# Patient Record
Sex: FEMALE | Race: OTHER | HISPANIC OR LATINO | URBAN - METROPOLITAN AREA
[De-identification: names, ages, dates, MRNs, and addresses within clinical notes are randomized per-mention and may not be internally consistent; named-entity substitution may affect disease eponyms.]

---

## 2018-09-29 ENCOUNTER — INPATIENT (INPATIENT)
Facility: HOSPITAL | Age: 53
LOS: 2 days | Discharge: HOME | DRG: 103 | End: 2018-10-02
Attending: INTERNAL MEDICINE | Admitting: INTERNAL MEDICINE
Payer: MEDICAID

## 2018-09-29 VITALS
OXYGEN SATURATION: 100 % | SYSTOLIC BLOOD PRESSURE: 133 MMHG | HEIGHT: 65 IN | HEART RATE: 54 BPM | DIASTOLIC BLOOD PRESSURE: 89 MMHG | RESPIRATION RATE: 18 BRPM | TEMPERATURE: 97 F | WEIGHT: 190.04 LBS

## 2018-09-29 DIAGNOSIS — Z98.51 TUBAL LIGATION STATUS: Chronic | ICD-10-CM

## 2018-09-29 DIAGNOSIS — R42 DIZZINESS AND GIDDINESS: ICD-10-CM

## 2018-09-29 LAB
ALBUMIN SERPL ELPH-MCNC: 3.5 G/DL — SIGNIFICANT CHANGE UP (ref 3.5–5)
ALP SERPL-CCNC: 100 U/L — SIGNIFICANT CHANGE UP (ref 40–120)
ALT FLD-CCNC: 29 U/L DA — SIGNIFICANT CHANGE UP (ref 10–60)
ANION GAP SERPL CALC-SCNC: 4 MMOL/L — LOW (ref 5–17)
APPEARANCE UR: CLEAR — SIGNIFICANT CHANGE UP
AST SERPL-CCNC: 21 U/L — SIGNIFICANT CHANGE UP (ref 10–40)
BASOPHILS # BLD AUTO: 0.1 K/UL — SIGNIFICANT CHANGE UP (ref 0–0.2)
BASOPHILS NFR BLD AUTO: 0.8 % — SIGNIFICANT CHANGE UP (ref 0–2)
BILIRUB SERPL-MCNC: 0.4 MG/DL — SIGNIFICANT CHANGE UP (ref 0.2–1.2)
BILIRUB UR-MCNC: NEGATIVE — SIGNIFICANT CHANGE UP
BUN SERPL-MCNC: 13 MG/DL — SIGNIFICANT CHANGE UP (ref 7–18)
CALCIUM SERPL-MCNC: 9 MG/DL — SIGNIFICANT CHANGE UP (ref 8.4–10.5)
CHLORIDE SERPL-SCNC: 103 MMOL/L — SIGNIFICANT CHANGE UP (ref 96–108)
CHOLEST SERPL-MCNC: 148 MG/DL — SIGNIFICANT CHANGE UP (ref 10–199)
CO2 SERPL-SCNC: 31 MMOL/L — SIGNIFICANT CHANGE UP (ref 22–31)
COLOR SPEC: YELLOW — SIGNIFICANT CHANGE UP
CREAT SERPL-MCNC: 0.74 MG/DL — SIGNIFICANT CHANGE UP (ref 0.5–1.3)
DIFF PNL FLD: ABNORMAL
EOSINOPHIL # BLD AUTO: 0 K/UL — SIGNIFICANT CHANGE UP (ref 0–0.5)
EOSINOPHIL NFR BLD AUTO: 0.4 % — SIGNIFICANT CHANGE UP (ref 0–6)
GLUCOSE SERPL-MCNC: 123 MG/DL — HIGH (ref 70–99)
GLUCOSE UR QL: NEGATIVE — SIGNIFICANT CHANGE UP
HCT VFR BLD CALC: 43.3 % — SIGNIFICANT CHANGE UP (ref 34.5–45)
HDLC SERPL-MCNC: 57 MG/DL — SIGNIFICANT CHANGE UP
HGB BLD-MCNC: 13.8 G/DL — SIGNIFICANT CHANGE UP (ref 11.5–15.5)
KETONES UR-MCNC: NEGATIVE — SIGNIFICANT CHANGE UP
LEUKOCYTE ESTERASE UR-ACNC: ABNORMAL
LIDOCAIN IGE QN: 138 U/L — SIGNIFICANT CHANGE UP (ref 73–393)
LIPID PNL WITH DIRECT LDL SERPL: 80 MG/DL — SIGNIFICANT CHANGE UP
LYMPHOCYTES # BLD AUTO: 1.2 K/UL — SIGNIFICANT CHANGE UP (ref 1–3.3)
LYMPHOCYTES # BLD AUTO: 13.5 % — SIGNIFICANT CHANGE UP (ref 13–44)
MCHC RBC-ENTMCNC: 28.4 PG — SIGNIFICANT CHANGE UP (ref 27–34)
MCHC RBC-ENTMCNC: 31.8 GM/DL — LOW (ref 32–36)
MCV RBC AUTO: 89.2 FL — SIGNIFICANT CHANGE UP (ref 80–100)
MONOCYTES # BLD AUTO: 0.4 K/UL — SIGNIFICANT CHANGE UP (ref 0–0.9)
MONOCYTES NFR BLD AUTO: 4.9 % — SIGNIFICANT CHANGE UP (ref 2–14)
NEUTROPHILS # BLD AUTO: 6.8 K/UL — SIGNIFICANT CHANGE UP (ref 1.8–7.4)
NEUTROPHILS NFR BLD AUTO: 80.4 % — HIGH (ref 43–77)
NITRITE UR-MCNC: NEGATIVE — SIGNIFICANT CHANGE UP
PH UR: 5 — SIGNIFICANT CHANGE UP (ref 5–8)
PLATELET # BLD AUTO: 176 K/UL — SIGNIFICANT CHANGE UP (ref 150–400)
POTASSIUM SERPL-MCNC: 3.9 MMOL/L — SIGNIFICANT CHANGE UP (ref 3.5–5.3)
POTASSIUM SERPL-SCNC: 3.9 MMOL/L — SIGNIFICANT CHANGE UP (ref 3.5–5.3)
PROT SERPL-MCNC: 8 G/DL — SIGNIFICANT CHANGE UP (ref 6–8.3)
PROT UR-MCNC: NEGATIVE — SIGNIFICANT CHANGE UP
RBC # BLD: 4.85 M/UL — SIGNIFICANT CHANGE UP (ref 3.8–5.2)
RBC # FLD: 12.8 % — SIGNIFICANT CHANGE UP (ref 10.3–14.5)
SODIUM SERPL-SCNC: 138 MMOL/L — SIGNIFICANT CHANGE UP (ref 135–145)
SP GR SPEC: 1.01 — SIGNIFICANT CHANGE UP (ref 1.01–1.02)
TOTAL CHOLESTEROL/HDL RATIO MEASUREMENT: 2.6 RATIO — LOW (ref 3.3–7.1)
TRIGL SERPL-MCNC: 54 MG/DL — SIGNIFICANT CHANGE UP (ref 10–149)
TROPONIN I SERPL-MCNC: <0.015 NG/ML — SIGNIFICANT CHANGE UP (ref 0–0.04)
TROPONIN I SERPL-MCNC: <0.015 NG/ML — SIGNIFICANT CHANGE UP (ref 0–0.04)
UROBILINOGEN FLD QL: NEGATIVE — SIGNIFICANT CHANGE UP
WBC # BLD: 8.5 K/UL — SIGNIFICANT CHANGE UP (ref 3.8–10.5)
WBC # FLD AUTO: 8.5 K/UL — SIGNIFICANT CHANGE UP (ref 3.8–10.5)

## 2018-09-29 PROCEDURE — 99285 EMERGENCY DEPT VISIT HI MDM: CPT

## 2018-09-29 PROCEDURE — 70450 CT HEAD/BRAIN W/O DYE: CPT | Mod: 26

## 2018-09-29 PROCEDURE — 76705 ECHO EXAM OF ABDOMEN: CPT | Mod: 26

## 2018-09-29 RX ORDER — MECLIZINE HCL 12.5 MG
25 TABLET ORAL ONCE
Qty: 0 | Refills: 0 | Status: COMPLETED | OUTPATIENT
Start: 2018-09-29 | End: 2018-09-29

## 2018-09-29 RX ORDER — ACETAMINOPHEN 500 MG
650 TABLET ORAL ONCE
Qty: 0 | Refills: 0 | Status: COMPLETED | OUTPATIENT
Start: 2018-09-29 | End: 2018-09-29

## 2018-09-29 RX ORDER — ONDANSETRON 8 MG/1
4 TABLET, FILM COATED ORAL ONCE
Qty: 0 | Refills: 0 | Status: COMPLETED | OUTPATIENT
Start: 2018-09-29 | End: 2018-09-29

## 2018-09-29 RX ORDER — NITROFURANTOIN MACROCRYSTAL 50 MG
100 CAPSULE ORAL ONCE
Qty: 0 | Refills: 0 | Status: COMPLETED | OUTPATIENT
Start: 2018-09-29 | End: 2018-09-29

## 2018-09-29 RX ORDER — METOCLOPRAMIDE HCL 10 MG
10 TABLET ORAL ONCE
Qty: 0 | Refills: 0 | Status: COMPLETED | OUTPATIENT
Start: 2018-09-29 | End: 2018-09-29

## 2018-09-29 RX ORDER — SODIUM CHLORIDE 9 MG/ML
1000 INJECTION INTRAMUSCULAR; INTRAVENOUS; SUBCUTANEOUS ONCE
Qty: 0 | Refills: 0 | Status: COMPLETED | OUTPATIENT
Start: 2018-09-29 | End: 2018-09-29

## 2018-09-29 RX ORDER — KETOROLAC TROMETHAMINE 30 MG/ML
30 SYRINGE (ML) INJECTION ONCE
Qty: 0 | Refills: 0 | Status: DISCONTINUED | OUTPATIENT
Start: 2018-09-29 | End: 2018-09-29

## 2018-09-29 RX ORDER — RIVAROXABAN 15 MG-20MG
20 KIT ORAL EVERY 24 HOURS
Qty: 0 | Refills: 0 | Status: DISCONTINUED | OUTPATIENT
Start: 2018-09-29 | End: 2018-10-02

## 2018-09-29 RX ADMIN — Medication 25 MILLIGRAM(S): at 16:44

## 2018-09-29 RX ADMIN — Medication 30 MILLIGRAM(S): at 22:44

## 2018-09-29 RX ADMIN — SODIUM CHLORIDE 4000 MILLILITER(S): 9 INJECTION INTRAMUSCULAR; INTRAVENOUS; SUBCUTANEOUS at 16:45

## 2018-09-29 RX ADMIN — ONDANSETRON 4 MILLIGRAM(S): 8 TABLET, FILM COATED ORAL at 23:40

## 2018-09-29 RX ADMIN — Medication 30 MILLIGRAM(S): at 19:16

## 2018-09-29 RX ADMIN — ONDANSETRON 4 MILLIGRAM(S): 8 TABLET, FILM COATED ORAL at 16:57

## 2018-09-29 RX ADMIN — Medication 650 MILLIGRAM(S): at 19:16

## 2018-09-29 RX ADMIN — Medication 10 MILLIGRAM(S): at 19:16

## 2018-09-29 RX ADMIN — Medication 30 MILLILITER(S): at 23:40

## 2018-09-29 RX ADMIN — Medication 100 MILLIGRAM(S): at 18:46

## 2018-09-29 RX ADMIN — Medication 650 MILLIGRAM(S): at 16:57

## 2018-09-29 NOTE — ED ADULT NURSE NOTE - NSIMPLEMENTINTERV_GEN_ALL_ED
Implemented All Fall with Harm Risk Interventions:  [place] to call system. Call bell, personal items and telephone within reach. Instruct patient to call for assistance. Room bathroom lighting operational. Non-slip footwear when patient is off stretcher. Physically safe environment: no spills, clutter or unnecessary equipment. Stretcher in lowest position, wheels locked, appropriate side rails in place. Provide visual cue, wrist band, yellow gown, etc. Monitor gait and stability. Monitor for mental status changes and reorient to person, place, and time. Review medications for side effects contributing to fall risk. Reinforce activity limits and safety measures with patient and family. Provide visual clues: red socks.

## 2018-09-29 NOTE — ED PROVIDER NOTE - NEUROLOGICAL, MLM
Alert and oriented, no focal deficits, no motor or sensory deficits. Alert and oriented, no focal deficits, no motor or sensory deficits. + right sided horizontal nystagmus

## 2018-09-29 NOTE — ED PROVIDER NOTE - OBJECTIVE STATEMENT
52 y/o F PMHx HTN, CVA, Afib presents to ED c/o dizziness that started around 1300. Had similar symptoms before 2 months ago. PMD did a MRI which was unremarkable. Pt notes left sided paralysis. Similar episode today. Denies nausea, vomiting, and SOB. Pt is allergic to penicillin. 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 Had similar symptoms before 2 months ago. PMD did a MRI which was unremarkable. Similar episode of dizziness today, with room spinning, nausea, vomiting, and SOB. + epigastric pain after vomiting. Pt is allergic to penicillin. No cp.

## 2018-09-29 NOTE — ED ADULT NURSE NOTE - OBJECTIVE STATEMENT
Pt states that she is having headache and nausea 2 hr PTA. HX CVA on the left. co syncope. son states that she passed out for sometime.

## 2018-09-29 NOTE — ED PROVIDER NOTE - NS ED MD DISPO DIVISION
Normal vision: sees adequately in most situations; can see medication labels, newsprint
[facility]

## 2018-09-29 NOTE — ED PROVIDER NOTE - PROGRESS NOTE DETAILS
pt now having ha. ct head no acute hemorrhage. gave toradol and reglan. vertigo had improved with meclizine, but pt now still nauseated. got zofran x 2. ECG done, . Will admit given she is not back to baseline. given RF for cva, low threshold to repeat mRI. tolerating po liquids. ua wbc of 6-10, neg nitrites, pt has no urinary sx.

## 2018-09-30 DIAGNOSIS — R42 DIZZINESS AND GIDDINESS: ICD-10-CM

## 2018-09-30 DIAGNOSIS — I48.91 UNSPECIFIED ATRIAL FIBRILLATION: ICD-10-CM

## 2018-09-30 DIAGNOSIS — Z29.9 ENCOUNTER FOR PROPHYLACTIC MEASURES, UNSPECIFIED: ICD-10-CM

## 2018-09-30 DIAGNOSIS — I63.9 CEREBRAL INFARCTION, UNSPECIFIED: ICD-10-CM

## 2018-09-30 DIAGNOSIS — I10 ESSENTIAL (PRIMARY) HYPERTENSION: ICD-10-CM

## 2018-09-30 DIAGNOSIS — R06.09 OTHER FORMS OF DYSPNEA: ICD-10-CM

## 2018-09-30 LAB
APTT BLD: 30 SEC — SIGNIFICANT CHANGE UP (ref 27.5–37.4)
CK MB BLD-MCNC: 1.9 % — SIGNIFICANT CHANGE UP (ref 0–3.5)
CK MB CFR SERPL CALC: 2 NG/ML — SIGNIFICANT CHANGE UP (ref 0–3.6)
CK SERPL-CCNC: 105 U/L — SIGNIFICANT CHANGE UP (ref 21–215)
INR BLD: 1.43 RATIO — HIGH (ref 0.88–1.16)
LACTATE SERPL-SCNC: 2.2 MMOL/L — HIGH (ref 0.7–2)
LACTATE SERPL-SCNC: 2.3 MMOL/L — HIGH (ref 0.7–2)
PROLACTIN SERPL-MCNC: 26 NG/ML — HIGH (ref 3.4–24.1)
PROTHROM AB SERPL-ACNC: 15.7 SEC — HIGH (ref 9.8–12.7)
TROPONIN I SERPL-MCNC: <0.015 NG/ML — SIGNIFICANT CHANGE UP (ref 0–0.04)

## 2018-09-30 PROCEDURE — 70450 CT HEAD/BRAIN W/O DYE: CPT | Mod: 26

## 2018-09-30 RX ORDER — ONDANSETRON 8 MG/1
4 TABLET, FILM COATED ORAL EVERY 6 HOURS
Qty: 0 | Refills: 0 | Status: DISCONTINUED | OUTPATIENT
Start: 2018-09-30 | End: 2018-10-02

## 2018-09-30 RX ORDER — ASPIRIN/CALCIUM CARB/MAGNESIUM 324 MG
81 TABLET ORAL DAILY
Qty: 0 | Refills: 0 | Status: DISCONTINUED | OUTPATIENT
Start: 2018-09-30 | End: 2018-10-02

## 2018-09-30 RX ORDER — METOPROLOL TARTRATE 50 MG
2.5 TABLET ORAL ONCE
Qty: 0 | Refills: 0 | Status: COMPLETED | OUTPATIENT
Start: 2018-09-30 | End: 2018-09-30

## 2018-09-30 RX ORDER — METOPROLOL TARTRATE 50 MG
50 TABLET ORAL
Qty: 0 | Refills: 0 | Status: DISCONTINUED | OUTPATIENT
Start: 2018-09-30 | End: 2018-09-30

## 2018-09-30 RX ORDER — METOPROLOL TARTRATE 50 MG
25 TABLET ORAL
Qty: 0 | Refills: 0 | Status: DISCONTINUED | OUTPATIENT
Start: 2018-09-30 | End: 2018-09-30

## 2018-09-30 RX ORDER — ENOXAPARIN SODIUM 100 MG/ML
40 INJECTION SUBCUTANEOUS DAILY
Qty: 0 | Refills: 0 | Status: DISCONTINUED | OUTPATIENT
Start: 2018-09-30 | End: 2018-09-30

## 2018-09-30 RX ORDER — ATORVASTATIN CALCIUM 80 MG/1
40 TABLET, FILM COATED ORAL AT BEDTIME
Qty: 0 | Refills: 0 | Status: DISCONTINUED | OUTPATIENT
Start: 2018-09-30 | End: 2018-10-02

## 2018-09-30 RX ORDER — LISINOPRIL 2.5 MG/1
5 TABLET ORAL DAILY
Qty: 0 | Refills: 0 | Status: DISCONTINUED | OUTPATIENT
Start: 2018-09-30 | End: 2018-09-30

## 2018-09-30 RX ORDER — PANTOPRAZOLE SODIUM 20 MG/1
40 TABLET, DELAYED RELEASE ORAL
Qty: 0 | Refills: 0 | Status: DISCONTINUED | OUTPATIENT
Start: 2018-09-30 | End: 2018-10-02

## 2018-09-30 RX ORDER — METOPROLOL TARTRATE 50 MG
50 TABLET ORAL
Qty: 0 | Refills: 0 | Status: DISCONTINUED | OUTPATIENT
Start: 2018-09-30 | End: 2018-10-02

## 2018-09-30 RX ADMIN — Medication 25 MILLIGRAM(S): at 17:28

## 2018-09-30 RX ADMIN — Medication 81 MILLIGRAM(S): at 11:54

## 2018-09-30 RX ADMIN — RIVAROXABAN 20 MILLIGRAM(S): KIT at 17:28

## 2018-09-30 RX ADMIN — ATORVASTATIN CALCIUM 40 MILLIGRAM(S): 80 TABLET, FILM COATED ORAL at 21:44

## 2018-09-30 RX ADMIN — RIVAROXABAN 20 MILLIGRAM(S): KIT at 00:04

## 2018-09-30 RX ADMIN — Medication 25 MILLIGRAM(S): at 06:18

## 2018-09-30 RX ADMIN — PANTOPRAZOLE SODIUM 40 MILLIGRAM(S): 20 TABLET, DELAYED RELEASE ORAL at 09:16

## 2018-09-30 NOTE — H&P ADULT - PROBLEM SELECTOR PLAN 5
IMPROVE VTE Individual Risk Assessment  RISK                                                          Points  [] Previous VTE                                           3  [] Thrombophilia                                        2  [] Lower limb paralysis                              2   [] Current Cancer                                       2   [x] Immobilization > 24 hrs                        1  [] ICU/CCU stay > 24 hours                       1  [x] Age > 60                                                   1  IMPROVE VTE Score = 2  levonox for DVT chemoprophylaxis

## 2018-09-30 NOTE — H&P ADULT - HISTORY OF PRESENT ILLNESS
53 y Female from home with PMHx of HTN, CVA with left sided residual paralysis, A fib(on Xeralto) presents with complain of dizziness for last few hours. She described dizziness as spinning of room. She had similar episode of dizziness 2 months ago but all workup came back negative at that time. Pt also complains of epigastric pain with nausea and 1 episode of non bloody copious vomiting. Pt denies any fever, chills, dyspnea palpitation, Chest pain, change in bowel or urinary habit, any new loss of sensation or weakness and any LOC.

## 2018-09-30 NOTE — H&P ADULT - PROBLEM SELECTOR PLAN 2
pt takes Xeralto 20 mg once daily at home  -will continue home dose. pt takes Xeralto 20 mg once daily at home.  -will continue home dose.

## 2018-09-30 NOTE — H&P ADULT - ASSESSMENT
53 y Female from home with PMHx of HTN, CVA with left sided residual paralysis, A fib(on Xeralto) presents with complain of dizziness for last few hours. She described dizziness as spinning of room. She had similar episode of dizziness 2 months ago but all workup came back negative at that time. Pt also complains of epigastric pain with nausea and 1 episode of non bloody copious vomiting

## 2018-09-30 NOTE — ED ADULT NURSE REASSESSMENT NOTE - NS ED NURSE REASSESS COMMENT FT1
Troponin x 2 was negative, labs drawn sent to lab, iv placed 20g to right top of hand. Daughter at bedside.

## 2018-09-30 NOTE — CHART NOTE - NSCHARTNOTEFT_GEN_A_CORE
Pt is running tachycardic to 110s, pt is asymptomatic, vitals are reviewed, will give 2.5mg iv push metoprolol and increase metoprolol to 50mg BID. D/w PGY 3 Dr. Hema.

## 2018-09-30 NOTE — H&P ADULT - ATTENDING COMMENTS
I agree with the above information  family at bedside  all questions answered  admitted for uncontrolled vertigo, code stroke noted, imaging stable with no additional acute findings  concern for seizure like activity. F/u EEG  F/u MRA Head/Neck. Neuro on board. Adjust management accordingly. Tele.     Additional management per medical consultants appreciated

## 2018-09-30 NOTE — H&P ADULT - PROBLEM SELECTOR PLAN 1
pt came with dizziness for last few hours. Similar episode 2 months ago.  -admit to tele monitoring.  -cardiac troponin negative.  - CT head is negative. pt came with dizziness for last few hours. Similar episode 2 months ago.  -admit to tele monitoring.  -cardiac troponin negative.  - CT head is negative for any acute finding though it shows previous stroke.  -Neurologist Dr. Thomas.

## 2018-09-30 NOTE — H&P ADULT - PROBLEM SELECTOR PLAN 3
pt has history of CVA with residual left sided weakness of arm and legs.  -Continue with aspirin, atorvastatin and metoprolol.

## 2018-09-30 NOTE — ED ADULT NURSE REASSESSMENT NOTE - NS ED NURSE REASSESS COMMENT FT1
Pt with AMS had a period of unresponsiveness with her head turned to the left, code stroke called has hx of stroke with left side weakness. code stroke called fs 110mg.dl  Heart rate 130bpm. regained consciousness, Resident assessed pt,  able to move her right arm does not move her left.  CT in progress.

## 2018-09-30 NOTE — CHART NOTE - NSCHARTNOTEFT_GEN_A_CORE
Code Stroke called in ER holding - patient had a period of unresponsiveness x 3-4 minutes w/ her head turned left and not responding to questions. Patient seen and examined at bedside in NAD - answers questions appropriate but no recollection of the past five minutes, no postictal confusion 2/2 knowing shes in the ED. Follows commands and no nystagmus on examination.   - NIHSS TBD  - CT head urgent Code Stroke called in ER holding - patient had a period of unresponsiveness x 3-4 minutes w/ her head turned left and not responding to questions. Patient seen and examined at bedside in NAD - answers questions appropriate but no recollection of the past five minutes, no postictal confusion 2/2 knowing shes in the ED. Follows commands and no nystagmus on examination - no bowel or bladder incontinence.  - NIHSS TBD  - CT head STAT Code Stroke called in ER holding - patient had a period of unresponsiveness x 3-4 minutes w/ her head turned left and not responding to questions. Was taken urgently for repeat CT head.     Patient seen and examined at bedside in NAD - answers questions appropriate but no recollection of the past five minutes, no postictal confusion 2/2 knowing shes in the ED. Follows commands and no nystagmus on examination - no bowel or bladder incontinence.    Allergies  penicillin (Hives)      PAST MEDICAL & SURGICAL HISTORY:  CVA (cerebral vascular accident)  Hypertension  Atrial fibrillation  H/O tubal ligation      Vital Signs Last 24 Hrs  T(C): 36.5 (30 Sep 2018 04:15), Max: 36.5 (30 Sep 2018 04:15)  T(F): 97.7 (30 Sep 2018 04:15), Max: 97.7 (30 Sep 2018 04:15)  HR: 110 (30 Sep 2018 04:15) (54 - 110)  BP: 128/89 (30 Sep 2018 04:15) (126/70 - 133/89)  BP(mean): --  RR: 18 (30 Sep 2018 04:15) (18 - 18)  SpO2: 100% (30 Sep 2018 04:15) (100% - 100%)          GENERAL: The patient is awake and alert in no apparent distress.   HEENT: Head is normocephalic and atraumatic. Extraocular muscles are intact. Mucous membranes are moist. No throat erythema/exudates no lymphadenopathy, no JVD,   NECK: Supple.  LUNGS: Clear to auscultation BL without wheezing, rales or rhonchi; respirations unlabored  HEART: Regular rate and rhythm ,+S1/+S2, no murmurs, rubs, gallops  ABDOMEN: Soft, nontender, and nondistended, no rebound, guarding rigidity, bowel sounds in all 4 quadrants  EXTREMITIES: Without any cyanosis, clubbing, rash, lesions or edema.  SKIN: No new rashes or lesions.  NEUROLOGIC: alert, awake and oriented to place, person and time. RUE 5/5, RLE 5/5, LUE and LLE 0/5 (from prior stroke)  PSYCH: No new changes.                            13.8   8.5   )-----------( 176      ( 29 Sep 2018 16:29 )             43.3     -    138  |  103  |  13  ----------------------------<  123<H>  3.9   |  31  |  0.74    Ca    9.0      29 Sep 2018 16:29    TPro  8.0  /  Alb  3.5  /  TBili  0.4  /  DBili  x   /  AST  21  /  ALT  29  /  AlkPhos  100  09-29         LIVER FUNCTIONS - ( 29 Sep 2018 16:29 )  Alb: 3.5 g/dL / Pro: 8.0 g/dL / ALK PHOS: 100 U/L / ALT: 29 U/L DA / AST: 21 U/L / GGT: x         Urinalysis Basic - ( 29 Sep 2018 16:36 )    Color: Yellow / Appearance: Clear / S.015 / pH: x  Gluc: x / Ketone: Negative  / Bili: Negative / Urobili: Negative   Blood: x / Protein: Negative / Nitrite: Negative   Leuk Esterase: Small / RBC: 2-5 /HPF / WBC 6-10 /HPF   Sq Epi: x / Non Sq Epi: Few /HPF / Bacteria: Moderate /HPF        Assessment- Rapid Response called for 53y year old Female with a past medical history of     Plan- Code Stroke called in ER holding - patient had a period of unresponsiveness w/ her head turned left and not responding to questions, lasting for 3-4 minutes. Was taken urgently for repeat CT head.     Patient seen and examined at bedside, now back to baseline, in NAD - answers questions appropriate but no recollection of the past five minutes, no postictal confusion. Follows commands and no nystagmus on examination - no bowel or bladder incontinence. NIHSS score is 9 but only because of prior residual left sided weakness. No new focal deficit on exam.     Initial HPI:  53 y Female from home with PMHx of HTN, CVA with left sided residual paralysis, A fib(on Xarelto) presents with complain of dizziness for last few hours. She described dizziness as spinning of room. She had similar episode of dizziness 2 months ago but all workup came back negative at that time. Pt also complains of epigastric pain with nausea and 1 episode of non bloody copious vomiting. Pt denies any fever, chills, dyspnea palpitation, chest pain, change in bowel or urinary habit, any new loss of sensation or weakness and any LOC.    Allergies  penicillin (Hives)      PAST MEDICAL & SURGICAL HISTORY:  CVA (cerebral vascular accident)  Hypertension  Atrial fibrillation  H/O tubal ligation      Vital Signs Last 24 Hrs  T(C): 36.5 (30 Sep 2018 04:15), Max: 36.5 (30 Sep 2018 04:15)  T(F): 97.7 (30 Sep 2018 04:15), Max: 97.7 (30 Sep 2018 04:15)  HR: 110 (30 Sep 2018 04:15) (54 - 110)  BP: 128/89 (30 Sep 2018 04:15) (126/70 - 133/89)  BP(mean): --  RR: 18 (30 Sep 2018 04:15) (18 - 18)  SpO2: 100% (30 Sep 2018 04:15) (100% - 100%)          GENERAL: The patient is awake and alert in no apparent distress.   HEENT: Head is normocephalic and atraumatic. Extraocular muscles are intact. Mucous membranes are moist. No throat erythema/exudates no lymphadenopathy, no JVD,   NECK: Supple.  LUNGS: Clear to auscultation BL without wheezing, rales or rhonchi; respirations unlabored  HEART: Regular rate and rhythm ,+S1/+S2, no murmurs, rubs, gallops  ABDOMEN: Soft, nontender, and nondistended, no rebound, guarding rigidity, bowel sounds in all 4 quadrants  EXTREMITIES: Without any cyanosis, clubbing, rash, lesions or edema.  SKIN: No new rashes or lesions.  NEUROLOGIC: alert, awake and oriented to place, person and time. RUE 5/5, RLE 5/5, LUE and LLE 0/5 (from prior stroke)  PSYCH: No new changes.                            13.8   8.5   )-----------( 176      ( 29 Sep 2018 16:29 )             43.3         138  |  103  |  13  ----------------------------<  123<H>  3.9   |  31  |  0.74    Ca    9.0      29 Sep 2018 16:29    TPro  8.0  /  Alb  3.5  /  TBili  0.4  /  DBili  x   /  AST  21  /  ALT    /  AlkPhos  100           LIVER FUNCTIONS - ( 29 Sep 2018 16:29 )  Alb: 3.5 g/dL / Pro: 8.0 g/dL / ALK PHOS: 100 U/L / ALT: 29 U/L DA / AST: 21 U/L / GGT: x         Urinalysis Basic - ( 29 Sep 2018 16:36 )    Color: Yellow / Appearance: Clear / S.015 / pH: x  Gluc: x / Ketone: Negative  / Bili: Negative / Urobili: Negative   Blood: x / Protein: Negative / Nitrite: Negative   Leuk Esterase: Small / RBC: 2-5 /HPF / WBC 6-10 /HPF   Sq Epi: x / Non Sq Epi: Few /HPF / Bacteria: Moderate /HPF        Assessment- 54 yo F with PMH of HTN, prior CVA few years back with residual left sided weakness, ambulates with cane; initially came to ED with dizziness starting yesterday, described as room spinning sensation with right sided headache. Initial CT head negative for any acute events. EKG: NSR. Code stroke activated at 4:01 am as patient was noted to be of unresponsiveness w/ her head turned left and not responding to questions, lasting for 3-4 minutes    Plan-  CT head repeat negative for any acute events.   Patient back to baseline, responding question appropriately, no new focal deficit.   NIHSS score 9 but its because of residual left sided weakness from prior stroke.   Discussed with neurologist Dr. Thomas, no TPA recommended as patient is back to baseline and on Xarelto.  Monitor on tele  Neuro checks q4  Seizure, fall and aspiration precautions  Bedside swallow evaluation done and passed  PT evaluation ordered  f/u EEG due to concern of seizure like activity, lactate slightly elevated, f/u prolactin. \    Discussed with attending.

## 2018-09-30 NOTE — H&P ADULT - PROBLEM SELECTOR PLAN 4
pt takes lisinopril 5mg at home for HTN.  - Will continue with home dose with parameters.  - blood pressure monitor. pt takes lisinopril 5mg at home for HTN.  - Will hold lisinopril 5mg for now.  - blood pressure monitor.

## 2018-09-30 NOTE — CONSULT NOTE ADULT - SUBJECTIVE AND OBJECTIVE BOX
Patient is a 53y old  Female who presents with a chief complaint of Dizziness (30 Sep 2018 12:09)      HPI:  Admitted because of vertigo described as a spinning sensation- worsened by head turning to the left. She has left spastic hemiplegia following a right hemisphere stroke and ambulates with a cane. She does admit to a headache as well. She admitted nausea and vomiting.     PAST MEDICAL & SURGICAL HISTORY:  CVA (cerebral vascular accident)  Hypertension  Atrial fibrillation  H/O tubal ligation      FAMILY HISTORY:  No pertinent family history in first degree relatives        Social Hx:  Nonsmoker, no drug or alcohol use    MEDICATIONS  (STANDING):  aspirin  chewable 81 milliGRAM(s) Oral daily  atorvastatin 40 milliGRAM(s) Oral at bedtime  metoprolol tartrate 25 milliGRAM(s) Oral two times a day  pantoprazole   Suspension 40 milliGRAM(s) Oral before breakfast  rivaroxaban 20 milliGRAM(s) Oral every 24 hours       Allergies    penicillin (Hives)    Intolerances        ROS: Pertinent positives in HPI, all other ROS were reviewed and are negative.      Vital Signs Last 24 Hrs  T(C): 37.2 (30 Sep 2018 12:29), Max: 37.2 (30 Sep 2018 12:29)  T(F): 99 (30 Sep 2018 12:29), Max: 99 (30 Sep 2018 12:29)  HR: 104 (30 Sep 2018 12:29) (54 - 110)  BP: 124/79 (30 Sep 2018 12:29) (122/81 - 133/89)  BP(mean): --  RR: 18 (30 Sep 2018 12:29) (18 - 18)  SpO2: 98% (30 Sep 2018 12:29) (98% - 100%)        Constitutional: awake and alert.  HEENT: PERRLA, EOMI,   Neck: Supple.  Respiratory: Breath sounds are clear bilaterally  Cardiovascular: S1 and S2, regular / irregular rhythm  Gastrointestinal: soft, nontender  Extremities:  no edema  Vascular: Caritid Bruit - no  Musculoskeletal: no joint swelling/tenderness, no abnormal movements  Skin: No rashes    Neurological exam:  HF: A x O x 3. Appropriately interactive, normal affect. Speech fluent, No Aphasia or paraphasic errors. Naming /repetition intact   CN: JERICA, EOMI, VF diminished left hemifield, facial sensation normal, left lower half of face is weaker, tongue midline, Palate moves equally, SCM equal bilaterally  Motor: No pronator drift, Strength 5/5 in all righ ext, normal bulk and tone, no tremor, rigidity or bradykinesia.  Left spastic hemiplegia 01-5  Sens: Intact to light touch / PP/ VS/ JS    Reflexes: asymmetric brisk on the left and rightnormal . BJ 2+, BR 2+, KJ 2+, AJ 2+, downgoing toes R, Babinski present L  Coord:  No FNFA, dysmetria, SE intact right  Gait/Balance: Spastic hemiplegic gait    NIHSS:0    Labs:                        13.8   8.5   )-----------( 176      ( 29 Sep 2018 16:29 )             43.3     09-29    138  |  103  |  13  ----------------------------<  123<H>  3.9   |  31  |  0.74    Ca    9.0      29 Sep 2018 16:29    TPro  8.0  /  Alb  3.5  /  TBili  0.4  /  DBili  x   /  AST  21  /  ALT  29  /  AlkPhos  100  09-29 09-29 Chol 148 LDL 80 HDL 57 Trig 54  PT/INR - ( 30 Sep 2018 04:42 )   PT: 15.7 sec;   INR: 1.43 ratio         PTT - ( 30 Sep 2018 04:42 )  PTT:30.0 sec    Radiology report:  - CT head:< from: CT Head No Cont (09.30.18 @ 04:24) >  EXAM:  CT BRAIN                            PROCEDURE DATE:  09/30/2018          INTERPRETATION:  CLINICAL INFORMATION: Episode of unresponsiveness and   head turning to the left, now resolved.    TECHNIQUE: Contiguous noncontrast axial images of the head were obtained   from the skull base to the vertex with coronal and sagittal reformats.   Beam hardening artifact slightly obscures evaluation of the posterior   fossa and brainstem.    COMPARISON: CT brain 9/29/2018. MRI brain 12/27/2016.    FINDINGS:  There is cystic encephalomalacia involving a large portion of the right   middle cerebral artery distribution.    There is no acute intracranial hemorrhage, hydrocephalus, midline shift,   extra-axial fluid collection or mass effect.     There is periventricular white matter lucency which is nonspecific but   likely the sequela of chronic microvascular ischemic change given   presence of intracranial atherosclerotic calcifications.    The paranasal sinuses and mastoids are clear. The calvarium is intact.    IMPRESSION:  No hemorrhage or mass effect. Old right middle cerebral artery infarct.    If there are new or persistent symptoms, consider further evaluation with   brain MRI if clinically warranted and if there are no contraindications.    Call Back:  I discussed this case with Dr. Herron at 4:34 AM on   9/30/2018.  Hospital policies for call back including read back policy   were followed. The verbal communication call back supplements this   written report.    - MRI brain  - MRA head/carotids  - EEG    A/P:    Vertigo due to TIA or migraine. Unlike BPV -: the Karen Hallpike maneuver increases vertigo with a left beating and rotatory nystamus clockwise in all positions of head and induced by left horizontal gaze and negative HIT  -  - ASA/ PLavix  - Statin  - Dysphagia screen  - DVT prophylaxia  - MRI/A brain-carotids  - PT eval  - Speech/swallow eval    Total Critical Care Time spent:

## 2018-09-30 NOTE — ED ADULT NURSE REASSESSMENT NOTE - NS ED NURSE REASSESS COMMENT FT1
received patient from cat scan done.patient awake ,alert,oriented. to 27 b . assessed by Dr. SANTO and medical residents.being monitored.

## 2018-10-01 ENCOUNTER — TRANSCRIPTION ENCOUNTER (OUTPATIENT)
Age: 53
End: 2018-10-01

## 2018-10-01 LAB
ANION GAP SERPL CALC-SCNC: 7 MMOL/L — SIGNIFICANT CHANGE UP (ref 5–17)
BUN SERPL-MCNC: 13 MG/DL — SIGNIFICANT CHANGE UP (ref 7–18)
CALCIUM SERPL-MCNC: 9.4 MG/DL — SIGNIFICANT CHANGE UP (ref 8.4–10.5)
CHLORIDE SERPL-SCNC: 106 MMOL/L — SIGNIFICANT CHANGE UP (ref 96–108)
CO2 SERPL-SCNC: 28 MMOL/L — SIGNIFICANT CHANGE UP (ref 22–31)
CREAT SERPL-MCNC: 0.74 MG/DL — SIGNIFICANT CHANGE UP (ref 0.5–1.3)
GLUCOSE SERPL-MCNC: 90 MG/DL — SIGNIFICANT CHANGE UP (ref 70–99)
HCG SERPL-ACNC: 3 MIU/ML — SIGNIFICANT CHANGE UP
HCT VFR BLD CALC: 42.8 % — SIGNIFICANT CHANGE UP (ref 34.5–45)
HGB BLD-MCNC: 14.1 G/DL — SIGNIFICANT CHANGE UP (ref 11.5–15.5)
INR BLD: 2.05 RATIO — HIGH (ref 0.88–1.16)
MCHC RBC-ENTMCNC: 29 PG — SIGNIFICANT CHANGE UP (ref 27–34)
MCHC RBC-ENTMCNC: 32.9 GM/DL — SIGNIFICANT CHANGE UP (ref 32–36)
MCV RBC AUTO: 88.4 FL — SIGNIFICANT CHANGE UP (ref 80–100)
PLATELET # BLD AUTO: 178 K/UL — SIGNIFICANT CHANGE UP (ref 150–400)
POTASSIUM SERPL-MCNC: 3.5 MMOL/L — SIGNIFICANT CHANGE UP (ref 3.5–5.3)
POTASSIUM SERPL-SCNC: 3.5 MMOL/L — SIGNIFICANT CHANGE UP (ref 3.5–5.3)
PROTHROM AB SERPL-ACNC: 22.7 SEC — HIGH (ref 9.8–12.7)
RBC # BLD: 4.85 M/UL — SIGNIFICANT CHANGE UP (ref 3.8–5.2)
RBC # FLD: 12.9 % — SIGNIFICANT CHANGE UP (ref 10.3–14.5)
SODIUM SERPL-SCNC: 141 MMOL/L — SIGNIFICANT CHANGE UP (ref 135–145)
WBC # BLD: 7.2 K/UL — SIGNIFICANT CHANGE UP (ref 3.8–10.5)
WBC # FLD AUTO: 7.2 K/UL — SIGNIFICANT CHANGE UP (ref 3.8–10.5)

## 2018-10-01 PROCEDURE — 70496 CT ANGIOGRAPHY HEAD: CPT | Mod: 26

## 2018-10-01 PROCEDURE — 95819 EEG AWAKE AND ASLEEP: CPT | Mod: 26

## 2018-10-01 RX ORDER — METOPROLOL TARTRATE 50 MG
2.5 TABLET ORAL ONCE
Qty: 0 | Refills: 0 | Status: COMPLETED | OUTPATIENT
Start: 2018-10-01 | End: 2018-10-01

## 2018-10-01 RX ADMIN — ATORVASTATIN CALCIUM 40 MILLIGRAM(S): 80 TABLET, FILM COATED ORAL at 22:57

## 2018-10-01 RX ADMIN — Medication 81 MILLIGRAM(S): at 11:32

## 2018-10-01 RX ADMIN — Medication 50 MILLIGRAM(S): at 17:19

## 2018-10-01 RX ADMIN — Medication 2.5 MILLIGRAM(S): at 17:19

## 2018-10-01 RX ADMIN — PANTOPRAZOLE SODIUM 40 MILLIGRAM(S): 20 TABLET, DELAYED RELEASE ORAL at 06:13

## 2018-10-01 RX ADMIN — Medication 50 MILLIGRAM(S): at 06:08

## 2018-10-01 RX ADMIN — RIVAROXABAN 20 MILLIGRAM(S): KIT at 17:19

## 2018-10-01 NOTE — DISCHARGE NOTE ADULT - PATIENT PORTAL LINK FT
You can access the [facility] Patient Portal, offered by [facility], by registering with the following website: [email or web address]

## 2018-10-01 NOTE — PHYSICAL THERAPY INITIAL EVALUATION ADULT - PASSIVE RANGE OF MOTION EXAMINATION, REHAB EVAL
Right UE Passive ROM was WNL (within normal limits)/Right LE Passive ROM was WNL (within normal limits)

## 2018-10-01 NOTE — EEG REPORT - NS EEG TEXT BOX
[facility]   COMPREHENSIVE EPILEPSY CENTER   REPORT OF ROUTINE    [facility]: [address], [place] [address], Ph#: [phone]  [facility]: [address], [place] [address], Ph#: [phone]  Office: [address] [facility], [facility] [address], [place] [address] Ph#: [phone]    Patient Name: GOLDIE NOLASCO  Age and : 53y (64)  MRN #: [id number]  Location: [place] [address]    Study Date: 10/1/18    _____________________________________________________________  TECHNICAL INFORMATION    EEG Placement and Labeling of Electrodes:  The EEG was performed utilizing 20 channels referential EEG connections (coronal over temporal over parasagittal montage) using all standard 10-20 electrode placements with EKG.  Recording was at a sampling rate of 256 samples per second per channel.  Rolly and seizure detection algorithms were utilized.    _____________________________________________________________  HISTORY:  Patient is a 53y old  Female who presents with a chief complaint of Dizziness (01 Oct 2018 13:44)  - h/o CVA, concern for seizure    MEDICATIONS  (STANDING):  aspirin  chewable 81 milliGRAM(s) Oral daily  atorvastatin 40 milliGRAM(s) Oral at bedtime  metoprolol tartrate 50 milliGRAM(s) Oral two times a day  pantoprazole   Suspension 40 milliGRAM(s) Oral before breakfast  rivaroxaban 20 milliGRAM(s) Oral every 24 hours    _____________________________________________________________  STUDY INTERPRETATION    Background:  The background was continuous, spontaneously variable, reactive, and predominantly consisted of alpha activities. During wakefulness, the posteriorly dominant rhythm consisted of an asymmetric (left>right), well-modulated 9-10 Hz activity, that attenuated to eye opening.      Focal slowing:   Continuous polymorphic right sided slowing, theta and delta, fronto-central predominant    Sleep:  Drowsiness was characterized by fragmentation, attenuation, and slowing of the background activity.    Stage II sleep transients were not recorded.    Non-epileptiform Paroxysmal Abnormalities:  None    Interictal Epileptiform Abnormalities:   None    Ictal Abnormalities:   No clinical events.  No electrographic seizures.    Activation Procedures:   Hyperventilation was not performed.    Photic stimulation was not performed.     Artifacts:  Intermittent myogenic and movement artifacts were noted.    ECG:  The heart rate on single channel ECG was predominantly between  BPM, irregularly, irregular.    _____________________________________________________________  EEG CLASSIFICATION/SUMMARY:    Abnormal EEG in the awake, drowsy states due to  - Continuous polymorphic right sided slowing, theta and delta, fronto-central predominant    _____________________________________________________________  CLINICAL IMPRESSION:     Abnormal EEG study consistent with   - structural or functional cerebral dysfunction in the right hemisphere, likely fronto-central.    No epileptiform discharges or seizures were present.      Jesus Moncada MD PhD  Attending Physician, [facility] [place]

## 2018-10-01 NOTE — DISCHARGE NOTE ADULT - CARE PLAN
Principal Discharge DX:	Migraine  Goal:	Prevention of future recurrences  Assessment and plan of treatment:	You were diagnosed with migraine with aura. Take prescribed topiramate. Follow up with your PCP within 1 week of discharge.  Secondary Diagnosis:	Atrial fibrillation  Goal:	Prevention of future recurrences  Assessment and plan of treatment:	You were diagnosed with atrial fibrillation. Take prescribed metoprolol and Xarelto. Follow up with your PCP within 1 week of discharge.  Secondary Diagnosis:	CVA (cerebral vascular accident)  Goal:	Prevention of future recurrences  Assessment and plan of treatment:	You were diagnosed with CVA. Your medication regimen has changed. Take your medications. Follow up with your PCP within 1 week of discharge.  Secondary Diagnosis:	Hypertension  Goal:	SBP < 140  Assessment and plan of treatment:	You were diagnosed with hypertension. Your medication regimen has changed. Take your medications. Follow up with your PCP within 1 week of discharge.

## 2018-10-01 NOTE — PHYSICAL THERAPY INITIAL EVALUATION ADULT - IMPAIRMENTS CONTRIBUTING TO GAIT DEVIATIONS, PT EVAL
residual left spastic hemiplegia/abnormal muscle tone/decreased ROM decreased ROM/impaired balance/abnormal muscle tone/residual left spastic hemiplegia

## 2018-10-01 NOTE — PHYSICAL THERAPY INITIAL EVALUATION ADULT - RANGE OF MOTION EXAMINATION, REHAB EVAL
left spastic hemiplegia/Right LE ROM was WNL (within normal limits)/Right UE ROM was WNL (within normal limits)

## 2018-10-01 NOTE — PHYSICAL THERAPY INITIAL EVALUATION ADULT - LIVES WITH, PROFILE
spouse/other relative/children/lives with , son and sister, 1st floor, 5 steps to enter home. HHA 20hrs/week.

## 2018-10-01 NOTE — PHYSICAL THERAPY INITIAL EVALUATION ADULT - CRITERIA FOR SKILLED THERAPEUTIC INTERVENTIONS
impairments found/rehab potential/therapy frequency/predicted duration of therapy intervention/anticipated equipment needs at discharge/anticipated discharge recommendation/risk reduction/prevention

## 2018-10-01 NOTE — CONSULT NOTE ADULT - SUBJECTIVE AND OBJECTIVE BOX
CHIEF COMPLAINT: Patient is a 53y old  Female who presents with a chief complaint of Dizziness (01 Oct 2018 13:44)      HPI:  53 y Female from home with PMHx of HTN, CVA with left sided residual paralysis, A fib(on Xeralto) presents with complain of dizziness for last few hours. She described dizziness as spinning of room. She had similar episode of dizziness 2 months ago but all workup came back negative at that time. Pt also complains of epigastric pain with nausea and 1 episode of non bloody copious vomiting. Pt denies any fever, chills, dyspnea palpitation, Chest pain, change in bowel or urinary habit, any new loss of sensation or weakness and any LOC. (30 Sep 2018 01:53)   Patient seen and examined.     PAST MEDICAL & SURGICAL HISTORY:  CVA (cerebral vascular accident)  Hypertension  Atrial fibrillation  H/O tubal ligation      Allergies    penicillin (Hives)    Intolerances        MEDICATIONS  (STANDING):  aspirin  chewable 81 milliGRAM(s) Oral daily  atorvastatin 40 milliGRAM(s) Oral at bedtime  metoprolol tartrate 50 milliGRAM(s) Oral two times a day  pantoprazole   Suspension 40 milliGRAM(s) Oral before breakfast  rivaroxaban 20 milliGRAM(s) Oral every 24 hours      MEDICATIONS  (PRN):  ondansetron Injectable 4 milliGRAM(s) IV Push every 6 hours PRN Nausea and/or Vomiting       Medications up to date at time of exam.    FAMILY HISTORY:  No pertinent family history in first degree relatives      SOCIAL HISTORY  Smoking History: [   ] smoking/smoke exposure, [   ] former smoker, [   ] denies smoking  Living Condition: [   ] apartment, [   ] private house  Work History:   Travel History: denies recent travel  Illicit Substance Use: denies  Alcohol Use: denies    REVIEW OF SYSTEMS:    CONSTITUTIONAL:  denies fevers, chills, sweats, weight loss    HEENT:  denies diplopia or blurred vision, sore throat or runny nose.    CARDIOVASCULAR:  denies pressure, squeezing, tightness, or heaviness about the chest; no palpitations.    RESPIRATORY:  denies SOB, cough, ALFREDO, wheezing.    GASTROINTESTINAL:  denies abdominal pain, nausea, vomiting or diarrhea.    GENITOURINARY: denies dysuria, frequency or urgency.    NEUROLOGIC:  denies numbness, tingling, seizures or weakness.    PSYCHIATRIC:  denies disorder of thought or mood.    MSK: denies swelling, redness      PHYSICAL EXAMINATION:    GENERAL: The patient is a well-developed, well-nourished, in no apparent distress.     Vital Signs Last 24 Hrs  T(C): 36.6 (01 Oct 2018 16:14), Max: 36.6 (30 Sep 2018 23:42)  T(F): 97.8 (01 Oct 2018 16:14), Max: 97.9 (01 Oct 2018 08:10)  HR: 100 (01 Oct 2018 16:14) (62 - 100)  BP: 119/68 (01 Oct 2018 16:14) (102/72 - 138/94)  BP(mean): --  RR: 18 (01 Oct 2018 16:14) (15 - 18)  SpO2: 96% (01 Oct 2018 16:14) (96% - 98%)    HEENT: head is normocephalic and atraumatic.     NECK: supple, no palpable adenopathy.    LUNGS: clear to auscultation, no wheezing, rales, or rhonchi.    HEART: regular rate and rhythm without murmur.    ABDOMEN: soft, nontender, and nondistended.     EXTREMITIES: without any cyanosis, clubbing, rash, lesions or edema.    NEUROLOGIC: awake, alert.    SKIN: warm, dry, good turgor.      LABS:                        14.1   7.2   )-----------( 178      ( 01 Oct 2018 06:37 )             42.8     10    141  |  106  |  13  ----------------------------<  90  3.5   |  28  |  0.74    Ca    9.4      01 Oct 2018 06:37      PT/INR - ( 01 Oct 2018 06:37 )   PT: 22.7 sec;   INR: 2.05 ratio         PTT - ( 30 Sep 2018 04:42 )  PTT:30.0 sec  Urinalysis Basic - ( 29 Sep 2018 16:36 )    Color: Yellow / Appearance: Clear / S.015 / pH: x  Gluc: x / Ketone: Negative  / Bili: Negative / Urobili: Negative   Blood: x / Protein: Negative / Nitrite: Negative   Leuk Esterase: Small / RBC: 2-5 /HPF / WBC 6-10 /HPF   Sq Epi: x / Non Sq Epi: Few /HPF / Bacteria: Moderate /HPF        CARDIAC MARKERS ( 30 Sep 2018 04:42 )  <0.015 ng/mL / x     / 105 U/L / x     / 2.0 ng/mL  CARDIAC MARKERS ( 29 Sep 2018 23:03 )  <0.015 ng/mL / x     / x     / x     / x                    .    MICROBIOLOGY: (if applicable)    RADIOLOGY & ADDITIONAL STUDIES:  EKG:   CXR:  ECHO:  TELE:    IMPRESSION: 53y Female PAST MEDICAL & SURGICAL HISTORY:  CVA (cerebral vascular accident)  Hypertension  Atrial fibrillation  H/O tubal ligation       HPI:  53 y Female from home with PMHx of HTN, CVA with left sided residual paralysis, A fib(on Xeralto) presents with complain of dizziness for last few hours. She described dizziness as spinning of room. She had similar episode of dizziness 2 months ago but all workup came back negative at that time. Pt also complains of epigastric pain with nausea and 1 episode of non bloody copious vomiting. Pt denies any fever, chills, dyspnea palpitation, Chest pain, change in bowel or urinary habit, any new loss of sensation or weakness and any LOC. (30 Sep 2018 01:53)      RECOMMENDATIONS:

## 2018-10-01 NOTE — DISCHARGE NOTE ADULT - HOSPITAL COURSE
Almaz is a 53 y Female from home with PMHx of HTN, CVA with left sided residual paralysis, A fib(on Xeralto) presented with complain of dizziness for last few hours. She described dizziness as spinning of room. She had similar episode of dizziness 2 months ago but all workup came back negative at that time. Pt also complained of epigastric pain with nausea and 1 episode of non bloody copious vomiting    Pt came with dizziness for few hours. Similar episode 2 months ago. She was admitted to tele monitoring. Cardiac troponin were negative. CT head was negative for any acute finding though it showed previous stroke. Neurologist Dr. Thomas followed the patient. EEG demonstrated changes of right hemispheric (old) stroke, but there were no epileptiform discharges.     For her atrial fibrillation, pt took Xeralto 20 mg once daily at home. She was continued on Xarelto.    For her history of CVA with residual left sided weakness of arm and legs, she was continued with aspirin, atorvastatin, and metoprolol.     For her hypertension, pt takes metoprolol and lisinopril 5mg at home for HTN. Patient was kept on metoprolol 50 mg BID and lisinopril 2.5 mg during her admission and was discharged on this regimen.    Topiramate 50 mg bed time for headache prophylaxis.

## 2018-10-01 NOTE — DISCHARGE NOTE ADULT - PLAN OF CARE
Prevention of future recurrences You were diagnosed with migraine with aura. Take prescribed topiramate. Follow up with your PCP within 1 week of discharge. You were diagnosed with atrial fibrillation. Take prescribed metoprolol and Xarelto. Follow up with your PCP within 1 week of discharge. You were diagnosed with CVA. Your medication regimen has changed. Take your medications. Follow up with your PCP within 1 week of discharge. SBP < 140 You were diagnosed with hypertension. Your medication regimen has changed. Take your medications. Follow up with your PCP within 1 week of discharge.

## 2018-10-01 NOTE — DISCHARGE NOTE ADULT - MEDICATION SUMMARY - MEDICATIONS TO TAKE
I will START or STAY ON the medications listed below when I get home from the hospital:    aspirin 81 mg oral tablet, chewable  -- 1 tab(s) by mouth once a day   -- Indication: For CVA (cerebral vascular accident)    lisinopril 2.5 mg oral tablet  -- 1 tab(s) by mouth once a day  -- Indication: For Hypertension    Xarelto 20 mg oral tablet  -- 1 tab(s) by mouth once a day (in the evening)  -- Indication: For Atrial fibrillation    topiramate 50 mg oral tablet  -- 1 tab(s) by mouth once a day (at bedtime)   -- Do not chew, break, or crush.  Do not drink alcoholic beverages when taking this medication.  Do not take this drug if you are pregnant.  It is very important that you take or use this exactly as directed.  Do not skip doses or discontinue unless directed by your doctor.  May cause drowsiness or dizziness.  Medication should be taken with plenty of water.  Obtain medical advice before taking any non-prescription drugs as some may affect the action of this medication.  Swallow whole.  Do not crush.  This drug may impair the ability to drive or operate machinery.  Use care until you become familiar with its effects.    -- Indication: For Migraine    atorvastatin 40 mg oral tablet  -- 1 tab(s) by mouth once a day (at bedtime)  -- Indication: For Prophylactic measure    metoprolol tartrate 50 mg oral tablet  -- 1 tab(s) by mouth 2 times a day  -- Indication: For Atrial fibrillation    pantoprazole 40 mg oral granule, enteric coated  -- 1 tab(s) by mouth once a day  -- Indication: For Prophylactic measure

## 2018-10-01 NOTE — DISCHARGE NOTE ADULT - MEDICATION SUMMARY - MEDICATIONS TO CHANGE
I will SWITCH the dose or number of times a day I take the medications listed below when I get home from the hospital:    metoprolol tartrate 25 mg oral tablet  -- 1 tab(s) by mouth 2 times a day    lisinopril 5 mg oral tablet  -- 1 tab(s) by mouth once a day  -- Do not take this drug if you are pregnant.  It is very important that you take or use this exactly as directed.  Do not skip doses or discontinue unless directed by your doctor.  Some non-prescription drugs may aggravate your condition.  Read all labels carefully.  If a warning appears, check with your doctor before taking.

## 2018-10-01 NOTE — PHYSICAL THERAPY INITIAL EVALUATION ADULT - BED MOBILITY LIMITATIONS, REHAB EVAL
decreased ability to use arms for pushing/pulling/decreased ability to use legs for bridging/pushing/residual left spastic hemiplegia

## 2018-10-02 VITALS
OXYGEN SATURATION: 100 % | TEMPERATURE: 98 F | DIASTOLIC BLOOD PRESSURE: 78 MMHG | HEART RATE: 44 BPM | RESPIRATION RATE: 16 BRPM | SYSTOLIC BLOOD PRESSURE: 128 MMHG

## 2018-10-02 LAB
ANION GAP SERPL CALC-SCNC: 8 MMOL/L — SIGNIFICANT CHANGE UP (ref 5–17)
BUN SERPL-MCNC: 23 MG/DL — HIGH (ref 7–18)
CALCIUM SERPL-MCNC: 9 MG/DL — SIGNIFICANT CHANGE UP (ref 8.4–10.5)
CHLORIDE SERPL-SCNC: 108 MMOL/L — SIGNIFICANT CHANGE UP (ref 96–108)
CO2 SERPL-SCNC: 26 MMOL/L — SIGNIFICANT CHANGE UP (ref 22–31)
CREAT SERPL-MCNC: 0.77 MG/DL — SIGNIFICANT CHANGE UP (ref 0.5–1.3)
GLUCOSE SERPL-MCNC: 91 MG/DL — SIGNIFICANT CHANGE UP (ref 70–99)
HCT VFR BLD CALC: 40.3 % — SIGNIFICANT CHANGE UP (ref 34.5–45)
HGB BLD-MCNC: 13 G/DL — SIGNIFICANT CHANGE UP (ref 11.5–15.5)
INR BLD: 2.02 RATIO — HIGH (ref 0.88–1.16)
MCHC RBC-ENTMCNC: 28.7 PG — SIGNIFICANT CHANGE UP (ref 27–34)
MCHC RBC-ENTMCNC: 32.2 GM/DL — SIGNIFICANT CHANGE UP (ref 32–36)
MCV RBC AUTO: 89 FL — SIGNIFICANT CHANGE UP (ref 80–100)
PLATELET # BLD AUTO: 183 K/UL — SIGNIFICANT CHANGE UP (ref 150–400)
POTASSIUM SERPL-MCNC: 3.6 MMOL/L — SIGNIFICANT CHANGE UP (ref 3.5–5.3)
POTASSIUM SERPL-SCNC: 3.6 MMOL/L — SIGNIFICANT CHANGE UP (ref 3.5–5.3)
PROTHROM AB SERPL-ACNC: 22.3 SEC — HIGH (ref 9.8–12.7)
RBC # BLD: 4.53 M/UL — SIGNIFICANT CHANGE UP (ref 3.8–5.2)
RBC # FLD: 13 % — SIGNIFICANT CHANGE UP (ref 10.3–14.5)
SODIUM SERPL-SCNC: 142 MMOL/L — SIGNIFICANT CHANGE UP (ref 135–145)
WBC # BLD: 7.4 K/UL — SIGNIFICANT CHANGE UP (ref 3.8–10.5)
WBC # FLD AUTO: 7.4 K/UL — SIGNIFICANT CHANGE UP (ref 3.8–10.5)

## 2018-10-02 PROCEDURE — 80061 LIPID PANEL: CPT

## 2018-10-02 PROCEDURE — 80053 COMPREHEN METABOLIC PANEL: CPT

## 2018-10-02 PROCEDURE — 83690 ASSAY OF LIPASE: CPT

## 2018-10-02 PROCEDURE — 82550 ASSAY OF CK (CPK): CPT

## 2018-10-02 PROCEDURE — 96375 TX/PRO/DX INJ NEW DRUG ADDON: CPT

## 2018-10-02 PROCEDURE — 96374 THER/PROPH/DIAG INJ IV PUSH: CPT

## 2018-10-02 PROCEDURE — 84484 ASSAY OF TROPONIN QUANT: CPT

## 2018-10-02 PROCEDURE — 93005 ELECTROCARDIOGRAM TRACING: CPT

## 2018-10-02 PROCEDURE — 83605 ASSAY OF LACTIC ACID: CPT

## 2018-10-02 PROCEDURE — 81001 URINALYSIS AUTO W/SCOPE: CPT

## 2018-10-02 PROCEDURE — 82553 CREATINE MB FRACTION: CPT

## 2018-10-02 PROCEDURE — 85730 THROMBOPLASTIN TIME PARTIAL: CPT

## 2018-10-02 PROCEDURE — 95819 EEG AWAKE AND ASLEEP: CPT

## 2018-10-02 PROCEDURE — 84702 CHORIONIC GONADOTROPIN TEST: CPT

## 2018-10-02 PROCEDURE — 85610 PROTHROMBIN TIME: CPT

## 2018-10-02 PROCEDURE — 70450 CT HEAD/BRAIN W/O DYE: CPT

## 2018-10-02 PROCEDURE — 84146 ASSAY OF PROLACTIN: CPT

## 2018-10-02 PROCEDURE — 70496 CT ANGIOGRAPHY HEAD: CPT

## 2018-10-02 PROCEDURE — 85027 COMPLETE CBC AUTOMATED: CPT

## 2018-10-02 PROCEDURE — 97162 PT EVAL MOD COMPLEX 30 MIN: CPT

## 2018-10-02 PROCEDURE — 93306 TTE W/DOPPLER COMPLETE: CPT

## 2018-10-02 PROCEDURE — 99285 EMERGENCY DEPT VISIT HI MDM: CPT | Mod: 25

## 2018-10-02 PROCEDURE — 76705 ECHO EXAM OF ABDOMEN: CPT

## 2018-10-02 PROCEDURE — 80048 BASIC METABOLIC PNL TOTAL CA: CPT

## 2018-10-02 PROCEDURE — 95957 EEG DIGITAL ANALYSIS: CPT

## 2018-10-02 PROCEDURE — 82962 GLUCOSE BLOOD TEST: CPT

## 2018-10-02 PROCEDURE — 96376 TX/PRO/DX INJ SAME DRUG ADON: CPT

## 2018-10-02 RX ORDER — ASPIRIN/CALCIUM CARB/MAGNESIUM 324 MG
1 TABLET ORAL
Qty: 30 | Refills: 0
Start: 2018-10-02 | End: 2018-10-31

## 2018-10-02 RX ORDER — LISINOPRIL 2.5 MG/1
1 TABLET ORAL
Qty: 30 | Refills: 0
Start: 2018-10-02 | End: 2018-10-31

## 2018-10-02 RX ORDER — TOPIRAMATE 25 MG
50 TABLET ORAL AT BEDTIME
Qty: 0 | Refills: 0 | Status: DISCONTINUED | OUTPATIENT
Start: 2018-10-02 | End: 2018-10-02

## 2018-10-02 RX ORDER — LISINOPRIL 2.5 MG/1
2.5 TABLET ORAL DAILY
Qty: 0 | Refills: 0 | Status: DISCONTINUED | OUTPATIENT
Start: 2018-10-02 | End: 2018-10-02

## 2018-10-02 RX ORDER — ATORVASTATIN CALCIUM 80 MG/1
1 TABLET, FILM COATED ORAL
Qty: 30 | Refills: 0
Start: 2018-10-02 | End: 2018-10-31

## 2018-10-02 RX ORDER — TOPIRAMATE 25 MG
1 TABLET ORAL
Qty: 30 | Refills: 0
Start: 2018-10-02 | End: 2018-10-31

## 2018-10-02 RX ORDER — METOPROLOL TARTRATE 50 MG
1 TABLET ORAL
Qty: 60 | Refills: 0
Start: 2018-10-02 | End: 2018-10-31

## 2018-10-02 RX ADMIN — RIVAROXABAN 20 MILLIGRAM(S): KIT at 17:55

## 2018-10-02 RX ADMIN — Medication 50 MILLIGRAM(S): at 17:55

## 2018-10-02 RX ADMIN — PANTOPRAZOLE SODIUM 40 MILLIGRAM(S): 20 TABLET, DELAYED RELEASE ORAL at 06:08

## 2018-10-02 RX ADMIN — Medication 81 MILLIGRAM(S): at 11:46

## 2018-10-02 NOTE — PROGRESS NOTE ADULT - PROBLEM SELECTOR PLAN 1
uncontrollable, however improving  vertigo - concern for inner ear disease vs alternative pathology  ct head stable  -F/u [facility] Head/Neck. Neuro on board.   -EEG to r/o seizures  Symptomatic supportive care  -Can benefit from ENT evaluation if above if inconclusive
improved  migraine rx  cta negative acute findings  discharge home
improving  vertigo - concern for inner ear disease vs alternative pathology  ct head stable  -F/u [facility] Head/Neck. Neuro on board - f/u recs  -EEG to r/o seizures  Symptomatic supportive care  -Can benefit from ENT evaluation if above if inconclusive

## 2018-10-02 NOTE — PROGRESS NOTE ADULT - SUBJECTIVE AND OBJECTIVE BOX
Patient seen and examined at bedside  Case discussed with medical team    HPI:  53 y Female from home with PMHx of HTN, CVA with left sided residual paralysis, A fib(on Xeralto) presents with complain of dizziness for last few hours. She described dizziness as spinning of room. She had similar episode of dizziness 2 months ago but all workup came back negative at that time. Pt also complains of epigastric pain with nausea and 1 episode of non bloody copious vomiting. Pt denies any fever, chills, dyspnea palpitation, Chest pain, change in bowel or urinary habit, any new loss of sensation or weakness and any LOC. (30 Sep 2018 01:53)      PAST MEDICAL & SURGICAL HISTORY:  CVA (cerebral vascular accident)  Hypertension  Atrial fibrillation  H/O tubal ligation      penicillin (Hives)       MEDICATIONS  (STANDING):  aspirin  chewable 81 milliGRAM(s) Oral daily  atorvastatin 40 milliGRAM(s) Oral at bedtime  metoprolol tartrate 25 milliGRAM(s) Oral two times a day  pantoprazole   Suspension 40 milliGRAM(s) Oral before breakfast  rivaroxaban 20 milliGRAM(s) Oral every 24 hours    MEDICATIONS  (PRN):  ondansetron Injectable 4 milliGRAM(s) IV Push every 6 hours PRN Nausea and/or Vomiting      REVIEW OF SYSTEMS:  CONSTITUTIONAL: (+) malaise. dizziness.   EYES: No acute change in vision   ENT:  No tinnitus  NECK: No stiffness  RESPIRATORY: gerber. No hemoptysis  CARDIOVASCULAR: No chest pain, palpitations, syncope  GASTROINTESTINAL: No hematemesis, diarrhea, melena, or hematochezia.  GENITOURINARY: No hematuria  NEUROLOGICAL: chronic left hemiparesis. headache  LYMPH Nodes: No enlarged glands  ENDOCRINE: No heat or cold intolerance	    T(C): 36.7 (18 @ 08:30), Max: 36.7 (18 @ 08:30)  HR: 101 (18 @ 08:30) (54 - 110)  BP: 122/81 (18 @ 08:30) (122/81 - 133/89)  RR: 18 (18 @ 08:30) (18 - 18)  SpO2: 99% (18 @ 08:30) (99% - 100%)    PHYSICAL EXAMINATION:   Constitutional: NAD  HEENT: AT  Neck:  Supple  Respiratory:  Adequate airflow b/l. Not using accessory muscles of respiration.  Cardiovascular:  S1 & S2 intact, no R/G, 2+ radial pulses b/l  Gastrointestinal: Soft, NT, ND, normoactive b.s., no organomegaly/RT/rigidity  Extremities: left hemiparesis (chronic). WWP  Neurological:  Alert and awake.  Crude sensation intact.     Labs and imaging reviewed    LABS:                        13.8   8.5   )-----------( 176      ( 29 Sep 2018 16:29 )             43.3         138  |  103  |  13  ----------------------------<  123<H>  3.9   |  31  |  0.74    Ca    9.0      29 Sep 2018 16:29    TPro  8.0  /  Alb  3.5  /  TBili  0.4  /  DBili  x   /  AST  21  /  ALT  29  /  AlkPhos  100      CARDIAC MARKERS ( 30 Sep 2018 04:42 )  <0.015 ng/mL / x     / 105 U/L / x     / 2.0 ng/mL  CARDIAC MARKERS ( 29 Sep 2018 23:03 )  <0.015 ng/mL / x     / x     / x     / x      CARDIAC MARKERS ( 29 Sep 2018 16:29 )  <0.015 ng/mL / x     / x     / x     / x          PT/INR - ( 30 Sep 2018 04:42 )   PT: 15.7 sec;   INR: 1.43 ratio         PTT - ( 30 Sep 2018 04:42 )  PTT:30.0 sec  Urinalysis Basic - ( 29 Sep 2018 16:36 )    Color: Yellow / Appearance: Clear / S.015 / pH: x  Gluc: x / Ketone: Negative  / Bili: Negative / Urobili: Negative   Blood: x / Protein: Negative / Nitrite: Negative   Leuk Esterase: Small / RBC: 2-5 /HPF / WBC 6-10 /HPF   Sq Epi: x / Non Sq Epi: Few /HPF / Bacteria: Moderate /HPF      CAPILLARY BLOOD GLUCOSE  110 (30 Sep 2018 06:51)      POCT Blood Glucose.: 110 mg/dL (30 Sep 2018 04:08)  POCT Blood Glucose.: 107 mg/dL (29 Sep 2018 15:33)        LIVER FUNCTIONS - ( 29 Sep 2018 16:29 )  Alb: 3.5 g/dL / Pro: 8.0 g/dL / ALK PHOS: 100 U/L / ALT: 29 U/L DA / AST: 21 U/L / GGT: x               RADIOLOGY & ADDITIONAL STUDIES:
HEALTH ISSUES - PROBLEM Dx:  Dyspnea on exertion: Dyspnea on exertion  Prophylactic measure: Prophylactic measure  Hypertension: Hypertension  CVA (cerebral vascular accident): CVA (cerebral vascular accident)  Atrial fibrillation: Atrial fibrillation  Dizziness: Dizziness  Vertigo  Headache    MEDICATIONS  (STANDING):  aspirin  chewable 81 milliGRAM(s) Oral daily  atorvastatin 40 milliGRAM(s) Oral at bedtime  metoprolol tartrate 50 milliGRAM(s) Oral two times a day  pantoprazole   Suspension 40 milliGRAM(s) Oral before breakfast  rivaroxaban 20 milliGRAM(s) Oral every 24 hours    MEDICATIONS  (PRN):  ondansetron Injectable 4 milliGRAM(s) IV Push every 6 hours PRN Nausea and/or Vomiting    Vital Signs Last 24 Hrs  T(C): 36.6 , Max: 37.2 (02 Oct 2018 04:27)  T(F): 97.9 , Max: 99 (02 Oct 2018 04:27)  HR: 64  (58 - 100)  BP: 133/83  (102/60 - 146/80)  BP(mean): --  RR: 16  (16 - 18)  SpO2: 99%  (96% - 100%)    REVIEW OF SYSTEMS:    CONSTITUTIONAL: No weakness, fevers or chills  EYES/ENT: No visual changes;  No vertigo or throat pain headache is better denies vertigo  NECK: No pain or stiffness  RESPIRATORY: No cough, wheezing, hemoptysis; No shortness of breath  CARDIOVASCULAR: No chest pain or palpitations  GASTROINTESTINAL: No abdominal or epigastric pain. No nausea, vomiting, or hematemesis; No diarrhea or constipation. No melena or hematochezia.  GENITOURINARY: No dysuria, frequency or hematuria  NEUROLOGICAL:Left paralysis arm leg and face  SKIN: No itching, rashes    PHYSICAL EXAMINATION:  Neurological exam:  HF: A x O x 3. Appropriately interactive, normal affect. Speech fluent, No Aphasia or paraphasic errors. Naming /repetition intact   CN: JERICA, EOMI, VF diminished left hemifield, facial sensation normal, left lower half of face is weaker, tongue midline, Palate moves equally, SCM equal bilaterally  Motor: No pronator drift, Strength 5/5 in all righ ext, normal bulk and tone, no tremor, rigidity or bradykinesia.  Left spastic hemiplegia 01-5  Sens: Intact to light touch / PP/ VS/ JS    Reflexes: asymmetric brisk on the left and rightnormal . BJ 2+, BR 2+, KJ 2+, AJ 2+, downgoing toes R, Babinski present L  Coord:  No FNFA, dysmetria, SE intact right  Gait/Balance: Spastic hemiplegic gait    < from: CT Angio Head w/ IV Cont (10.01.18 @ 18:57) >  EXAM:  CT ANGIO BRAIN (W)AW IC                            PROCEDURE DATE:  10/01/2018          INTERPRETATION:  CT ANGIOGRAPHY OF THE [place]  CT HEAD WITHOUT CONTRAST    INDICATION: 53-year-old female with dizziness.    TECHNIQUE: CT angiographic evaluation of the [place] was   performed consisting of contiguous axial sections scanned from the C2   level to the vertex following the intravenous administration of iodinated   contrast. The acquired data was post-processed to generate 3D/MIP coronal   and sagittal reformats of the intracranial arterial vasculature/[place].    COMPARISON: Noncontrast CT head 9/30/2018. MRA head 12/27/2016.    FINDINGS:    CT HEAD WITHOUT CONTRAST:  No acute intracranial hemorrhage, mass effect or midline shift.  No CT evidence of acute large territory vascular infarct.  Scattered hypodensities in the periventricular white matter are   nonspecific, but likely sequela of small vessel ischemic disease.    Old large right frontal lobe infarct is reidentified.    The paranasal sinuses and mastoid air cells are well aerated.    CTA HEAD:  Mild atherosclerotic plaque along the bilateral cavernous internal   carotid artery segments without significant stenosis. The visualized   cervical and intracranial internal carotid artery segments are otherwise   patent without significant stenosis.    There is diminished arborization of anterior branches of the and middle   cerebral artery in the region of old infarct. The left middle cerebral   artery and bilateral anterior cerebral arteries are are patent without   significant stenosis.    The left posterior communicating artery is seen.    Bilateral distal cervical and intracranial internal carotid arteries are   unremarkable. The basilar artery is unremarkable. Bilateral superior   cerebellar and posterior cerebral arteries are unremarkable.    IMPRESSION:  Patent vessels of the [place] and major branches without   evidence of abrupt vessel cut off, aneurysm or hemodynamically   significant stenosis.    Noncontrast CT head demonstrates no acute intracranial hemorrhage. Old   large right MCA territory infarct redemonstrated.        JULIETA LONDONO M.D., ATTENDING RADIOLOGIST
HEALTH ISSUES - PROBLEM Dx:  Dyspnea on exertion: Dyspnea on exertion  Prophylactic measure: Prophylactic measure  Hypertension: Hypertension  CVA (cerebral vascular accident): CVA (cerebral vascular accident)  Atrial fibrillation: Atrial fibrillation  Dizziness: Dizziness  Vertigo  Headache    MEDICATIONS  (STANDING):  aspirin  chewable 81 milliGRAM(s) Oral daily  atorvastatin 40 milliGRAM(s) Oral at bedtime  metoprolol tartrate 50 milliGRAM(s) Oral two times a day  pantoprazole   Suspension 40 milliGRAM(s) Oral before breakfast  rivaroxaban 20 milliGRAM(s) Oral every 24 hours    MEDICATIONS  (PRN):  ondansetron Injectable 4 milliGRAM(s) IV Push every 6 hours PRN Nausea and/or Vomiting    Vital Signs Last 24 Hrs  T(C): 36.6 , Max: 37.2 (02 Oct 2018 04:27)  T(F): 97.9 , Max: 99 (02 Oct 2018 04:27)  HR: 64  (58 - 100)  BP: 133/83  (102/60 - 146/80)  BP(mean): --  RR: 16  (16 - 18)  SpO2: 99%  (96% - 100%)    REVIEW OF SYSTEMS:    CONSTITUTIONAL: No weakness, fevers or chills  EYES/ENT: No visual changes;  No vertigo or throat pain headache is better denies vertigo  NECK: No pain or stiffness  RESPIRATORY: No cough, wheezing, hemoptysis; No shortness of breath  CARDIOVASCULAR: No chest pain or palpitations  GASTROINTESTINAL: No abdominal or epigastric pain. No nausea, vomiting, or hematemesis; No diarrhea or constipation. No melena or hematochezia.  GENITOURINARY: No dysuria, frequency or hematuria  NEUROLOGICAL:Left paralysis arm leg and face  SKIN: No itching, rashes    PHYSICAL EXAMINATION:  Neurological exam:  HF: A x O x 3. Appropriately interactive, normal affect. Speech fluent, No Aphasia or paraphasic errors. Naming /repetition intact   CN: JERICA, EOMI, VF diminished left hemifield, facial sensation normal, left lower half of face is weaker, tongue midline, Palate moves equally, SCM equal bilaterally  Motor: No pronator drift, Strength 5/5 in all righ ext, normal bulk and tone, no tremor, rigidity or bradykinesia.  Left spastic hemiplegia 01-5  Sens: Intact to light touch / PP/ VS/ JS    Reflexes: asymmetric brisk on the left and rightnormal . BJ 2+, BR 2+, KJ 2+, AJ 2+, downgoing toes R, Babinski present L  Coord:  No FNFA, dysmetria, SE intact right  Gait/Balance: Spastic hemiplegic gait    a
Patient seen and examined at bedside  No acute events noted overnight  Case discussed with medical team    HPI:  53 y Female from home with PMHx of HTN, CVA with left sided residual paralysis, A fib(on Xeralto) presents with complain of dizziness for last few hours. She described dizziness as spinning of room. She had similar episode of dizziness 2 months ago but all workup came back negative at that time. Pt also complains of epigastric pain with nausea and 1 episode of non bloody copious vomiting. Pt denies any fever, chills, dyspnea palpitation, Chest pain, change in bowel or urinary habit, any new loss of sensation or weakness and any LOC. (30 Sep 2018 01:53)      PAST MEDICAL & SURGICAL HISTORY:  CVA (cerebral vascular accident)  Hypertension  Atrial fibrillation  H/O tubal ligation      penicillin (Hives)       MEDICATIONS  (STANDING):  aspirin  chewable 81 milliGRAM(s) Oral daily  atorvastatin 40 milliGRAM(s) Oral at bedtime  metoprolol tartrate 50 milliGRAM(s) Oral two times a day  pantoprazole   Suspension 40 milliGRAM(s) Oral before breakfast  rivaroxaban 20 milliGRAM(s) Oral every 24 hours    MEDICATIONS  (PRN):  ondansetron Injectable 4 milliGRAM(s) IV Push every 6 hours PRN Nausea and/or Vomiting      REVIEW OF SYSTEMS:  CONSTITUTIONAL: (+) malaise.   EYES: No acute change in vision   ENT:  No tinnitus  NECK: No stiffness  RESPIRATORY: No hemoptysis  CARDIOVASCULAR: No chest pain, palpitations, syncope  GASTROINTESTINAL: No hematemesis, diarrhea, melena, or hematochezia.  GENITOURINARY: No hematuria  NEUROLOGICAL: No headaches  LYMPH Nodes: No enlarged glands  ENDOCRINE: No heat or cold intolerance	    T(C): 36.5 (10-01-18 @ 12:30), Max: 36.6 (18 @ 23:42)  HR: 68 (10-01-18 @ 12:30) (62 - 93)  BP: 110/78 (10-01-18 @ 12:30) (102/72 - 138/94)  RR: 18 (10-01-18 @ 12:30) (15 - 18)  SpO2: 97% (10-01-18 @ 12:30) (97% - 98%)    PHYSICAL EXAMINATION:   Constitutional: WD, NAD  HEENT: NC, AT  Neck:  Supple  Respiratory:  Adequate airflow b/l. Not using accessory muscles of respiration.  Cardiovascular:  S1 & S2 intact, no R/G, 2+ radial pulses b/l  Gastrointestinal: Soft, NT, ND, normoactive b.s., no organomegaly/RT/rigidity  Extremities: WWP. stable left hemiparesis.   Neurological:  Alert and awake.   Crude sensation intact.     Labs and imaging reviewed    LABS:                        14.1   7.2   )-----------( 178      ( 01 Oct 2018 06:37 )             42.8     10    141  |  106  |  13  ----------------------------<  90  3.5   |  28  |  0.74    Ca    9.4      01 Oct 2018 06:37    TPro  8.0  /  Alb  3.5  /  TBili  0.4  /  DBili  x   /  AST  21  /  ALT  29  /  AlkPhos  100  09-29    CARDIAC MARKERS ( 30 Sep 2018 04:42 )  <0.015 ng/mL / x     / 105 U/L / x     / 2.0 ng/mL  CARDIAC MARKERS ( 29 Sep 2018 23:03 )  <0.015 ng/mL / x     / x     / x     / x      CARDIAC MARKERS ( 29 Sep 2018 16:29 )  <0.015 ng/mL / x     / x     / x     / x          PT/INR - ( 01 Oct 2018 06:37 )   PT: 22.7 sec;   INR: 2.05 ratio         PTT - ( 30 Sep 2018 04:42 )  PTT:30.0 sec  Urinalysis Basic - ( 29 Sep 2018 16:36 )    Color: Yellow / Appearance: Clear / S.015 / pH: x  Gluc: x / Ketone: Negative  / Bili: Negative / Urobili: Negative   Blood: x / Protein: Negative / Nitrite: Negative   Leuk Esterase: Small / RBC: 2-5 /HPF / WBC 6-10 /HPF   Sq Epi: x / Non Sq Epi: Few /HPF / Bacteria: Moderate /HPF      CAPILLARY BLOOD GLUCOSE            LIVER FUNCTIONS - ( 29 Sep 2018 16:29 )  Alb: 3.5 g/dL / Pro: 8.0 g/dL / ALK PHOS: 100 U/L / ALT: 29 U/L DA / AST: 21 U/L / GGT: x               RADIOLOGY & ADDITIONAL STUDIES:
Patient seen and examined at bedside  No acute events noted overnight  Case discussed with medical team    HPI:  53 y Female from home with PMHx of HTN, CVA with left sided residual paralysis, A fib(on Xeralto) presents with complain of dizziness for last few hours. She described dizziness as spinning of room. She had similar episode of dizziness 2 months ago but all workup came back negative at that time. Pt also complains of epigastric pain with nausea and 1 episode of non bloody copious vomiting. Pt denies any fever, chills, dyspnea palpitation, Chest pain, change in bowel or urinary habit, any new loss of sensation or weakness and any LOC. (30 Sep 2018 01:53)      PAST MEDICAL & SURGICAL HISTORY:  CVA (cerebral vascular accident)  Hypertension  Atrial fibrillation  H/O tubal ligation      penicillin (Hives)       MEDICATIONS  (STANDING):  aspirin  chewable 81 milliGRAM(s) Oral daily  atorvastatin 40 milliGRAM(s) Oral at bedtime  metoprolol tartrate 50 milliGRAM(s) Oral two times a day  pantoprazole   Suspension 40 milliGRAM(s) Oral before breakfast  rivaroxaban 20 milliGRAM(s) Oral every 24 hours    MEDICATIONS  (PRN):  ondansetron Injectable 4 milliGRAM(s) IV Push every 6 hours PRN Nausea and/or Vomiting      REVIEW OF SYSTEMS:  CONSTITUTIONAL: (+) malaise.   EYES: No acute change in vision   ENT:  No tinnitus  NECK: No stiffness  RESPIRATORY: No hemoptysis  CARDIOVASCULAR: No chest pain, palpitations, syncope  GASTROINTESTINAL: No hematemesis, diarrhea, melena, or hematochezia.  GENITOURINARY: No hematuria  NEUROLOGICAL: No headaches  LYMPH Nodes: No enlarged glands  ENDOCRINE: No heat or cold intolerance	    T(C): 36.6 (10-02-18 @ 11:26), Max: 37.2 (10-02-18 @ 04:27)  HR: 64 (10-02-18 @ 11:26) (58 - 100)  BP: 133/83 (10-02-18 @ 11:26) (102/60 - 146/80)  RR: 16 (10-02-18 @ 11:26) (16 - 18)  SpO2: 99% (10-02-18 @ 11:26) (96% - 100%)    PHYSICAL EXAMINATION:   Constitutional: WD, NAD  HEENT: NC, AT  Neck:  Supple  Respiratory:  Adequate airflow b/l. Not using accessory muscles of respiration.  Cardiovascular:  S1 & S2 intact, no R/G, 2+ radial pulses b/l  Gastrointestinal: Soft, NT, ND, normoactive b.s., no organomegaly/RT/rigidity  Extremities: WWP  Neurological:  Alert and awake.  No acute focal motor deficits. Crude sensation intact.     Labs and imaging reviewed    LABS:                        13.0   7.4   )-----------( 183      ( 02 Oct 2018 06:12 )             40.3     10-02    142  |  108  |  23<H>  ----------------------------<  91  3.6   |  26  |  0.77    Ca    9.0      02 Oct 2018 06:12          PT/INR - ( 02 Oct 2018 06:12 )   PT: 22.3 sec;   INR: 2.02 ratio             CAPILLARY BLOOD GLUCOSE                  RADIOLOGY & ADDITIONAL STUDIES:

## 2018-10-02 NOTE — PROGRESS NOTE ADULT - ASSESSMENT
53 y Female from home with PMHx of HTN, CVA with left sided residual paralysis, A fib(on Xeralto) presents with complain of dizziness for last few hours. She described dizziness as spinning of room. She had similar episode of dizziness 2 months ago but all workup came back negative at that time. Pt also complains of epigastric pain with nausea and 1 episode of non bloody copious vomiting
Discussed with Dr. Gurrola; EEG demonstrates changes of right hemispheric (old) stroke, but there are no epileptiform discharges  Because she has improved following anti-migraine treatment, she requires a study of the intracranial blood vessels to determine the level of anti-platelet, CTA head is recommended
Discussed with Dr. Gurrola; EEG demonstrates changes of right hemispheric (old) stroke, but there are no epileptiform discharges  Because she has improved following anti-migraine treatment, she requires a study of the intracranial blood vessels to determine the level of anti-platelet. CTA does not demonstrate narrowing/obstruction of the vertebral arteries, their branches or Basilar artery and its branches. Recommend discharge on Topiramate 50 mg bed time for headache prophylaxis, aspirin 81 mg daily

## 2018-10-02 NOTE — PROGRESS NOTE ADULT - PROBLEM SELECTOR PLAN 2
pt ambulates with assistance  decreased exercise tolerance  -F/u TTE  cards and pulm management appreciated
improved
pt ambulates with assistance  decreased exercise tolerance  -F/u TTE  cards and pulm management appreciated

## 2018-10-09 NOTE — H&P ADULT - PSH
"Chief Complaints and History of Present Illnesses   Patient presents with     Post Op (Ophthalmology) Left Eye     enuclation LE (RB), no medications for pain, parents think she is not in pain. Has shield since surgery.   Review of systems for the eyes was negative other than the pertinent positives and negatives noted in the HPI.  History is obtained from the patient and parents.    Referring provider: Kraig Maradiaga     Primary care: Toni Castano   Erika Jarquin is a 1 year old female who presents with:       ICD-10-CM    1. 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  She is quite edematous likely because she has refused all medicines, including antibiotics and steroids.  She seems comfortable but will take few extra weeks for edema to resolve.  Will hold on starting antibiotics as she is already a week out from surgery but parents will watch for any signs of infection and notify our clinic (Gave them Cherrie's number.)  F/u Oct 25th to check healing and go over pathology results.     Further details of the management plan can be found in the \"Patient Instructions\" section which was printed and given to the patient at checkout.  Data Unavailable   Attending Physician Attestation:  Complete documentation of historical and exam elements from today's encounter can be found in the full encounter summary report (not reduplicated in this progress note).  I personally obtained the chief complaint(s) and history of present illness.  I confirmed and edited as necessary the review of systems, past medical/surgical history, family history, social history, and examination findings as documented by others; and I examined the patient myself.  I personally reviewed the relevant tests, images, and reports as documented above.  I formulated and edited as necessary the " assessment and plan and discussed the findings and management plan with the patient and family. - Chayo Loyola MD 10/9/2018 10:03 AM         H/O tubal ligation

## 2019-01-12 ENCOUNTER — INPATIENT (INPATIENT)
Facility: HOSPITAL | Age: 54
LOS: 2 days | Discharge: HOME | DRG: 57 | End: 2019-01-15
Attending: INTERNAL MEDICINE | Admitting: INTERNAL MEDICINE
Payer: MEDICAID

## 2019-01-12 VITALS
WEIGHT: 250 LBS | RESPIRATION RATE: 14 BRPM | SYSTOLIC BLOOD PRESSURE: 184 MMHG | HEIGHT: 61 IN | HEART RATE: 142 BPM | DIASTOLIC BLOOD PRESSURE: 115 MMHG

## 2019-01-12 DIAGNOSIS — Z98.51 TUBAL LIGATION STATUS: Chronic | ICD-10-CM

## 2019-01-12 LAB
ALBUMIN SERPL ELPH-MCNC: 3.8 G/DL — SIGNIFICANT CHANGE UP (ref 3.5–5)
ALP SERPL-CCNC: 113 U/L — SIGNIFICANT CHANGE UP (ref 40–120)
ALT FLD-CCNC: 28 U/L DA — SIGNIFICANT CHANGE UP (ref 10–60)
ANION GAP SERPL CALC-SCNC: 8 MMOL/L — SIGNIFICANT CHANGE UP (ref 5–17)
APTT BLD: 29.7 SEC — SIGNIFICANT CHANGE UP (ref 27.5–36.3)
AST SERPL-CCNC: 23 U/L — SIGNIFICANT CHANGE UP (ref 10–40)
BASOPHILS # BLD AUTO: 0.2 K/UL — SIGNIFICANT CHANGE UP (ref 0–0.2)
BASOPHILS NFR BLD AUTO: 1.5 % — SIGNIFICANT CHANGE UP (ref 0–2)
BILIRUB SERPL-MCNC: 0.3 MG/DL — SIGNIFICANT CHANGE UP (ref 0.2–1.2)
BUN SERPL-MCNC: 16 MG/DL — SIGNIFICANT CHANGE UP (ref 7–18)
CALCIUM SERPL-MCNC: 9.1 MG/DL — SIGNIFICANT CHANGE UP (ref 8.4–10.5)
CHLORIDE SERPL-SCNC: 105 MMOL/L — SIGNIFICANT CHANGE UP (ref 96–108)
CK MB BLD-MCNC: 1.1 % — SIGNIFICANT CHANGE UP (ref 0–3.5)
CK MB CFR SERPL CALC: 1.3 NG/ML — SIGNIFICANT CHANGE UP (ref 0–3.6)
CK SERPL-CCNC: 114 U/L — SIGNIFICANT CHANGE UP (ref 21–215)
CO2 SERPL-SCNC: 29 MMOL/L — SIGNIFICANT CHANGE UP (ref 22–31)
CREAT SERPL-MCNC: 0.99 MG/DL — SIGNIFICANT CHANGE UP (ref 0.5–1.3)
EOSINOPHIL # BLD AUTO: 0.1 K/UL — SIGNIFICANT CHANGE UP (ref 0–0.5)
EOSINOPHIL NFR BLD AUTO: 1.1 % — SIGNIFICANT CHANGE UP (ref 0–6)
GLUCOSE SERPL-MCNC: 137 MG/DL — HIGH (ref 70–99)
HCT VFR BLD CALC: 45.5 % — HIGH (ref 34.5–45)
HGB BLD-MCNC: 14.2 G/DL — SIGNIFICANT CHANGE UP (ref 11.5–15.5)
INR BLD: 1.24 RATIO — HIGH (ref 0.88–1.16)
LYMPHOCYTES # BLD AUTO: 4.7 K/UL — HIGH (ref 1–3.3)
LYMPHOCYTES # BLD AUTO: 42.8 % — SIGNIFICANT CHANGE UP (ref 13–44)
MCHC RBC-ENTMCNC: 28.7 PG — SIGNIFICANT CHANGE UP (ref 27–34)
MCHC RBC-ENTMCNC: 31.2 GM/DL — LOW (ref 32–36)
MCV RBC AUTO: 92 FL — SIGNIFICANT CHANGE UP (ref 80–100)
MONOCYTES # BLD AUTO: 1.1 K/UL — HIGH (ref 0–0.9)
MONOCYTES NFR BLD AUTO: 10.4 % — SIGNIFICANT CHANGE UP (ref 2–14)
NEUTROPHILS # BLD AUTO: 4.8 K/UL — SIGNIFICANT CHANGE UP (ref 1.8–7.4)
NEUTROPHILS NFR BLD AUTO: 44.2 % — SIGNIFICANT CHANGE UP (ref 43–77)
PLATELET # BLD AUTO: 184 K/UL — SIGNIFICANT CHANGE UP (ref 150–400)
POTASSIUM SERPL-MCNC: 3.3 MMOL/L — LOW (ref 3.5–5.3)
POTASSIUM SERPL-SCNC: 3.3 MMOL/L — LOW (ref 3.5–5.3)
PROT SERPL-MCNC: 8.2 G/DL — SIGNIFICANT CHANGE UP (ref 6–8.3)
PROTHROM AB SERPL-ACNC: 13.8 SEC — HIGH (ref 10–12.9)
RBC # BLD: 4.95 M/UL — SIGNIFICANT CHANGE UP (ref 3.8–5.2)
RBC # FLD: 12.7 % — SIGNIFICANT CHANGE UP (ref 10.3–14.5)
SODIUM SERPL-SCNC: 142 MMOL/L — SIGNIFICANT CHANGE UP (ref 135–145)
TROPONIN I SERPL-MCNC: <0.015 NG/ML — SIGNIFICANT CHANGE UP (ref 0–0.04)
WBC # BLD: 10.9 K/UL — HIGH (ref 3.8–10.5)
WBC # FLD AUTO: 10.9 K/UL — HIGH (ref 3.8–10.5)

## 2019-01-12 PROCEDURE — 70498 CT ANGIOGRAPHY NECK: CPT | Mod: 26

## 2019-01-12 PROCEDURE — 70496 CT ANGIOGRAPHY HEAD: CPT | Mod: 26

## 2019-01-12 PROCEDURE — 70450 CT HEAD/BRAIN W/O DYE: CPT | Mod: 26

## 2019-01-12 RX ORDER — METOPROLOL TARTRATE 50 MG
5 TABLET ORAL ONCE
Qty: 0 | Refills: 0 | Status: COMPLETED | OUTPATIENT
Start: 2019-01-12 | End: 2019-01-12

## 2019-01-12 RX ADMIN — Medication 5 MILLIGRAM(S): at 23:43

## 2019-01-12 NOTE — ED PROVIDER NOTE - CARE PLAN
Principal Discharge DX:	TIA (transient ischemic attack)  Secondary Diagnosis:	Atrial fibrillation with RVR

## 2019-01-12 NOTE — ED PROVIDER NOTE - PROGRESS NOTE DETAILS
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 Burden several times, no response, admit to Tele, Pt turned over to Bryan at 230 am

## 2019-01-12 NOTE — ED PROVIDER NOTE - OBJECTIVE STATEMENT
54 y/o F pt w/ PMHx of CVA, HTN, HLD presents to ED c/o slurred speech x after 9 pm today. Per daughter, Pt came home at 9pm at baseline, was ambulatory, has chronic left sided weakness residually from prior stroke. Pt then suddenly became altered, speech changes and left sided gaze. Pt is not taking any anti-coagulants. History limited by altered mental status. Allergic to Penicillin.

## 2019-01-12 NOTE — ED PROVIDER NOTE - MEDICAL DECISION MAKING DETAILS
54 y/o F pt w/ concern for stroke, will rule out hemorrhage, rule out contra-indications for tPA, stroke protiocol, CT head and neck, coags, tele stroke

## 2019-01-12 NOTE — ED PROVIDER NOTE - CRITICAL CARE PROVIDED
additional history taking/consult w/ pt's family directly relating to pts condition/consultation with other physicians/direct patient care (not related to procedure)

## 2019-01-13 DIAGNOSIS — R56.9 UNSPECIFIED CONVULSIONS: ICD-10-CM

## 2019-01-13 DIAGNOSIS — I63.9 CEREBRAL INFARCTION, UNSPECIFIED: ICD-10-CM

## 2019-01-13 DIAGNOSIS — G45.9 TRANSIENT CEREBRAL ISCHEMIC ATTACK, UNSPECIFIED: ICD-10-CM

## 2019-01-13 DIAGNOSIS — Z29.9 ENCOUNTER FOR PROPHYLACTIC MEASURES, UNSPECIFIED: ICD-10-CM

## 2019-01-13 DIAGNOSIS — I10 ESSENTIAL (PRIMARY) HYPERTENSION: ICD-10-CM

## 2019-01-13 DIAGNOSIS — I48.91 UNSPECIFIED ATRIAL FIBRILLATION: ICD-10-CM

## 2019-01-13 LAB
ALBUMIN SERPL ELPH-MCNC: 3.6 G/DL — SIGNIFICANT CHANGE UP (ref 3.5–5)
ALP SERPL-CCNC: 103 U/L — SIGNIFICANT CHANGE UP (ref 40–120)
ALT FLD-CCNC: 28 U/L DA — SIGNIFICANT CHANGE UP (ref 10–60)
ANION GAP SERPL CALC-SCNC: 12 MMOL/L — SIGNIFICANT CHANGE UP (ref 5–17)
APTT BLD: 39.9 SEC — HIGH (ref 27.5–36.3)
APTT BLD: >200 SEC — CRITICAL HIGH (ref 27.5–36.3)
AST SERPL-CCNC: 18 U/L — SIGNIFICANT CHANGE UP (ref 10–40)
BASOPHILS # BLD AUTO: 0.1 K/UL — SIGNIFICANT CHANGE UP (ref 0–0.2)
BASOPHILS NFR BLD AUTO: 0.6 % — SIGNIFICANT CHANGE UP (ref 0–2)
BILIRUB SERPL-MCNC: 0.4 MG/DL — SIGNIFICANT CHANGE UP (ref 0.2–1.2)
BUN SERPL-MCNC: 13 MG/DL — SIGNIFICANT CHANGE UP (ref 7–18)
CALCIUM SERPL-MCNC: 8.5 MG/DL — SIGNIFICANT CHANGE UP (ref 8.4–10.5)
CHLORIDE SERPL-SCNC: 105 MMOL/L — SIGNIFICANT CHANGE UP (ref 96–108)
CHOLEST SERPL-MCNC: 152 MG/DL — SIGNIFICANT CHANGE UP (ref 10–199)
CK MB BLD-MCNC: 2.3 % — SIGNIFICANT CHANGE UP (ref 0–3.5)
CK MB CFR SERPL CALC: 2.3 NG/ML — SIGNIFICANT CHANGE UP (ref 0–3.6)
CK SERPL-CCNC: 102 U/L — SIGNIFICANT CHANGE UP (ref 21–215)
CO2 SERPL-SCNC: 23 MMOL/L — SIGNIFICANT CHANGE UP (ref 22–31)
CREAT SERPL-MCNC: 0.81 MG/DL — SIGNIFICANT CHANGE UP (ref 0.5–1.3)
EOSINOPHIL # BLD AUTO: 0 K/UL — SIGNIFICANT CHANGE UP (ref 0–0.5)
EOSINOPHIL NFR BLD AUTO: 0 % — SIGNIFICANT CHANGE UP (ref 0–6)
GLUCOSE SERPL-MCNC: 149 MG/DL — HIGH (ref 70–99)
HBA1C BLD-MCNC: 5.6 % — SIGNIFICANT CHANGE UP (ref 4–5.6)
HCT VFR BLD CALC: 41.4 % — SIGNIFICANT CHANGE UP (ref 34.5–45)
HCT VFR BLD CALC: 42.1 % — SIGNIFICANT CHANGE UP (ref 34.5–45)
HDLC SERPL-MCNC: 65 MG/DL — SIGNIFICANT CHANGE UP
HGB BLD-MCNC: 13.4 G/DL — SIGNIFICANT CHANGE UP (ref 11.5–15.5)
HGB BLD-MCNC: 13.9 G/DL — SIGNIFICANT CHANGE UP (ref 11.5–15.5)
LIPID PNL WITH DIRECT LDL SERPL: 78 MG/DL — SIGNIFICANT CHANGE UP
LYMPHOCYTES # BLD AUTO: 0.6 K/UL — LOW (ref 1–3.3)
LYMPHOCYTES # BLD AUTO: 4.2 % — LOW (ref 13–44)
MAGNESIUM SERPL-MCNC: 1.9 MG/DL — SIGNIFICANT CHANGE UP (ref 1.6–2.6)
MCHC RBC-ENTMCNC: 28.8 PG — SIGNIFICANT CHANGE UP (ref 27–34)
MCHC RBC-ENTMCNC: 30 PG — SIGNIFICANT CHANGE UP (ref 27–34)
MCHC RBC-ENTMCNC: 31.8 GM/DL — LOW (ref 32–36)
MCHC RBC-ENTMCNC: 33.5 GM/DL — SIGNIFICANT CHANGE UP (ref 32–36)
MCV RBC AUTO: 89.4 FL — SIGNIFICANT CHANGE UP (ref 80–100)
MCV RBC AUTO: 90.6 FL — SIGNIFICANT CHANGE UP (ref 80–100)
MONOCYTES # BLD AUTO: 0.7 K/UL — SIGNIFICANT CHANGE UP (ref 0–0.9)
MONOCYTES NFR BLD AUTO: 5.1 % — SIGNIFICANT CHANGE UP (ref 2–14)
NEUTROPHILS # BLD AUTO: 12.8 K/UL — HIGH (ref 1.8–7.4)
NEUTROPHILS NFR BLD AUTO: 90.2 % — HIGH (ref 43–77)
PHOSPHATE SERPL-MCNC: 2.9 MG/DL — SIGNIFICANT CHANGE UP (ref 2.5–4.5)
PLATELET # BLD AUTO: 172 K/UL — SIGNIFICANT CHANGE UP (ref 150–400)
PLATELET # BLD AUTO: 173 K/UL — SIGNIFICANT CHANGE UP (ref 150–400)
POTASSIUM SERPL-MCNC: 3.9 MMOL/L — SIGNIFICANT CHANGE UP (ref 3.5–5.3)
POTASSIUM SERPL-SCNC: 3.9 MMOL/L — SIGNIFICANT CHANGE UP (ref 3.5–5.3)
PROT SERPL-MCNC: 8 G/DL — SIGNIFICANT CHANGE UP (ref 6–8.3)
RBC # BLD: 4.64 M/UL — SIGNIFICANT CHANGE UP (ref 3.8–5.2)
RBC # BLD: 4.65 M/UL — SIGNIFICANT CHANGE UP (ref 3.8–5.2)
RBC # FLD: 12.6 % — SIGNIFICANT CHANGE UP (ref 10.3–14.5)
RBC # FLD: 12.9 % — SIGNIFICANT CHANGE UP (ref 10.3–14.5)
SODIUM SERPL-SCNC: 140 MMOL/L — SIGNIFICANT CHANGE UP (ref 135–145)
TOTAL CHOLESTEROL/HDL RATIO MEASUREMENT: 2.3 RATIO — LOW (ref 3.3–7.1)
TRIGL SERPL-MCNC: 43 MG/DL — SIGNIFICANT CHANGE UP (ref 10–149)
TROPONIN I SERPL-MCNC: 0.02 NG/ML — SIGNIFICANT CHANGE UP (ref 0–0.04)
TSH SERPL-MCNC: 0.68 UU/ML — SIGNIFICANT CHANGE UP (ref 0.34–4.82)
VIT B12 SERPL-MCNC: 497 PG/ML — SIGNIFICANT CHANGE UP (ref 232–1245)
WBC # BLD: 12.6 K/UL — HIGH (ref 3.8–10.5)
WBC # BLD: 14.2 K/UL — HIGH (ref 3.8–10.5)
WBC # FLD AUTO: 12.6 K/UL — HIGH (ref 3.8–10.5)
WBC # FLD AUTO: 14.2 K/UL — HIGH (ref 3.8–10.5)

## 2019-01-13 PROCEDURE — 95819 EEG AWAKE AND ASLEEP: CPT | Mod: 26

## 2019-01-13 PROCEDURE — 99291 CRITICAL CARE FIRST HOUR: CPT

## 2019-01-13 PROCEDURE — 99223 1ST HOSP IP/OBS HIGH 75: CPT

## 2019-01-13 PROCEDURE — 71045 X-RAY EXAM CHEST 1 VIEW: CPT | Mod: 26

## 2019-01-13 PROCEDURE — 99222 1ST HOSP IP/OBS MODERATE 55: CPT | Mod: GC

## 2019-01-13 RX ORDER — PROCAINAMIDE HCL 500 MG
1000 TABLET, EXTENDED RELEASE ORAL ONCE
Qty: 0 | Refills: 0 | Status: COMPLETED | OUTPATIENT
Start: 2019-01-13 | End: 2019-01-13

## 2019-01-13 RX ORDER — HEPARIN SODIUM 5000 [USP'U]/ML
900 INJECTION INTRAVENOUS; SUBCUTANEOUS
Qty: 25000 | Refills: 0 | Status: DISCONTINUED | OUTPATIENT
Start: 2019-01-13 | End: 2019-01-14

## 2019-01-13 RX ORDER — METOPROLOL TARTRATE 50 MG
5 TABLET ORAL ONCE
Qty: 0 | Refills: 0 | Status: COMPLETED | OUTPATIENT
Start: 2019-01-13 | End: 2019-01-13

## 2019-01-13 RX ORDER — TOPIRAMATE 25 MG
50 TABLET ORAL AT BEDTIME
Qty: 0 | Refills: 0 | Status: DISCONTINUED | OUTPATIENT
Start: 2019-01-13 | End: 2019-01-13

## 2019-01-13 RX ORDER — HEPARIN SODIUM 5000 [USP'U]/ML
1000 INJECTION INTRAVENOUS; SUBCUTANEOUS
Qty: 25000 | Refills: 0 | Status: DISCONTINUED | OUTPATIENT
Start: 2019-01-13 | End: 2019-01-13

## 2019-01-13 RX ORDER — ASPIRIN/CALCIUM CARB/MAGNESIUM 324 MG
81 TABLET ORAL DAILY
Qty: 0 | Refills: 0 | Status: DISCONTINUED | OUTPATIENT
Start: 2019-01-13 | End: 2019-01-15

## 2019-01-13 RX ORDER — ONDANSETRON 8 MG/1
4 TABLET, FILM COATED ORAL EVERY 6 HOURS
Qty: 0 | Refills: 0 | Status: DISCONTINUED | OUTPATIENT
Start: 2019-01-13 | End: 2019-01-15

## 2019-01-13 RX ORDER — DILTIAZEM HCL 120 MG
15 CAPSULE, EXT RELEASE 24 HR ORAL
Qty: 125 | Refills: 0 | Status: DISCONTINUED | OUTPATIENT
Start: 2019-01-13 | End: 2019-01-14

## 2019-01-13 RX ORDER — HEPARIN SODIUM 5000 [USP'U]/ML
900 INJECTION INTRAVENOUS; SUBCUTANEOUS
Qty: 25000 | Refills: 0 | Status: DISCONTINUED | OUTPATIENT
Start: 2019-01-13 | End: 2019-01-13

## 2019-01-13 RX ORDER — LEVETIRACETAM 250 MG/1
500 TABLET, FILM COATED ORAL EVERY 12 HOURS
Qty: 0 | Refills: 0 | Status: DISCONTINUED | OUTPATIENT
Start: 2019-01-13 | End: 2019-01-15

## 2019-01-13 RX ORDER — LISINOPRIL 2.5 MG/1
2.5 TABLET ORAL DAILY
Qty: 0 | Refills: 0 | Status: DISCONTINUED | OUTPATIENT
Start: 2019-01-13 | End: 2019-01-15

## 2019-01-13 RX ORDER — PANTOPRAZOLE SODIUM 20 MG/1
40 TABLET, DELAYED RELEASE ORAL
Qty: 0 | Refills: 0 | Status: DISCONTINUED | OUTPATIENT
Start: 2019-01-13 | End: 2019-01-15

## 2019-01-13 RX ORDER — ATORVASTATIN CALCIUM 80 MG/1
40 TABLET, FILM COATED ORAL AT BEDTIME
Qty: 0 | Refills: 0 | Status: DISCONTINUED | OUTPATIENT
Start: 2019-01-13 | End: 2019-01-15

## 2019-01-13 RX ADMIN — ATORVASTATIN CALCIUM 40 MILLIGRAM(S): 80 TABLET, FILM COATED ORAL at 21:09

## 2019-01-13 RX ADMIN — Medication 81 MILLIGRAM(S): at 11:53

## 2019-01-13 RX ADMIN — HEPARIN SODIUM 600 UNIT(S)/HR: 5000 INJECTION INTRAVENOUS; SUBCUTANEOUS at 17:43

## 2019-01-13 RX ADMIN — Medication 5 MILLIGRAM(S): at 00:20

## 2019-01-13 RX ADMIN — HEPARIN SODIUM 900 UNIT(S)/HR: 5000 INJECTION INTRAVENOUS; SUBCUTANEOUS at 05:23

## 2019-01-13 RX ADMIN — PANTOPRAZOLE SODIUM 40 MILLIGRAM(S): 20 TABLET, DELAYED RELEASE ORAL at 09:02

## 2019-01-13 RX ADMIN — HEPARIN SODIUM 900 UNIT(S)/HR: 5000 INJECTION INTRAVENOUS; SUBCUTANEOUS at 01:33

## 2019-01-13 RX ADMIN — HEPARIN SODIUM 900 UNIT(S)/HR: 5000 INJECTION INTRAVENOUS; SUBCUTANEOUS at 09:19

## 2019-01-13 RX ADMIN — Medication 15 MG/HR: at 05:28

## 2019-01-13 RX ADMIN — HEPARIN SODIUM 0 UNIT(S)/HR: 5000 INJECTION INTRAVENOUS; SUBCUTANEOUS at 16:35

## 2019-01-13 NOTE — PHYSICAL THERAPY INITIAL EVALUATION ADULT - BALANCE DISTURBANCE, IDENTIFIED IMPAIRMENT CONTRIBUTE, REHAB EVAL
impaired coordination/impaired postural control/decreased ROM/abnormal muscle tone/decreased strength

## 2019-01-13 NOTE — H&P ADULT - RS GEN PE MLT RESP DETAILS PC
no wheezes/airway patent/no rales/respirations non-labored/breath sounds equal/good air movement/no rhonchi/clear to auscultation bilaterally

## 2019-01-13 NOTE — H&P ADULT - PROBLEM SELECTOR PLAN 5
[] Previous VTE                                                3  [] Thrombophilia                                             2  [] Lower limb paralysis                                   2    [] Current Cancer                                             2   [x] Immobilization > 24 hrs                              1  [] ICU/CCU stay > 24 hours                             1  [x] Age > 60                                                         1    IMPROVE VTE Score: On heparin drip for afib

## 2019-01-13 NOTE — CONSULT NOTE ADULT - SUBJECTIVE AND OBJECTIVE BOX
54 y/o F  w/ PMHx of CVA 3 years ago, afib on Xarelto, HTN, HLD presents to ED c/o slurred speech x after 9 pm today. As per sister pt went to the [employer] and was acting strange but pt said everything was fine. Sister took patient home and then she was called because pt was confused and only looking to her left side. Sister got concerned and brought pt to ED. At Ed pt was found to have afib RVR @ 230's. Upon arrival pt had slurred speech and right sided hemineglect  with NIHSS of  9 and code stroke was called. As per Neurology no TPA was needed. Head CT was negative for acute stroke only showing chronic right sided stroke and neurology recommend heparin drip for anticoagulation.  As per sister pt missed today's dose of Xarelto but unknown is pt takes her medications everyday since pt at times is in denial of health issues and her mental status is not the best. As per sister pt at times talks by herself and hears voices. At this time the pt capacity to make decisions is guarded. Pt has refused ablation in the past  for irregular heart beat. Pt was given lopressor 5 mg IVP with no improvement on HR. ICU consulted for HR better HR control. Pt was given 20 mg Cardizem IVP which show some improvement in HR and then was started on Cardizem drip for maintenance. Another 20 mg Cardizem IVP were given since Cardizem drip was not running due to line problem but afterwards line was fixed and Cardizem drip was started. During that time pt had a generalized seizure on which ativan 2 mg IVP was given and seizures broke. Contacted Neurology on the case who recommended Ativan 2 mg PRN for breakthrough seizures for now and schedule patient for EEG today. Limited ROS due to patient mental status.         PAST MEDICAL & SURGICAL HISTORY:    CVA (cerebral vascular accident)  Hypertension  Atrial fibrillation  H/O tubal ligation    Allergies    penicillin (Hives)    Intolerances      FAMILY HISTORY:  No pertinent family history in first degree relatives    Social history reviewed:     no tobbaco  no etoh   no elicit drug use     Review of Systems:    limited due to mental status ROS were taken from sister     CONSTITUTIONAL: No fever, chills, or fatigue  EYES: No eye pain, visual disturbances, or discharge  ENMT:  No difficulty hearing, tinnitus, vertigo; No sinus or throat pain  NECK: No pain or stiffness  RESPIRATORY: No cough, wheezing, chills or hemoptysis; No shortness of breath  CARDIOVASCULAR: No chest pain, palpitations, dizziness, or leg swelling  GASTROINTESTINAL: No abdominal or epigastric pain. No nausea, vomiting, or hematemesis  GENITOURINARY: No dysuria, frequency, hematuria, or incontinence  NEUROLOGICAL: No headaches, memory loss, loss of strength, numbness, or tremors  MUSCULOSKELETAL: No joint pain or swelling; No muscle, back, or extremity pain      Medications:  diltiazem Infusion 15 mG/Hr IV Continuous <Continuous>  heparin  Infusion. 900 Unit(s)/Hr IV Continuous <Continuous>        Vital Signs Last 24 Hrs  T(C): 36.8 (13 Jan 2019 01:04), Max: 36.8 (13 Jan 2019 01:04)  T(F): 98.3 (13 Jan 2019 01:04), Max: 98.3 (13 Jan 2019 01:04)  HR: 158 (13 Jan 2019 01:04) (142 - 158)  BP: 143/98 (13 Jan 2019 01:04) (143/98 - 184/115)  BP(mean): --  RR: 22 (13 Jan 2019 01:04) (14 - 22)  SpO2: 95% (13 Jan 2019 01:04) (95% - 95%)              LABS:                        14.2   10.9  )-----------( 184      ( 12 Jan 2019 23:01 )             45.5     01-12    142  |  105  |  16  ----------------------------<  137<H>  3.3<L>   |  29  |  0.99    Ca    9.1      12 Jan 2019 23:01    TPro  8.2  /  Alb  3.8  /  TBili  0.3  /  DBili  x   /  AST  23  /  ALT  28  /  AlkPhos  113  01-12      CARDIAC MARKERS ( 12 Jan 2019 23:01 )  <0.015 ng/mL / x     / 114 U/L / x     / 1.3 ng/mL      CAPILLARY BLOOD GLUCOSE      POCT Blood Glucose.: 120 mg/dL (12 Jan 2019 22:48)    PT/INR - ( 12 Jan 2019 23:01 )   PT: 13.8 sec;   INR: 1.24 ratio         PTT - ( 12 Jan 2019 23:01 )  PTT:29.7 sec    CULTURES:        Physical Examination:    General: lethargic      HEENT: Pupils equal, reactive to light.  Symmetric.    PULM: Clear to auscultation bilaterally, no significant sputum production    CVS: irregular, no murmurs, rubs, or gallops    ABD: Soft, nondistended, nontender, normoactive bowel sounds, no masses    EXT: No edema, nontender    SKIN: Warm and well perfused, no rashes noted.    NEURO: lethargic with slurred speech and right side hemineglect, residual  left sided hemiplegia       CRITICAL CARE TIME SPENT: 35 minutes 54 y/o F  w/ PMHx of CVA 3 years ago, afib on Xarelto, HTN, HLD presents to ED c/o slurred speech x after 9 pm today. As per sister pt went to the [employer] and was acting strange but pt said everything was fine. Sister took patient home and then she was called because pt was confused and only looking to her left side. Sister got concerned and brought pt to ED. At Ed pt was found to have afib RVR @ 230's. Upon arrival pt had slurred speech and right sided hemineglect  with NIHSS of  9 and code stroke was called. As per Neurology no TPA was needed. Head CT was negative for acute stroke only showing chronic right sided stroke and neurology recommend heparin drip for anticoagulation. As per sister pt missed today's dose of Xarelto but unknown is pt takes her medications everyday since pt at times is in denial of health issues and her mental status is not the best. As per sister pt at times talks by herself and hears voices. At this time the pt capacity to make decisions is guarded. Pt has refused ablation in the past  for irregular heart beat. Pt was given lopressor 5 mg IVP with no improvement on HR. ICU consulted for HR better HR control. Pt was given 20 mg Cardizem IVP which show some improvement in HR and then was started on Cardizem drip for maintenance. Another 20 mg Cardizem IVP were given since Cardizem drip was not running due to line problem but afterwards line was fixed and Cardizem drip was started. During that time pt had a generalized seizure on which ativan 2 mg IVP was given and seizures broke. Contacted Neurology on the case who recommended Ativan 2 mg PRN for breakthrough seizures for now and schedule patient for EEG today. Limited ROS due to patient mental status.         PAST MEDICAL & SURGICAL HISTORY:    CVA (cerebral vascular accident)  Hypertension  Atrial fibrillation  H/O tubal ligation    Allergies    penicillin (Hives)    Intolerances      FAMILY HISTORY:  No pertinent family history in first degree relatives    Social history reviewed:     no tobbaco  no etoh   no elicit drug use     Review of Systems:    limited due to mental status ROS were taken from sister     CONSTITUTIONAL: No fever, chills, or fatigue  EYES: No eye pain, visual disturbances, or discharge  ENMT:  No difficulty hearing, tinnitus, vertigo; No sinus or throat pain  NECK: No pain or stiffness  RESPIRATORY: No cough, wheezing, chills or hemoptysis; No shortness of breath  CARDIOVASCULAR: No chest pain, palpitations, dizziness, or leg swelling  GASTROINTESTINAL: No abdominal or epigastric pain. No nausea, vomiting, or hematemesis  GENITOURINARY: No dysuria, frequency, hematuria, or incontinence  NEUROLOGICAL: No headaches, memory loss, loss of strength, numbness, or tremors  MUSCULOSKELETAL: No joint pain or swelling; No muscle, back, or extremity pain      Medications:  diltiazem Infusion 15 mG/Hr IV Continuous <Continuous>  heparin  Infusion. 900 Unit(s)/Hr IV Continuous <Continuous>        Vital Signs Last 24 Hrs  T(C): 36.8 (13 Jan 2019 01:04), Max: 36.8 (13 Jan 2019 01:04)  T(F): 98.3 (13 Jan 2019 01:04), Max: 98.3 (13 Jan 2019 01:04)  HR: 158 (13 Jan 2019 01:04) (142 - 158)  BP: 143/98 (13 Jan 2019 01:04) (143/98 - 184/115)  BP(mean): --  RR: 22 (13 Jan 2019 01:04) (14 - 22)  SpO2: 95% (13 Jan 2019 01:04) (95% - 95%)              LABS:                        14.2   10.9  )-----------( 184      ( 12 Jan 2019 23:01 )             45.5     01-12    142  |  105  |  16  ----------------------------<  137<H>  3.3<L>   |  29  |  0.99    Ca    9.1      12 Jan 2019 23:01    TPro  8.2  /  Alb  3.8  /  TBili  0.3  /  DBili  x   /  AST  23  /  ALT  28  /  AlkPhos  113  01-12      CARDIAC MARKERS ( 12 Jan 2019 23:01 )  <0.015 ng/mL / x     / 114 U/L / x     / 1.3 ng/mL      CAPILLARY BLOOD GLUCOSE      POCT Blood Glucose.: 120 mg/dL (12 Jan 2019 22:48)    PT/INR - ( 12 Jan 2019 23:01 )   PT: 13.8 sec;   INR: 1.24 ratio         PTT - ( 12 Jan 2019 23:01 )  PTT:29.7 sec    CULTURES:        Physical Examination:    General: lethargic      HEENT: Pupils equal, reactive to light.  Symmetric.    PULM: Clear to auscultation bilaterally, no significant sputum production    CVS: irregular, no murmurs, rubs, or gallops    ABD: Soft, nondistended, nontender, normoactive bowel sounds, no masses    EXT: No edema, nontender    SKIN: Warm and well perfused, no rashes noted.    NEURO: lethargic with slurred speech and right side hemineglect, residual  left sided hemiplegia       CRITICAL CARE TIME SPENT: 35 minutes

## 2019-01-13 NOTE — PHYSICAL THERAPY INITIAL EVALUATION ADULT - GENERAL OBSERVATIONS, REHAB EVAL
Consult received, chart reviewed. Patient received supine in bed, NAD, +cardiac monitor, +IV. Daughter at bedside Patient agreed to EVALUATION from Physical Therapist. Consult received, chart reviewed. Patient received supine in bed, NAD, +cardiac monitor, +IV. Daughter at bedside. Patient agreed to EVALUATION from Physical Therapist.

## 2019-01-13 NOTE — CONSULT NOTE ADULT - PROBLEM SELECTOR RECOMMENDATION 9
presents with Afib RVR and possible TIA/stroke   HR on arrival was around 230's. ED physician gave 10 mg lopressor IVP which did not controlled the HR. ICU was consulted for rate control. Pt was hemodynamically stable. We recommended 20 mg Cardizem IVP and to start Cardizem drip. HR was controlled in about 100-110 after initiation of Cardizem drip.   started on heparin drip as per neurology   No need for ICU care at this time. Pt stable to be transfer to telemetry floor.

## 2019-01-13 NOTE — PHYSICAL THERAPY INITIAL EVALUATION ADULT - PLANNED THERAPY INTERVENTIONS, PT EVAL
gait training/balance training/bed mobility training/stretching/transfer training/postural re-education/motor coordination training/ROM/strengthening

## 2019-01-13 NOTE — PHYSICAL THERAPY INITIAL EVALUATION ADULT - IMPAIRMENTS CONTRIBUTING TO GAIT DEVIATIONS, PT EVAL
impaired postural control/impaired balance/impaired coordination/impaired motor control/abnormal muscle tone/decreased strength/decreased ROM

## 2019-01-13 NOTE — CONSULT NOTE ADULT - SUBJECTIVE AND OBJECTIVE BOX
NOTE TO BE COMPLETED    Neurology Consult    Patient is a 53y old  Female who presents with a chief complaint of     HPI:      PAST MEDICAL & SURGICAL HISTORY:  CVA (cerebral vascular accident)  Hypertension  Atrial fibrillation  H/O tubal ligation      FAMILY HISTORY:  No pertinent family history in first degree relatives      Social History: (-) x 3    Allergies    penicillin (Hives)    Intolerances        MEDICATIONS  (STANDING):  heparin  Infusion. 900 Unit(s)/Hr (9 mL/Hr) IV Continuous <Continuous>  procainamide   IVPB 1000 milliGRAM(s) IV Intermittent Once    MEDICATIONS  (PRN):      Review of systems:    Constitutional: No fever, weight loss or fatigue    Eyes: No eye pain or discharge  ENMT:  No difficulty hearing; No sinus or throat pain  Neck: No pain or stiffness  Respiratory: No cough, wheezing, chills or hemoptysis  Cardiovascular: No chest pain, palpitations, shortness of breath, dyspnea on exertion  Gastrointestinal: No abdominal pain, nausea, vomiting or hematemesis; No diarrhea or constipation.   Genitourinary: No dysuria, frequency, hematuria or incontinence  Neurological: As per HPI  Skin: No rashes or lesions   Endocrine: No heat or cold intolerance; No hair loss  Musculoskeletal: No joint pain or swelling  Psychiatric: No depression, anxiety, mood swings  Heme/Lymph: No easy bruising or bleeding gums    Vital Signs Last 24 Hrs  T(C): 36.8 (13 Jan 2019 01:04), Max: 36.8 (13 Jan 2019 01:04)  T(F): 98.3 (13 Jan 2019 01:04), Max: 98.3 (13 Jan 2019 01:04)  HR: 158 (13 Jan 2019 01:04) (142 - 158)  BP: 143/98 (13 Jan 2019 01:04) (143/98 - 184/115)  BP(mean): --  RR: 22 (13 Jan 2019 01:04) (14 - 22)  SpO2: 95% (13 Jan 2019 01:04) (95% - 95%)    Neurologic Examination:  General:  Appearance is consistent with chronologic age.  No abnormal facies.   General: The patient is oriented to person, place, time and date.  Recent and remote memory intact.  Fund of knowledge is intact and normal.  Language with normal repetition, comprehension and naming.  Nondysarthric.    Cranial nerves: intact VA, VFF.  EOMI w/o nystagmus, skew or reported double vision.  PERRL.  No ptosis/weakness of eyelid closure.  Facial sensation is normal with normal bite.  No facial asymmetry.  Hearing grossly intact b/l.  Palate elevates midline.  Tongue midline.  Motor examination:   Normal tone, bulk and range of motion.  No tenderness, twitching, tremors or involuntary movements.  Formal Muscle Strength Testing: (MRC grade R/L) 5/5 UE; 5/5 LE.  No observable drift.  Reflexes:   2+ b/l pectoralis, biceps, triceps, brachioradialis, patella and Achilles.  Plantar response downgoing b/l.  Jaw jerk, Portia, clonus absent.  Sensory examination:   Intact to light touch and pinprick, pain, temperature and proprioception and vibration in all extremities.  Cerebellum:   FTN/HKS intact with normal SE in all limbs.  No dysmetria or dysdiadokinesia.  Gait narrow based and normal.    Labs:   CBC Full  -  ( 12 Jan 2019 23:01 )  WBC Count : 10.9 K/uL  Hemoglobin : 14.2 g/dL  Hematocrit : 45.5 %  Platelet Count - Automated : 184 K/uL  Mean Cell Volume : 92.0 fl  Mean Cell Hemoglobin : 28.7 pg  Mean Cell Hemoglobin Concentration : 31.2 gm/dL  Auto Neutrophil # : 4.8 K/uL  Auto Lymphocyte # : 4.7 K/uL  Auto Monocyte # : 1.1 K/uL  Auto Eosinophil # : 0.1 K/uL  Auto Basophil # : 0.2 K/uL  Auto Neutrophil % : 44.2 %  Auto Lymphocyte % : 42.8 %  Auto Monocyte % : 10.4 %  Auto Eosinophil % : 1.1 %  Auto Basophil % : 1.5 %    01-12    142  |  105  |  16  ----------------------------<  137<H>  3.3<L>   |  29  |  0.99    Ca    9.1      12 Jan 2019 23:01    TPro  8.2  /  Alb  3.8  /  TBili  0.3  /  DBili  x   /  AST  23  /  ALT  28  /  AlkPhos  113  01-12    LIVER FUNCTIONS - ( 12 Jan 2019 23:01 )  Alb: 3.8 g/dL / Pro: 8.2 g/dL / ALK PHOS: 113 U/L / ALT: 28 U/L DA / AST: 23 U/L / GGT: x           PT/INR - ( 12 Jan 2019 23:01 )   PT: 13.8 sec;   INR: 1.24 ratio         PTT - ( 12 Jan 2019 23:01 )  PTT:29.7 sec        Neuroimaging:  NCHCT:   CT Angiography/Perfusion:  MRI Brain NC:  MRA Head/Neck:  EEG:    Assessment:  This is a 53y Female with h/o     Plan:   -   01-13-19 @ 01:29          Please contact the Neurology consult service with any questions.    Wiley Majano MD  Neurology Attending  [facility] Neurology Consult    Patient is a 53y old  Female who presents with a chief complaint of Lt sided weakness (13 Jan 2019 12:22)    HPI:  53 RHF with previous ischemic stroke with baseline left-sided hemiparesis in the setting of HTN, HLD, and Afib on rivaroxaban (last dose taken on 1/11/19), who was last seen normal on 1/12/19 at 21:00, after which time she seemed to be confused and saying very little, while also looking only to her left side. She was brought to the ED and a stroke code was called.  She was evaluated by telestroke and had a NIHSS of 9, but was not considered a candidate for IV tPA because of recent use of rivaroxaban. The patient was subsequently observed to have a generalized convulsive seizure, which stopped with lorazepam 2mg IV x 1.     PAST MEDICAL & SURGICAL HISTORY:  CVA (cerebral vascular accident)  Hypertension  Atrial fibrillation on Rivaroxaban  H/O tubal ligation    FAMILY HISTORY: No known neurological diseases in family    Social History: No tobacco, alcohol, or illicit drug use per family.    Allergies: Penicillin (Hives)    MEDICATIONS  (STANDING):  aspirin  chewable 81 milliGRAM(s) Oral daily  atorvastatin 40 milliGRAM(s) Oral at bedtime  diltiazem Infusion 15 mG/Hr (15 mL/Hr) IV Continuous <Continuous>  heparin  Infusion. 900 Unit(s)/Hr (9 mL/Hr) IV Continuous <Continuous>  lisinopril 2.5 milliGRAM(s) Oral daily  pantoprazole    Tablet 40 milliGRAM(s) Oral before breakfast    MEDICATIONS  (PRN):  LORazepam   Injectable 2 milliGRAM(s) IV Push every 6 hours PRN seizure activity  ondansetron Injectable 4 milliGRAM(s) IV Push every 6 hours PRN Nausea and/or Vomiting    Review of systems:    Constitutional: No recent fever, weight loss or fatigue    Eyes: No recent eye pain or discharge  ENMT:  No recent difficulty hearing; No sinus or throat pain  Neck: No recent pain or stiffness  Respiratory: No recent cough, wheezing, chills or hemoptysis  Cardiovascular: No recent chest pain, palpitations, shortness of breath, dyspnea on exertion  Gastrointestinal: No recent abdominal pain, nausea, vomiting or hematemesis; No diarrhea or constipation.   Genitourinary: No recent dysuria, frequency, hematuria or incontinence  Neurological: As per HPI  Skin: No recent rashes or lesions   Endocrine: No recent heat or cold intolerance; No hair loss  Musculoskeletal: No recent joint pain or swelling  Psychiatric: No recent depression, anxiety, mood swings  Heme/Lymph: No recent easy bruising or bleeding    Vital Signs Last 24 Hrs  T(C): 36.9 (13 Jan 2019 10:56), Max: 37.1 (13 Jan 2019 06:39)  T(F): 98.4 (13 Jan 2019 10:56), Max: 98.7 (13 Jan 2019 06:39)  HR: 98 (13 Jan 2019 15:42) (79 - 158)  BP: 125/86 (13 Jan 2019 15:42) (111/71 - 184/115)  RR: 24 (13 Jan 2019 15:42) (14 - 32)  SpO2: 100% (13 Jan 2019 15:42) (95% - 100%)    General:  Appearance is consistent with chronologic age.  No abnormal facies.   Respiratory: Shallow, rapid breaths; Diminished sounds b/l  Cardiovascular: Rapid rate, Irregular rhythm, No murmurs; Full b/l radial and pedal pulses    Neurologic Examination:  General: Awake, Follows most verbal commands, Has minimal verbal output with dysarthric speech.  Cranial nerves: Leftward gaze preference, but able to overcome this with prompting.  Blink to threat present on left, but not on right.  PERRL.  Left facial droop present.  Turns to snap at left ear, but not at right ear.  Weak midline palate elevation and tongue protrusion.  At least 3/5 strength with b/l SCM, effort-limited.  Motor: Normal bulk x 4.  Unable to lift LUE and LLE from bed.  At least 4/5 strength throughout RUE and RLE, and both drift to bed.  No spasticity or rigidity x 4.  Reflexes:   1+ and symmetric at b/l biceps, triceps, brachioradialis, patellae, and Achilles.  Left upgoing toe, Mute right toe.  Sensory examination:   Intact to light touch and pinprick in all four extremities.  Cerebellum:   No dysmetria with right finger-to-nose and right heel raise.  Unable to assess at LUE and LLE due to profound weakness.  Gait and Romberg deferred due to weakness.    NIHSS Score:    LOC - 0  LOC Questions - 1  LOC Commands - 0  Gaze Preference - 1  Visual Fields - 2  Facial Palsy - 2  Motor Arm Left - 4  Motor Arm Right - 1  Motor Leg Left - 4  Motor Leg Right - 1  Limb Ataxia - 0 (unable to assess at LUE and LLE)  Sensory - 0  Language - 1  Speech - 1  Extinction - 0    NIHSS Score Total: 18    Modified Angella Scale: 4 (Moderately severe disability)    Labs:   CBC Full  -  ( 13 Jan 2019 15:46 )  WBC Count : 12.6 K/uL  Hemoglobin : 13.4 g/dL  Hematocrit : 42.1 %  Platelet Count - Automated : 172 K/uL  Mean Cell Volume : 90.6 fl  Mean Cell Hemoglobin : 28.8 pg  Mean Cell Hemoglobin Concentration : 31.8 gm/dL  Auto Neutrophil # : x  Auto Lymphocyte # : x  Auto Monocyte # : x  Auto Eosinophil # : x  Auto Basophil # : x  Auto Neutrophil % : x  Auto Lymphocyte % : x  Auto Monocyte % : x  Auto Eosinophil % : x  Auto Basophil % : x    01-13    140  |  105  |  13  ----------------------------<  149<H>  3.9   |  23  |  0.81    Ca    8.5      13 Jan 2019 08:43  Phos  2.9     01-13  Mg     1.9     01-13    TPro  8.0  /  Alb  3.6  /  TBili  0.4  /  DBili  x   /  AST  18  /  ALT  28  /  AlkPhos  103  01-13      PT/INR - ( 12 Jan 2019 23:01 )   PT: 13.8 sec;   INR: 1.24 ratio    PTT - ( 13 Jan 2019 15:46 )  PTT:>200.0 sec      Neuroimaging:    CT Head and CT Angiogram Head & Neck (1/12/19): No acute intracranial abnormality; Old right MCA and EJ territory infarcts present; No intracranial or neck vessel cutoffs present      Assessment:  53 RHF with right hemispheric ischemic stroke secondary to atrial fibrillation and resulting left-sided facial droop and hemiparesis, now with left-sided hemiplegia and new left gaze preference and right-sided hemiparesis that may indicate presence of a new left hemispheric ischemic stroke, as well as witnessed generalized convulsions that may represent a focal to bilateral epileptic seizure secondary to previous stroke.    Recommendations:    1. Secondary stroke prevention:  (a) Aspirin 81mg daily  (b) Atorvastatin 40mg QHS  (c) Okay to resume anticoagulation with heparin at low dose (900 units/hr max) - although NIHSS is high, many are based on chronic findings, and the new findings actually make up only a portion of the total score. Risk of allowing uncontrolled Afib outweighs benefit.  (d) Defer to ED, ICU, and Medicine teams for control of Afib with RVR up to 230s found in ED  (e) PT/OT and Speech Therapy for swallow eval (NPO until then)    2. Stroke workup:  (a) Hemoglobin A1c  (b) Fasting lipid panel  (c) Telemetry  (d) Echo    3. Seizure evaluation:  (a) EEG to be completed and reviewed later today.  (b) For now, have lorazepam 2-4 mg IV/IM available to stop any further breakthrough seizures lasting 5 minutes or longer. Will decide on whether to initiate antiepileptic medication based on EEG results and clinical progress.      Please contact the Neurology consult service with any questions.    Wiley Majano MD  Neurology Attending  [facility]

## 2019-01-13 NOTE — EEG REPORT - NS EEG TEXT BOX
B/l TIRDA (R>L) present  Right temporal seizure detected    FULL REPORT TO FOLLOW [facility]  Report of Routine EEG     [facility]: [address], [address] [place] [address], Phone: [phone]  [facility]: [address] [place] [address], Phone: [phone]  Office: [address] [facility], [facility] [address], [place] [address], Phone: [phone]    Patient Name: GOLDIE NOLASCO    Age: 53 year  : 1965  Patient ID: -, MRN #: [id number], Location: - [facility]  Referring Physician: DR PIERRE  EEG #:     Study Date: 2019		    Technical Information:					  On Instrument: -  Placement and Labeling of Electrodes:  The EEG was performed utilizing 20 channels referential EEG connections (coronal over temporal over parasagittal montage) using all standard 10-20 electrode placements with EKG.  Recording was at a sampling rate of 256 samples per second per channel.  Rolly and seizure detection algorithms were utilized.    History: 53 RHF with previous right MCA and EJ territory ischemic strokes, who presents with worsening of baseline left-sided hemiparesis and poor verbal output. Subsequently witnessed to have a generalized convulsion.  -  -    Medication	  Lorazepam 2mg IV x 1.  No AEDs at time of EEG.	    Study Interpretation:    FINDINGS:  The background was continuous, spontaneously variable and reactive. During wakefulness, the posterior dominant rhythm consisted of symmetric, well-modulated 9 Hz activity, with amplitude to 30 uV, that attenuated to eye opening. Low amplitude frontal beta was noted in wakefulness.      Background Slowing:  No generalized background slowing was present.    Focal Slowing:   Temporal intermittent rhythmic delta activity (TIRDA) seen bilaterally, right more often than left.    Sleep Background:  Drowsiness was characterized by fragmentation, attenuation, and slowing of the background activity.    Stage II sleep transients were not recorded.    Other Non-Epileptiform Findings:  None were present.    Interictal Epileptiform Activity:   Intermittent right temporal sharps, centered around lead T8, are seen throughout the study.    Events:  Clinical events: Unspecified movement was observed starting at 13:30:25, lasting 2 seconds  Seizures: Between 13:30:27 and 13:30:48, high amplitude rhythmic delta activity (2 Hz) is seen over the right temporal region, and quickly spreads to both temporal regions and then both hemispheres. Starting at 13:30:48, there was sudden onset of generalized voltage attenuation with generalized slowing.    Activation Procedures:   Hyperventilation was not performed.    Photic stimulation was performed and did not elicit any abnormalities.      Artifacts:  Intermittent myogenic and movement artifacts were noted.    ECG:  The heart rate on single channel ECG was predominantly between 100 and 110 BPM, with irregular rhythm.    EEG Summary/Classification:  Abnormal EEG in the awake and drowsy states.    EEG Impression/Clinical Correlate:  Normal EEG study.  No epileptic pattern or seizure seen.      Abnormal EEG study.  1. Potential epileptogenic focus in the right and left temporal regions.  Right temporal electrographic, potentially electroclinical, seizure detected one time during study.    Bilateral temporal intermittent rhythmic delta activity (TIRDA) and right temporal sharps are both indicative of seizure tendency emanating from both temporal regions, particularly on the right.    Information relayed to primary team taking care of patient.    ________________________________________    Wiley Pierre MD  Attending Physician, [facility] [place]

## 2019-01-13 NOTE — PHYSICAL THERAPY INITIAL EVALUATION ADULT - RANGE OF MOTION EXAMINATION, REHAB EVAL
Right UE ROM was WNL (within normal limits)/Right LE ROM was WNL (within normal limits) Right UE ROM was WNL (within normal limits)/Right LE ROM was WNL (within normal limits)/LUE minimal active motiona secondary to chronic severe tone, LLE PROM at least 1/2 range grossly and ~1/5 range hip and knee AROM, all limited by tone

## 2019-01-13 NOTE — PHYSICAL THERAPY INITIAL EVALUATION ADULT - DIAGNOSIS, PT EVAL
impaired strength, ROM, balance, L sided neglect impaired strength, tone ROM, balance, L sided neglect

## 2019-01-13 NOTE — H&P ADULT - HISTORY OF PRESENT ILLNESS
Pt is a 54 y/o F, from home, lives with , w/ PMHx of CVA 3 years prior (residual rt sided weakness), afib on Xarelto, HTN, HLD who presents to ED c/o slurred speech after 9 pm yesterday. As per sister at bedside pt went to [facility] and was acting strange, but pt said everything was fine. Once pt arrived home she was confused and only looking to her left side.  Sister got concerned and brought pt to ED. In Ed, pt was found to be in afib w/ RVR @ 230's. Upon arrival pt had slurred speech and right sided hemineglect with NIHSS of 9, so code stroke was called. As per Neurology no TPA was needed. Head CT was negative for acute stroke only showing chronic right sided stroke and neurology recommend heparin drip for anticoagulation.  Pt was given lopressor 5 mg IVP with no improvement of HR. ICU consulted for better HR control. Pt was given 20 mg Cardizem IVP which show some improvement of HR, and Cardizem drip was started for maintenance. Another 20 mg Cardizem IVP was given as well. During this time pt had a generalized seizure at which point ativan 2 mg IVP was given and the seizure broke. Neurology contacted who recommended Ativan 2 mg PRN for breakthrough seizures for now and schedule patient for EEG today.  As per sister pt missed today's dose of Xarelto but unknown is pt takes her medications everyday since her mental status is not the best. As per sister, pt at times talks to herself, and hears voices. At this time pt's capacity to make decisions is guarded. Pt has refused ablation in the past for irregular heart rhythm.   Limited ROS due to patient mental status.

## 2019-01-13 NOTE — H&P ADULT - NSHPPHYSICALEXAM_GEN_ALL_CORE
Vital Signs Last 24 Hrs  T(C): 36.8 (13 Jan 2019 01:04), Max: 36.8 (13 Jan 2019 01:04)  T(F): 98.3 (13 Jan 2019 01:04), Max: 98.3 (13 Jan 2019 01:04)  HR: 158 (13 Jan 2019 01:04) (142 - 158)  BP: 143/98 (13 Jan 2019 01:04) (143/98 - 184/115)  RR: 22 (13 Jan 2019 01:04) (14 - 22)  SpO2: 95% (13 Jan 2019 01:04) (95% - 95%)

## 2019-01-13 NOTE — CONSULT NOTE ADULT - ASSESSMENT
52 y/o F  w/ PMHx of CVA 3 years ago, afib on Xarelto, HTN, HLD presents to ED c/o slurred speech x after 9 pm today. As per sister pt went to the [facility] and was acting strange but pt said everything was fine. Sister took patient home and then she was called because pt was confused and only looking to her left side. Sister got concerned and brought pt to ED. At Ed pt was found to have afib RVR @ 230's. Upon arrival pt had slurred speech and right sided hemineglect  with NIHSS of  9 and code stroke was called. As per Neurology no TPA was needed. Head CT was negative for acute stroke only showing chronic right sided stroke and neurology recommend heparin drip for anticoagulation. As per sister pt missed today's dose of Xarelto but unknown is pt takes her medications everyday since pt at times is in denial of health issues and her mental status is not the best. As per sister pt at times talks by herself and hears voices. At this time the pt capacity to make decisions is guarded. Pt has refused ablation in the past  for irregular heart beat. Pt was given lopressor 5 mg IVP with no improvement on HR. ICU consulted for HR better HR control. Pt was given 20 mg Cardizem IVP which show some improvement in HR and then was started on Cardizem drip for maintenance. Another 20 mg Cardizem IVP were given since Cardizem drip was not running due to line problem but afterwards line was fixed and Cardizem drip was started. During that time pt had a generalized seizure on which ativan 2 mg IVP was given and seizures broke. Contacted Neurology on the case who recommended Ativan 2 mg PRN for breakthrough seizures for now and schedule patient for EEG today. Limited ROS due to patient mental status.

## 2019-01-13 NOTE — CONSULT NOTE ADULT - PROBLEM SELECTOR RECOMMENDATION 2
pt presented with aphasia, and right sided hemineglect   Head CT was negative for acute stroke  TPA was not given as per Neurology recommendations  ASA, statin  echo   could be 2/2 afib and non compliant to medications

## 2019-01-13 NOTE — ED ADULT NURSE NOTE - ED STAT RN HANDOFF DETAILS 3
Received patient from [facility] Sow admitted to telemetry with heparin drip at 8unit/hr and Cardizem increased to 520 mg/hr the mas due to 100"s HR. PAtient is afib on monitor denies any pain or discomfort. No bed assigned as yet continue to monitor patient family at [facility].

## 2019-01-13 NOTE — ED ADULT NURSE NOTE - NSIMPLEMENTINTERV_GEN_ALL_ED
Implemented All Fall with Harm Risk Interventions:  [place] to call system. Call bell, personal items and telephone within reach. Instruct patient to call for assistance. Room bathroom lighting operational. Non-slip footwear when patient is off stretcher. Physically safe environment: no spills, clutter or unnecessary equipment. Stretcher in lowest position, wheels locked, appropriate side rails in place. Provide visual cue, wrist band, yellow gown, etc. Monitor gait and stability. Monitor for mental status changes and reorient to person, place, and time. Review medications for side effects contributing to fall risk. Reinforce activity limits and safety measures with patient and family. Provide visual clues: red socks.

## 2019-01-13 NOTE — PHYSICAL THERAPY INITIAL EVALUATION ADULT - CRITERIA FOR SKILLED THERAPEUTIC INTERVENTIONS
functional limitations in following categories/impairments found/rehab potential/predicted duration of therapy intervention/risk reduction/prevention/therapy frequency/anticipated discharge recommendation

## 2019-01-13 NOTE — CONSULT NOTE ADULT - PROBLEM SELECTOR RECOMMENDATION 4
IMPROVE VTE Individual Risk Assessment    RISK                                                          Points  [] Previous VTE                                           3  [] Thrombophilia                                        2  [] Lower limb paralysis                              2   [] Current Cancer                                       2   [x] Immobilization > 24 hrs                        1  [x] ICU/CCU stay > 24 hours                       1  [x] Age > 60                                                   1    IMPROVE VTE Score:  3    on heparin drip

## 2019-01-13 NOTE — CONSULT NOTE ADULT - SUBJECTIVE AND OBJECTIVE BOX
Requesting Physician : Dr. Tamayo    Reason for Consultation: Afib    HISTORY OF PRESENT ILLNESS:  52 yo F with history of Afib on xarelto, history of CVA, HTN who is being seen for management of Afib.  The patient was admitted with slurred speech.  She was evaluated in the ED, code stroke was called, and no tpa was given after neuro eval.  In the ED, the patient developed seizures and was given Ativan.  During this time the patient was found to be rapid Af for which cardizem gtt was started and cardiology consulted to further evaluate.  The patient has no chest pain, palpitations, syncope, sob.  The patient's family reports compliance with xarelto.              PAST MEDICAL & SURGICAL HISTORY:  CVA (cerebral vascular accident)  Hypertension  Atrial fibrillation  H/O tubal ligation          MEDICATIONS:  MEDICATIONS  (STANDING):  aspirin  chewable 81 milliGRAM(s) Oral daily  atorvastatin 40 milliGRAM(s) Oral at bedtime  diltiazem Infusion 15 mG/Hr (15 mL/Hr) IV Continuous <Continuous>  heparin  Infusion. 900 Unit(s)/Hr (9 mL/Hr) IV Continuous <Continuous>  lisinopril 2.5 milliGRAM(s) Oral daily  pantoprazole    Tablet 40 milliGRAM(s) Oral before breakfast      Allergies    penicillin (Hives)    Intolerances        FAMILY HISTORY:  No pertinent family history in first degree relatives    Non-contributary for premature coronary disease or sudden cardiac death    SOCIAL HISTORY:    [x ] Non-smoker  [ ] Smoker  [ ] Alcohol      REVIEW OF SYSTEMS:  [ ]chest pain  [  ]shortness of breath  [  ]palpitations  [  ]syncope  [ ]near syncope [ ]upper extremity weakness   [ ] lower extremity weakness  [  ]diplopia  [ x ]altered mental status   [  ]fevers  [ ]chills [ ]nausea  [ ]vomitting  [  ]dysphagia    [ ]abdominal pain  [ ]melena  [ ]BRBPR    [  ]epistaxis  [  ]rash    [ ]lower extremity edema        [x ] All others negative	  [ ] Unable to obtain    PHYSICAL EXAM:  T(C): 36.9 (01-13-19 @ 10:56), Max: 37.1 (01-13-19 @ 06:39)  HR: 101 (01-13-19 @ 11:41) (79 - 158)  BP: 119/64 (01-13-19 @ 11:41) (111/71 - 184/115)  RR: 32 (01-13-19 @ 10:56) (14 - 32)  SpO2: 98% (01-13-19 @ 11:41) (95% - 100%)  Wt(kg): --  I&O's Summary        	  Lymphatic: No lymphadenopathy , no edema  Cardiovascular: Normal S1 S2, IRRR No murmurs , Peripheral pulses palpable 2+ bilaterally  Respiratory: Lungs clear to auscultation, normal effort 	  Gastrointestinal:  Soft, Non-tender, + BS	  Skin: No rashes, No ecchymoses, No cyanosis, warm to touch        TELEMETRY: AF 90's	    ECG: AF with RVR	  RADIOLOGY:  OTHER:     DIAGNOSTIC TESTING:  [ ] Echocardiogram: < from: Transthoracic Echocardiogram (09.30.18 @ 12:17) >  CONCLUSIONS:  1. Normal mitral valve. Mild mitral regurgitation.  2. Normal trileaflet aortic valve.  3. Aortic Root: 3.4 cm.  4. Severe left atrial enlargement.  5. Normal left ventricular internal dimensions and wall  thicknesses.  6. Normal Left Ventricular Systolic Function,  (EF = 55 to  60%)  7. Grade II diastolic dysfunction.  8. Normal right atrium.  9. Normal right ventricular size and function.  10. RV systolic pressure is 28 mm Hg.  11. There is mild tricuspid regurgitation.  12. Normal pulmonic valve.  13. Trivial pericardial effusion is seen.    < end of copied text >    [ ]  Catheterization:  [ ] Stress Test:    	  	  LABS:	 	    CARDIAC MARKERS:  CARDIAC MARKERS ( 13 Jan 2019 08:43 )  0.023 ng/mL / x     / 102 U/L / x     / 2.3 ng/mL  CARDIAC MARKERS ( 12 Jan 2019 23:01 )  <0.015 ng/mL / x     / 114 U/L / x     / 1.3 ng/mL                              13.9   14.2  )-----------( 173      ( 13 Jan 2019 08:21 )             41.4     01-13    140  |  105  |  13  ----------------------------<  149<H>  3.9   |  23  |  0.81    Ca    8.5      13 Jan 2019 08:43  Phos  2.9     01-13  Mg     1.9     01-13    TPro  8.0  /  Alb  3.6  /  TBili  0.4  /  DBili  x   /  AST  18  /  ALT  28  /  AlkPhos  103  01-13    proBNP:   Lipid Profile:   HgA1c:   TSH: Thyroid Stimulating Hormone, Serum: 0.68 uU/mL (01-13 @ 08:43)      ASSESSMENT/PLAN:  52 yo F with history of Afib on xarelto, history of CVA, HTN who is being seen for management of Afib.  -recommend lifelong ac for cva prevention  -pt. changed to hep gtt per neuro - continue with ac if no contraindications and ok with neuro   -given possible acute cva, favor rate control strategy for afib  -continue with cardizem gtt (rate is better controlled now; last TTE showed normal LV function)  -check TTE  -monitor tele  -follow up neuro    Maria Teresa Burciaga MD

## 2019-01-13 NOTE — PHYSICAL THERAPY INITIAL EVALUATION ADULT - MANUAL MUSCLE TESTING RESULTS, REHAB EVAL
R UE and R LE gross MMT at least 3/5, L UE shoulder 2-/5, elbow 2-/5, L LE grossly 1/5 R UE and R LE gross MMT at least 4/5, L UE shoulder 2-/5, elbow 2-/5, L LE grossly 2+/5, except ankle 1+/5

## 2019-01-13 NOTE — CONSULT NOTE ADULT - PROBLEM SELECTOR RECOMMENDATION 3
pt had generalized seizure   ativan 2 mg x 1  contacted neurology and recommended Ativan 2 mg PRN for breakthrough seizure  no AED at the time

## 2019-01-13 NOTE — H&P ADULT - PROBLEM SELECTOR PLAN 2
-Pt appeared to be in SVT on admission  -pt was given lopressor 5 mg IVP with no improvement of HR. Pt was given 20 mg Cardizem IVP which showed some improvement of HR, and Cardizem drip was started for maintenance. Another 20 mg Cardizem IVP was given as well.   -Will continue Cardizem drip until cleared by Speech therapy  -continue with heparin drip for AC per neuro rec  -f/u Echo  -Cardio: Dr. Burden

## 2019-01-13 NOTE — PHYSICAL THERAPY INITIAL EVALUATION ADULT - IMPAIRED TRANSFERS: SIT/STAND, REHAB EVAL
decreased ROM/impaired balance/decreased flexibility/abnormal muscle tone/impaired postural control/impaired coordination/pain/decreased strength

## 2019-01-13 NOTE — H&P ADULT - MOTOR
Normal tone, bulk and range of motion.  No tenderness, twitching, tremors or involuntary movements.  Strength: rt sided upper and lower ext 2/5

## 2019-01-13 NOTE — H&P ADULT - ASSESSMENT
Pt is a 54 y/o F, from home, lives with , w/ PMHx of CVA 3 years prior (residual rt sided weakness), afib on Xarelto, HTN, HLD who presents to ED c/o slurred speech after 9 pm yesterday. In the ED pt presents in no acute distress, vitals sig for 's remaining labs WNL, and EKG SVT.   Upon arrival pt had slurred speech and right sided hemineglect with NIHSS of 9, so code stroke was called. As per Neurology no TPA was needed. Head CT was negative for acute stroke only showing chronic right sided stroke and neurology recommend heparin drip for anticoagulation.  For the SVT, pt was given lopressor 5 mg IVP with no improvement of HR. ICU consulted for better HR control. Pt was given 20 mg Cardizem IVP which showed some improvement of HR, and Cardizem drip was started for maintenance. Another 20 mg Cardizem IVP was given as well. During this time pt had a generalized seizure at which point ativan 2 mg IVP was given and the seizure broke. Neurology contacted who recommended Ativan 2 mg PRN for breakthrough seizures for now and schedule patient for EEG today.      Pt will be admitted to tele for management of Afib w/ RVR and acute CVA

## 2019-01-13 NOTE — H&P ADULT - PROBLEM SELECTOR PLAN 1
-slurred speech and right sided hemineglect on admission   -Code stroke called in ED  -CT Head was negative for acute stroke, only showing chronic right sided stroke  -NIHSS of 9 on admission- no TpA given per neuro  -Heparin drip started by neuro with concern for thromboembolic event 2/2 afib  -Pt was on Xarelto during her last CVA, but it was not changed- ? failure of xarelto therapy   -Will await neuro and cardio recs for OP AC  -c/w asa and statin therapy  -PT and speech consult  -f/u Echo  -Neuro: Dr. Majano  -Cardio: Dr. Burden

## 2019-01-13 NOTE — H&P ADULT - PROBLEM SELECTOR PLAN 3
-Pt was on Topamax in the past, but no longer takes it  -Seized during Cardizem infusion  -Will continue IV ativan PRN for seizures as pt is scheduled for EEG today  -Neuro: Dr. Majano

## 2019-01-13 NOTE — PHYSICAL THERAPY INITIAL EVALUATION ADULT - IMPAIRMENTS FOUND, PT EVAL
aerobic capacity/endurance/gait, locomotion, and balance/ergonomics and body mechanics/muscle strength/ROM/tone/posture

## 2019-01-13 NOTE — ED ADULT NURSE NOTE - ED STAT RN HANDOFF DETAILS 4
Patient endorsed to Bob RN holding nurse with heparin and cardizem drip at same rate as earlier , no bed as yet continue to monitor patient safety maintained.

## 2019-01-14 DIAGNOSIS — R56.9 UNSPECIFIED CONVULSIONS: ICD-10-CM

## 2019-01-14 LAB
ANION GAP SERPL CALC-SCNC: 11 MMOL/L — SIGNIFICANT CHANGE UP (ref 5–17)
APTT BLD: 34.4 SEC — SIGNIFICANT CHANGE UP (ref 27.5–36.3)
APTT BLD: 35.3 SEC — SIGNIFICANT CHANGE UP (ref 27.5–36.3)
APTT BLD: 40.2 SEC — HIGH (ref 27.5–36.3)
BUN SERPL-MCNC: 16 MG/DL — SIGNIFICANT CHANGE UP (ref 7–18)
CALCIUM SERPL-MCNC: 9 MG/DL — SIGNIFICANT CHANGE UP (ref 8.4–10.5)
CHLORIDE SERPL-SCNC: 101 MMOL/L — SIGNIFICANT CHANGE UP (ref 96–108)
CO2 SERPL-SCNC: 25 MMOL/L — SIGNIFICANT CHANGE UP (ref 22–31)
CREAT SERPL-MCNC: 0.64 MG/DL — SIGNIFICANT CHANGE UP (ref 0.5–1.3)
GLUCOSE BLDC GLUCOMTR-MCNC: 119 MG/DL — HIGH (ref 70–99)
GLUCOSE SERPL-MCNC: 117 MG/DL — HIGH (ref 70–99)
HCT VFR BLD CALC: 28.5 % — LOW (ref 34.5–45)
HGB BLD-MCNC: 9.3 G/DL — LOW (ref 11.5–15.5)
INR BLD: 1.22 RATIO — HIGH (ref 0.88–1.16)
INR BLD: 1.27 RATIO — HIGH (ref 0.88–1.16)
MCHC RBC-ENTMCNC: 28.8 PG — SIGNIFICANT CHANGE UP (ref 27–34)
MCHC RBC-ENTMCNC: 32.5 GM/DL — SIGNIFICANT CHANGE UP (ref 32–36)
MCV RBC AUTO: 88.7 FL — SIGNIFICANT CHANGE UP (ref 80–100)
PLATELET # BLD AUTO: 117 K/UL — LOW (ref 150–400)
POTASSIUM SERPL-MCNC: 3.7 MMOL/L — SIGNIFICANT CHANGE UP (ref 3.5–5.3)
POTASSIUM SERPL-SCNC: 3.7 MMOL/L — SIGNIFICANT CHANGE UP (ref 3.5–5.3)
PROTHROM AB SERPL-ACNC: 13.6 SEC — HIGH (ref 10–12.9)
PROTHROM AB SERPL-ACNC: 14.2 SEC — HIGH (ref 10–12.9)
RBC # BLD: 3.21 M/UL — LOW (ref 3.8–5.2)
RBC # FLD: 12.6 % — SIGNIFICANT CHANGE UP (ref 10.3–14.5)
SODIUM SERPL-SCNC: 137 MMOL/L — SIGNIFICANT CHANGE UP (ref 135–145)
WBC # BLD: 9.5 K/UL — SIGNIFICANT CHANGE UP (ref 3.8–10.5)
WBC # FLD AUTO: 9.5 K/UL — SIGNIFICANT CHANGE UP (ref 3.8–10.5)

## 2019-01-14 PROCEDURE — 99233 SBSQ HOSP IP/OBS HIGH 50: CPT

## 2019-01-14 RX ORDER — RIVAROXABAN 15 MG-20MG
20 KIT ORAL EVERY 24 HOURS
Qty: 0 | Refills: 0 | Status: DISCONTINUED | OUTPATIENT
Start: 2019-01-14 | End: 2019-01-15

## 2019-01-14 RX ORDER — METOPROLOL TARTRATE 50 MG
50 TABLET ORAL
Qty: 0 | Refills: 0 | Status: DISCONTINUED | OUTPATIENT
Start: 2019-01-14 | End: 2019-01-15

## 2019-01-14 RX ORDER — LANOLIN ALCOHOL/MO/W.PET/CERES
3 CREAM (GRAM) TOPICAL ONCE
Qty: 0 | Refills: 0 | Status: DISCONTINUED | OUTPATIENT
Start: 2019-01-14 | End: 2019-01-14

## 2019-01-14 RX ADMIN — LEVETIRACETAM 420 MILLIGRAM(S): 250 TABLET, FILM COATED ORAL at 05:04

## 2019-01-14 RX ADMIN — Medication 81 MILLIGRAM(S): at 12:49

## 2019-01-14 RX ADMIN — LEVETIRACETAM 420 MILLIGRAM(S): 250 TABLET, FILM COATED ORAL at 02:28

## 2019-01-14 RX ADMIN — LISINOPRIL 2.5 MILLIGRAM(S): 2.5 TABLET ORAL at 05:04

## 2019-01-14 RX ADMIN — RIVAROXABAN 20 MILLIGRAM(S): KIT at 17:20

## 2019-01-14 RX ADMIN — ATORVASTATIN CALCIUM 40 MILLIGRAM(S): 80 TABLET, FILM COATED ORAL at 23:18

## 2019-01-14 RX ADMIN — HEPARIN SODIUM 800 UNIT(S)/HR: 5000 INJECTION INTRAVENOUS; SUBCUTANEOUS at 08:10

## 2019-01-14 RX ADMIN — HEPARIN SODIUM 800 UNIT(S)/HR: 5000 INJECTION INTRAVENOUS; SUBCUTANEOUS at 02:02

## 2019-01-14 RX ADMIN — PANTOPRAZOLE SODIUM 40 MILLIGRAM(S): 20 TABLET, DELAYED RELEASE ORAL at 08:10

## 2019-01-14 RX ADMIN — Medication 50 MILLIGRAM(S): at 17:20

## 2019-01-14 RX ADMIN — LEVETIRACETAM 420 MILLIGRAM(S): 250 TABLET, FILM COATED ORAL at 17:20

## 2019-01-14 NOTE — PROGRESS NOTE ADULT - ATTENDING COMMENTS
PATIENT IS SEEN AND EXAMINED  - DC PLAN HOME ON ANTISEIZURE Rx , AND REESTABLISH HOME CARE  - D/W STAFF  - DR. ODEN

## 2019-01-14 NOTE — PROGRESS NOTE ADULT - PROBLEM SELECTOR PLAN 2
-currently rate control with Cardizem drip, will switch to oral agents  - heparin drip, will  bridge to Xarelto    - telemetry monitoring   - TTE pending  - Dr Burden cardiology follows

## 2019-01-14 NOTE — PROGRESS NOTE ADULT - SUBJECTIVE AND OBJECTIVE BOX
Patient denies chest pain or shortness of breath.   Review of systems otherwise (-)  	  MEDICATIONS:  MEDICATIONS  (STANDING):  aspirin  chewable 81 milliGRAM(s) Oral daily  atorvastatin 40 milliGRAM(s) Oral at bedtime  levETIRAcetam  IVPB 500 milliGRAM(s) IV Intermittent every 12 hours  lisinopril 2.5 milliGRAM(s) Oral daily  metoprolol tartrate 50 milliGRAM(s) Oral two times a day  pantoprazole    Tablet 40 milliGRAM(s) Oral before breakfast  rivaroxaban 20 milliGRAM(s) Oral every 24 hours      LABS:	 	    CARDIAC MARKERS:  CARDIAC MARKERS ( 13 Jan 2019 08:43 )  0.023 ng/mL / x     / 102 U/L / x     / 2.3 ng/mL  CARDIAC MARKERS ( 12 Jan 2019 23:01 )  <0.015 ng/mL / x     / 114 U/L / x     / 1.3 ng/mL                                9.3    9.5   )-----------( 117      ( 14 Jan 2019 05:56 )             28.5     Hemoglobin: 9.3 g/dL (01-14 @ 05:56)  Hemoglobin: 13.4 g/dL (01-13 @ 15:46)  Hemoglobin: 13.9 g/dL (01-13 @ 08:21)  Hemoglobin: 14.2 g/dL (01-12 @ 23:01)      01-14    137  |  101  |  16  ----------------------------<  117<H>  3.7   |  25  |  0.64    Ca    9.0      14 Jan 2019 05:56  Phos  2.9     01-13  Mg     1.9     01-13    TPro  8.0  /  Alb  3.6  /  TBili  0.4  /  DBili  x   /  AST  18  /  ALT  28  /  AlkPhos  103  01-13    Creatinine Trend: 0.64<--, 0.81<--, 0.99<--    COAGS:   PT/INR - ( 14 Jan 2019 05:56 )   PT: 13.6 sec;   INR: 1.22 ratio         PTT - ( 14 Jan 2019 07:47 )  PTT:40.2 sec          PHYSICAL EXAM:  T(C): 37.3 (01-14-19 @ 16:42), Max: 37.3 (01-14-19 @ 04:59)  HR: 100 (01-14-19 @ 16:42) (86 - 110)  BP: 154/84 (01-14-19 @ 16:42) (116/74 - 154/84)  RR: 22 (01-14-19 @ 16:42) (20 - 28)  SpO2: 98% (01-14-19 @ 16:42) (96% - 100%)  Wt(kg): --  I&O's Summary        Gen: Appears well in NAD  HEENT:  (-)icterus (-)pallor  CV: N S1 S2 1/6 MORGAN (+)2 Pulses B/l  Resp:  Clear to ausculatation B/L, normal effort  GI: (+) BS Soft, NT, ND  Lymph:  (-)Edema, (-)obvious lymphadenopathy  Skin: Warm to touch, Normal turgor  Psych: Appropriate mood and affect      ASSESSMENT/PLAN: 	53y  Female history of Afib on xarelto, history of CVA, HTN who is being seen for management of Afib.    - ? xarelto failure will D/W neuro if we should trial a different agent  - No pertinent findings on echo  - Neuro f/u    Elfego Burden MD, FACC

## 2019-01-14 NOTE — PROGRESS NOTE ADULT - PROBLEM SELECTOR PLAN 3
+ old right MCA and RCA  old ischemic stroke   -c/w secondary stroke prevention protocol:  Aspirin 81mg and Atorvastatin 40mg  - pending TTE  - heart rate control  - home with home PT

## 2019-01-14 NOTE — PROGRESS NOTE ADULT - SUBJECTIVE AND OBJECTIVE BOX
Improved exam - No dysarthria, No aphasia improved weakness on right  EEG shows b/l TIRDA and Right temporal sharps  Pt started on LEV 500mg BID    FULL NOTE TO FOLLOW Neurology Follow up note    Name  GOLDIE NOLASCO    Interval History -  Since the patient had a witnessed generalized convulsion about 24 hours ago, she has not had any new witnessed seizures. Since then, her right-sided weakness has resolved, her left-sided weakness has returned to its baseline, and her head and eyes are no longer fixed toward the left. She is able to understand commands and speak without slurring or limited output. She has no complaints of fever, chills, headache, lightheadedness, cough, shortness of breath, palpitations, nausea, vomiting, chest/abdominal pain, or changes in bowel or urinary habits.    MEDICATIONS  (STANDING):  aspirin  chewable 81 milliGRAM(s) Oral daily  atorvastatin 40 milliGRAM(s) Oral at bedtime  diltiazem Infusion 15 mG/Hr (15 mL/Hr) IV Continuous <Continuous>  heparin  Infusion. 900 Unit(s)/Hr (9 mL/Hr) IV Continuous <Continuous>  levETIRAcetam  IVPB 500 milliGRAM(s) IV Intermittent every 12 hours  lisinopril 2.5 milliGRAM(s) Oral daily  pantoprazole    Tablet 40 milliGRAM(s) Oral before breakfast    MEDICATIONS  (PRN):  LORazepam   Injectable 2 milliGRAM(s) IV Push every 6 hours PRN seizure activity  ondansetron Injectable 4 milliGRAM(s) IV Push every 6 hours PRN Nausea and/or Vomiting    Allergies: Penicillin (Hives)    Review of Systems: Fourteen systems reviewed and negative except as in Interval History.    Objective:   Vital Signs Last 24 Hrs  T(C): 37.3 (14 Jan 2019 04:59), Max: 37.3 (14 Jan 2019 04:59)  T(F): 99.2 (14 Jan 2019 04:59), Max: 99.2 (14 Jan 2019 04:59)  HR: 104 (14 Jan 2019 04:59) (79 - 104)  BP: 116/74 (14 Jan 2019 04:59) (116/74 - 141/74)  RR: 20 (14 Jan 2019 04:59) (20 - 32)  SpO2: 97% (14 Jan 2019 04:59) (97% - 100%)    General:  Appearance is consistent with chronologic age.  No abnormal facies.   Respiratory: Rapid breathing rate; CTAB/l  Cardiovascular:  Irregular rhythm, Rapid rate, No murmurs; Full b/l radial and pedal pulses    Neurologic Examination:  General: Awake, Follows all verbal commands, No dysarthria or aphasia.  Cranial nerves: VFF x 4, EOMI x 2, PERRL.  B/l V1-V3 intact to light touch and pinprick.  Left facial droop present.  Hearing intact to finger rub b/l.  Midline palate elevation and tongue protrusion.  5/5 strength with b/l SCM.  Motor: Normal bulk x 4.  2/5 strength throughout LUE and LLE; both drop to bed.  At least 4/5 strength throughout RUE and RLE, with no drift to bed.  No spasticity or rigidity x 4.  Reflexes:   1+ and symmetric at b/l biceps, triceps, brachioradialis, patellae, and Achilles.  Left upgoing toe, Right downgoing toe.  Sensory examination:   Intact to light touch and pinprick in all four extremities.  Cerebellum:   No dysmetria with b/l finger-to-nose and heel raise (more difficulty to perform with LUE and LLE).  Gait and Romberg deferred due to weakness.    NIHSS Score:    LOC - 0  LOC Questions - 0  LOC Commands - 0  Gaze Preference - 0  Visual Fields - 0  Facial Palsy - 2  Motor Arm Left - 4  Motor Arm Right - 0  Motor Leg Left - 4  Motor Leg Right - 0  Limb Ataxia - 0  Sensory - 0  Language - 0  Speech - 0  Extinction - 0    NIHSS Score Total: 10    Modified Angella Scale: 3 (Moderate disability)      Labs:    01-14    137  |  101  |  16  ----------------------------<  117<H>  3.7   |  25  |  0.64    Ca    9.0      14 Jan 2019 05:56  Phos  2.9     01-13  Mg     1.9     01-13    TPro  8.0  /  Alb  3.6  /  TBili  0.4  /  DBili  x   /  AST  18  /  ALT  28  /  AlkPhos  103  01-13    Hemoglobin A1c 5.6%   / 43 / 65 / 78      Neuroimaging:    EEG (1/13/19): Right temporal sharps and electrographic seizure, Bilateral temporal intermittent rhythmic delta activity (TIRDA), all supporting a right temporal focus for epileptic seizures      Assessment: 53 RHF with 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    Recommendations:    1. Secondary stroke prevention:  (a) Aspirin 81mg daily  (b) Atorvastatin 40mg QHS  (c) Low dose heparin drip, bridge back to rivaroxaban  (d) HR control to manage Afib w/ RVR  (e) PT/OT and swallow eval    2. Stroke workup:  (a) Telemetry  (b) Echo    3. Post-stroke epilepsy  (a) Levetiracetam 500mg BID started  (b) Lorazepam 2mg IV PRN seizure lasting over 5 minutes    4. Close follow up in [facility] neurology clinic: [phone].      Please contact the Neurology consult service with any questions.    Wiley Majano MD  Neurology Attending  [facility]

## 2019-01-14 NOTE — PROGRESS NOTE ADULT - SUBJECTIVE AND OBJECTIVE BOX
NP Note     52 yo F with history of Afib on xarelto, history of CVA, HTN presented with worsening left sided hemipharesis, slurred speach and gneralized convulsion. In ED found to be in Afib with RVR. Admitted to telemetry for new stroke work up and afib control.  EEG + seizure activity. pt seen at bedside, states feeling better, left sided weakness at baseline, no more seizures, gaze normal, clear speech. Denies headache, fever, palpitation or hest pain. Heparin drip continued. Pt on Cardizem drip, currently rate around 100bpm. Awaiting TTE.           INTERVAL HPI/OVERNIGHT EVENTS: no new complaints    MEDICATIONS  (STANDING):  aspirin  chewable 81 milliGRAM(s) Oral daily  atorvastatin 40 milliGRAM(s) Oral at bedtime  diltiazem Infusion 15 mG/Hr (15 mL/Hr) IV Continuous <Continuous>  heparin  Infusion. 900 Unit(s)/Hr (9 mL/Hr) IV Continuous <Continuous>  levETIRAcetam  IVPB 500 milliGRAM(s) IV Intermittent every 12 hours  lisinopril 2.5 milliGRAM(s) Oral daily  pantoprazole    Tablet 40 milliGRAM(s) Oral before breakfast    MEDICATIONS  (PRN):  LORazepam   Injectable 2 milliGRAM(s) IV Push every 6 hours PRN seizure activity  ondansetron Injectable 4 milliGRAM(s) IV Push every 6 hours PRN Nausea and/or Vomiting      __________________________________________________  REVIEW OF SYSTEMS:    CONSTITUTIONAL: No fever,   RESPIRATORY: No cough; No shortness of breath  CARDIOVASCULAR: No chest pain, no palpitations  GASTROINTESTINAL: No pain. No nausea or vomiting; No diarrhea   NEUROLOGICAL: No headache or numbness, no tremors  MUSCULOSKELETAL: No joint pain, no muscle pain  GENITOURINARY: no dysuria, no frequency, no hesitancy      Vital Signs Last 24 Hrs  T(C): 36.8 (14 Jan 2019 10:33), Max: 37.3 (14 Jan 2019 04:59)  T(F): 98.3 (14 Jan 2019 10:33), Max: 99.2 (14 Jan 2019 04:59)  HR: 110 (14 Jan 2019 10:33) (86 - 110)  BP: 117/66 (14 Jan 2019 10:33) (116/74 - 136/87)  BP(mean): --  RR: 27 (14 Jan 2019 10:33) (20 - 28)  SpO2: 97% (14 Jan 2019 10:33) (96% - 100%)    ________________________________________________  PHYSICAL EXAM:  GENERAL: NAD  HEENT: Normocephalic;  conjunctivae and sclerae clear; moist mucous membranes;   NECK : supple  CHEST/LUNG: Clear to ausculitation bilaterally with good air entry   HEART: S1 S2  irregular; Afib   ABDOMEN: Soft, Nontender, Nondistended; Bowel sounds present  EXTREMITIES: no cyanosis; no edema; no calf tenderness  SKIN: warm and dry; no rash  NERVOUS SYSTEM:  Awake and alert; Oriented  to place, person and time ; no new deficits, left hemiprosthesis at baseline     _________________________________________________  LABS:                        9.3    9.5   )-----------( 117      ( 14 Jan 2019 05:56 )             28.5     01-14    137  |  101  |  16  ----------------------------<  117<H>  3.7   |  25  |  0.64    Ca    9.0      14 Jan 2019 05:56  Phos  2.9     01-13  Mg     1.9     01-13    TPro  8.0  /  Alb  3.6  /  TBili  0.4  /  DBili  x   /  AST  18  /  ALT  28  /  AlkPhos  103  01-13    PT/INR - ( 14 Jan 2019 05:56 )   PT: 13.6 sec;   INR: 1.22 ratio         PTT - ( 14 Jan 2019 07:47 )  PTT:40.2 sec    CAPILLARY BLOOD GLUCOSE            RADIOLOGY & ADDITIONAL TESTS:    Imaging Personally Reviewed:  YES/NO    Consultant(s) Notes Reviewed:   YES/ No    Care Discussed with Consultants :     Plan of care was discussed with patient and /or primary care giver; all questions and concerns were addressed and care was aligned with patient's wishes.

## 2019-01-14 NOTE — PROGRESS NOTE ADULT - PROBLEM SELECTOR PLAN 1
post-stroke epilepsy, new onset  - c/w Levetiracetam 500mg BID started  - lorazepam 2mg IV as needed for seizure   - seizure precautions  - outpatient follow up in neuro clinic

## 2019-01-15 ENCOUNTER — TRANSCRIPTION ENCOUNTER (OUTPATIENT)
Age: 54
End: 2019-01-15

## 2019-01-15 VITALS — SYSTOLIC BLOOD PRESSURE: 134 MMHG | OXYGEN SATURATION: 100 % | DIASTOLIC BLOOD PRESSURE: 92 MMHG | HEART RATE: 91 BPM

## 2019-01-15 LAB
ANION GAP SERPL CALC-SCNC: 9 MMOL/L — SIGNIFICANT CHANGE UP (ref 5–17)
BUN SERPL-MCNC: 14 MG/DL — SIGNIFICANT CHANGE UP (ref 7–18)
CALCIUM SERPL-MCNC: 8.9 MG/DL — SIGNIFICANT CHANGE UP (ref 8.4–10.5)
CHLORIDE SERPL-SCNC: 100 MMOL/L — SIGNIFICANT CHANGE UP (ref 96–108)
CO2 SERPL-SCNC: 24 MMOL/L — SIGNIFICANT CHANGE UP (ref 22–31)
CREAT SERPL-MCNC: 0.67 MG/DL — SIGNIFICANT CHANGE UP (ref 0.5–1.3)
GLUCOSE SERPL-MCNC: 107 MG/DL — HIGH (ref 70–99)
HCT VFR BLD CALC: 44.6 % — SIGNIFICANT CHANGE UP (ref 34.5–45)
HGB BLD-MCNC: 14.6 G/DL — SIGNIFICANT CHANGE UP (ref 11.5–15.5)
INR BLD: 1.67 RATIO — HIGH (ref 0.88–1.16)
MCHC RBC-ENTMCNC: 29 PG — SIGNIFICANT CHANGE UP (ref 27–34)
MCHC RBC-ENTMCNC: 32.6 GM/DL — SIGNIFICANT CHANGE UP (ref 32–36)
MCV RBC AUTO: 89 FL — SIGNIFICANT CHANGE UP (ref 80–100)
PLATELET # BLD AUTO: 190 K/UL — SIGNIFICANT CHANGE UP (ref 150–400)
POTASSIUM SERPL-MCNC: 3.3 MMOL/L — LOW (ref 3.5–5.3)
POTASSIUM SERPL-SCNC: 3.3 MMOL/L — LOW (ref 3.5–5.3)
PROTHROM AB SERPL-ACNC: 18.8 SEC — HIGH (ref 10–12.9)
RBC # BLD: 5.02 M/UL — SIGNIFICANT CHANGE UP (ref 3.8–5.2)
RBC # FLD: 12.5 % — SIGNIFICANT CHANGE UP (ref 10.3–14.5)
SODIUM SERPL-SCNC: 133 MMOL/L — LOW (ref 135–145)
WBC # BLD: 10.5 K/UL — SIGNIFICANT CHANGE UP (ref 3.8–10.5)
WBC # FLD AUTO: 10.5 K/UL — SIGNIFICANT CHANGE UP (ref 3.8–10.5)

## 2019-01-15 PROCEDURE — 85610 PROTHROMBIN TIME: CPT

## 2019-01-15 PROCEDURE — 80061 LIPID PANEL: CPT

## 2019-01-15 PROCEDURE — 82553 CREATINE MB FRACTION: CPT

## 2019-01-15 PROCEDURE — 86901 BLOOD TYPING SEROLOGIC RH(D): CPT

## 2019-01-15 PROCEDURE — 70498 CT ANGIOGRAPHY NECK: CPT

## 2019-01-15 PROCEDURE — 84484 ASSAY OF TROPONIN QUANT: CPT

## 2019-01-15 PROCEDURE — 99291 CRITICAL CARE FIRST HOUR: CPT | Mod: 25

## 2019-01-15 PROCEDURE — 83735 ASSAY OF MAGNESIUM: CPT

## 2019-01-15 PROCEDURE — 71045 X-RAY EXAM CHEST 1 VIEW: CPT

## 2019-01-15 PROCEDURE — 84443 ASSAY THYROID STIM HORMONE: CPT

## 2019-01-15 PROCEDURE — 80053 COMPREHEN METABOLIC PANEL: CPT

## 2019-01-15 PROCEDURE — 95957 EEG DIGITAL ANALYSIS: CPT

## 2019-01-15 PROCEDURE — 80048 BASIC METABOLIC PNL TOTAL CA: CPT

## 2019-01-15 PROCEDURE — 95819 EEG AWAKE AND ASLEEP: CPT

## 2019-01-15 PROCEDURE — 86850 RBC ANTIBODY SCREEN: CPT

## 2019-01-15 PROCEDURE — 82962 GLUCOSE BLOOD TEST: CPT

## 2019-01-15 PROCEDURE — 36415 COLL VENOUS BLD VENIPUNCTURE: CPT

## 2019-01-15 PROCEDURE — 82550 ASSAY OF CK (CPK): CPT

## 2019-01-15 PROCEDURE — 86900 BLOOD TYPING SEROLOGIC ABO: CPT

## 2019-01-15 PROCEDURE — 85027 COMPLETE CBC AUTOMATED: CPT

## 2019-01-15 PROCEDURE — 83036 HEMOGLOBIN GLYCOSYLATED A1C: CPT

## 2019-01-15 PROCEDURE — 85730 THROMBOPLASTIN TIME PARTIAL: CPT

## 2019-01-15 PROCEDURE — 93306 TTE W/DOPPLER COMPLETE: CPT

## 2019-01-15 PROCEDURE — 70450 CT HEAD/BRAIN W/O DYE: CPT

## 2019-01-15 PROCEDURE — 84100 ASSAY OF PHOSPHORUS: CPT

## 2019-01-15 PROCEDURE — 70496 CT ANGIOGRAPHY HEAD: CPT

## 2019-01-15 PROCEDURE — 82607 VITAMIN B-12: CPT

## 2019-01-15 PROCEDURE — 93005 ELECTROCARDIOGRAM TRACING: CPT

## 2019-01-15 PROCEDURE — 97116 GAIT TRAINING THERAPY: CPT

## 2019-01-15 PROCEDURE — 97163 PT EVAL HIGH COMPLEX 45 MIN: CPT

## 2019-01-15 RX ORDER — LEVETIRACETAM 250 MG/1
1 TABLET, FILM COATED ORAL
Qty: 60 | Refills: 0
Start: 2019-01-15 | End: 2019-02-13

## 2019-01-15 RX ORDER — POTASSIUM CHLORIDE 20 MEQ
40 PACKET (EA) ORAL EVERY 4 HOURS
Qty: 0 | Refills: 0 | Status: DISCONTINUED | OUTPATIENT
Start: 2019-01-15 | End: 2019-01-15

## 2019-01-15 RX ORDER — RIVAROXABAN 15 MG-20MG
1 KIT ORAL
Qty: 0 | Refills: 0 | COMMUNITY

## 2019-01-15 RX ORDER — POTASSIUM CHLORIDE 20 MEQ
40 PACKET (EA) ORAL ONCE
Qty: 0 | Refills: 0 | Status: DISCONTINUED | OUTPATIENT
Start: 2019-01-15 | End: 2019-01-15

## 2019-01-15 RX ORDER — RIVAROXABAN 15 MG-20MG
1 KIT ORAL
Qty: 0 | Refills: 0 | DISCHARGE
Start: 2019-01-15

## 2019-01-15 RX ORDER — LEVETIRACETAM 250 MG/1
5 TABLET, FILM COATED ORAL
Qty: 0 | Refills: 0 | COMMUNITY
Start: 2019-01-15

## 2019-01-15 RX ADMIN — LEVETIRACETAM 420 MILLIGRAM(S): 250 TABLET, FILM COATED ORAL at 05:26

## 2019-01-15 RX ADMIN — Medication 40 MILLIEQUIVALENT(S): at 11:13

## 2019-01-15 RX ADMIN — Medication 81 MILLIGRAM(S): at 11:13

## 2019-01-15 RX ADMIN — PANTOPRAZOLE SODIUM 40 MILLIGRAM(S): 20 TABLET, DELAYED RELEASE ORAL at 05:26

## 2019-01-15 RX ADMIN — Medication 50 MILLIGRAM(S): at 05:26

## 2019-01-15 RX ADMIN — LISINOPRIL 2.5 MILLIGRAM(S): 2.5 TABLET ORAL at 05:26

## 2019-01-15 NOTE — DISCHARGE NOTE ADULT - NS AS DC STROKE ED MATERIALS
Stroke Warning Signs and Symptoms/Stroke Education Booklet/Risk Factors for Stroke/Call 911 for Stroke/Prescribed Medications/Need for Followup After Discharge

## 2019-01-15 NOTE — PROGRESS NOTE ADULT - SUBJECTIVE AND OBJECTIVE BOX
Patient denies chest pain or shortness of breath.   Review of systems otherwise (-)  	  aspirin  chewable 81 milliGRAM(s) Oral daily  atorvastatin 40 milliGRAM(s) Oral at bedtime  levETIRAcetam  IVPB 500 milliGRAM(s) IV Intermittent every 12 hours  lisinopril 2.5 milliGRAM(s) Oral daily  LORazepam   Injectable 2 milliGRAM(s) IV Push every 6 hours PRN  metoprolol tartrate 50 milliGRAM(s) Oral two times a day  ondansetron Injectable 4 milliGRAM(s) IV Push every 6 hours PRN  pantoprazole    Tablet 40 milliGRAM(s) Oral before breakfast  potassium chloride   Powder 40 milliEquivalent(s) Oral every 4 hours  rivaroxaban 20 milliGRAM(s) Oral every 24 hours                            14.6   10.5  )-----------( 190      ( 15 Bulmaro 2019 07:44 )             44.6       Hemoglobin: 14.6 g/dL (01-15 @ 07:44)  Hemoglobin: 9.3 g/dL (01-14 @ 05:56)  Hemoglobin: 13.4 g/dL (01-13 @ 15:46)  Hemoglobin: 13.9 g/dL (01-13 @ 08:21)  Hemoglobin: 14.2 g/dL (01-12 @ 23:01)      01-15    133<L>  |  100  |  14  ----------------------------<  107<H>  3.3<L>   |  24  |  0.67    Ca    8.9      15 Bulmaro 2019 07:44      Creatinine Trend: 0.67<--, 0.64<--, 0.81<--, 0.99<--    COAGS: PT/INR - ( 15 Bulmaro 2019 07:44 )   PT: 18.8 sec;   INR: 1.67 ratio             CARDIAC MARKERS ( 13 Jan 2019 08:43 )  0.023 ng/mL / x     / 102 U/L / x     / 2.3 ng/mL  CARDIAC MARKERS ( 12 Jan 2019 23:01 )  <0.015 ng/mL / x     / 114 U/L / x     / 1.3 ng/mL        T(C): 36.7 (01-15-19 @ 10:17), Max: 37.3 (01-14-19 @ 16:42)  HR: 79 (01-15-19 @ 10:17) (79 - 100)  BP: 133/88 (01-15-19 @ 10:17) (130/86 - 154/84)  RR: 15 (01-15-19 @ 10:17) (15 - 22)  SpO2: 97% (01-15-19 @ 10:17) (96% - 98%)  Wt(kg): --    I&O's Summary      Gen: Appears well in NAD  HEENT:  (-)icterus (-)pallor  CV: N S1 S2 1/6 MORGAN (+)2 Pulses B/l  Resp:  Clear to ausculatation B/L, normal effort  GI: (+) BS Soft, NT, ND  Lymph:  (-)Edema, (-)obvious lymphadenopathy  Skin: Warm to touch, Normal turgor  Psych: Appropriate mood and affect      ASSESSMENT/PLAN: 	53y  Female history of Afib on xarelto, history of CVA, HTN who is being seen for management of Afib.    - ? xarelto failure will D/W neuro if we should trial a different agent, however this is currently not felt to be a CVA per primary team  - No pertinent findings on echo  - Neuro f/u  - No need for further inpatient cardiac work up.  - I will sign off please call back with questions    Elfego Burden MD, FACC

## 2019-01-15 NOTE — DISCHARGE NOTE ADULT - MEDICATION SUMMARY - MEDICATIONS TO TAKE
I will START or STAY ON the medications listed below when I get home from the hospital:    aspirin 81 mg oral tablet, chewable  -- 1 tab(s) by mouth once a day   -- Indication: For Stroke prevention    lisinopril 2.5 mg oral tablet  -- 1 tab(s) by mouth once a day  -- Indication: For Hypertension    rivaroxaban 20 mg oral tablet  -- 1 tab(s) by mouth every 24 hours  -- Indication: For Atrial fibrillation with RVR    levETIRAcetam 100 mg/mL intravenous solution  -- 5 milliliter(s) intravenous every 12 hours  -- Indication: For Seizure    topiramate 50 mg oral tablet  -- 1 tab(s) by mouth once a day (at bedtime)   -- Do not chew, break, or crush.  Do not drink alcoholic beverages when taking this medication.  Do not take this drug if you are pregnant.  It is very important that you take or use this exactly as directed.  Do not skip doses or discontinue unless directed by your doctor.  May cause drowsiness or dizziness.  Medication should be taken with plenty of water.  Obtain medical advice before taking any non-prescription drugs as some may affect the action of this medication.  Swallow whole.  Do not crush.  This drug may impair the ability to drive or operate machinery.  Use care until you become familiar with its effects.    -- Indication: For pain medication    atorvastatin 40 mg oral tablet  -- 1 tab(s) by mouth once a day (at bedtime)  -- Indication: For Hyperlipidemia    metoprolol tartrate 50 mg oral tablet  -- 1 tab(s) by mouth 2 times a day  -- Indication: For Hypertension    pantoprazole 40 mg oral granule, enteric coated  -- 1 tab(s) by mouth once a day  -- Indication: For Antiacid I will START or STAY ON the medications listed below when I get home from the hospital:    aspirin 81 mg oral tablet, chewable  -- 1 tab(s) by mouth once a day   -- Indication: For Stroke prevention    lisinopril 2.5 mg oral tablet  -- 1 tab(s) by mouth once a day  -- Indication: For Hypertension    rivaroxaban 20 mg oral tablet  -- 1 tab(s) by mouth every 24 hours  -- Indication: For Atrial fibrillation with RVR    Keppra 500 mg oral tablet  -- 1 tab(s) by mouth 2 times a day   -- Check with your doctor before becoming pregnant.  It is very important that you take or use this exactly as directed.  Do not skip doses or discontinue unless directed by your doctor.  May cause drowsiness or dizziness.  Obtain medical advice before taking any non-prescription drugs as some may affect the action of this medication.  Swallow whole.  Do not crush.  This drug may impair the ability to drive or operate machinery.  Use care until you become familiar with its effects.    -- Indication: For Seizure    topiramate 50 mg oral tablet  -- 1 tab(s) by mouth once a day (at bedtime)   -- Do not chew, break, or crush.  Do not drink alcoholic beverages when taking this medication.  Do not take this drug if you are pregnant.  It is very important that you take or use this exactly as directed.  Do not skip doses or discontinue unless directed by your doctor.  May cause drowsiness or dizziness.  Medication should be taken with plenty of water.  Obtain medical advice before taking any non-prescription drugs as some may affect the action of this medication.  Swallow whole.  Do not crush.  This drug may impair the ability to drive or operate machinery.  Use care until you become familiar with its effects.    -- Indication: For pain medication    atorvastatin 40 mg oral tablet  -- 1 tab(s) by mouth once a day (at bedtime)  -- Indication: For Hyperlipidemia    metoprolol tartrate 50 mg oral tablet  -- 1 tab(s) by mouth 2 times a day  -- Indication: For Hypertension    pantoprazole 40 mg oral granule, enteric coated  -- 1 tab(s) by mouth once a day  -- Indication: For Antiacid

## 2019-01-15 NOTE — DISCHARGE NOTE ADULT - HOSPITAL COURSE
Pt is a 54 y/o F, from home, lives with , w/ PMHx of CVA 3 years prior (residual rt sided weakness), afib on Xarelto, HTN, HLD who presents to ED c/o slurred speech after 9 pm yesterday. As per sister at bedside pt went to [employer] and was acting strange, but pt said everything was fine. Once pt arrived home she was confused and only looking to her left side.  Sister got concerned and brought pt to ED. In Ed, pt was found to be in afib w/ RVR @ 230's. Upon arrival pt had slurred speech and right sided hemineglect with NIHSS of 9, so code stroke was called. As per Neurology no TPA was needed. Head CT was negative for acute stroke only showing chronic right sided stroke and neurology recommend heparin drip for anticoagulation.  Pt was given lopressor 5 mg IVP with no improvement of HR. ICU consulted for better HR control. Pt was given 20 mg Cardizem IVP which show some improvement of HR, and Cardizem drip was started for maintenance. Another 20 mg Cardizem IVP was given as well. During this time pt had a generalized seizure at which point ativan 2 mg IVP was given and the seizure broke. Neurology contacted who recommended Ativan 2 mg PRN for breakthrough seizures for now and schedule patient for EEG today.  As per sister pt missed today's dose of Xarelto but unknown is pt takes her medications everyday since her mental status is not the best. As per sister, pt at times talks to herself, and hears voices. At this time pt's capacity to make decisions is guarded. Pt has refused ablation in the past for irregular heart rhythm.   Limited ROS due to patient mental status. Pt is a 52 y/o F, from home, lives with , w/ PMHx of CVA 3 years prior (residual rt sided weakness), afib on Xarelto, HTN, HLD who presents to ED c/o slurred speech after 9 pm yesterday. As per sister at bedside pt went to [employer] and was acting strange, but pt said everything was fine. Once pt arrived home she was confused and only looking to her left side.  Sister got concerned and brought pt to ED. In Ed, pt was found to be in afib w/ RVR @ 230's. Upon arrival pt had slurred speech and right sided hemineglect with NIHSS of 9, so code stroke was called. As per Neurology no TPA was needed. Head CT was negative for acute stroke only showing chronic right sided stroke and neurology recommend heparin drip for anticoagulation.  Pt was given lopressor 5 mg IVP with no improvement of HR. ICU consulted for better HR control. Pt was given 20 mg Cardizem IVP which show some improvement of HR, and Cardizem drip was started for maintenance. Another 20 mg Cardizem IVP was given as well. During this time pt had a generalized seizure at which point ativan 2 mg IVP was given and the seizure broke. Neurology contacted who recommended Ativan 2 mg PRN for breakthrough seizures for now and schedule patient for EEG today.  As per sister pt missed today's dose of Xarelto but unknown is pt takes her medications everyday since her mental status is not the best. As per sister, pt at times talks to herself, and hears voices. At this time pt's capacity to make decisions is guarded. Pt has refused ablation in the past for irregular heart rhythm.   Limited ROS due to patient mental status.     Pt presented with convulsion and new onset weakness which resolved to her baseline within 24 hour. Pt had new onset seizure in hospital for which she was given ativan IV and it resolved after that. Pt is started on keppra 500 mg twice daily for prevention of further episodes. EEG was done inpatient and it showed positive seizure activity.   Pt is stable for discharge per primary attending.

## 2019-01-15 NOTE — DISCHARGE NOTE ADULT - PATIENT PORTAL LINK FT
You can access the [facility], offered by [facility], by registering with the following website: [email or web address]

## 2019-01-15 NOTE — DISCHARGE NOTE ADULT - PLAN OF CARE
Continue keppra 500 mg twice daily and outpatient neurology workup. You presented with convulsion and new onset weakness which resolved to her baseline within 24 hour. Pt had new onset seizure in hospital for which she was given ativan IV and it resolved after that. Pt is started on keppra 500 mg twice daily for prevention of further episodes. EEG was done inpatient and it showed positive seizure activity. Pt is stable for discharge per primary attending. anticoagulation with Xarelto. You will continue Xarelto 20 mg which is your home dose. HTN control you will continue taking home dose of lisinopril and metoprolol.  Please continue to monitor your blood pressure at home. continue aspirin and statin. you have history of stroke with residual weakness. You will continue taking aspirin and statin.

## 2019-01-15 NOTE — DISCHARGE NOTE ADULT - NSCORESITESY/N_GEN_A_CORE_RD
Occupational Therapy Daily Treatment     Visit Count: 6  Plan of Care Dates: Initial: 3/27/18 Through: 6/19/2018  Insurance Information: Work Injury Information:   Current Employer: [employer]   : [employer]   Restrictions: None  Full Duty Work Demands: sedentary (less than 5lbs)   Current Work Status: full time occupation: PSR  Claim Number: [id number]  Claim Status: claim open and in good standing    Next Referring Provider Visit: 4/24/2018    Referred by: Chris Patel DO  Medical Diagnosis (from order):    Neurapraxia of right upper extremity, subsequent encounter [S44.91XD]  - Primary       Electric injury, subsequent encounter [W86.8XXD]         Insurance: 1. -[employer]  2. N/A    Date of Onset: new onset; 11/26/2017   Diagnosis Precautions: follow precautions for CTS, ulnar nerve  Chart reviewed: Relevant co-morbidities, allergies, tests and medications: EMG/NCT negative; history of prior injury to the left arm     SUBJECTIVE   Patient states the numbness and tingling is going away.  The palm of the hand is still hurting though.  Current Pain: 3/10.    Functional Change: \"less numbness and tingling\"    OBJECTIVE   -tightness noted in the right upper trap  -moderate neural tension with the ulnar nerve    Treatment   Manual Therapy:   -scapular mobilization to the right scapula while pt in side lying  -nerve glides in supine; focused on ulnar and median nerve glides; less neural tension noted  Ischemic compression/stretch to right upper trapezius  -STM and MFR to the right hand/palm to decrease tightness and pain in thenar eminence.  Also along the entire right forearm to decrease tightness.    Therapeutic Exercise:  -prayer stretch; 1 set of 15 reps    Ultrasound ([id number]):  Patient has been made aware of potential contraindications and possible risks associated with the use of modality and has agreed.  Location: right hand/palm  Position: sitting  Duration: 8 minutes Duty  Cycle: 100% duty cycle  Frequency: 3 Mhz  Intensity: 1.0 W/cm2   Results: decreased pain and improved circulation; no adverse reaction to treatment    Current Home Program (not performed this date except as noted above):   Exercise: Date issued Date DC Comments   Use of heat/ice 3/27/18   Handout   Ulnar nerve glides whole arm 3/27/18   handout   Brachial plexus glides, whole arm 3/27/18   handout    Flexor stretches with elbow extended 4/12/18                    ASSESSMENT   Pt is progressing with having less numbness and tingling in the right arm.    Pain after treatment: 2/10 \" Less numbness, but still pain in the hand\"  Result of above outlined education: Verbalizes understanding and Needs reinforcement    Goals:       To be obtained by end of this plan of care:  1. Patient independent with modified and progressed home exercise program.  2. Decrease involved right arm pain to 0-1/10 pain, for performance of regular daily functional tasks and work tasks with affected extremity and eliminate need for OTC pain medication use.  3. Achieve  strength of +5-10  Pounds and pinch strength of +2 pounds and intrinsic strength of 4+/5 to 5/5,  for hand strength with performance of functional writing, typing, grasping, opening containers, etc.  4. Improve coordination speed for resumption of work related tasks, leisure pursuits,  writing, typing.  5. Pt will have improvement in neurologic symptoms, demonstrated by decreased numbness/tingling by at least 75% and only occasional occurrence.  6. DASH: Patient will complete form to reflect an improved score from initial score of 41 to less than or equal to 10 (scored 0-100; a higher score indicates greater disability) to indicate pt reported improvement in function/disability/impairment (minimal detectable change: 15 points).      PLAN   US/E-stim; US palm, nerve glides, pt education; posture exercises     THERAPY DAILY BILLING   Primary Insurance:  -[employer]  [employer]UR[employer]  Secondary Insurance: N/A    Evaluation Procedures:  No evaluation codes were used on this date of service    Timed Procedures:  Manual Therapy, 22 minutes  Therapeutic Exercise, 10 minutes  Ultrasound, 8 minutes    Untimed Procedures:  No untimed codes were used on this date of service    Total Treatment Time: 40 minutes        Yes

## 2019-01-15 NOTE — DISCHARGE NOTE ADULT - CARE PLAN
Principal Discharge DX:	TIA (transient ischemic attack)  Secondary Diagnosis:	Seizure  Secondary Diagnosis:	Atrial fibrillation with RVR  Secondary Diagnosis:	Hypertension Principal Discharge DX:	Seizure, late effect of stroke  Goal:	Continue keppra 500 mg twice daily and outpatient neurology workup.  Assessment and plan of treatment:	You presented with convulsion and new onset weakness which resolved to her baseline within 24 hour. Pt had new onset seizure in hospital for which she was given ativan IV and it resolved after that. Pt is started on keppra 500 mg twice daily for prevention of further episodes. EEG was done inpatient and it showed positive seizure activity. Pt is stable for discharge per primary attending.  Secondary Diagnosis:	Atrial fibrillation with RVR  Goal:	anticoagulation with Xarelto.  Assessment and plan of treatment:	You will continue Xarelto 20 mg which is your home dose.  Secondary Diagnosis:	Hypertension  Goal:	HTN control  Assessment and plan of treatment:	you will continue taking home dose of lisinopril and metoprolol.  Please continue to monitor your blood pressure at home.  Secondary Diagnosis:	CVA (cerebral vascular accident)  Goal:	continue aspirin and statin.  Assessment and plan of treatment:	you have history of stroke with residual weakness. You will continue taking aspirin and statin.

## 2019-01-17 PROBLEM — Z00.00 ENCOUNTER FOR PREVENTIVE HEALTH EXAMINATION: Status: ACTIVE | Noted: 2019-01-17

## 2019-01-25 ENCOUNTER — APPOINTMENT (OUTPATIENT)
Dept: NEUROLOGY | Facility: CLINIC | Age: 54
End: 2019-01-25
Payer: MEDICAID

## 2019-01-25 VITALS
BODY MASS INDEX: 36.44 KG/M2 | WEIGHT: 198 LBS | SYSTOLIC BLOOD PRESSURE: 112 MMHG | DIASTOLIC BLOOD PRESSURE: 64 MMHG | HEIGHT: 62 IN | HEART RATE: 45 BPM

## 2019-01-25 DIAGNOSIS — Z78.9 OTHER SPECIFIED HEALTH STATUS: ICD-10-CM

## 2019-01-25 DIAGNOSIS — Z82.49 FAMILY HISTORY OF ISCHEMIC HEART DISEASE AND OTHER DISEASES OF THE CIRCULATORY SYSTEM: ICD-10-CM

## 2019-01-25 DIAGNOSIS — Z83.3 FAMILY HISTORY OF DIABETES MELLITUS: ICD-10-CM

## 2019-01-25 DIAGNOSIS — Z80.8 FAMILY HISTORY OF MALIGNANT NEOPLASM OF OTHER ORGANS OR SYSTEMS: ICD-10-CM

## 2019-01-25 PROCEDURE — 99215 OFFICE O/P EST HI 40 MIN: CPT

## 2019-02-04 PROBLEM — Z78.9 DOES NOT USE ILLICIT DRUGS: Status: ACTIVE | Noted: 2019-02-04

## 2019-02-04 PROBLEM — Z78.9 CURRENT NON-SMOKER: Status: ACTIVE | Noted: 2019-02-04

## 2019-02-04 RX ORDER — LEVETIRACETAM 500 MG/1
500 TABLET, FILM COATED, EXTENDED RELEASE ORAL
Refills: 0 | Status: DISCONTINUED | COMMUNITY

## 2019-02-04 NOTE — DATA REVIEWED
[de-identified] : (01/13/2019): Right temporal sharps and electrographic seizure; Bilateral temporal intermittent rhythmic delta activity (TIRDA) [de-identified] : CT Head and CT Angio Head & Neck (01/12/2019): Chronic right MCA infarct, Absent right PCA, Otherwise normal intracranial and neck vasculature\par Echocardiogram (01/14/2019): Severely dilated left atrium, Trace mitral regurgitation\par \par Labs (01/15/2019): CBC Normal, INR 1.67 (high), BMP notable for low sodium (133), low potassium (3.3), and high glucose (107)\par Labs (01/13/2019): Hemoglobin A1c 5.6%, Fasting lipid panel: Cholesterol 152, Triglycerides 43, HDL 65, LDL 78

## 2019-02-04 NOTE — HISTORY OF PRESENT ILLNESS
[FreeTextEntry1] : This is a 53-year-old right-handed woman with a previous right hemispheric ischemic stroke and residual left-sided hemiparesis, who presented to [facility] ED on January 12, 2019 after experiencing inability to speak and worsened left-sided hemiparesis, followed by a generalized convulsion. EMS was called and the patient was administered lorazepam 2mg IV x 1, which caused the seizure to resolve. The patient was started on levetiracetam 500mg PO BID after receiving a 1000mg IV load, and has since had no similar events, and no change to her baseline left-sided hemiparesis. She had no tongue/cheek bite, head trauma, or urinary/bowel incontinence. She denies any recent history of fever, headache, alcohol use, added stress, or poor sleep hygiene.

## 2019-02-04 NOTE — DISCUSSION/SUMMARY
[FreeTextEntry1] : NIHSS Total: 12\par LOC Responsiveness: 0\par LOC Questions: 0\par LOC Commands: 0\par Gaze Preference: 0\par Visual Field: 0\par Facial Palsy: 2\par Motor Arm Right:  0\par Motor Arm Left: 3\par Motor Leg Right: 0\par Motor Arm Left: 2\par Limb Ataxia: 2\par Sensory: 1\par Language: 0\par Speech: 1\par Extinction/Inattention: 1\par \par MRS: 3 (moderate disability)\par

## 2019-02-04 NOTE — DATA REVIEWED
[de-identified] : (01/13/2019): Right temporal sharps and electrographic seizure; Bilateral temporal intermittent rhythmic delta activity (TIRDA) [de-identified] : CT Head and CT Angio Head & Neck (01/12/2019): Chronic right MCA infarct, Absent right PCA, Otherwise normal intracranial and neck vasculature\par Echocardiogram (01/14/2019): Severely dilated left atrium, Trace mitral regurgitation\par \par Labs (01/15/2019): CBC Normal, INR 1.67 (high), BMP notable for low sodium (133), low potassium (3.3), and high glucose (107)\par Labs (01/13/2019): Hemoglobin A1c 5.6%, Fasting lipid panel: Cholesterol 152, Triglycerides 43, HDL 65, LDL 78

## 2019-02-04 NOTE — PHYSICAL EXAM
[General Appearance - Alert] : alert [General Appearance - Well Nourished] : well nourished [Oriented To Time, Place, And Person] : oriented to person, place, and time [Person] : oriented to person [Place] : oriented to place [Time] : oriented to time [Fluency] : fluency intact [Comprehension] : comprehension intact [Cranial Nerves Optic (II)] : visual acuity intact bilaterally,  visual fields full to confrontation, pupils equal round and reactive to light [Cranial Nerves Oculomotor (III)] : extraocular motion intact [Cranial Nerves Trigeminal (V)] : facial sensation intact symmetrically [Cranial Nerves Vestibulocochlear (VIII)] : hearing was intact bilaterally [Cranial Nerves Glossopharyngeal (IX)] : tongue and palate midline [Cranial Nerves Accessory (XI - Cranial And Spinal)] : head turning and shoulder shrug symmetric [Cranial Nerves Hypoglossal (XII)] : there was no tongue deviation with protrusion [Cranial Nerves Facial Central - Left Only] : central 7th nerve weakness [Involuntary Movements] : no involuntary movements were seen [Motor Handedness Right-Handed] : the patient is right hand dominant [Paresis Pronator Drift Left-Sided] : a left-sided pronator drift was present [1] : shoulder adduction 1/5 [Hand Weakness Left] : the hand  was weak [5] : ankle plantar flexion 5/5 [2] : ankle plantar flexion 2/5 [Tactile Decrease Entire Left Arm] : diminished left arm [Tactile Decrease Entire Leg Left] : diminished left leg [Limited Balance] : the patient's balance was impaired [Coordination - Dysmetria Impaired Finger-to-Nose Left Only] : present on the left side [Coordination Dysmetria Impaired Heel-to-Shin Using Left Heel] : present on the left side [1+] : Patella left 1+ [0] : Ankle jerk left 0 [Plantar Reflex Right Only] : abnormal on the right [PERRL With Normal Accommodation] : pupils were equal in size, round, reactive to light, with normal accommodation [Optic Disc Abnormality] : the optic disc were normal in size and color [Outer Ear] : the ears and nose were normal in appearance [Hearing Threshold Finger Rub Not [place]] : hearing was normal [Neck Appearance] : the appearance of the neck was normal [Respiration, Rhythm And Depth] : normal respiratory rhythm and effort [Auscultation Breath Sounds / Voice Sounds] : lungs were clear to auscultation bilaterally [Arterial Pulses Carotid] : carotid pulses were normal with no bruits [Bowel Sounds] : normal bowel sounds [Abdomen Soft] : soft [Abdomen Tenderness] : non-tender [No CVA Tenderness] : no ~M costovertebral angle tenderness [No Spinal Tenderness] : no spinal tenderness [Skin Turgor] : normal skin turgor [] : no rash [Cranial Nerves Facial Central - Right Only] : no central 7th nerve weakness [Paresis Pronator Drift Right-Sided] : no pronator drift on the right [Hand Weakness Right] : normal hand  [Tactile Decrease Entire Right Arm] : normal right arm [Tactile Decrease Hand] : normal right hand [Tactile Decrease Entire Leg Right] : normal right leg [Coordination - Dysmetria Impaired Finger-to-Nose Right Only] : not present on the ride side [Coordination Dysmetria Impaired Heel-Shin Using Right Heel] : not present on the ride side [Plantar Reflex Left Only] : normal on the left [___] : absent on the right [___] : absent on the left [FreeTextEntry4] : Mild dysarthria present [FreeTextEntry8] : Coordination exams on left were limited due to weakness. [FreeTextEntry1] : Requires walker to ambulate, Gait is wide-based, Unable to perform Romberg testing or tiptoe/heel/tandem gaits

## 2019-02-04 NOTE — ASSESSMENT
[FreeTextEntry1] : 53 RHF with right MCA territory ischemic stroke secondary to atrial fibrillation, with new post-stroke seizure, controlled on levetiracetam.

## 2019-02-04 NOTE — PHYSICAL EXAM
[General Appearance - Alert] : alert [General Appearance - Well Nourished] : well nourished [Oriented To Time, Place, And Person] : oriented to person, place, and time [Person] : oriented to person [Place] : oriented to place [Time] : oriented to time [Fluency] : fluency intact [Comprehension] : comprehension intact [Cranial Nerves Optic (II)] : visual acuity intact bilaterally,  visual fields full to confrontation, pupils equal round and reactive to light [Cranial Nerves Oculomotor (III)] : extraocular motion intact [Cranial Nerves Trigeminal (V)] : facial sensation intact symmetrically [Cranial Nerves Vestibulocochlear (VIII)] : hearing was intact bilaterally [Cranial Nerves Glossopharyngeal (IX)] : tongue and palate midline [Cranial Nerves Accessory (XI - Cranial And Spinal)] : head turning and shoulder shrug symmetric [Cranial Nerves Hypoglossal (XII)] : there was no tongue deviation with protrusion [Cranial Nerves Facial Central - Left Only] : central 7th nerve weakness [Involuntary Movements] : no involuntary movements were seen [Motor Handedness Right-Handed] : the patient is right hand dominant [Paresis Pronator Drift Left-Sided] : a left-sided pronator drift was present [1] : shoulder adduction 1/5 [Hand Weakness Left] : the hand  was weak [5] : ankle plantar flexion 5/5 [2] : ankle plantar flexion 2/5 [Tactile Decrease Entire Left Arm] : diminished left arm [Tactile Decrease Entire Leg Left] : diminished left leg [Limited Balance] : the patient's balance was impaired [Coordination - Dysmetria Impaired Finger-to-Nose Left Only] : present on the left side [Coordination Dysmetria Impaired Heel-to-Shin Using Left Heel] : present on the left side [1+] : Patella left 1+ [0] : Ankle jerk left 0 [Plantar Reflex Right Only] : abnormal on the right [PERRL With Normal Accommodation] : pupils were equal in size, round, reactive to light, with normal accommodation [Optic Disc Abnormality] : the optic disc were normal in size and color [Outer Ear] : the ears and nose were normal in appearance [Hearing Threshold Finger Rub Not [place]] : hearing was normal [Neck Appearance] : the appearance of the neck was normal [Respiration, Rhythm And Depth] : normal respiratory rhythm and effort [Auscultation Breath Sounds / Voice Sounds] : lungs were clear to auscultation bilaterally [Arterial Pulses Carotid] : carotid pulses were normal with no bruits [Bowel Sounds] : normal bowel sounds [Abdomen Soft] : soft [Abdomen Tenderness] : non-tender [No CVA Tenderness] : no ~M costovertebral angle tenderness [No Spinal Tenderness] : no spinal tenderness [Skin Turgor] : normal skin turgor [] : no rash [Cranial Nerves Facial Central - Right Only] : no central 7th nerve weakness [Paresis Pronator Drift Right-Sided] : no pronator drift on the right [Hand Weakness Right] : normal hand  [Tactile Decrease Entire Right Arm] : normal right arm [Tactile Decrease Hand] : normal right hand [Tactile Decrease Entire Leg Right] : normal right leg [Coordination - Dysmetria Impaired Finger-to-Nose Right Only] : not present on the ride side [Coordination Dysmetria Impaired Heel-Shin Using Right Heel] : not present on the ride side [Plantar Reflex Left Only] : normal on the left [___] : absent on the right [___] : absent on the left [FreeTextEntry4] : Mild dysarthria present [FreeTextEntry8] : Coordination exams on left were limited due to weakness. [FreeTextEntry1] : Requires walker to ambulate, Gait is wide-based, Unable to perform Romberg testing or tiptoe/heel/tandem gaits

## 2019-02-04 NOTE — HISTORY OF PRESENT ILLNESS
[FreeTextEntry1] : This is a 53-year-old right-handed woman with a previous right hemispheric ischemic stroke and residual left-sided hemiparesis, who presented to [facility] ED on January 12, 2019 after experiencing inability to speak and worsened left-sided hemiparesis, followed by a generalized convulsion. EMS was called and the patient was administered lorazepam 2mg IV x 1, which caused the seizure to resolve. The patient was started on levetiracetam 500mg PO BID after receiving a 1000mg IV load, and has since had no similar events, and no change to her baseline left-sided hemiparesis. She had no tongue/cheek bite, head trauma, or urinary/bowel incontinence. She denies any recent history of fever, headache, alcohol use, added stress, or poor sleep hygiene.

## 2019-03-24 ENCOUNTER — EMERGENCY (EMERGENCY)
Facility: HOSPITAL | Age: 54
LOS: 1 days | Discharge: HOME | End: 2019-03-24
Attending: EMERGENCY MEDICINE
Payer: MEDICAID

## 2019-03-24 VITALS
SYSTOLIC BLOOD PRESSURE: 156 MMHG | TEMPERATURE: 98 F | OXYGEN SATURATION: 93 % | DIASTOLIC BLOOD PRESSURE: 102 MMHG | HEART RATE: 88 BPM | RESPIRATION RATE: 22 BRPM

## 2019-03-24 VITALS
OXYGEN SATURATION: 100 % | RESPIRATION RATE: 20 BRPM | HEART RATE: 77 BPM | SYSTOLIC BLOOD PRESSURE: 161 MMHG | TEMPERATURE: 98 F | DIASTOLIC BLOOD PRESSURE: 94 MMHG

## 2019-03-24 DIAGNOSIS — Z98.51 TUBAL LIGATION STATUS: Chronic | ICD-10-CM

## 2019-03-24 LAB
ALBUMIN SERPL ELPH-MCNC: 3.3 G/DL — LOW (ref 3.5–5)
ALP SERPL-CCNC: 114 U/L — SIGNIFICANT CHANGE UP (ref 40–120)
ALT FLD-CCNC: 25 U/L DA — SIGNIFICANT CHANGE UP (ref 10–60)
ANION GAP SERPL CALC-SCNC: 3 MMOL/L — LOW (ref 5–17)
APPEARANCE UR: CLEAR — SIGNIFICANT CHANGE UP
APTT BLD: 32 SEC — SIGNIFICANT CHANGE UP (ref 27.5–36.3)
AST SERPL-CCNC: 28 U/L — SIGNIFICANT CHANGE UP (ref 10–40)
BASOPHILS # BLD AUTO: 0.05 K/UL — SIGNIFICANT CHANGE UP (ref 0–0.2)
BASOPHILS NFR BLD AUTO: 0.7 % — SIGNIFICANT CHANGE UP (ref 0–2)
BILIRUB SERPL-MCNC: 0.4 MG/DL — SIGNIFICANT CHANGE UP (ref 0.2–1.2)
BILIRUB UR-MCNC: NEGATIVE — SIGNIFICANT CHANGE UP
BUN SERPL-MCNC: 17 MG/DL — SIGNIFICANT CHANGE UP (ref 7–18)
CALCIUM SERPL-MCNC: 8.5 MG/DL — SIGNIFICANT CHANGE UP (ref 8.4–10.5)
CHLORIDE SERPL-SCNC: 106 MMOL/L — SIGNIFICANT CHANGE UP (ref 96–108)
CK MB BLD-MCNC: 1.2 % — SIGNIFICANT CHANGE UP (ref 0–3.5)
CK MB CFR SERPL CALC: 1.5 NG/ML — SIGNIFICANT CHANGE UP (ref 0–3.6)
CK SERPL-CCNC: 126 U/L — SIGNIFICANT CHANGE UP (ref 21–215)
CO2 SERPL-SCNC: 27 MMOL/L — SIGNIFICANT CHANGE UP (ref 22–31)
COLOR SPEC: YELLOW — SIGNIFICANT CHANGE UP
CREAT SERPL-MCNC: 0.77 MG/DL — SIGNIFICANT CHANGE UP (ref 0.5–1.3)
DIFF PNL FLD: NEGATIVE — SIGNIFICANT CHANGE UP
EOSINOPHIL # BLD AUTO: 0.03 K/UL — SIGNIFICANT CHANGE UP (ref 0–0.5)
EOSINOPHIL NFR BLD AUTO: 0.4 % — SIGNIFICANT CHANGE UP (ref 0–6)
GLUCOSE SERPL-MCNC: 164 MG/DL — HIGH (ref 70–99)
GLUCOSE UR QL: NEGATIVE — SIGNIFICANT CHANGE UP
HCT VFR BLD CALC: 40.8 % — SIGNIFICANT CHANGE UP (ref 34.5–45)
HGB BLD-MCNC: 12.7 G/DL — SIGNIFICANT CHANGE UP (ref 11.5–15.5)
IMM GRANULOCYTES NFR BLD AUTO: 0.7 % — SIGNIFICANT CHANGE UP (ref 0–1.5)
INR BLD: 1.38 RATIO — HIGH (ref 0.88–1.16)
KETONES UR-MCNC: NEGATIVE — SIGNIFICANT CHANGE UP
LEUKOCYTE ESTERASE UR-ACNC: NEGATIVE — SIGNIFICANT CHANGE UP
LIDOCAIN IGE QN: 162 U/L — SIGNIFICANT CHANGE UP (ref 73–393)
LYMPHOCYTES # BLD AUTO: 1.44 K/UL — SIGNIFICANT CHANGE UP (ref 1–3.3)
LYMPHOCYTES # BLD AUTO: 21.1 % — SIGNIFICANT CHANGE UP (ref 13–44)
MCHC RBC-ENTMCNC: 28.3 PG — SIGNIFICANT CHANGE UP (ref 27–34)
MCHC RBC-ENTMCNC: 31.1 GM/DL — LOW (ref 32–36)
MCV RBC AUTO: 91.1 FL — SIGNIFICANT CHANGE UP (ref 80–100)
MONOCYTES # BLD AUTO: 0.33 K/UL — SIGNIFICANT CHANGE UP (ref 0–0.9)
MONOCYTES NFR BLD AUTO: 4.8 % — SIGNIFICANT CHANGE UP (ref 2–14)
NEUTROPHILS # BLD AUTO: 4.94 K/UL — SIGNIFICANT CHANGE UP (ref 1.8–7.4)
NEUTROPHILS NFR BLD AUTO: 72.3 % — SIGNIFICANT CHANGE UP (ref 43–77)
NITRITE UR-MCNC: NEGATIVE — SIGNIFICANT CHANGE UP
NRBC # BLD: 0 /100 WBCS — SIGNIFICANT CHANGE UP (ref 0–0)
PH UR: 7 — SIGNIFICANT CHANGE UP (ref 5–8)
PLATELET # BLD AUTO: 150 K/UL — SIGNIFICANT CHANGE UP (ref 150–400)
POTASSIUM SERPL-MCNC: 4.3 MMOL/L — SIGNIFICANT CHANGE UP (ref 3.5–5.3)
POTASSIUM SERPL-SCNC: 4.3 MMOL/L — SIGNIFICANT CHANGE UP (ref 3.5–5.3)
PROT SERPL-MCNC: 7.7 G/DL — SIGNIFICANT CHANGE UP (ref 6–8.3)
PROT UR-MCNC: NEGATIVE — SIGNIFICANT CHANGE UP
PROTHROM AB SERPL-ACNC: 15.5 SEC — HIGH (ref 10–12.9)
RBC # BLD: 4.48 M/UL — SIGNIFICANT CHANGE UP (ref 3.8–5.2)
RBC # FLD: 13.6 % — SIGNIFICANT CHANGE UP (ref 10.3–14.5)
SODIUM SERPL-SCNC: 136 MMOL/L — SIGNIFICANT CHANGE UP (ref 135–145)
SP GR SPEC: 1 — LOW (ref 1.01–1.02)
TROPONIN I SERPL-MCNC: <0.015 NG/ML — SIGNIFICANT CHANGE UP (ref 0–0.04)
UROBILINOGEN FLD QL: NEGATIVE — SIGNIFICANT CHANGE UP
WBC # BLD: 6.84 K/UL — SIGNIFICANT CHANGE UP (ref 3.8–10.5)
WBC # FLD AUTO: 6.84 K/UL — SIGNIFICANT CHANGE UP (ref 3.8–10.5)

## 2019-03-24 PROCEDURE — 74177 CT ABD & PELVIS W/CONTRAST: CPT

## 2019-03-24 PROCEDURE — 74177 CT ABD & PELVIS W/CONTRAST: CPT | Mod: 26

## 2019-03-24 PROCEDURE — 96374 THER/PROPH/DIAG INJ IV PUSH: CPT | Mod: XU

## 2019-03-24 PROCEDURE — 99291 CRITICAL CARE FIRST HOUR: CPT

## 2019-03-24 PROCEDURE — 84484 ASSAY OF TROPONIN QUANT: CPT

## 2019-03-24 PROCEDURE — 99291 CRITICAL CARE FIRST HOUR: CPT | Mod: 25

## 2019-03-24 PROCEDURE — 70450 CT HEAD/BRAIN W/O DYE: CPT | Mod: 26

## 2019-03-24 PROCEDURE — 85610 PROTHROMBIN TIME: CPT

## 2019-03-24 PROCEDURE — 82553 CREATINE MB FRACTION: CPT

## 2019-03-24 PROCEDURE — 96375 TX/PRO/DX INJ NEW DRUG ADDON: CPT

## 2019-03-24 PROCEDURE — 70450 CT HEAD/BRAIN W/O DYE: CPT

## 2019-03-24 PROCEDURE — 80053 COMPREHEN METABOLIC PANEL: CPT

## 2019-03-24 PROCEDURE — 93005 ELECTROCARDIOGRAM TRACING: CPT

## 2019-03-24 PROCEDURE — 82550 ASSAY OF CK (CPK): CPT

## 2019-03-24 PROCEDURE — 83690 ASSAY OF LIPASE: CPT

## 2019-03-24 PROCEDURE — 81003 URINALYSIS AUTO W/O SCOPE: CPT

## 2019-03-24 PROCEDURE — 85027 COMPLETE CBC AUTOMATED: CPT

## 2019-03-24 PROCEDURE — 85730 THROMBOPLASTIN TIME PARTIAL: CPT

## 2019-03-24 PROCEDURE — 36415 COLL VENOUS BLD VENIPUNCTURE: CPT

## 2019-03-24 RX ORDER — ONDANSETRON 8 MG/1
4 TABLET, FILM COATED ORAL ONCE
Qty: 0 | Refills: 0 | Status: COMPLETED | OUTPATIENT
Start: 2019-03-24 | End: 2019-03-24

## 2019-03-24 RX ORDER — ACETAMINOPHEN 500 MG
1000 TABLET ORAL ONCE
Qty: 0 | Refills: 0 | Status: COMPLETED | OUTPATIENT
Start: 2019-03-24 | End: 2019-03-24

## 2019-03-24 RX ORDER — FAMOTIDINE 10 MG/ML
20 INJECTION INTRAVENOUS ONCE
Qty: 0 | Refills: 0 | Status: COMPLETED | OUTPATIENT
Start: 2019-03-24 | End: 2019-03-24

## 2019-03-24 RX ADMIN — Medication 400 MILLIGRAM(S): at 12:12

## 2019-03-24 RX ADMIN — Medication 1000 MILLIGRAM(S): at 14:37

## 2019-03-24 RX ADMIN — Medication 1000 MILLIGRAM(S): at 12:43

## 2019-03-24 RX ADMIN — FAMOTIDINE 20 MILLIGRAM(S): 10 INJECTION INTRAVENOUS at 11:08

## 2019-03-24 RX ADMIN — ONDANSETRON 4 MILLIGRAM(S): 8 TABLET, FILM COATED ORAL at 11:08

## 2019-03-24 NOTE — ED ADULT NURSE NOTE - OBJECTIVE STATEMENT
Pt BIB EMS as a code stroke notification, pt with h/o CVA with Left hemiparesis reports abdominal pain with nausea

## 2019-03-24 NOTE — ED PROVIDER NOTE - CLINICAL SUMMARY MEDICAL DECISION MAKING FREE TEXT BOX
52 y/o F presents with epigastric discomfort. History of CVA with questionable increased weakness, though nothing focal on examination. Will obtain CT of head and abdomen. Reassess.

## 2019-03-24 NOTE — ED PROVIDER NOTE - OBJECTIVE STATEMENT
52 y/o F with PMHx of HTN, CVA, atrial fibrillation on Xarelto and PSHx of tubal ligation presents to the ED as notification for CVA. As per EMS, patient has been feeling weaker though nothing focal. Patient is normally able to ambulate with minimal assistance, however today needed 3 people to assist. As per son, patient awoke this morning with complaints of abdominal pain. Patient has residual L sided weakness at baseline; as per son weakness is unchanged. Patient is able to move all extremities in the ED. Patient, herself denies neurological deficits; only with complaints of abdominal pain. No code stroke called. Allergies: PCN.

## 2019-03-24 NOTE — ED PROVIDER NOTE - PROGRESS NOTE DETAILS
pt reassessed, with son at bedside.  pt only complaining of mild headache.  says abdominal discomfort has resolved.  Says she is feeling better from when she arrived after GI cocktail.  CT head and abdomen/pelvis pending.  Will continue to observe. pt again reassessed, head and abdomen CT negative.  Pt feels back to baseline.  Family at bedside also agree with patient is back to baseline.  Abdominal discomfort resolved.  family given copy of all results.  Will d/c

## 2019-03-24 NOTE — ED ADULT NURSE NOTE - NSIMPLEMENTINTERV_GEN_ALL_ED
Implemented All Universal Safety Interventions:  [place] to call system. Call bell, personal items and telephone within reach. Instruct patient to call for assistance. Room bathroom lighting operational. Non-slip footwear when patient is off stretcher. Physically safe environment: no spills, clutter or unnecessary equipment. Stretcher in lowest position, wheels locked, appropriate side rails in place.

## 2019-08-08 ENCOUNTER — MOBILE ON CALL (OUTPATIENT)
Age: 54
End: 2019-08-08

## 2019-08-16 ENCOUNTER — APPOINTMENT (OUTPATIENT)
Dept: NEUROLOGY | Facility: CLINIC | Age: 54
End: 2019-08-16
Payer: MEDICARE

## 2019-08-16 VITALS
SYSTOLIC BLOOD PRESSURE: 116 MMHG | BODY MASS INDEX: 36.44 KG/M2 | HEART RATE: 46 BPM | HEIGHT: 62 IN | DIASTOLIC BLOOD PRESSURE: 72 MMHG | WEIGHT: 198 LBS

## 2019-08-16 PROCEDURE — 99215 OFFICE O/P EST HI 40 MIN: CPT

## 2019-08-30 NOTE — ASSESSMENT
[FreeTextEntry1] : 53 RHF with right MCA territory ischemic stroke secondary to atrial fibrillation, with stable left-sided hemiparesis, sensory deficit, and coordination deficit, also with no new post-stroke seizure.

## 2019-08-30 NOTE — DATA REVIEWED
[de-identified] : (01/13/2019): Right temporal sharps and electrographic seizure; Bilateral temporal intermittent rhythmic delta activity (TIRDA) [de-identified] : CT Head and CT Angio Head & Neck (01/12/2019): Chronic right MCA infarct, Absent right PCA, Otherwise normal intracranial and neck vasculature\par Echocardiogram (01/14/2019): Severely dilated left atrium, Trace mitral regurgitation\par \par Labs (01/15/2019): CBC Normal, INR 1.67 (high), BMP notable for low sodium (133), low potassium (3.3), and high glucose (107)\par Labs (01/13/2019): Hemoglobin A1c 5.6%, Fasting lipid panel: Cholesterol 152, Triglycerides 43, HDL 65, LDL 78

## 2019-08-30 NOTE — PHYSICAL EXAM
[General Appearance - Alert] : alert [General Appearance - Well Nourished] : well nourished [Oriented To Time, Place, And Person] : oriented to person, place, and time [Person] : oriented to person [Place] : oriented to place [Time] : oriented to time [Fluency] : fluency intact [Comprehension] : comprehension intact [Cranial Nerves Optic (II)] : visual acuity intact bilaterally,  visual fields full to confrontation, pupils equal round and reactive to light [Cranial Nerves Oculomotor (III)] : extraocular motion intact [Cranial Nerves Trigeminal (V)] : facial sensation intact symmetrically [Cranial Nerves Vestibulocochlear (VIII)] : hearing was intact bilaterally [Cranial Nerves Glossopharyngeal (IX)] : tongue and palate midline [Cranial Nerves Accessory (XI - Cranial And Spinal)] : head turning and shoulder shrug symmetric [Cranial Nerves Hypoglossal (XII)] : there was no tongue deviation with protrusion [Cranial Nerves Facial Central - Left Only] : central 7th nerve weakness [Involuntary Movements] : no involuntary movements were seen [Motor Handedness Right-Handed] : the patient is right hand dominant [Paresis Pronator Drift Left-Sided] : a left-sided pronator drift was present [1] : shoulder adduction 1/5 [Hand Weakness Left] : the hand  was weak [5] : ankle plantar flexion 5/5 [2] : ankle plantar flexion 2/5 [Tactile Decrease Entire Left Arm] : diminished left arm [Tactile Decrease Entire Leg Left] : diminished left leg [Limited Balance] : the patient's balance was impaired [Coordination - Dysmetria Impaired Finger-to-Nose Left Only] : present on the left side [Coordination Dysmetria Impaired Heel-to-Shin Using Left Heel] : present on the left side [1+] : Patella left 1+ [0] : Ankle jerk left 0 [Plantar Reflex Right Only] : abnormal on the right [PERRL With Normal Accommodation] : pupils were equal in size, round, reactive to light, with normal accommodation [Outer Ear] : the ears and nose were normal in appearance [Optic Disc Abnormality] : the optic disc were normal in size and color [Hearing Threshold Finger Rub Not [place]] : hearing was normal [Neck Appearance] : the appearance of the neck was normal [Auscultation Breath Sounds / Voice Sounds] : lungs were clear to auscultation bilaterally [Arterial Pulses Carotid] : carotid pulses were normal with no bruits [Bowel Sounds] : normal bowel sounds [Abdomen Soft] : soft [Abdomen Tenderness] : non-tender [No CVA Tenderness] : no ~M costovertebral angle tenderness [No Spinal Tenderness] : no spinal tenderness [Skin Turgor] : normal skin turgor [Cranial Nerves Facial Central - Right Only] : no central 7th nerve weakness [Paresis Pronator Drift Right-Sided] : no pronator drift on the right [Hand Weakness Right] : normal hand  [Allodynia] : no ~T allodynia present [Tactile Decrease Entire Right Arm] : normal right arm [Tactile Decrease Entire Leg Right] : normal right leg [Tactile Decrease Hand] : normal right hand [Pain / Temperature Decrease Entire Arm Right] : normal right arm [Pain / Temperature Decrease Entire Arm Left] : diminished left arm [Pain / Temp Decrease Hand] : diminished left hand [Pain / Temp Decrease Entire Leg - Right] : normal right leg [Pain / Temp Decrease Entire Leg - Left] : diminished left leg [Coordination - Dysmetria Impaired Finger-to-Nose Right Only] : not present on the ride side [Plantar Reflex Left Only] : normal on the left [Coordination Dysmetria Impaired Heel-Shin Using Right Heel] : not present on the ride side [___] : absent on the right [___] : absent on the left [FreeTextEntry4] : Mild dysarthria present [FreeTextEntry8] : Coordination exams on left are limited due to weakness. [Oropharynx] : the oropharynx was normal [] : no respiratory distress [Full Pulse] : the pedal pulses are present [FreeTextEntry1] : Requires walker to ambulate, Gait is wide-based, Unable to perform Romberg testing or tiptoe/heel/tandem gaits

## 2019-08-30 NOTE — HISTORY OF PRESENT ILLNESS
[FreeTextEntry1] : FROM 1/25/19:\par \par This is a 53-year-old right-handed woman with a previous right hemispheric ischemic stroke and residual left-sided hemiparesis, who presented to [facility] ED on January 12, 2019 after experiencing inability to speak and worsened left-sided hemiparesis, followed by a generalized convulsion. EMS was called and the patient was administered lorazepam 2mg IV x 1, which caused the seizure to resolve. The patient was started on levetiracetam 500mg PO BID after receiving a 1000mg IV load, and has since had no similar events, and no change to her baseline left-sided hemiparesis. She had no tongue/cheek bite, head trauma, or urinary/bowel incontinence. She denies any recent history of fever, headache, alcohol use, added stress, or poor sleep hygiene.\par \par \par FROM 8/16/19:\par \par The patient states that since I saw her on 1/25/19, she has had one episode of confusion associated with abdominal pain, although there was no witnessed seizure activity or new stroke-like symptoms. She presented to [facility] ED on 3/24/19 - per ED notes, she had generalized weakness with abdominal pain, but was found to have a normal CT abdomen and resolution of abdominal pain, prompting discharge to home. She has been compliant with her medications, although she has not taken aspirin 81mg daily while taking Xarelto. She has occasional mild frontal headache, which resolves on its own or with over-the-counter Tylenol.

## 2019-09-20 ENCOUNTER — INPATIENT (INPATIENT)
Facility: HOSPITAL | Age: 54
LOS: 1 days | Discharge: HOME | DRG: 101 | End: 2019-09-22
Attending: INTERNAL MEDICINE | Admitting: INTERNAL MEDICINE
Payer: COMMERCIAL

## 2019-09-20 VITALS
DIASTOLIC BLOOD PRESSURE: 170 MMHG | HEIGHT: 61 IN | OXYGEN SATURATION: 99 % | WEIGHT: 194.01 LBS | SYSTOLIC BLOOD PRESSURE: 225 MMHG | HEART RATE: 108 BPM | RESPIRATION RATE: 30 BRPM

## 2019-09-20 DIAGNOSIS — Z98.51 TUBAL LIGATION STATUS: Chronic | ICD-10-CM

## 2019-09-20 NOTE — ED ADULT NURSE NOTE - OBJECTIVE STATEMENT
Pt BIBA r/o stroke. Last well known 2215pm. Pt started having slurred speech, gaze deviation to the left and drifting to the right. Pt is ANO x3. Able to answer some questions c/o of abdominal pain and chest pain. Pt has blank stared.

## 2019-09-20 NOTE — ED ADULT TRIAGE NOTE - CCCP TRG CHIEF CMPLNT
notification for cva/c/o right sided weakness/headache/slurred speech started at 10;15 pm as per  ems/slurred speech

## 2019-09-20 NOTE — ED ADULT TRIAGE NOTE - CHIEF COMPLAINT QUOTE
notification for cva /c/o right sided weakness //headache /slurred speech started at 10:15 pm as per ems

## 2019-09-21 DIAGNOSIS — Z29.9 ENCOUNTER FOR PROPHYLACTIC MEASURES, UNSPECIFIED: ICD-10-CM

## 2019-09-21 DIAGNOSIS — I10 ESSENTIAL (PRIMARY) HYPERTENSION: ICD-10-CM

## 2019-09-21 DIAGNOSIS — I48.91 UNSPECIFIED ATRIAL FIBRILLATION: ICD-10-CM

## 2019-09-21 DIAGNOSIS — I63.9 CEREBRAL INFARCTION, UNSPECIFIED: ICD-10-CM

## 2019-09-21 DIAGNOSIS — R29.90 UNSPECIFIED SYMPTOMS AND SIGNS INVOLVING THE NERVOUS SYSTEM: ICD-10-CM

## 2019-09-21 DIAGNOSIS — R56.9 UNSPECIFIED CONVULSIONS: ICD-10-CM

## 2019-09-21 DIAGNOSIS — E78.5 HYPERLIPIDEMIA, UNSPECIFIED: ICD-10-CM

## 2019-09-21 LAB
24R-OH-CALCIDIOL SERPL-MCNC: 20.4 NG/ML — LOW (ref 30–80)
ALBUMIN SERPL ELPH-MCNC: 3.6 G/DL — SIGNIFICANT CHANGE UP (ref 3.5–5)
ALP SERPL-CCNC: 114 U/L — SIGNIFICANT CHANGE UP (ref 40–120)
ALT FLD-CCNC: 25 U/L DA — SIGNIFICANT CHANGE UP (ref 10–60)
ANION GAP SERPL CALC-SCNC: 6 MMOL/L — SIGNIFICANT CHANGE UP (ref 5–17)
APTT BLD: 29.2 SEC — SIGNIFICANT CHANGE UP (ref 27.5–36.3)
AST SERPL-CCNC: 26 U/L — SIGNIFICANT CHANGE UP (ref 10–40)
BASOPHILS # BLD AUTO: 0.05 K/UL — SIGNIFICANT CHANGE UP (ref 0–0.2)
BASOPHILS NFR BLD AUTO: 0.6 % — SIGNIFICANT CHANGE UP (ref 0–2)
BILIRUB SERPL-MCNC: 0.4 MG/DL — SIGNIFICANT CHANGE UP (ref 0.2–1.2)
BLD GP AB SCN SERPL QL: SIGNIFICANT CHANGE UP
BUN SERPL-MCNC: 20 MG/DL — HIGH (ref 7–18)
CALCIUM SERPL-MCNC: 8.9 MG/DL — SIGNIFICANT CHANGE UP (ref 8.4–10.5)
CHLORIDE SERPL-SCNC: 105 MMOL/L — SIGNIFICANT CHANGE UP (ref 96–108)
CO2 SERPL-SCNC: 27 MMOL/L — SIGNIFICANT CHANGE UP (ref 22–31)
CREAT SERPL-MCNC: 1.09 MG/DL — SIGNIFICANT CHANGE UP (ref 0.5–1.3)
EOSINOPHIL # BLD AUTO: 0.1 K/UL — SIGNIFICANT CHANGE UP (ref 0–0.5)
EOSINOPHIL NFR BLD AUTO: 1.2 % — SIGNIFICANT CHANGE UP (ref 0–6)
ETHANOL SERPL-MCNC: <3 MG/DL — SIGNIFICANT CHANGE UP (ref 0–10)
GLUCOSE BLDC GLUCOMTR-MCNC: 101 MG/DL — HIGH (ref 70–99)
GLUCOSE BLDC GLUCOMTR-MCNC: 86 MG/DL — SIGNIFICANT CHANGE UP (ref 70–99)
GLUCOSE SERPL-MCNC: 114 MG/DL — HIGH (ref 70–99)
HCT VFR BLD CALC: 42.5 % — SIGNIFICANT CHANGE UP (ref 34.5–45)
HGB BLD-MCNC: 13.2 G/DL — SIGNIFICANT CHANGE UP (ref 11.5–15.5)
IMM GRANULOCYTES NFR BLD AUTO: 0.2 % — SIGNIFICANT CHANGE UP (ref 0–1.5)
INR BLD: 1.28 RATIO — HIGH (ref 0.88–1.16)
LACTATE SERPL-SCNC: 1.6 MMOL/L — SIGNIFICANT CHANGE UP (ref 0.7–2)
LYMPHOCYTES # BLD AUTO: 3.28 K/UL — SIGNIFICANT CHANGE UP (ref 1–3.3)
LYMPHOCYTES # BLD AUTO: 40.1 % — SIGNIFICANT CHANGE UP (ref 13–44)
MCHC RBC-ENTMCNC: 28.4 PG — SIGNIFICANT CHANGE UP (ref 27–34)
MCHC RBC-ENTMCNC: 31.1 GM/DL — LOW (ref 32–36)
MCV RBC AUTO: 91.4 FL — SIGNIFICANT CHANGE UP (ref 80–100)
MONOCYTES # BLD AUTO: 0.92 K/UL — HIGH (ref 0–0.9)
MONOCYTES NFR BLD AUTO: 11.3 % — SIGNIFICANT CHANGE UP (ref 2–14)
NEUTROPHILS # BLD AUTO: 3.8 K/UL — SIGNIFICANT CHANGE UP (ref 1.8–7.4)
NEUTROPHILS NFR BLD AUTO: 46.6 % — SIGNIFICANT CHANGE UP (ref 43–77)
NRBC # BLD: 0 /100 WBCS — SIGNIFICANT CHANGE UP (ref 0–0)
NT-PROBNP SERPL-SCNC: 923 PG/ML — HIGH (ref 0–125)
PLATELET # BLD AUTO: 154 K/UL — SIGNIFICANT CHANGE UP (ref 150–400)
POTASSIUM SERPL-MCNC: 4.3 MMOL/L — SIGNIFICANT CHANGE UP (ref 3.5–5.3)
POTASSIUM SERPL-SCNC: 4.3 MMOL/L — SIGNIFICANT CHANGE UP (ref 3.5–5.3)
PROT SERPL-MCNC: 7.8 G/DL — SIGNIFICANT CHANGE UP (ref 6–8.3)
PROTHROM AB SERPL-ACNC: 14.3 SEC — HIGH (ref 10–12.9)
RBC # BLD: 4.65 M/UL — SIGNIFICANT CHANGE UP (ref 3.8–5.2)
RBC # FLD: 14.8 % — HIGH (ref 10.3–14.5)
SODIUM SERPL-SCNC: 138 MMOL/L — SIGNIFICANT CHANGE UP (ref 135–145)
TROPONIN I SERPL-MCNC: <0.015 NG/ML — SIGNIFICANT CHANGE UP (ref 0–0.04)
WBC # BLD: 8.17 K/UL — SIGNIFICANT CHANGE UP (ref 3.8–10.5)
WBC # FLD AUTO: 8.17 K/UL — SIGNIFICANT CHANGE UP (ref 3.8–10.5)

## 2019-09-21 PROCEDURE — 99285 EMERGENCY DEPT VISIT HI MDM: CPT

## 2019-09-21 PROCEDURE — 71275 CT ANGIOGRAPHY CHEST: CPT | Mod: 26

## 2019-09-21 PROCEDURE — 74174 CTA ABD&PLVS W/CONTRAST: CPT | Mod: 26

## 2019-09-21 PROCEDURE — 99223 1ST HOSP IP/OBS HIGH 75: CPT

## 2019-09-21 RX ORDER — ASPIRIN/CALCIUM CARB/MAGNESIUM 324 MG
300 TABLET ORAL ONCE
Refills: 0 | Status: COMPLETED | OUTPATIENT
Start: 2019-09-21 | End: 2019-09-21

## 2019-09-21 RX ORDER — ENOXAPARIN SODIUM 100 MG/ML
90 INJECTION SUBCUTANEOUS EVERY 12 HOURS
Refills: 0 | Status: DISCONTINUED | OUTPATIENT
Start: 2019-09-21 | End: 2019-09-22

## 2019-09-21 RX ORDER — ENOXAPARIN SODIUM 100 MG/ML
90 INJECTION SUBCUTANEOUS EVERY 12 HOURS
Refills: 0 | Status: DISCONTINUED | OUTPATIENT
Start: 2019-09-21 | End: 2019-09-21

## 2019-09-21 RX ORDER — ACETAMINOPHEN 500 MG
650 TABLET ORAL EVERY 6 HOURS
Refills: 0 | Status: DISCONTINUED | OUTPATIENT
Start: 2019-09-21 | End: 2019-09-22

## 2019-09-21 RX ORDER — LEVETIRACETAM 250 MG/1
1000 TABLET, FILM COATED ORAL ONCE
Refills: 0 | Status: COMPLETED | OUTPATIENT
Start: 2019-09-21 | End: 2019-09-21

## 2019-09-21 RX ORDER — LEVETIRACETAM 250 MG/1
500 TABLET, FILM COATED ORAL EVERY 12 HOURS
Refills: 0 | Status: DISCONTINUED | OUTPATIENT
Start: 2019-09-21 | End: 2019-09-22

## 2019-09-21 RX ADMIN — ENOXAPARIN SODIUM 90 MILLIGRAM(S): 100 INJECTION SUBCUTANEOUS at 06:57

## 2019-09-21 RX ADMIN — Medication 650 MILLIGRAM(S): at 18:58

## 2019-09-21 RX ADMIN — Medication 300 MILLIGRAM(S): at 01:55

## 2019-09-21 RX ADMIN — LEVETIRACETAM 400 MILLIGRAM(S): 250 TABLET, FILM COATED ORAL at 00:27

## 2019-09-21 RX ADMIN — Medication 0.5 MILLIGRAM(S): at 00:10

## 2019-09-21 RX ADMIN — LEVETIRACETAM 420 MILLIGRAM(S): 250 TABLET, FILM COATED ORAL at 12:14

## 2019-09-21 RX ADMIN — ENOXAPARIN SODIUM 90 MILLIGRAM(S): 100 INJECTION SUBCUTANEOUS at 19:48

## 2019-09-21 RX ADMIN — Medication 650 MILLIGRAM(S): at 19:44

## 2019-09-21 NOTE — ED PROVIDER NOTE - CLINICAL SUMMARY MEDICAL DECISION MAKING FREE TEXT BOX
Patient presenting to the ED for left sided weakness, slurred speech, left sided gazed preference, chest pain and abdominal pain. Breakthrough seizures vs worsening of chronic recrudescent of prior strokes. Patient presenting to the ED for left sided weakness, slurred speech, left sided gazed preference, chest pain and abdominal pain. Breakthrough seizures vs worsening of chronic recrudescent of prior strokes. got ct to eval for aortic dissection given pain & new neurological findings.

## 2019-09-21 NOTE — H&P ADULT - PROBLEM SELECTOR PLAN 2
p/w episode of left sided gaze deviation, as per ER attending improved after giving AED, concern or breakthrough seizure  -s/p keppra 1000mg and 0.5mg of ativan in ER  -resume home dose of keppra 500mg bid iv   -will evaluate for source of infection, f.u UA  -f/u keppra level,   -f/u EEG  -f/u neuro reccs p/w episode of left sided gaze deviation, as per ER attending improved after giving AED, concern for breakthrough seizure or subclinical status epilepticus  -s/p keppra 1000mg and 0.5mg of ativan in ER  -resume home dose of keppra 500mg bid iv   -will evaluate for source of infection, f.u UA  -f/u keppra level,   -f/u EEG  -f/u neuro reccs

## 2019-09-21 NOTE — ED PROVIDER NOTE - OBJECTIVE STATEMENT
Pertinent HPI/PMH/PSH/FHx/SHx and Review of Systems contained within  HPI: 64 y/o F p/w CC: left upper extremity and left lower extremity weakness, slurred speech, chest pain and abdominal pain. Patient was last known well at 10pm. t has a-fib on Xarelto. Patient's family noted patient had a left sided gaze deviation.   PMH/PSH relevant for: a-fib, seizures   ROS negative for: fever,  SOB, Nausea, vomiting, diarrhea,  dysuria    FamilyHx and SocialHx not otherwise contributory Pertinent HPI/PMH/PSH/FHx/SHx and Review of Systems contained within  HPI: 64 y/o F p/w CC: left upper extremity and left lower extremity weakness, slurred speech, chest pain and abdominal pain. Patient was last known well at 10pm. t has a-fib on Xarelto. Patient's family noted patient had a left sided gaze deviation.   PMH/PSH relevant for: on xarelto for ?a-fib, seizures on keppra, htn  ROS negative for: fever,  SOB, Nausea, vomiting, diarrhea,  dysuria    FamilyHx and SocialHx not otherwise contributory Pertinent HPI/PMH/PSH/FHx/SHx and Review of Systems contained within  HPI: 64 y/o F bibems with CC slurred speech & deviation of eyes to Left side. last known normal 10pm. also reporting chest & abdominal pain. per brother at bedside pt  was doing apparently fine until 10pm when she complained of headache and was noted to have deviation of gaze to left with slowing of speech. No noted worsening of residual left sided weakness, or weakness on right side.   PMH/PSH relevant for: on xarelto for ?a-fib, seizures on keppra, htn, cva w/ L sided hemiplegia  ROS negative for: fever,  SOB, Nausea, vomiting, diarrhea,  dysuria    FamilyHx and SocialHx not otherwise contributory

## 2019-09-21 NOTE — CONSULT NOTE ADULT - SUBJECTIVE AND OBJECTIVE BOX
Neurology consult    THELMA [id number]    HPI:  64yo female from home, with PMH of HTN, HLD, afib (on xarelto), CVA, presented to ER after noted episode of deviation of eyes to left. Paitent on arrival in ER received ativan 0.5mg and keppra 1000mg for suspected seizure activity, following which she was drowsy, hence history obtained form brother at bedside who reports patient was doing apparently fine until 10pm when she complained of headache and was noted to have deviation of gaze to left with slowing of speech. No noted worsening of residual left sided weakness, or weakness on right side. Patient was alert and awake on arrival to ER and reported chest and abdominal pain. CT head was negative for acute changes, hence she was keppra loaded for concern for breakthrough seizures.   **patient's previous records can be obtained under [id number], name sabi on current admission spelled as "Di Unger" (21 Sep 2019 02:53)    No seizures reported since admission              MEDICATIONS    enoxaparin Injectable 90 milliGRAM(s) SubCutaneous every 12 hours  levETIRAcetam  IVPB 500 milliGRAM(s) IV Intermittent every 12 hours         Family history: No history of dementia, strokes, or seizures   FAMILY HISTORY:  FH: hypertension    SOCIAL HISTORY -- No history of tobacco or alcohol use     Allergies    No Known Allergies    Intolerances        Height (cm): 154.94 (09-20 @ 23:27)  Weight (kg): 88 (09-20 @ 23:35)  BMI (kg/m2): 36.7 (09-20 @ 23:35)    Vital Signs Last 24 Hrs  T(C): 36.7 (21 Sep 2019 12:05), Max: 37.1 (21 Sep 2019 05:02)  T(F): 98 (21 Sep 2019 12:05), Max: 98.7 (21 Sep 2019 05:02)  HR: 60 (21 Sep 2019 12:05) (55 - 108)  BP: 109/65 (21 Sep 2019 12:05) (98/58 - 225/170)  BP(mean): --  RR: 18 (21 Sep 2019 12:05) (17 - 30)  SpO2: 100% (21 Sep 2019 12:05) (99% - 100%)      REVIEW OF SYSTEMS:    Constitutional: No fever, chills, fatigue, weakness  Eyes: no eye pain, visual disturbances, or discharge  ENT:  No difficulty hearing, tinnitus, vertigo; No sinus or throat pain  Neck: No pain or stiffness  Respiratory: No cough, dyspnea, wheezing   Cardiovascular: No chest pain, palpitations,   On Neurological Examination:    Mental Status - Patient is alert, awake, oriented X3.      Follows commands well and able to answer questions appropriately. Mood and affect  normal  Follow simple commands  Follow complex commands      Speech -   Fluent                               Cranial Nerves - Pupils 3 mm equal and reactive to light,   extraocular eye movements intact.     EOM INTACT    LEFT CENTRAL FACIAL WEAKNESS IS SEEN (OLD)    CN 3,4,6,8,9,10 11,12 ARE INTACT     Motor Exam -   Right upper 5/5  Left upper 0/5  Right lower 5/5  Left lower  0/5     SPASTISITY OF LEFT UPPER AND LEFT LOWER EXTREMETY     With stimuli positive movement of all 4 extremities    DTR LEFT UPPER AND LOWERS ARE 3+    RIGHT SIDE ARE1 +    LEFT PLANTAR UPGOING ,RIGHT FLEXOR   Gait - PATIENT REFUSED     GENERAL Exam:     Nontoxic , No Acute Distress   	  HEENT:  normocephalic, atraumatic  		  LUNGS:	Clear bilaterally  No Wheeze  Decreased bilaterally  	  HEART:	 Normal S1S2   No murmur RRR        	  GI/ ABDOMEN:  Soft  Non tender                LABS:  CBC Full  -  ( 21 Sep 2019 00:30 )  WBC Count : 8.17 K/uL  RBC Count : 4.65 M/uL  Hemoglobin : 13.2 g/dL  Hematocrit : 42.5 %  Platelet Count - Automated : 154 K/uL  Mean Cell Volume : 91.4 fl  Mean Cell Hemoglobin : 28.4 pg  Mean Cell Hemoglobin Concentration : 31.1 gm/dL  Auto Neutrophil # : 3.80 K/uL  Auto Lymphocyte # : 3.28 K/uL  Auto Monocyte # : 0.92 K/uL  Auto Eosinophil # : 0.10 K/uL  Auto Basophil # : 0.05 K/uL  Auto Neutrophil % : 46.6 %  Auto Lymphocyte % : 40.1 %  Auto Monocyte % : 11.3 %  Auto Eosinophil % : 1.2 %  Auto Basophil % : 0.6 %      09-21    138  |  105  |  20<H>  ----------------------------<  114<H>  4.3   |  27  |  1.09    Ca    8.9      21 Sep 2019 00:30    TPro  7.8  /  Alb  3.6  /  TBili  0.4  /  DBili  x   /  AST  26  /  ALT  25  /  AlkPhos  114  09-21    Hemoglobin A1C:     LIVER FUNCTIONS - ( 21 Sep 2019 00:30 )  Alb: 3.6 g/dL / Pro: 7.8 g/dL / ALK PHOS: 114 U/L / ALT: 25 U/L DA / AST: 26 U/L / GGT: x           Vitamin B12   PT/INR - ( 21 Sep 2019 00:30 )   PT: 14.3 sec;   INR: 1.28 ratio         PTT - ( 21 Sep 2019 00:30 )  PTT:29.2 sec      RADIOLOGYEXAM:  CT BRAIN STROKE PROTOCOL                            PROCEDURE DATE:  09/20/2019          INTERPRETATION:  NONCONTRAST CT OF THE BRAIN DATED 9/20/2019.    CLINICAL INFORMATION:  Stroke Code , history of old stroke, slurred   speech and new left gaze deviation.    TECHNIQUE: Axial CT images are obtained from the cranial vertex to the   skull base without the administration of IV contrast. Images are   reformatted in sagittal and coronal planes.    No prior studies are available for comparison.    FINDINGS:    There is no acute intra-axial or extra-axial hemorrhage. There is no mass   effect or shift of the midline. The basal cisterns are not effaced. There   is cerebral volume loss with prominence of the ventricles and sulci.   There is an old white territory right MCA and EJ infarct involving the   right frontal, parietal, and temporal cortex as well as the insula. There   is ex vacuo dilatation of the right lateral ventricle. There are mild   atherosclerotic ossifications of theintracranial carotid arteries.    The regional soft tissues and osseous structures are unremarkable. The   visualized paranasal sinuses and tympanic/mastoid cavities are clear   apart from mild bilateral ethmoid mucosal thickening.    IMPRESSION:    No acute intracranial hemorrhage or mass effect.    Old wide territory right MCA and EJ infarct involving the right frontal,   parietal, and temporal cortex, as well as, the insula.

## 2019-09-21 NOTE — ED PROVIDER NOTE - CARE PLAN
Principal Discharge DX:	CVA (cerebral vascular accident)  Secondary Diagnosis:	Seizure-like activity

## 2019-09-21 NOTE — H&P ADULT - ATTENDING COMMENTS
Patient seen and examined ; case was discussed with the admitting resident    ROS: as in the HPI; all other ROS negative    SH and family history as above    Vital Signs Last 24 Hrs  T(C): --  T(F): --  HR: 58 (21 Sep 2019 01:58) (58 - 108)  BP: 115/76 (21 Sep 2019 01:07) (115/76 - 225/170)  BP(mean): --  RR: 21 (21 Sep 2019 01:58) (21 - 30)  SpO2: 100% (21 Sep 2019 01:58) (99% - 100%)    GEN: NAD  HEENT- normocephalic; mouth moist  CVS- S1S2+  LUNGS- clear to auscultation; no wheezing  ABD: Soft , nontender, nondistended, Bowel sounds are present  EXTREMITY: no calf tenderness, no cyanosis, no edema  NEURO: AAOx3; non focal neurologic exam; cranial nerves grossly intact  PSYCH: normal affect and behavior  BACK: no swelling or mass;   VASCULAR: ++ distal peripheral pulses  SKIN: warm and dry.       Labs Reviewed:                         13.2   8.17  )-----------( 154      ( 21 Sep 2019 00:30 )             42.5     09-21    138  |  105  |  20<H>  ----------------------------<  114<H>  4.3   |  27  |  1.09    Ca    8.9      21 Sep 2019 00:30    TPro  7.8  /  Alb  3.6  /  TBili  0.4  /  DBili  x   /  AST  26  /  ALT  25  /  AlkPhos  114  09-21    CARDIAC MARKERS ( 21 Sep 2019 00:30 )  <0.015 ng/mL / x     / x     / x     / x            PT/INR - ( 21 Sep 2019 00:30 )   PT: 14.3 sec;   INR: 1.28 ratio         PTT - ( 21 Sep 2019 00:30 )  PTT:29.2 sec  BNP:   MEDICATIONS  (STANDING):    MEDICATIONS  (PRN):      CXR reviewed:     EKG Reviewed:     64 y/o F with h/o         Plan of care discussed with patient ;  all questions and concerns were addressed.  Discussed with Patient's family Patient seen and examined ; case was discussed with the admitting resident    ROS: as in the HPI; all other ROS negative    SH and family history as above    Vital Signs Last 24 Hrs  T(C): --  T(F): --  HR: 58 (21 Sep 2019 01:58) (58 - 108)  BP: 115/76 (21 Sep 2019 01:07) (115/76 - 225/170)  BP(mean): --  RR: 21 (21 Sep 2019 01:58) (21 - 30)  SpO2: 100% (21 Sep 2019 01:58) (99% - 100%)    GEN: lethargic, GCS 8  HEENT- normocephalic; mouth moist  CVS- S1S2+  LUNGS- clear to auscultation; no wheezing  ABD: Soft , nontender, nondistended, Bowel sounds are present  EXTREMITY: no calf tenderness, no cyanosis, non- pitting edema  NEURO: L hemiparesis with LUE contracture, responds to painful stimuli   BACK: no swelling or mass;   VASCULAR: ++ distal peripheral pulses  SKIN: warm and dry.       Labs Reviewed:                         13.2   8.17  )-----------( 154      ( 21 Sep 2019 00:30 )             42.5     09-21    138  |  105  |  20<H>  ----------------------------<  114<H>  4.3   |  27  |  1.09    Ca    8.9      21 Sep 2019 00:30    TPro  7.8  /  Alb  3.6  /  TBili  0.4  /  DBili  x   /  AST  26  /  ALT  25  /  AlkPhos  114  09-21    CARDIAC MARKERS ( 21 Sep 2019 00:30 )  <0.015 ng/mL / x     / x     / x     / x            PT/INR - ( 21 Sep 2019 00:30 )   PT: 14.3 sec;   INR: 1.28 ratio         PTT - ( 21 Sep 2019 00:30 )  PTT:29.2 sec  BNP:   CXR reviewed  EKG Reviewed    64 y/o F with h/o R MCA infarct with residual L hemiparesis, admitted with slurred speech, and fixed gaze deviation. Patient's brother was speaking with her on the phone when she ba        Plan of care discussed with patient ;  all questions and concerns were addressed.  Discussed with Patient's family Patient seen and examined ; case was discussed with the admitting resident    ROS: as in the HPI; all other ROS negative    SH and family history as above    Vital Signs Last 24 Hrs  T(C): --  T(F): --  HR: 58 (21 Sep 2019 01:58) (58 - 108)  BP: 115/76 (21 Sep 2019 01:07) (115/76 - 225/170)  BP(mean): --  RR: 21 (21 Sep 2019 01:58) (21 - 30)  SpO2: 100% (21 Sep 2019 01:58) (99% - 100%)    GEN: lethargic, GCS 8, 10 on repeat assessment   HEENT- normocephalic; mouth moist  CVS- S1S2+  LUNGS- clear to auscultation; no wheezing  ABD: Soft , nontender, nondistended, Bowel sounds are present  EXTREMITY: no calf tenderness, no cyanosis, non- pitting edema  NEURO: L hemiparesis with LUE contracture, responds to painful stimuli   BACK: no swelling or mass;   VASCULAR: ++ distal peripheral pulses  SKIN: warm and dry.       Labs Reviewed:                         13.2   8.17  )-----------( 154      ( 21 Sep 2019 00:30 )             42.5     09-21    138  |  105  |  20<H>  ----------------------------<  114<H>  4.3   |  27  |  1.09    Ca    8.9      21 Sep 2019 00:30    TPro  7.8  /  Alb  3.6  /  TBili  0.4  /  DBili  x   /  AST  26  /  ALT  25  /  AlkPhos  114  -    CARDIAC MARKERS ( 21 Sep 2019 00:30 )  <0.015 ng/mL / x     / x     / x     / x            PT/INR - ( 21 Sep 2019 00:30 )   PT: 14.3 sec;   INR: 1.28 ratio         PTT - ( 21 Sep 2019 00:30 )  PTT:29.2 sec    CT chest/abd/pelv reviewed   EKG Reviewed    62 y/o F with h/o R MCA infarct with residual L hemiparesis, admitted with slurred speech, and fixed gaze deviation. Patient's brother was speaking to her when she had slurry speech, fixed gaze deviation to the left and stated she had headache, chest pain and abd pain-similar sx to her last visit 2019. Denies focal seizure like activity witnessed .  Of note patient has another medical record with different , discussed with registration to change asap. Previous record can be found under [facility] with  63.     1. Encephalopathy- toxic metabolic- suspected seizure vs cva vs occult infection, medication effect. Obtain UA, no clear source of infection on CT C/A/P. MRI, MRA head/neck, unable to do CT angio given recent contrast administration and concern for dissection on presentation. EEG to r/u nonconvulsive status epilepticus. Sedated on initial exam 2/2 medication effect, now improved. Needs speech and swallow, neurochecks.   2. H/o CVA- R MCA, EJ- asa given, cont statin when able to take po, lovenox for now  3. H/o seizure d/o- seizure focus 2/2 gliosis, prev insults from cvas- continue keppra iv for now, level pending   4. A fib- paroxysmal- chadsvasc 4, cont lovenox, no ich on cth. Change to NOAC when more awake to take po.   5.     Plan of care discussed with patient ;  all questions and concerns were addressed.  Discussed with Patient's family, brother at bedside Patient seen and examined ; case was discussed with the admitting resident    ROS: as in the HPI; all other ROS negative    SH and family history as above    Vital Signs Last 24 Hrs  T(C): --  T(F): --  HR: 58 (21 Sep 2019 01:58) (58 - 108)  BP: 115/76 (21 Sep 2019 01:07) (115/76 - 225/170)  BP(mean): --  RR: 21 (21 Sep 2019 01:58) (21 - 30)  SpO2: 100% (21 Sep 2019 01:58) (99% - 100%)    GEN: lethargic, GCS 8, 10 on repeat assessment   HEENT- normocephalic; mouth moist  CVS- S1S2+  LUNGS- clear to auscultation; no wheezing  ABD: Soft , nontender, nondistended, Bowel sounds are present  EXTREMITY: no calf tenderness, no cyanosis, non- pitting edema  NEURO: L hemiparesis with LUE contracture, responds to painful stimuli   BACK: no swelling or mass;   VASCULAR: ++ distal peripheral pulses  SKIN: warm and dry.       Labs Reviewed:                         13.2   8.17  )-----------( 154      ( 21 Sep 2019 00:30 )             42.5     09-21    138  |  105  |  20<H>  ----------------------------<  114<H>  4.3   |  27  |  1.09    Ca    8.9      21 Sep 2019 00:30    TPro  7.8  /  Alb  3.6  /  TBili  0.4  /  DBili  x   /  AST  26  /  ALT  25  /  AlkPhos  114  -    CARDIAC MARKERS ( 21 Sep 2019 00:30 )  <0.015 ng/mL / x     / x     / x     / x            PT/INR - ( 21 Sep 2019 00:30 )   PT: 14.3 sec;   INR: 1.28 ratio         PTT - ( 21 Sep 2019 00:30 )  PTT:29.2 sec    CT chest/abd/pelv reviewed   EKG Reviewed    62 y/o F with h/o R MCA infarct with residual L hemiparesis, admitted with slurred speech, and fixed gaze deviation. Patient's brother was speaking to her when she had slurry speech, fixed gaze deviation to the left and stated she had headache, chest pain and abd pain-similar sx to her last visit 2019. Denies focal seizure like activity witnessed .  Of note patient has another medical record with different , discussed with registration to change asap. Previous record can be found under [facility] with  63.     1. Encephalopathy- toxic metabolic- suspected seizure vs cva vs occult infection, medication effect. Obtain UA, no clear source of infection on CT C/A/P. MRI, MRA head/neck, unable to do CT angio given recent contrast administration and concern for dissection on presentation. EEG to r/u nonconvulsive status epilepticus. Sedated on initial exam 2/2 medication effect, now improved. Needs speech and swallow, neurochecks.   2. H/o CVA- R MCA, EJ- asa given, cont statin when able to take po, lovenox for now  3. H/o seizure d/o- seizure focus 2/2 gliosis, prev insults from cvas- continue keppra iv for now, level pending   4. A fib- paroxysmal- chadsvasc 4, cont lovenox, no ich on cth. Change to NOAC when more awake to take po.   5. HTN  6. HLD    Plan of care discussed with patient ;  all questions and concerns were addressed.  Discussed with Patient's family, brother at bedside

## 2019-09-21 NOTE — ED PROVIDER NOTE - NS ED ROS FT
Review of Systems:  	•	CONSTITUTIONAL: no fever  	•	SKIN: no rash  	•	RESPIRATORY: no shortness of breath  	•	CARDIAC:  +chest pain, no palpitations  	•	GI: + abd pain, no nausea, no vomiting, no diarrhea  	•	GENITO-URINARY:  no dysuria; no hematuria    	•	MUSCULOSKELETAL:  no back pain  	•	NEUROLOGIC: +left sided weakness, +slurred speech, +left sided gazed deviation  	•	ALLERGY: no rhinitis  	•	PSYCHIATRIC: no anxiety Review of Systems:  	•	CONSTITUTIONAL: no fever  	•	SKIN: no rash  	•	RESPIRATORY: no shortness of breath  	•	CARDIAC:  +chest pain, no palpitations  	•	GI: + abd pain, no nausea, no vomiting, no diarrhea  	•	GENITO-URINARY:  no dysuria  	•	MUSCULOSKELETAL:  no back pain  	•	NEUROLOGIC: +left sided weakness, +slurred speech, +left sided gazed deviation  	•	ALLERGY: no rhinitis  	•	PSYCHIATRIC: no anxiety

## 2019-09-21 NOTE — H&P ADULT - HISTORY OF PRESENT ILLNESS
54yo female from home, with PMH of HTN, HLD, afib (on xarelto), CVA, presented to ER after noted episode of deviation of eyes to left. Pinky on arrival in ER received ativan 0.5mg and keppra 1000mg for suspected seizure activity, following which she was drowsy, hence history obtained form brother at bedside who reports patient was doing apparently fine until 10pm when she complained of headache and was noted to have deviation of gaze to left with slowing of speech. No noted worsening of residual left sided weakness, or weakness on right side. Patient was alert and awake on arrival to ER and reported chest and abdominal pain. CT head was negative for acute changes, hence she was keppra loaded for concern for breakthrough seizures. 52yo female from home, with PMH of HTN, HLD, afib (on xarelto), CVA, presented to ER after noted episode of deviation of eyes to left. Pinky on arrival in ER received ativan 0.5mg and keppra 1000mg for suspected seizure activity, following which she was drowsy, hence history obtained form brother at bedside who reports patient was doing apparently fine until 10pm when she complained of headache and was noted to have deviation of gaze to left with slowing of speech. No noted worsening of residual left sided weakness, or weakness on right side. Patient was alert and awake on arrival to ER and reported chest and abdominal pain. CT head was negative for acute changes, hence she was keppra loaded for concern for breakthrough seizures.   **patient's previous records can be obtained under [id number], name misspelled on current admission spelled as "Di Unger" 62yo female from home, with PMH of HTN, HLD, afib (on xarelto), CVA, presented to ER after noted episode of deviation of eyes to left. Pinky on arrival in ER received ativan 0.5mg and keppra 1000mg for suspected seizure activity, following which she was drowsy, hence history obtained form brother at bedside who reports patient was doing apparently fine until 10pm when she complained of headache and was noted to have deviation of gaze to left with slowing of speech. No noted worsening of residual left sided weakness, or weakness on right side. Patient was alert and awake on arrival to ER and reported chest and abdominal pain. CT head was negative for acute changes, hence she was keppra loaded for concern for breakthrough seizures.   **patient's previous records can be obtained under [id number], name misspelled on current admission spelled as "Di Unger"

## 2019-09-21 NOTE — PATIENT PROFILE ADULT - STATED REASON FOR ADMISSION
Pt at home had H/A,  stomachache and dizziness. Pt had left side weakness to upper body and was able to move lower body a little. lf side facial drooping.

## 2019-09-21 NOTE — H&P ADULT - PROBLEM SELECTOR PLAN 4
hold hctz/lisinopril as bp acceptable,   will allow for permissive htn x 24 hours until 10pm on 9/21  monitor bp and start meds as indicated

## 2019-09-21 NOTE — ED PROVIDER NOTE - PHYSICAL EXAMINATION
*GEN: NAD; well appearing; A+O x3   *HEAD: NC/AT   *EYES/NOSE: PERRL & EOMI b/l, +left sided nystagmus with left side gaze prefernce, visual fields not intact,   *THROAT: airway patent, moist mucous membranes  *NECK: Neck supple, no masses  *PULMONARY: CTA b/l, symmetric breath sounds.   *CARDIAC: s1s2, regular rhythm, no Murmur  *ABDOMEN:  ND, NT, soft, no guarding, no rebound, no masses   *BACK: no CVA tenderness, Normal  spine   *EXTREMITIES: symmetric pulses, 2+ dp & radial pulses, capillary refill < 2 seconds, no cyanosis, no edema   *SKIN: no rash or bruising   *NEUROLOGIC: alert, CN 2-12 intact, +left sided weakness, +slurred speech  *PSYCH: insight and judgment nl, memory nl, affect nl, thought nl *GEN: NAD; well appearing; A+O x3   *HEAD: NC/AT   *EYES/NOSE: PERRL & EOMI b/l, +left sided nystagmus with left side gaze preference, visual fields not intact,   *THROAT: airway patent, moist mucous membranes  *NECK: Neck supple, no masses  *PULMONARY: CTA b/l, symmetric breath sounds.   *CARDIAC: s1s2, regular rhythm, no Murmur  *ABDOMEN:  ND, NT, soft, no guarding, no rebound, no masses   *BACK: no CVA tenderness, Normal  spine   *EXTREMITIES: symmetric pulses, 2+ dp & radial pulses, capillary refill < 2 seconds, no cyanosis, no edema   *SKIN: no rash or bruising   *NEUROLOGIC: alert, slurred speech, L side gaze preference, visual fields not intact possibly since pt not able to comprehend. old L side facial droop; LUE & LLE strength 0/5, RLE & RUE strength 5/5  *PSYCH: insight and judgment nl, memory nl, affect nl, thought nl

## 2019-09-21 NOTE — H&P ADULT - PROBLEM SELECTOR PLAN 3
ekg on presentation in sinus rhythm @99bpm  on xarelto and metoprolol at home  will start full dose lovenox in lieu of xarelto as pt npo

## 2019-09-21 NOTE — H&P ADULT - PROBLEM SELECTOR PLAN 1
P/w episode of left sied deviation of eyes, slowing of speech,   concenr fro cvs,   ct head with no acute changes  unable to assess nihss as pt received ativan, drowsy  -npo for now  -f/u speech and swallow eval  -resume asa and statin when able to take po  -telemonitoring  -neuro eval P/w episode of left sied deviation of eyes, slowing of speech,   concern fro cvs,   ct head with no acute changes  unable to assess nihss as pt received ativan, drowsy  -npo for now  -f/u speech and swallow eval  -resume asa and statin when able to take po  -telemonitoring  -neurochecks q2hrs  -neuro eval P/w episode of left sided deviation of eyes, slowing of speech,   concern fro cvs,   ct head with no acute changes  unable to assess nihss as pt received ativan, drowsy  -npo for now  -f/u speech and swallow eval  -resume asa and statin when able to take po  -telemonitoring  -neurochecks q2hrs  -neuro eval P/w episode of left sided deviation of eyes, slowing of speech,   concern for cvs,   ct head with no acute changes  unable to assess nihss as pt received ativan, drowsy  -npo for now  -f/u speech and swallow eval  -resume asa and statin when able to take po  -telemonitoring  -neurochecks q2hrs  -neuro eval Dr Domingo

## 2019-09-21 NOTE — EEG REPORT - NS EEG TEXT BOX
[facility]   COMPREHENSIVE EPILEPSY CENTER   REPORT OF ROUTINE VIDEO EEG     [facility]: [address], [place] [address], Ph#: [phone]  [facility]: [address], [place] [address], Ph#: [phone]  [facility]: [address] [place] [address], Ph#: [phone]    Patient Name: THELMA NOLASCO  Age and : 63y ([birth year])  MRN #: [id number]  Location: [address]  Referring Physician: Awdoa Escobedo    Study Date: 19    _____________________________________________________________  TECHNICAL INFORMATION    Placement and Labeling of Electrodes:  The EEG was performed utilizing 20 channels referential EEG connections (coronal over temporal over parasagittal montage) using all standard 10-20 electrode placements with EKG.  Recording was at a sampling rate of 256 samples per second per channel.  Time synchronized digital video recording was done simultaneously with EEG recording.  A low light infrared camera was used for low light recording.  Rolly and seizure detection algorithms were utilized.    _____________________________________________________________  HISTORY    Patient is a 63y old  Female who presents with a chief complaint of Slurred speech (21 Sep 2019 15:20)      PERTINENT MEDICATION:  levETIRAcetam  IVPB 500 milliGRAM(s) IV Intermittent every 12 hours    _____________________________________________________________  STUDY INTERPRETATION    Findings: The background was continuous, spontaneously variable and reactive. During wakefulness, the posterior dominant rhythm consisted of well-modulated 9-10 Hz activity with amplitude to 60 uV, better formed over the left hemisphere.  Low amplitude frontal beta was noted in wakefulness.    Background Slowing:  No generalized background slowing was present.    Focal Slowing:   Continuous theta/delta slowing in the right hemisphere, max temporally.     Sleep Background:  Drowsiness was characterized by fragmentation, attenuation, and slowing of the background activity.    Sleep was characterized by the presence of asymmetric sleep spindles and K-complexes, better formed over the left hemisphere.    Other Non-Epileptiform Findings:  Attenuation of fast activity in the right hemisphere.    Interictal Epileptiform Activity:   Occasional right posterior quadrant (max P8) sharp wave discharges.    Events:  Clinical events: None recorded.  Seizures: None recorded.    Activation Procedures:   Hyperventilation was not performed.    Photic stimulation was performed and did not elicit any abnormality.     Artifacts:  Intermittent myogenic and movement artifacts were noted.    ECG:  The heart rate on single channel ECG was predominantly between 60-70 BPM.    _____________________________________________________________  EEG SUMMARY/CLASSIFICATION    Abnormal EEG in the awake, drowsy and asleep states.  - Occasional right posterior quadrant (max P8) sharp wave discharges.  - Continuous theta/delta slowing in the right hemisphere, max temporally.   - Attenuation of fast activity in the right hemisphere.    _____________________________________________________________  EEG IMPRESSION/CLINICAL CORRELATE    Abnormal EEG study.  1. Potential epileptogenic focus in the right posterior quadrant.  2. Cortical dysfunction in the right hemisphere.  3. Structural abnormality in the right hemisphere. max temporally.  4. No seizure seen.    _____________________________________________________________    Julito Cox MD  Attending Physician, [facility]

## 2019-09-21 NOTE — H&P ADULT - NSHPPHYSICALEXAM_GEN_ALL_CORE
Vital Signs Last 24 Hrs  T(C): --  T(F): --  HR: 58 (21 Sep 2019 01:58) (58 - 108)  BP: 115/76 (21 Sep 2019 01:07) (115/76 - 225/170)  BP(mean): --  RR: 21 (21 Sep 2019 01:58) (21 - 30)  SpO2: 100% (21 Sep 2019 01:58) (99% - 100%)    PHYSICAL EXAM:  GENERAL: NAD, well-developed  HEAD:  Atraumatic, Normocephalic  EYES: EOMI, PERRLA, conjunctiva and sclera clear  NECK: Supple, No JVD  CHEST/LUNG: Clear to auscultation bilaterally; No wheeze  HEART: Regular rate and rhythm; No murmurs, rubs, or gallops  ABDOMEN: Soft, Nontender, Nondistended; Bowel sounds present  EXTREMITIES:, No clubbing, cyanosis, or edema  PSYCH: AAOx3  NEUROLOGY: non-focal  SKIN: No rashes or lesions Vital Signs Last 24 Hrs  HR: 58 (21 Sep 2019 01:58) (58 - 108)  BP: 115/76 (21 Sep 2019 01:07) (115/76 - 225/170)  RR: 21 (21 Sep 2019 01:58) (21 - 30)  SpO2: 100% (21 Sep 2019 01:58) (99% - 100%)    PHYSICAL EXAM:  GENERAL: NAD, well-developed  HEAD:  Atraumatic, Normocephalic  EYES: EOMI, PERRLA, conjunctiva and sclera clear  NECK: Supple, No JVD  CHEST/LUNG: Clear to auscultation bilaterally; No wheeze  HEART: Regular rate and rhythm; No murmurs, rubs, or gallops  ABDOMEN: Soft, Nontender, Nondistended; Bowel sounds present  EXTREMITIES:, No clubbing, cyanosis, or edema  PSYCH: drowsy, responds to name, mumbles in response  NEUROLOGY: cannot be assessed d/t drowsiness, tone wnl  SKIN: No rashes or lesions

## 2019-09-22 ENCOUNTER — TRANSCRIPTION ENCOUNTER (OUTPATIENT)
Age: 54
End: 2019-09-22

## 2019-09-22 VITALS
DIASTOLIC BLOOD PRESSURE: 87 MMHG | RESPIRATION RATE: 18 BRPM | SYSTOLIC BLOOD PRESSURE: 135 MMHG | TEMPERATURE: 97 F | HEART RATE: 54 BPM | OXYGEN SATURATION: 99 %

## 2019-09-22 LAB
ANION GAP SERPL CALC-SCNC: 4 MMOL/L — LOW (ref 5–17)
APPEARANCE UR: ABNORMAL
BACTERIA # UR AUTO: ABNORMAL /HPF
BILIRUB UR-MCNC: NEGATIVE — SIGNIFICANT CHANGE UP
BUN SERPL-MCNC: 17 MG/DL — SIGNIFICANT CHANGE UP (ref 7–18)
CALCIUM SERPL-MCNC: 9.4 MG/DL — SIGNIFICANT CHANGE UP (ref 8.4–10.5)
CHLORIDE SERPL-SCNC: 110 MMOL/L — HIGH (ref 96–108)
CHOLEST SERPL-MCNC: 150 MG/DL — SIGNIFICANT CHANGE UP (ref 10–199)
CO2 SERPL-SCNC: 29 MMOL/L — SIGNIFICANT CHANGE UP (ref 22–31)
COLOR SPEC: YELLOW — SIGNIFICANT CHANGE UP
CREAT SERPL-MCNC: 0.73 MG/DL — SIGNIFICANT CHANGE UP (ref 0.5–1.3)
DIFF PNL FLD: ABNORMAL
EPI CELLS # UR: SIGNIFICANT CHANGE UP /HPF
FOLATE SERPL-MCNC: 18.2 NG/ML — SIGNIFICANT CHANGE UP
GLUCOSE SERPL-MCNC: 83 MG/DL — SIGNIFICANT CHANGE UP (ref 70–99)
GLUCOSE UR QL: NEGATIVE — SIGNIFICANT CHANGE UP
HBA1C BLD-MCNC: 5.7 % — HIGH (ref 4–5.6)
HCT VFR BLD CALC: 40.7 % — SIGNIFICANT CHANGE UP (ref 34.5–45)
HDLC SERPL-MCNC: 54 MG/DL — SIGNIFICANT CHANGE UP
HGB BLD-MCNC: 13.1 G/DL — SIGNIFICANT CHANGE UP (ref 11.5–15.5)
KETONES UR-MCNC: ABNORMAL
LEUKOCYTE ESTERASE UR-ACNC: ABNORMAL
LIPID PNL WITH DIRECT LDL SERPL: 83 MG/DL — SIGNIFICANT CHANGE UP
MCHC RBC-ENTMCNC: 28.4 PG — SIGNIFICANT CHANGE UP (ref 27–34)
MCHC RBC-ENTMCNC: 32.2 GM/DL — SIGNIFICANT CHANGE UP (ref 32–36)
MCV RBC AUTO: 88.3 FL — SIGNIFICANT CHANGE UP (ref 80–100)
NITRITE UR-MCNC: NEGATIVE — SIGNIFICANT CHANGE UP
NRBC # BLD: 0 /100 WBCS — SIGNIFICANT CHANGE UP (ref 0–0)
PH UR: 6 — SIGNIFICANT CHANGE UP (ref 5–8)
PLATELET # BLD AUTO: 148 K/UL — LOW (ref 150–400)
POTASSIUM SERPL-MCNC: 3.8 MMOL/L — SIGNIFICANT CHANGE UP (ref 3.5–5.3)
POTASSIUM SERPL-SCNC: 3.8 MMOL/L — SIGNIFICANT CHANGE UP (ref 3.5–5.3)
PROT UR-MCNC: 30 MG/DL
RBC # BLD: 4.61 M/UL — SIGNIFICANT CHANGE UP (ref 3.8–5.2)
RBC # FLD: 14.8 % — HIGH (ref 10.3–14.5)
RBC CASTS # UR COMP ASSIST: SIGNIFICANT CHANGE UP /HPF (ref 0–2)
SODIUM SERPL-SCNC: 143 MMOL/L — SIGNIFICANT CHANGE UP (ref 135–145)
SP GR SPEC: 1.01 — SIGNIFICANT CHANGE UP (ref 1.01–1.02)
TOTAL CHOLESTEROL/HDL RATIO MEASUREMENT: 2.8 RATIO — LOW (ref 3.3–7.1)
TRIGL SERPL-MCNC: 63 MG/DL — SIGNIFICANT CHANGE UP (ref 10–149)
TSH SERPL-MCNC: 2.21 UU/ML — SIGNIFICANT CHANGE UP (ref 0.34–4.82)
UROBILINOGEN FLD QL: 1
VIT B12 SERPL-MCNC: 438 PG/ML — SIGNIFICANT CHANGE UP (ref 232–1245)
WBC # BLD: 5.07 K/UL — SIGNIFICANT CHANGE UP (ref 3.8–10.5)
WBC # FLD AUTO: 5.07 K/UL — SIGNIFICANT CHANGE UP (ref 3.8–10.5)
WBC UR QL: ABNORMAL /HPF (ref 0–5)

## 2019-09-22 PROCEDURE — 99239 HOSP IP/OBS DSCHRG MGMT >30: CPT

## 2019-09-22 RX ORDER — LEVETIRACETAM 250 MG/1
750 TABLET, FILM COATED ORAL
Refills: 0 | Status: DISCONTINUED | OUTPATIENT
Start: 2019-09-22 | End: 2019-09-22

## 2019-09-22 RX ORDER — LEVETIRACETAM 250 MG/1
1 TABLET, FILM COATED ORAL
Qty: 30 | Refills: 0
Start: 2019-09-22 | End: 2019-10-06

## 2019-09-22 RX ADMIN — LEVETIRACETAM 420 MILLIGRAM(S): 250 TABLET, FILM COATED ORAL at 00:08

## 2019-09-22 RX ADMIN — ENOXAPARIN SODIUM 90 MILLIGRAM(S): 100 INJECTION SUBCUTANEOUS at 05:27

## 2019-09-22 RX ADMIN — LEVETIRACETAM 420 MILLIGRAM(S): 250 TABLET, FILM COATED ORAL at 12:48

## 2019-09-22 RX ADMIN — LEVETIRACETAM 750 MILLIGRAM(S): 250 TABLET, FILM COATED ORAL at 17:11

## 2019-09-22 RX ADMIN — ENOXAPARIN SODIUM 90 MILLIGRAM(S): 100 INJECTION SUBCUTANEOUS at 17:11

## 2019-09-22 NOTE — PROGRESS NOTE ADULT - PROBLEM SELECTOR PLAN 1
P/w episode of left sided deviation of eyes, slowing of speech,   concern for cvs,   ct head with no acute changes  patient is awake and alert,  No new sz activity.   Able to eat, swallow, ambulate    -    -neuro eval Dr Domingo

## 2019-09-22 NOTE — PROGRESS NOTE ADULT - PROBLEM SELECTOR PLAN 4
hold hctz/lisinopril as bp acceptable,   will allow for permissive htn x 24 hours until 10pm on 9/21  BP meds can be resumed.

## 2019-09-22 NOTE — PROGRESS NOTE ADULT - SUBJECTIVE AND OBJECTIVE BOX
MEDICAL ATTENDING NOTE  Patient is a 54y old  Female who presents with a chief complaint of Slurred speech (21 Sep 2019 15:20)      HPI:  62yo female from home, with PMH of HTN, HLD, afib (on xarelto), CVA, presented to ER after noted episode of deviation of eyes to left. Paitent on arrival in ER received ativan 0.5mg and keppra 1000mg for suspected seizure activity, following which she was drowsy, hence history obtained form brother at bedside who reports patient was doing apparently fine until 10pm when she complained of headache and was noted to have deviation of gaze to left with slowing of speech. No noted worsening of residual left sided weakness, or weakness on right side. Patient was alert and awake on arrival to ER and reported chest and abdominal pain. CT head was negative for acute changes, hence she was keppra loaded for concern for breakthrough seizures.   **patient's previous records can be obtained under [id number], name parveenlled on current admission spelled as "Di Unger" (21 Sep 2019 02:53)      INTERVAL HPI/OVERNIGHT EVENTS: no new seizure, no SOB    MEDICATIONS  (STANDING):  enoxaparin Injectable 90 milliGRAM(s) SubCutaneous every 12 hours  levETIRAcetam 750 milliGRAM(s) Oral two times a day  levETIRAcetam  IVPB 500 milliGRAM(s) IV Intermittent every 12 hours    MEDICATIONS  (PRN):  acetaminophen   Tablet .. 650 milliGRAM(s) Oral every 6 hours PRN Severe Pain (7 - 10)      __________________________________________________  REVIEW OF SYSTEMS:    CONSTITUTIONAL: No fever,   EYES: no acute visual disturbances  NECK: No pain or stiffness  RESPIRATORY: No cough; No shortness of breath  CARDIOVASCULAR: No chest pain, no palpitations  GASTROINTESTINAL: No pain. No nausea or vomiting; No diarrhea   NEUROLOGICAL: No headache or numbness, no tremors  MUSCULOSKELETAL: No joint pain, no muscle pain  GENITOURINARY: no dysuria, no frequency, no hesitancy  PSYCHIATRY: no depression , no anxiety  ALL OTHER  ROS negative        Vital Signs Last 24 Hrs  T(C): 36.3 (22 Sep 2019 16:03), Max: 36.9 (21 Sep 2019 23:20)  T(F): 97.3 (22 Sep 2019 16:03), Max: 98.4 (21 Sep 2019 23:20)  HR: 54 (22 Sep 2019 16:03) (54 - 93)  BP: 135/87 (22 Sep 2019 16:03) (103/62 - 135/87)  BP(mean): --  RR: 18 (22 Sep 2019 16:03) (17 - 19)  SpO2: 99% (22 Sep 2019 16:03) (96% - 100%)    ________________________________________________  PHYSICAL EXAM:  GENERAL: NAD  HEENT: Normocephalic;  conjunctivae and sclerae clear; moist mucous membranes;   NECK : supple  CHEST/LUNG: Clear to auscultation bilaterally with good air entry   HEART: S1 S2  regular; no murmurs, gallops or rubs  ABDOMEN: Soft, Nontender, Nondistended; Bowel sounds present  EXTREMITIES: no cyanosis; no edema; no calf tenderness  SKIN: warm and dry; no rash  NERVOUS SYSTEM:  Awake and alert; Oriented  to place, person and time ; no new deficits    _________________________________________________  LABS:                        13.1   5.07  )-----------( 148      ( 22 Sep 2019 10:40 )             40.7     09-    143  |  110<H>  |  17  ----------------------------<  83  3.8   |  29  |  0.73    Ca    9.4      22 Sep 2019 10:40    TPro  7.8  /  Alb  3.6  /  TBili  0.4  /  DBili  x   /  AST  26  /  ALT  25  /  AlkPhos  114  09-21    PT/INR - ( 21 Sep 2019 00:30 )   PT: 14.3 sec;   INR: 1.28 ratio         PTT - ( 21 Sep 2019 00:30 )  PTT:29.2 sec  Urinalysis Basic - ( 22 Sep 2019 07:53 )    Color: Yellow / Appearance: Slightly Turbid / S.015 / pH: x  Gluc: x / Ketone: Trace  / Bili: Negative / Urobili: 1   Blood: x / Protein: 30 mg/dL / Nitrite: Negative   Leuk Esterase: Moderate / RBC: 0-2 /HPF / WBC 26-50 /HPF   Sq Epi: x / Non Sq Epi: Few /HPF / Bacteria: Moderate /HPF      CAPILLARY BLOOD GLUCOSE      POCT Blood Glucose.: 86 mg/dL (21 Sep 2019 17:26)    < from: CT Brain Stroke Protocol (19 @ 23:48) >  No acute intracranial hemorrhage or mass effect.    Old wide territory right MCA and EJ infarct involving the right frontal,   parietal, and temporal cortex, as well as, the insula.    Findings were discussed with Dr. Mcgee of the emergency Departmentat   11:50 PM on 2019.    < end of copied text >          < from: CT Angio Abdomen and Pelvis w/ IV Cont (19 @ 00:01) >      Abnormal EEG study.  1. Potential epileptogenic focus in the right posterior quadrant.  2. Cortical dysfunction in the right hemisphere.  3. Structural abnormality in the right hemisphere. max temporally.  4. No seizure seen.    ABDOMEN AND PELVIS:    LIVER: Within normal limits.  BILE DUCTS: Normal caliber.  GALLBLADDER: Within normal limits.  SPLEEN: Within normal limits.  PANCREAS: Within normal limits.  ADRENALS: Within normal limits.  KIDNEYS/URETERS: Within normal limits.    BLADDER: Within normal limits.  REPRODUCTIVE ORGANS: Uterus and adnexa are grossly unremarkable.    BOWEL: No bowel obstruction or overt bowel wall thickening. Appendix is   normal.  PERITONEUM: No ascites or pneumoperitoneum. No mesenteric adenopathy.  VESSELS: Normal caliber abdominal aorta. No evidence of intramural   hematoma. No abdominal aortic dissection. Patent celiac artery, renal   arteries, SMA, and MARIANA. Normal caliber iliac arteries without focal   dissectionflap.  RETROPERITONEUM/LYMPH NODES: No lymphadenopathy. No retroperitoneal   hematoma.  ABDOMINAL WALL: Small fat-containing umbilical hernia.  BONES: Degenerative changes of the spine.    IMPRESSION:     No evidence of aortic dissection. Normal caliber thoracic and abdominal   aorta.    Patchy bilateral airspace opacities and intralobular septal thickening   likely reflective of pulmonary edema.    < end of copied text >  < from: CT Angio Abdomen and Pelvis w/ IV Cont (19 @ 00:01) >    ABDOMEN AND PELVIS:    LIVER: Within normal limits.  BILE DUCTS: Normal caliber.  GALLBLADDER: Within normal limits.  SPLEEN: Within normal limits.  PANCREAS: Within normal limits.  ADRENALS: Within normal limits.  KIDNEYS/URETERS: Within normal limits.    BLADDER: Within normal limits.  REPRODUCTIVE ORGANS: Uterus and adnexa are grossly unremarkable.    BOWEL: No bowel obstruction or overt bowel wall thickening. Appendix is   normal.  PERITONEUM: No ascites or pneumoperitoneum. No mesenteric adenopathy.  VESSELS: Normal caliber abdominal aorta. No evidence of intramural   hematoma. No abdominal aortic dissection. Patent celiac artery, renal   arteries, SMA, and MARIANA. Normal caliber iliac arteries without focal   dissectionflap.  RETROPERITONEUM/LYMPH NODES: No lymphadenopathy. No retroperitoneal   hematoma.  ABDOMINAL WALL: Small fat-containing umbilical hernia.  BONES: Degenerative changes of the spine.    IMPRESSION:     No evidence of aortic dissection. Normal caliber thoracic and abdominal   aorta.    Patchy bilateral airspace opacities and intralobular septal thickening   likely reflective of pulmonary edema.    < end of copied text >

## 2019-09-22 NOTE — DISCHARGE NOTE PROVIDER - NSDCCPCAREPLAN_GEN_ALL_CORE_FT
PRINCIPAL DISCHARGE DIAGNOSIS  Diagnosis: Seizure-like activity  Assessment and Plan of Treatment: You presented with deviation of eyes to left and slowing of speech.  You received a dose of keppra and ativan in ER. Neurologist was consulted. CT head showed no acute events and stroke was ruled out. An EEG was done which showed potential epileptogenic focus in the right posterior quadrant. You have an old stroke. Your symptoms were due to a seizure. You were given Keppra here. You had no other seizures. Please continue with Keppra 750 mg twice a day. Please follow with your primary care doctor and a neurologist after discharge.      SECONDARY DISCHARGE DIAGNOSES  Diagnosis: Hypertension  Assessment and Plan of Treatment: Continue with blood pressure medication. Maintain a healthy diet that consist of low sugar, low fat, low sodium diet. Exercise frequently if possible.  Follow up with primary care physician in one week after discharge.    Diagnosis: Hyperlipidemia  Assessment and Plan of Treatment: Your cholesterol levels were found to be elevated. In order to lower your risk of cardiac and vascular disease, please take your statin as prescribed. Additionally, we recommend you carefully watch your diet to avoid fatty and greasy foods. Please follow up with your primary care provider to recheck your lipid panel every few years.    Diagnosis: Atrial fibrillation  Assessment and Plan of Treatment: Please continue with your anticoagulation medication as instructed in the medication reconciliation and your primary care physician.  Please continue with you rate control medication: lopressir as instructed in the medication reconciliation and your primary care physician. Please continue with Xarelto as before.

## 2019-09-22 NOTE — PROGRESS NOTE ADULT - PROBLEM SELECTOR PLAN 2
p/w episode of left sided gaze deviation, as per ER attending improved after giving AED, concern for breakthrough seizure or subclinical status epilepticus  -s/p keppra 1000mg and 0.5mg of ativan in ER  Old changes seen on CT head  -resume home dose of keppra 500mg bid iv     -f/u keppra level,

## 2019-09-22 NOTE — PROGRESS NOTE ADULT - ASSESSMENT
52yo female from home, with PMH of HTN, HLD, afib (on xarelto), CVA, presented to ER after noted episode of deviation of eyes to left. Patient on arrival in ER received ativan 0.5mg and keppra 1000mg for suspected seizure activity, following which she was drowsy, hence history obtained form brother at bedside who reports patient was doing apparently fine until 10pm when she complained of headache and was noted to have deviation of gaze to left with slowing of speech. No noted worsening of residual left sided weakness, or weakness on right side. Patient was alert and awake on arrival to ER and reported chest and abdominal pain. CT head was negative for acute changes, hence she was keppra loaded for concern for breakthrough seizures.   **patient's previous records can be obtained under [id number], name misspelled on current admission spelled as "Di Unger"  **paitents meds to be confirmed when she is alert and awake

## 2019-09-22 NOTE — PROGRESS NOTE ADULT - ATTENDING COMMENTS
Patient seen and examined. Discussed with covering resident, RN and Neurology.   Patient is alert, oriented, cooperative.   ROS: as in the HPI; all other ROS negative    Vital Signs Last 24 Hrs  T(C): 36.3 (22 Sep 2019 16:03), Max: 36.9 (21 Sep 2019 23:20)  T(F): 97.3 (22 Sep 2019 16:03), Max: 98.4 (21 Sep 2019 23:20)  HR: 54 (22 Sep 2019 16:03) (54 - 93)  BP: 135/87 (22 Sep 2019 16:03) (103/62 - 135/87)  BP(mean): --  RR: 18 (22 Sep 2019 16:03) (17 - 19)  SpO2: 99% (22 Sep 2019 16:03) (96% - 100%)    GEN: alert, oriented  HEENT- normocephalic; mouth moist  CVS- S1S2+  LUNGS- clear to auscultation; no wheezing  ABD: Soft , nontender, nondistended, Bowel sounds are present  EXTREMITY: no calf tenderness, no cyanosis, non- pitting edema  NEURO: L hemiparesis with LUE contracture, responds to painful stimuli   BACK: no swelling or mass;   VASCULAR: ++ distal peripheral pulses  SKIN: warm and dry.     Radiological studies, above, reviewed and discussed with Neurology.     CARDIAC MARKERS ( 21 Sep 2019 00:30 )  <0.015 ng/mL / x     / x     / x     / x        64 y/o F with h/o R MCA infarct with residual L hemiparesis, admitted with slurred speech, and fixed gaze deviation.  These new symptoms have resolved.   Patient's brother was speaking to her when she had slurry speech, fixed gaze deviation to the left and stated she had headache, chest pain and abd pain-similar sx to her last visit 1/2019. Seizure is likely cause of this event and resolution.     1. Encephalopathy- toxic metabolic- suspected seizureon presentation. EEG, reviewed and discussed with Neurology.   2. H/o CVA- R MCA, EJ- asa given, cont statin when able to take po, lovenox for now  3. H/o seizure d/o- seizure focus 2/2 gliosis, prev insults from cvas- Increase Keppra to 750 mg po bid.  First dose before discharge today.   4. A fib- paroxysmal- chadsvasc 4, cont lovenox, no ich on cth. resume NOAC, statin, antihypertensives.   6. HLD, resume statin    Plan of care discussed with patient ;  all questions and concerns were addressed.  f/u PMD

## 2019-09-22 NOTE — DISCHARGE NOTE NURSING/CASE MANAGEMENT/SOCIAL WORK - PATIENT PORTAL LINK FT
You can access the FollowMyHealth Patient Portal offered by [facility] by registering at the following website: [email or web address]. By joining [employer]’s FollowMyHealth portal, you will also be able to view your health information using other applications (apps) compatible with our system.

## 2019-09-22 NOTE — DISCHARGE NOTE NURSING/CASE MANAGEMENT/SOCIAL WORK - NSDCPEPTSTRK_GEN_ALL_CORE
Stroke support groups for patients, families, and friends/Need for follow up after discharge/Call 911 for stroke/Stroke warning signs and symptoms/Prescribed medications/Signs and symptoms of stroke/Risk factors for stroke/Stroke education booklet

## 2019-09-22 NOTE — DISCHARGE NOTE PROVIDER - HOSPITAL COURSE
52yo female from home, with PMH of HTN, HLD, afib (on xarelto), CVA, presented to ER after noted episode of deviation of eyes to left. Patient on arrival in ER received ativan 0.5mg and keppra 1000mg for suspected seizure activity, following which she was drowsy, hence history obtained form brother at bedside who reports patient was doing apparently fine until 10pm when she complained of headache and was noted to have deviation of gaze to left with slowing of speech. No noted worsening of residual left sided weakness, or weakness on right side. Patient was alert and awake on arrival to ER and reported chest and abdominal pain. CT head was negative for acute changes, hence she was keppra loaded for concern for breakthrough seizures.     **patient's previous records can be obtained under [id number], name misspelled on current admission spelled as "Di Unger"        Pt is s/p keppra 1000mg and 0.5mg of ativan in ER. EEG showed potential epileptogenic focus in the right posterior quadrant. Neuro was consulted. Pt has an old right MCA and AZA CVA with left hemiplegia. She had a seizure and is on keppra. No recurrence.        Patient is stable for discharge per attending and is advised to follow up with PCP as outpatient    Please refer to patient's complete medical chart with documents for a full hospital course, for this is only a brief summary.

## 2019-09-23 LAB
HCV AB S/CO SERPL IA: 0.17 S/CO — SIGNIFICANT CHANGE UP (ref 0–0.99)
HCV AB SERPL-IMP: SIGNIFICANT CHANGE UP
LEVETIRACETAM SERPL-MCNC: 14.6 MCG/ML — SIGNIFICANT CHANGE UP (ref 12–46)

## 2019-09-24 RX ORDER — LEVETIRACETAM 250 MG/1
1 TABLET, FILM COATED ORAL
Qty: 0 | Refills: 0 | DISCHARGE

## 2019-09-24 RX ORDER — LISINOPRIL/HYDROCHLOROTHIAZIDE 10-12.5 MG
0 TABLET ORAL
Qty: 0 | Refills: 0 | DISCHARGE

## 2019-09-24 RX ORDER — LEVETIRACETAM 250 MG/1
0 TABLET, FILM COATED ORAL
Qty: 0 | Refills: 0 | DISCHARGE

## 2019-09-24 RX ORDER — RIVAROXABAN 15 MG-20MG
0 KIT ORAL
Qty: 0 | Refills: 0 | DISCHARGE

## 2019-09-24 RX ORDER — METOPROLOL TARTRATE 50 MG
0 TABLET ORAL
Qty: 0 | Refills: 0 | DISCHARGE

## 2019-09-26 DIAGNOSIS — R56.9 UNSPECIFIED CONVULSIONS: ICD-10-CM

## 2019-09-26 DIAGNOSIS — I48.0 PAROXYSMAL ATRIAL FIBRILLATION: ICD-10-CM

## 2019-09-26 DIAGNOSIS — E78.5 HYPERLIPIDEMIA, UNSPECIFIED: ICD-10-CM

## 2019-09-26 DIAGNOSIS — I69.954 HEMIPLEGIA AND HEMIPARESIS FOLLOWING UNSPECIFIED CEREBROVASCULAR DISEASE AFFECTING LEFT NON-DOMINANT SIDE: ICD-10-CM

## 2019-09-26 DIAGNOSIS — I10 ESSENTIAL (PRIMARY) HYPERTENSION: ICD-10-CM

## 2019-10-10 ENCOUNTER — MOBILE ON CALL (OUTPATIENT)
Age: 54
End: 2019-10-10

## 2019-10-10 RX ORDER — LEVETIRACETAM 500 MG/1
500 TABLET, FILM COATED ORAL TWICE DAILY
Qty: 180 | Refills: 1 | Status: DISCONTINUED | COMMUNITY
Start: 2019-01-25 | End: 2019-10-10

## 2019-10-31 PROCEDURE — 82962 GLUCOSE BLOOD TEST: CPT

## 2019-10-31 PROCEDURE — 81001 URINALYSIS AUTO W/SCOPE: CPT

## 2019-10-31 PROCEDURE — 82607 VITAMIN B-12: CPT

## 2019-10-31 PROCEDURE — 80061 LIPID PANEL: CPT

## 2019-10-31 PROCEDURE — 74174 CTA ABD&PLVS W/CONTRAST: CPT

## 2019-10-31 PROCEDURE — 84484 ASSAY OF TROPONIN QUANT: CPT

## 2019-10-31 PROCEDURE — 96375 TX/PRO/DX INJ NEW DRUG ADDON: CPT

## 2019-10-31 PROCEDURE — 85027 COMPLETE CBC AUTOMATED: CPT

## 2019-10-31 PROCEDURE — 84443 ASSAY THYROID STIM HORMONE: CPT

## 2019-10-31 PROCEDURE — 80048 BASIC METABOLIC PNL TOTAL CA: CPT

## 2019-10-31 PROCEDURE — 86900 BLOOD TYPING SEROLOGIC ABO: CPT

## 2019-10-31 PROCEDURE — 80307 DRUG TEST PRSMV CHEM ANLYZR: CPT

## 2019-10-31 PROCEDURE — 82746 ASSAY OF FOLIC ACID SERUM: CPT

## 2019-10-31 PROCEDURE — 36415 COLL VENOUS BLD VENIPUNCTURE: CPT

## 2019-10-31 PROCEDURE — 93005 ELECTROCARDIOGRAM TRACING: CPT

## 2019-10-31 PROCEDURE — 83880 ASSAY OF NATRIURETIC PEPTIDE: CPT

## 2019-10-31 PROCEDURE — 70496 CT ANGIOGRAPHY HEAD: CPT

## 2019-10-31 PROCEDURE — 70450 CT HEAD/BRAIN W/O DYE: CPT

## 2019-10-31 PROCEDURE — 86803 HEPATITIS C AB TEST: CPT

## 2019-10-31 PROCEDURE — 96374 THER/PROPH/DIAG INJ IV PUSH: CPT

## 2019-10-31 PROCEDURE — 85730 THROMBOPLASTIN TIME PARTIAL: CPT

## 2019-10-31 PROCEDURE — 95819 EEG AWAKE AND ASLEEP: CPT

## 2019-10-31 PROCEDURE — 86850 RBC ANTIBODY SCREEN: CPT

## 2019-10-31 PROCEDURE — 71275 CT ANGIOGRAPHY CHEST: CPT

## 2019-10-31 PROCEDURE — 86901 BLOOD TYPING SEROLOGIC RH(D): CPT

## 2019-10-31 PROCEDURE — 80177 DRUG SCRN QUAN LEVETIRACETAM: CPT

## 2019-10-31 PROCEDURE — 85610 PROTHROMBIN TIME: CPT

## 2019-10-31 PROCEDURE — 83605 ASSAY OF LACTIC ACID: CPT

## 2019-10-31 PROCEDURE — 82306 VITAMIN D 25 HYDROXY: CPT

## 2019-10-31 PROCEDURE — 83036 HEMOGLOBIN GLYCOSYLATED A1C: CPT

## 2019-10-31 PROCEDURE — 95957 EEG DIGITAL ANALYSIS: CPT

## 2019-10-31 PROCEDURE — 99285 EMERGENCY DEPT VISIT HI MDM: CPT | Mod: 25

## 2019-10-31 PROCEDURE — 80053 COMPREHEN METABOLIC PANEL: CPT

## 2019-11-13 ENCOUNTER — APPOINTMENT (OUTPATIENT)
Dept: NEUROLOGY | Facility: CLINIC | Age: 54
End: 2019-11-13
Payer: MEDICARE

## 2019-11-13 VITALS — WEIGHT: 202 LBS | HEIGHT: 62 IN | BODY MASS INDEX: 37.17 KG/M2

## 2019-11-13 VITALS
TEMPERATURE: 98.7 F | HEART RATE: 49 BPM | DIASTOLIC BLOOD PRESSURE: 77 MMHG | SYSTOLIC BLOOD PRESSURE: 115 MMHG | OXYGEN SATURATION: 98 %

## 2019-11-13 DIAGNOSIS — I10 ESSENTIAL (PRIMARY) HYPERTENSION: ICD-10-CM

## 2019-11-13 PROBLEM — R56.9 UNSPECIFIED CONVULSIONS: Chronic | Status: ACTIVE | Noted: 2019-09-20

## 2019-11-13 PROBLEM — I63.9 CEREBRAL INFARCTION, UNSPECIFIED: Chronic | Status: ACTIVE | Noted: 2019-09-20

## 2019-11-13 PROBLEM — I48.91 UNSPECIFIED ATRIAL FIBRILLATION: Chronic | Status: ACTIVE | Noted: 2019-09-21

## 2019-11-13 PROCEDURE — 99215 OFFICE O/P EST HI 40 MIN: CPT

## 2019-11-13 RX ORDER — TOPIRAMATE 50 MG/1
50 TABLET, FILM COATED ORAL DAILY
Refills: 0 | Status: DISCONTINUED | COMMUNITY
End: 2019-11-13

## 2019-11-13 RX ORDER — PANTOPRAZOLE 40 MG/1
40 TABLET, DELAYED RELEASE ORAL DAILY
Refills: 0 | Status: DISCONTINUED | COMMUNITY
End: 2019-11-13

## 2019-11-13 RX ORDER — LISINOPRIL 2.5 MG/1
2.5 TABLET ORAL DAILY
Refills: 0 | Status: DISCONTINUED | COMMUNITY
End: 2019-11-13

## 2019-11-13 RX ORDER — METOPROLOL TARTRATE 50 MG/1
50 TABLET, FILM COATED ORAL
Refills: 0 | Status: DISCONTINUED | COMMUNITY
End: 2019-11-13

## 2019-11-13 NOTE — DATA REVIEWED
[de-identified] : CT Head and CT Angio Head & Neck (01/12/2019): Chronic right MCA infarct, Absent right PCA, Otherwise normal intracranial and neck vasculature\par \par Echocardiogram (01/14/2019): Severely dilated left atrium, Trace mitral regurgitation\par \par Labs (01/15/2019): CBC Normal, INR 1.67 (high), BMP notable for low sodium (133), low potassium (3.3), and high glucose (107)\par \par Labs (01/13/2019): Hemoglobin A1c 5.6%, Fasting lipid panel: Cholesterol 152, Triglycerides 43, HDL 65, LDL 78 [de-identified] : (01/13/2019): Right temporal sharps and electrographic seizure; Bilateral temporal intermittent rhythmic delta activity (TIRDA)

## 2019-11-13 NOTE — PHYSICAL EXAM
[General Appearance - Well Nourished] : well nourished [General Appearance - Alert] : alert [Oriented To Time, Place, And Person] : oriented to person, place, and time [Person] : oriented to person [Place] : oriented to place [Fluency] : fluency intact [Time] : oriented to time [Comprehension] : comprehension intact [Cranial Nerves Optic (II)] : visual acuity intact bilaterally,  visual fields full to confrontation, pupils equal round and reactive to light [Cranial Nerves Oculomotor (III)] : extraocular motion intact [Cranial Nerves Vestibulocochlear (VIII)] : hearing was intact bilaterally [Cranial Nerves Trigeminal (V)] : facial sensation intact symmetrically [Cranial Nerves Accessory (XI - Cranial And Spinal)] : head turning and shoulder shrug symmetric [Cranial Nerves Glossopharyngeal (IX)] : tongue and palate midline [Cranial Nerves Hypoglossal (XII)] : there was no tongue deviation with protrusion [Cranial Nerves Facial Central - Left Only] : central 7th nerve weakness [Motor Handedness Right-Handed] : the patient is right hand dominant [Involuntary Movements] : no involuntary movements were seen [Paresis Pronator Drift Left-Sided] : a left-sided pronator drift was present [1] : shoulder adduction 1/5 [Hand Weakness Left] : the hand  was weak [5] : ankle plantar flexion 5/5 [Coordination - Dysmetria Impaired Finger-to-Nose Left Only] : present on the left side [Coordination Dysmetria Impaired Heel-to-Shin Using Left Heel] : present on the left side [1+] : Patella left 1+ [Plantar Reflex Right Only] : abnormal on the right [0] : Ankle jerk left 0 [Optic Disc Abnormality] : the optic disc were normal in size and color [Outer Ear] : the ears and nose were normal in appearance [PERRL With Normal Accommodation] : pupils were equal in size, round, reactive to light, with normal accommodation [Oropharynx] : the oropharynx was normal [Hearing Threshold Finger Rub Not [place]] : hearing was normal [Neck Appearance] : the appearance of the neck was normal [Auscultation Breath Sounds / Voice Sounds] : lungs were clear to auscultation bilaterally [Full Pulse] : the pedal pulses are present [Bowel Sounds] : normal bowel sounds [Arterial Pulses Carotid] : carotid pulses were normal with no bruits [No CVA Tenderness] : no ~M costovertebral angle tenderness [Abdomen Tenderness] : non-tender [Abdomen Soft] : soft [No Spinal Tenderness] : no spinal tenderness [] : no rash [Skin Turgor] : normal skin turgor [General Appearance - In No Acute Distress] : in no acute distress [2] : shoulder abduction 2/5 [3] : ankle plantar flexion 3/5 [Sclera] : the sclera and conjunctiva were normal [Nail Clubbing] : no clubbing  or cyanosis of the fingernails [Cranial Nerves Facial Central - Right Only] : no central 7th nerve weakness [Hand Weakness Right] : normal hand  [Paresis Pronator Drift Right-Sided] : no pronator drift on the right [Allodynia] : no ~T allodynia present [Tactile Decrease Entire Right Arm] : normal right arm [Tactile Decrease Entire Left Arm] : normal left arm [Tactile Decrease Hand] : normal left hand [Pain / Temperature Decrease Entire Arm Right] : normal right arm [Tactile Decrease Entire Leg Left] : normal left leg [Tactile Decrease Entire Leg Right] : normal right leg [Pain / Temperature Decrease Entire Arm Left] : normal left arm [Pain / Temp Decrease Hand] : normal left hand [Pain / Temp Decrease Entire Leg - Right] : normal right leg [Pain / Temp Decrease Entire Leg - Left] : normal left leg [Coordination Dysmetria Impaired Heel-Shin Using Right Heel] : not present on the ride side [Coordination - Dysmetria Impaired Finger-to-Nose Right Only] : not present on the ride side [Plantar Reflex Left Only] : normal on the left [___] : absent on the right [___] : absent on the left [FreeTextEntry4] : Mild dysarthria present [FreeTextEntry8] : Wide-based stance and gait. Gait is hemiparetic. Unable to perform tiptoe, heel, and tandem gaits, nor to be tested for Romberg sign, due to left leg weakness. Coordination exams on left are limited due to weakness. [FreeTextEntry1] : Bradycardic, Irregularly irregular rhythm

## 2019-11-13 NOTE — HISTORY OF PRESENT ILLNESS
[FreeTextEntry1] : FROM 1/25/19:\par This is a 53-year-old right-handed woman with a previous right hemispheric ischemic stroke and residual left-sided hemiparesis, who presented to [facility] ED on January 12, 2019 after experiencing inability to speak and worsened left-sided hemiparesis, followed by a generalized convulsion. EMS was called and the patient was administered lorazepam 2mg IV x 1, which caused the seizure to resolve. The patient was started on levetiracetam 500mg PO BID after receiving a 1000mg IV load, and has since had no similar events, and no change to her baseline left-sided hemiparesis. She had no tongue/cheek bite, head trauma, or urinary/bowel incontinence. She denies any recent history of fever, headache, alcohol use, added stress, or poor sleep hygiene.\par \par FROM 8/16/19:\par The patient states that since I saw her on 1/25/19, she has had one episode of confusion associated with abdominal pain, although there was no witnessed seizure activity or new stroke-like symptoms. She presented to [facility] ED on 3/24/19 - per ED notes, she had generalized weakness with abdominal pain, but was found to have a normal CT abdomen and resolution of abdominal pain, prompting discharge to home. She has been compliant with her medications, although she has not taken aspirin 81mg daily while taking Xarelto. She has occasional mild frontal headache, which resolves on its own or with over-the-counter Tylenol.\par \par FROM 11/13/19:\par Since the last clinic visit, the patient presented to the ED in early October for breakthrough seizures, and her levetiracetam dosage was increased from 500mg to 750mg PO BID. Since then, she has not had any new seizures, and has been compliant with all of her medications for seizure prophylaxis and secondary stroke prevention. She completed four sessions of physical therapy, which helped her improve her left arm and leg strength, but she was not approved by her insurance plan for more sessions.

## 2019-12-01 ENCOUNTER — INPATIENT (INPATIENT)
Facility: HOSPITAL | Age: 54
LOS: 2 days | Discharge: HOME | DRG: 57 | End: 2019-12-04
Attending: STUDENT IN AN ORGANIZED HEALTH CARE EDUCATION/TRAINING PROGRAM | Admitting: STUDENT IN AN ORGANIZED HEALTH CARE EDUCATION/TRAINING PROGRAM
Payer: COMMERCIAL

## 2019-12-01 VITALS
HEART RATE: 102 BPM | SYSTOLIC BLOOD PRESSURE: 164 MMHG | RESPIRATION RATE: 16 BRPM | TEMPERATURE: 99 F | OXYGEN SATURATION: 94 % | WEIGHT: 220.02 LBS | HEIGHT: 63 IN | DIASTOLIC BLOOD PRESSURE: 82 MMHG

## 2019-12-01 DIAGNOSIS — Z98.51 TUBAL LIGATION STATUS: Chronic | ICD-10-CM

## 2019-12-01 LAB
ALBUMIN SERPL ELPH-MCNC: 3.7 G/DL — SIGNIFICANT CHANGE UP (ref 3.5–5)
ALP SERPL-CCNC: 104 U/L — SIGNIFICANT CHANGE UP (ref 40–120)
ALT FLD-CCNC: 21 U/L DA — SIGNIFICANT CHANGE UP (ref 10–60)
ANION GAP SERPL CALC-SCNC: 3 MMOL/L — LOW (ref 5–17)
APTT BLD: 32.3 SEC — SIGNIFICANT CHANGE UP (ref 27.5–36.3)
AST SERPL-CCNC: 14 U/L — SIGNIFICANT CHANGE UP (ref 10–40)
BILIRUB SERPL-MCNC: 0.2 MG/DL — SIGNIFICANT CHANGE UP (ref 0.2–1.2)
BUN SERPL-MCNC: 14 MG/DL — SIGNIFICANT CHANGE UP (ref 7–18)
CALCIUM SERPL-MCNC: 8.9 MG/DL — SIGNIFICANT CHANGE UP (ref 8.4–10.5)
CHLORIDE SERPL-SCNC: 108 MMOL/L — SIGNIFICANT CHANGE UP (ref 96–108)
CK SERPL-CCNC: 100 U/L — SIGNIFICANT CHANGE UP (ref 21–215)
CO2 SERPL-SCNC: 31 MMOL/L — SIGNIFICANT CHANGE UP (ref 22–31)
CREAT SERPL-MCNC: 0.74 MG/DL — SIGNIFICANT CHANGE UP (ref 0.5–1.3)
GLUCOSE SERPL-MCNC: 101 MG/DL — HIGH (ref 70–99)
HCG SERPL-ACNC: 4 MIU/ML — SIGNIFICANT CHANGE UP
HCT VFR BLD CALC: 41.6 % — SIGNIFICANT CHANGE UP (ref 34.5–45)
HGB BLD-MCNC: 13.5 G/DL — SIGNIFICANT CHANGE UP (ref 11.5–15.5)
INR BLD: 1.25 RATIO — HIGH (ref 0.88–1.16)
LACTATE SERPL-SCNC: 2 MMOL/L — SIGNIFICANT CHANGE UP (ref 0.7–2)
LIDOCAIN IGE QN: 240 U/L — SIGNIFICANT CHANGE UP (ref 73–393)
MCHC RBC-ENTMCNC: 28.8 PG — SIGNIFICANT CHANGE UP (ref 27–34)
MCHC RBC-ENTMCNC: 32.5 GM/DL — SIGNIFICANT CHANGE UP (ref 32–36)
MCV RBC AUTO: 88.9 FL — SIGNIFICANT CHANGE UP (ref 80–100)
PLATELET # BLD AUTO: 175 K/UL — SIGNIFICANT CHANGE UP (ref 150–400)
POTASSIUM SERPL-MCNC: 3.2 MMOL/L — LOW (ref 3.5–5.3)
POTASSIUM SERPL-SCNC: 3.2 MMOL/L — LOW (ref 3.5–5.3)
PROT SERPL-MCNC: 8 G/DL — SIGNIFICANT CHANGE UP (ref 6–8.3)
PROTHROM AB SERPL-ACNC: 14 SEC — HIGH (ref 10–12.9)
RBC # BLD: 4.68 M/UL — SIGNIFICANT CHANGE UP (ref 3.8–5.2)
RBC # FLD: 13.9 % — SIGNIFICANT CHANGE UP (ref 10.3–14.5)
SODIUM SERPL-SCNC: 142 MMOL/L — SIGNIFICANT CHANGE UP (ref 135–145)
WBC # BLD: 7.85 K/UL — SIGNIFICANT CHANGE UP (ref 3.8–10.5)
WBC # FLD AUTO: 7.85 K/UL — SIGNIFICANT CHANGE UP (ref 3.8–10.5)

## 2019-12-01 PROCEDURE — 99223 1ST HOSP IP/OBS HIGH 75: CPT

## 2019-12-01 PROCEDURE — 71045 X-RAY EXAM CHEST 1 VIEW: CPT | Mod: 26

## 2019-12-01 PROCEDURE — 70450 CT HEAD/BRAIN W/O DYE: CPT | Mod: 26

## 2019-12-01 PROCEDURE — 99291 CRITICAL CARE FIRST HOUR: CPT

## 2019-12-01 RX ORDER — LEVETIRACETAM 250 MG/1
1000 TABLET, FILM COATED ORAL ONCE
Refills: 0 | Status: COMPLETED | OUTPATIENT
Start: 2019-12-01 | End: 2019-12-01

## 2019-12-01 RX ORDER — DILTIAZEM HCL 120 MG
25 CAPSULE, EXT RELEASE 24 HR ORAL ONCE
Refills: 0 | Status: COMPLETED | OUTPATIENT
Start: 2019-12-01 | End: 2019-12-01

## 2019-12-01 RX ORDER — DIGOXIN 250 MCG
0.5 TABLET ORAL ONCE
Refills: 0 | Status: COMPLETED | OUTPATIENT
Start: 2019-12-01 | End: 2019-12-01

## 2019-12-01 RX ORDER — DILTIAZEM HCL 120 MG
10 CAPSULE, EXT RELEASE 24 HR ORAL
Qty: 125 | Refills: 0 | Status: DISCONTINUED | OUTPATIENT
Start: 2019-12-01 | End: 2019-12-02

## 2019-12-01 RX ADMIN — LEVETIRACETAM 400 MILLIGRAM(S): 250 TABLET, FILM COATED ORAL at 23:37

## 2019-12-01 RX ADMIN — Medication 25 MILLIGRAM(S): at 23:36

## 2019-12-01 NOTE — ED PROVIDER NOTE - OBJECTIVE STATEMENT
Pt has hx of CVA with L hemiparesis and is on Keppra, Metoprolol, Xarelto, and Simvastatin. Chief complaint L gaze preference, nystagmus, brought in for suspected breakthrough seizure. Family notes pt had symptoms of L gaze preference and nystagmus at 8PM, and state symptoms are consistent with her previous seizure-like episodes.     On evaluation, pt is nonverbal, noted to have L gaze preference and nystagmus. Keppra administration was initiated, and as pt was in rapid afib, Cardizem was also initiated.

## 2019-12-01 NOTE — ED PROVIDER NOTE - PMH
Atrial fibrillation    Atrial fibrillation    CVA (cerebral vascular accident)    CVA (cerebral vascular accident)    Hypertension    Seizure

## 2019-12-01 NOTE — ED ADULT TRIAGE NOTE - BP NONINVASIVE SYSTOLIC (MM HG)
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 Pt states she has had progressively worse pain radiating in back in the last few months, she is currently out on disability and is folowed by [facility] Dr. Grimm [phone]. PMH: HTN and melanoma of the right leg  s/p excision in 2014'. Pt denies chest pain or SOB and feels well otherwise. (26 Mar 2018 10:34)    OVERNIGHT EVENTS:  Vital Signs Last 24 Hrs  T(C): 36.7 (10 Apr 2018 07:00), Max: 36.7 (09 Apr 2018 20:00)  T(F): 98.1 (10 Apr 2018 07:00), Max: 98.1 (09 Apr 2018 20:00)  HR: 65 (10 Apr 2018 08:00) (56 - 82)  BP: 130/72 (10 Apr 2018 08:00) (114/71 - 176/86)  BP(mean): 89 (10 Apr 2018 06:00) (88 - 122)  RR: 16 (10 Apr 2018 08:00) (14 - 16)  SpO2: 98% (10 Apr 2018 08:00) (95% - 99%)    I&O's Detail    09 Apr 2018 07:01  -  10 Apr 2018 07:00  --------------------------------------------------------  IN:    IV PiggyBack: 250 mL    Oral Fluid: 1575 mL    sodium chloride 0.9% with potassium chloride 20 mEq/L: 375 mL  Total IN: 2200 mL    OUT:    Accordian: 47 mL    Voided: 2150 mL  Total OUT: 2197 mL    Total NET: 3 mL        I&O's Summary    09 Apr 2018 07:01  -  10 Apr 2018 07:00  --------------------------------------------------------  IN: 2200 mL / OUT: 2197 mL / NET: 3 mL        PHYSICAL EXAM:  Neurological:    Motor exam:         [x] Upper extremity                 D             B          T          WE       WF      HI                                               R         5/5        5/5        5/5       5/5     5/5       5/5                                               L          5/5        5/5        5/5       5/5     5/5       5/5         [x] Lower extremity                Ps          Ha        Quad    EHL        FHL                                               R        5/5        5/5        5/5       5/5         5/5                                               L         5/5        5/5       5/5       5/5          5/5                                                        [] warm well perfused; capillary refill <3 seconds     Sensation: [x] intact to light touch  [] decreased:     DTR's 1/2    Gait nl    Cardiovascular:  Respiratory:  Gastrointestinal:  Genitourinary:  Extremities:  Incision/Wound: Clean and dry    TUBES/LINES:  [] CVC  [] A-line  [] Lumbar Drain  [] Ventriculostomy  [] Other    DIET:  [] NPO  [] Mechanical  [] Tube feeds    LABS:                        15.4   11.5  )-----------( 236      ( 09 Apr 2018 20:32 )             44.2     04-09    140  |  102  |  12  ----------------------------<  168<H>  4.2   |  21<L>  |  0.70    Ca    9.2      09 Apr 2018 20:32  Phos  4.5     04-09  Mg     2.1     04-09    TPro  7.2  /  Alb  4.4  /  TBili  0.4  /  DBili  x   /  AST  30  /  ALT  18  /  AlkPhos  57  04-09            CAPILLARY BLOOD GLUCOSE      POCT Blood Glucose.: 121 mg/dL (10 Apr 2018 06:57)          Drug Levels: [] N/A    CSF Analysis: [] N/A      Allergies    No Known Allergies    Intolerances      MEDICATIONS:  Antibiotics:  vancomycin  IVPB 1000 milliGRAM(s) IV Intermittent every 12 hours    Neuro:  gabapentin 1200 milliGRAM(s) Oral three times a day  ondansetron Injectable 4 milliGRAM(s) IV Push once PRN  oxyCODONE    5 mG/acetaminophen 325 mG 1 Tablet(s) Oral every 4 hours PRN  oxyCODONE    5 mG/acetaminophen 325 mG 2 Tablet(s) Oral every 4 hours PRN    Anticoagulation:    OTHER:  amLODIPine   Tablet 5 milliGRAM(s) Oral daily  ATENolol  Tablet 50 milliGRAM(s) Oral daily  dexamethasone  Injectable 4 milliGRAM(s) IV Push every 6 hours  docusate sodium 100 milliGRAM(s) Oral three times a day  insulin lispro (HumaLOG) corrective regimen sliding scale   SubCutaneous Before meals and at bedtime  senna 2 Tablet(s) Oral at bedtime    IVF:    CULTURES:    RADIOLOGY & ADDITIONAL TESTS:      ASSESSMENT:  HPI:  61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 Pt states she has had progressively worse pain radiating in back in the last few months, she is currently out on disability and is folowed by [facility] Dr. Grimm [phone]. PMH: HTN and melanoma of the right leg  s/p excision in 2014'. Pt denies chest pain or SOB and feels well otherwise. (26 Mar 2018 10:34)    61y Female s/p    PLAN:  NEURO:  CARDIOVASCULAR:  PULMONARY:  RENAL:  GI:  HEME:  ID:  ENDO:    DVT PROPHYLAXIS:  [x] Venodynes                                [] Heparin/Lovenox    FALL RISK:  [x] Low Risk                                    [] Impulsive CHIEF COMPLAINT: patient in chair, feeling well, reports radicular pain nearly resolved post op      Vital Signs Last 24 Hrs  T(C): 36.7 (10 Apr 2018 07:00), Max: 36.7 (09 Apr 2018 20:00)  T(F): 98.1 (10 Apr 2018 07:00), Max: 98.1 (09 Apr 2018 20:00)  HR: 66 (10 Apr 2018 10:00) (56 - 82)  BP: 113/68 (10 Apr 2018 10:00) (113/68 - 176/86)  BP(mean): 85 (10 Apr 2018 10:00) (85 - 122)  RR: 13 (10 Apr 2018 10:00) (13 - 16)  SpO2: 99% (10 Apr 2018 10:00) (95% - 100%)    DRAINS: [] ANNA (cc/24h) [x] HMV (40cc/24h)    PHYSICAL EXAM:    Constitutional: No Acute Distress     Neurological: AOx3, Following Commands, Moving all Extremities     Motor exam:          Upper extremity                         Delt     Bicep     Tricep    HG                                                 R         5/5        5/5        5/5       5/5                                               L          5/5        5/5        5/5       5/5          Lower extremity                        HF         KF        KE       DF         PF                                                  R        5/5        5/5        5/5       5/5         5/5                                               L         5/5        5/5       5/5       5/5          5/5                                                 Sensation: [] intact to light touch  [] decreased:     Pulmonary: Clear to Auscultation, No rales, No rhonchi, No wheezes     Cardiovascular: S1, S2, Regular rate and rhythm     Gastrointestinal: Soft, Non-tender, Non-distended     Extremities: No calf tenderness     Incision: HMVC removed without difficulty, +steri strips, no evidence of hematoma   tolerating PO well wihtout difficulty, voice normal    LABS:                        15.4   11.5  )-----------( 236      ( 09 Apr 2018 20:32 )             44.2    04-09    140  |  102  |  12  ----------------------------<  168<H>  4.2   |  21<L>  |  0.70    Ca    9.2      09 Apr 2018 20:32  Phos  4.5     04-09  Mg     2.1     04-09    TPro  7.2  /  Alb  4.4  /  TBili  0.4  /  DBili  x   /  AST  30  /  ALT  18  /  AlkPhos  57  04-09      MEDICATIONS:    Neuro:  gabapentin 1200 milliGRAM(s) Oral three times a day  ondansetron Injectable 4 milliGRAM(s) IV Push once PRN Nausea and/or Vomiting  oxyCODONE    5 mG/acetaminophen 325 mG 1 Tablet(s) Oral every 4 hours PRN Moderate Pain  oxyCODONE    5 mG/acetaminophen 325 mG 2 Tablet(s) Oral every 4 hours PRN Severe Pain    Cardiac:  amLODIPine   Tablet 5 milliGRAM(s) Oral daily  ATENolol  Tablet 50 milliGRAM(s) Oral daily    GI/:  docusate sodium 100 milliGRAM(s) Oral three times a day  senna 2 Tablet(s) Oral at bedtime    Other:   insulin lispro (HumaLOG) corrective regimen sliding scale   SubCutaneous Before meals and at bedtime    DIET: [x] Regular [] CCD [] Renal [] Puree [] Dysphagia [] Tube Feeds:   DASH HPI:  61yr old F presents to PST for a C3-7 Anterior Cervical Discectomy and Fusion on 4/9/18. Pt states she has had progressively worse back pain with BL hand neuropathy in the last few months, she is currently out on disability and is followed by Chronic Pain Dr. Grimm [phone].   PMH: HTN and melanoma of the right leg  s/p excision in 2014. Pt denies chest pain or SOB and feels well otherwise. (26 Mar 2018 10:34)    VITALS:  T(C): , Max: 36.6 (04-09-18 @ 16:05)  HR:  (67 - 71)  BP:  (130/64 - 157/90)  ABP:  (156/78 - 166/76)  RR:  (14 - 18)  SpO2:  (98% - 99%)      LABS: no recent labs.    IMAGING: Recent imaging studies were reviewed.    MEDICATIONS: reviewed.    EXAMINATION:  General:  calm, cooperative.  HEENT:  EOMI, face symmetric.  Neuro:  awake, alert, oriented x 3, follows commands, moves all extremities, no sensation deficit in LE, motor - 5/5 throughout.  Cards:  S1S2 present.  Respiratory:  no respiratory distress.  Extremities:  no edema.  Skin:  warm/dry. O: elevated bP    T(C): 36.7 (04-09-18 @ 20:00), Max: 36.7 (04-09-18 @ 20:00)  HR: 70 (04-09-18 @ 23:00) (63 - 76)  BP: 167/92 (04-09-18 @ 20:00) (114/71 - 176/86)  RR: 16 (04-09-18 @ 23:00) (14 - 18)  SpO2: 97% (04-09-18 @ 23:00) (96% - 99%)  04-09-18 @ 07:01  -  04-09-18 @ 23:54  --------------------------------------------------------  IN: 1925 mL / OUT: 1490 mL / NET: 435 mL    amLODIPine   Tablet 5 milliGRAM(s) Oral daily  ATENolol  Tablet 50 milliGRAM(s) Oral daily  ceFAZolin   IVPB 2000 milliGRAM(s) IV Intermittent once  dexamethasone  Injectable 4 milliGRAM(s) IV Push every 6 hours  docusate sodium 100 milliGRAM(s) Oral three times a day  gabapentin 1200 milliGRAM(s) Oral three times a day  ondansetron Injectable 4 milliGRAM(s) IV Push once PRN  oxyCODONE    5 mG/acetaminophen 325 mG 1 Tablet(s) Oral every 4 hours PRN  oxyCODONE    5 mG/acetaminophen 325 mG 2 Tablet(s) Oral every 4 hours PRN  senna 2 Tablet(s) Oral at bedtime  sodium chloride 0.9% with potassium chloride 20 mEq/L 1000 milliLiter(s) IV Continuous <Continuous>  vancomycin  IVPB 1000 milliGRAM(s) IV Intermittent once  vancomycin  IVPB 1000 milliGRAM(s) IV Intermittent every 12 hours      EXAMINATION:  General:  calm, cooperative.  HEENT:  EOMI, face symmetric.No neck hematoma  Neuro:  awake, alert, oriented x 3, follows commands, moves all extremities, no sensation deficit in LE, motor - 5/5 throughout.  Cards:  S1S2 present.  Respiratory:  no respiratory distress.  Extremities:  no edema.  Skin:  warm/dry.    Comprehensive Metabolic Panel (04.09.18 @ 20:32)    Sodium, Serum: 140 mmol/L    Potassium, Serum: 4.2 mmol/L    Chloride, Serum: 102 mmol/L    Carbon Dioxide, Serum: 21 mmol/L    Anion Gap, Serum: 17 mmol/L    Blood Urea Nitrogen, Serum: 12 mg/dL    Creatinine, Serum: 0.70 mg/dL    Glucose, Serum: 168 mg/dL    Calcium, Total Serum: 9.2 mg/dL    Protein Total, Serum: 7.2 g/dL    Albumin, Serum: 4.4 g/dL    Bilirubin Total, Serum: 0.4 mg/dL    Alkaline Phosphatase, Serum: 57 U/L    Aspartate Aminotransferase (AST/SGOT): 30 U/L    Alanine Aminotransferase (ALT/SGPT): 18 U/L RC    Complete Blood Count (04.09.18 @ 20:32)    WBC Count: 11.5 K/uL    RBC Count: 4.63 M/uL    Hemoglobin: 15.4 g/dL    Hematocrit: 44.2 %    Mean Cell Volume: 95.5 fl    Mean Cell Hemoglobin: 33.3 pg    Mean Cell Hemoglobin Conc: 34.8 gm/dL    Red Cell Distrib Width: 11.9 %    Platelet Count - Automated: 236 K/uL      Assessment and Plan:     60 yo F with symptomatic cervical foraminal stenosis, s/p C3-C7 anterior cervical discectomy and fusion 04/09.    NEURO:  -neurochecks q 4 hr  -pain control with Tylenol IV  -drains as per NSx  -on Dexa as per NSx for 1 day  Activity: [x] mobilize as tolerated [] Bedrest [x] PT [x] OT [] PMNR    PULM: tolerates RA    CV:  SBP goal 100-150  Resume home BP meds, including Atenolol    RENAL:  Fluids: d/ c fluid, d/c a line and quezada     GI:  Diet: advance as tolerated  GI prophylaxis [x] not indicated [] PPI [] other:  Bowel regimen [x] colace [x] senna [] other:    ENDO:   Goal euglycemia (-180)    HEME/ONC:  VTE prophylaxis: [x] SCDs [x] chemoprophylaxis [] hold chemoprophylaxis due to: fresh postop [] high risk of DVT/PE on admission due to:lovenox 40 mg sc qhs    ID: afebrile. Periop Abx.    SOCIAL/FAMILY:  [x] awaiting [] updated at bedside [] family meeting    CODE STATUS:  [x] Full Code [] DNR [] DNI [] Palliative/Comfort Care    DISPOSITION:  [] ICU [] Stroke Unit [x] Floor [] EMU [] RCU [] PCU 164

## 2019-12-01 NOTE — ED ADULT NURSE NOTE - OBJECTIVE STATEMENT
pt brought to the ED for seizures, abdominal pain, dizziness, pt is alert, not talking, response to stimuli, had stroke 3 years ago, deficits to the left side. uses a walker.

## 2019-12-01 NOTE — ED PROVIDER NOTE - CARE PLAN
Principal Discharge DX:	Atrial fibrillation with RVR  Secondary Diagnosis:	Seizure disorder as sequela of cerebrovascular accident

## 2019-12-01 NOTE — ED PROVIDER NOTE - PROGRESS NOTE DETAILS
Confirmed with radiologist no hemorrhage on CT, chronic R sided infarct. Pt is not TPA candidate due to current Xarelto use and ambiguous presentation. Confirmed with radiologist no hemorrhage on CT, chronic R sided infarct. Pt is not TPA candidate due to current Xarelto use and ambiguous presentation/seizure. Pt had tonic clonic seizure activity lasting < 1 minute. Ativan ordered. Rapid response team in attendance.

## 2019-12-01 NOTE — ED PROVIDER NOTE - CLINICAL SUMMARY MEDICAL DECISION MAKING FREE TEXT BOX
11:45a Afib with RVR decreased from 200 bpm to 130 bpm s/p Cardizem bolus. Cardizem IV infusion and digoxin ordered.   Pt received Keppra IV bolus, nystagmus lessoning. Hospitalist and neuro on call paged, pt to be admitted to tlelemetry. MAR endorsed. Pt's family agrees with admission. I had a detailed discussion with the patient and/or guardian regarding the historical points, exam findings, and any diagnostic results supporting the admit diagnosis.

## 2019-12-02 DIAGNOSIS — I48.91 UNSPECIFIED ATRIAL FIBRILLATION: ICD-10-CM

## 2019-12-02 LAB
24R-OH-CALCIDIOL SERPL-MCNC: 17.1 NG/ML — LOW (ref 30–80)
ALBUMIN SERPL ELPH-MCNC: 3.2 G/DL — LOW (ref 3.5–5)
ALP SERPL-CCNC: 96 U/L — SIGNIFICANT CHANGE UP (ref 40–120)
ALT FLD-CCNC: 22 U/L DA — SIGNIFICANT CHANGE UP (ref 10–60)
ANION GAP SERPL CALC-SCNC: 6 MMOL/L — SIGNIFICANT CHANGE UP (ref 5–17)
APPEARANCE UR: CLEAR — SIGNIFICANT CHANGE UP
AST SERPL-CCNC: 16 U/L — SIGNIFICANT CHANGE UP (ref 10–40)
BASOPHILS # BLD AUTO: 0 K/UL — SIGNIFICANT CHANGE UP (ref 0–0.2)
BASOPHILS NFR BLD AUTO: 0 % — SIGNIFICANT CHANGE UP (ref 0–2)
BILIRUB SERPL-MCNC: 0.4 MG/DL — SIGNIFICANT CHANGE UP (ref 0.2–1.2)
BILIRUB UR-MCNC: NEGATIVE — SIGNIFICANT CHANGE UP
BUN SERPL-MCNC: 12 MG/DL — SIGNIFICANT CHANGE UP (ref 7–18)
CALCIUM SERPL-MCNC: 8.9 MG/DL — SIGNIFICANT CHANGE UP (ref 8.4–10.5)
CHLORIDE SERPL-SCNC: 105 MMOL/L — SIGNIFICANT CHANGE UP (ref 96–108)
CK MB BLD-MCNC: 1 % — SIGNIFICANT CHANGE UP (ref 0–3.5)
CK MB BLD-MCNC: 1.5 % — SIGNIFICANT CHANGE UP (ref 0–3.5)
CK MB CFR SERPL CALC: 1.2 NG/ML — SIGNIFICANT CHANGE UP (ref 0–3.6)
CK MB CFR SERPL CALC: 2.4 NG/ML — SIGNIFICANT CHANGE UP (ref 0–3.6)
CK SERPL-CCNC: 233 U/L — HIGH (ref 21–215)
CK SERPL-CCNC: 80 U/L — SIGNIFICANT CHANGE UP (ref 21–215)
CO2 SERPL-SCNC: 27 MMOL/L — SIGNIFICANT CHANGE UP (ref 22–31)
COLOR SPEC: YELLOW — SIGNIFICANT CHANGE UP
CREAT SERPL-MCNC: 0.64 MG/DL — SIGNIFICANT CHANGE UP (ref 0.5–1.3)
DIFF PNL FLD: NEGATIVE — SIGNIFICANT CHANGE UP
EOSINOPHIL # BLD AUTO: 0.08 K/UL — SIGNIFICANT CHANGE UP (ref 0–0.5)
EOSINOPHIL NFR BLD AUTO: 1 % — SIGNIFICANT CHANGE UP (ref 0–6)
FOLATE SERPL-MCNC: >20 NG/ML — SIGNIFICANT CHANGE UP
GLUCOSE SERPL-MCNC: 121 MG/DL — HIGH (ref 70–99)
GLUCOSE UR QL: NEGATIVE — SIGNIFICANT CHANGE UP
HCT VFR BLD CALC: 41.3 % — SIGNIFICANT CHANGE UP (ref 34.5–45)
HGB BLD-MCNC: 13.7 G/DL — SIGNIFICANT CHANGE UP (ref 11.5–15.5)
KETONES UR-MCNC: NEGATIVE — SIGNIFICANT CHANGE UP
LACTATE SERPL-SCNC: 1.9 MMOL/L — SIGNIFICANT CHANGE UP (ref 0.7–2)
LEUKOCYTE ESTERASE UR-ACNC: NEGATIVE — SIGNIFICANT CHANGE UP
LYMPHOCYTES # BLD AUTO: 1.96 K/UL — SIGNIFICANT CHANGE UP (ref 1–3.3)
LYMPHOCYTES # BLD AUTO: 25 % — SIGNIFICANT CHANGE UP (ref 13–44)
MAGNESIUM SERPL-MCNC: 2 MG/DL — SIGNIFICANT CHANGE UP (ref 1.6–2.6)
MANUAL SMEAR VERIFICATION: SIGNIFICANT CHANGE UP
MCHC RBC-ENTMCNC: 28.8 PG — SIGNIFICANT CHANGE UP (ref 27–34)
MCHC RBC-ENTMCNC: 33.2 GM/DL — SIGNIFICANT CHANGE UP (ref 32–36)
MCV RBC AUTO: 86.9 FL — SIGNIFICANT CHANGE UP (ref 80–100)
MONOCYTES # BLD AUTO: 0.86 K/UL — SIGNIFICANT CHANGE UP (ref 0–0.9)
MONOCYTES NFR BLD AUTO: 11 % — SIGNIFICANT CHANGE UP (ref 2–14)
NEUTROPHILS # BLD AUTO: 4.95 K/UL — SIGNIFICANT CHANGE UP (ref 1.8–7.4)
NEUTROPHILS NFR BLD AUTO: 63 % — SIGNIFICANT CHANGE UP (ref 43–77)
NITRITE UR-MCNC: NEGATIVE — SIGNIFICANT CHANGE UP
NRBC # BLD: 0 /100 WBCS — SIGNIFICANT CHANGE UP (ref 0–0)
NRBC # BLD: 0 /100 — SIGNIFICANT CHANGE UP (ref 0–0)
PH UR: 7 — SIGNIFICANT CHANGE UP (ref 5–8)
PLAT MORPH BLD: NORMAL — SIGNIFICANT CHANGE UP
PLATELET # BLD AUTO: 190 K/UL — SIGNIFICANT CHANGE UP (ref 150–400)
POTASSIUM SERPL-MCNC: 3.6 MMOL/L — SIGNIFICANT CHANGE UP (ref 3.5–5.3)
POTASSIUM SERPL-SCNC: 3.6 MMOL/L — SIGNIFICANT CHANGE UP (ref 3.5–5.3)
PROCALCITONIN SERPL-MCNC: 0.02 NG/ML — SIGNIFICANT CHANGE UP (ref 0.02–0.1)
PROT SERPL-MCNC: 7.5 G/DL — SIGNIFICANT CHANGE UP (ref 6–8.3)
PROT UR-MCNC: NEGATIVE — SIGNIFICANT CHANGE UP
RBC # BLD: 4.75 M/UL — SIGNIFICANT CHANGE UP (ref 3.8–5.2)
RBC # FLD: 13.9 % — SIGNIFICANT CHANGE UP (ref 10.3–14.5)
RBC BLD AUTO: NORMAL — SIGNIFICANT CHANGE UP
SODIUM SERPL-SCNC: 138 MMOL/L — SIGNIFICANT CHANGE UP (ref 135–145)
SP GR SPEC: 1 — LOW (ref 1.01–1.02)
TROPONIN I SERPL-MCNC: <0.015 NG/ML — SIGNIFICANT CHANGE UP (ref 0–0.04)
TROPONIN I SERPL-MCNC: <0.015 NG/ML — SIGNIFICANT CHANGE UP (ref 0–0.04)
TSH SERPL-MCNC: 0.68 UU/ML — SIGNIFICANT CHANGE UP (ref 0.34–4.82)
UROBILINOGEN FLD QL: NEGATIVE — SIGNIFICANT CHANGE UP
VIT B12 SERPL-MCNC: 494 PG/ML — SIGNIFICANT CHANGE UP (ref 232–1245)
WBC # BLD: 8.97 K/UL — SIGNIFICANT CHANGE UP (ref 3.8–10.5)
WBC # FLD AUTO: 8.97 K/UL — SIGNIFICANT CHANGE UP (ref 3.8–10.5)

## 2019-12-02 PROCEDURE — 95819 EEG AWAKE AND ASLEEP: CPT | Mod: 26

## 2019-12-02 PROCEDURE — 99233 SBSQ HOSP IP/OBS HIGH 50: CPT

## 2019-12-02 PROCEDURE — 99223 1ST HOSP IP/OBS HIGH 75: CPT

## 2019-12-02 RX ORDER — POTASSIUM CHLORIDE 20 MEQ
10 PACKET (EA) ORAL
Refills: 0 | Status: DISCONTINUED | OUTPATIENT
Start: 2019-12-02 | End: 2019-12-02

## 2019-12-02 RX ORDER — RIVAROXABAN 15 MG-20MG
20 KIT ORAL
Refills: 0 | Status: DISCONTINUED | OUTPATIENT
Start: 2019-12-02 | End: 2019-12-04

## 2019-12-02 RX ORDER — DILTIAZEM HCL 120 MG
5 CAPSULE, EXT RELEASE 24 HR ORAL
Qty: 125 | Refills: 0 | Status: DISCONTINUED | OUTPATIENT
Start: 2019-12-02 | End: 2019-12-02

## 2019-12-02 RX ORDER — DIGOXIN 250 MCG
0.25 TABLET ORAL EVERY 6 HOURS
Refills: 0 | Status: COMPLETED | OUTPATIENT
Start: 2019-12-02 | End: 2019-12-02

## 2019-12-02 RX ORDER — LEVETIRACETAM 250 MG/1
1000 TABLET, FILM COATED ORAL EVERY 12 HOURS
Refills: 0 | Status: DISCONTINUED | OUTPATIENT
Start: 2019-12-02 | End: 2019-12-03

## 2019-12-02 RX ORDER — ACETAMINOPHEN 500 MG
650 TABLET ORAL EVERY 6 HOURS
Refills: 0 | Status: DISCONTINUED | OUTPATIENT
Start: 2019-12-02 | End: 2019-12-04

## 2019-12-02 RX ORDER — ERGOCALCIFEROL 1.25 MG/1
50000 CAPSULE ORAL
Refills: 0 | Status: DISCONTINUED | OUTPATIENT
Start: 2019-12-02 | End: 2019-12-04

## 2019-12-02 RX ORDER — ATORVASTATIN CALCIUM 80 MG/1
40 TABLET, FILM COATED ORAL AT BEDTIME
Refills: 0 | Status: DISCONTINUED | OUTPATIENT
Start: 2019-12-02 | End: 2019-12-04

## 2019-12-02 RX ORDER — PREGABALIN 225 MG/1
1000 CAPSULE ORAL DAILY
Refills: 0 | Status: DISCONTINUED | OUTPATIENT
Start: 2019-12-02 | End: 2019-12-04

## 2019-12-02 RX ORDER — POTASSIUM CHLORIDE 20 MEQ
40 PACKET (EA) ORAL ONCE
Refills: 0 | Status: COMPLETED | OUTPATIENT
Start: 2019-12-02 | End: 2019-12-02

## 2019-12-02 RX ADMIN — Medication 40 MILLIEQUIVALENT(S): at 17:22

## 2019-12-02 RX ADMIN — Medication 650 MILLIGRAM(S): at 21:50

## 2019-12-02 RX ADMIN — ATORVASTATIN CALCIUM 40 MILLIGRAM(S): 80 TABLET, FILM COATED ORAL at 21:48

## 2019-12-02 RX ADMIN — ERGOCALCIFEROL 50000 UNIT(S): 1.25 CAPSULE ORAL at 17:22

## 2019-12-02 RX ADMIN — Medication 10 MG/HR: at 00:24

## 2019-12-02 RX ADMIN — LEVETIRACETAM 400 MILLIGRAM(S): 250 TABLET, FILM COATED ORAL at 17:23

## 2019-12-02 RX ADMIN — Medication 0.25 MILLIGRAM(S): at 06:16

## 2019-12-02 RX ADMIN — Medication 0.5 MILLIGRAM(S): at 00:17

## 2019-12-02 RX ADMIN — Medication 2 MILLIGRAM(S): at 01:11

## 2019-12-02 RX ADMIN — Medication 5 MG/HR: at 08:56

## 2019-12-02 RX ADMIN — RIVAROXABAN 20 MILLIGRAM(S): KIT at 17:22

## 2019-12-02 RX ADMIN — Medication 650 MILLIGRAM(S): at 20:22

## 2019-12-02 RX ADMIN — Medication 0.25 MILLIGRAM(S): at 14:20

## 2019-12-02 RX ADMIN — Medication 10 MG/HR: at 06:25

## 2019-12-02 RX ADMIN — LEVETIRACETAM 400 MILLIGRAM(S): 250 TABLET, FILM COATED ORAL at 06:16

## 2019-12-02 NOTE — CHART NOTE - NSCHARTNOTEFT_GEN_A_CORE
53yo female from home, with PMH of HTN, HLD, afib (on xarelto), CVA, presented to ER after noted episode of deviation of eyes to left. Patient was keppra loaded in ER however had episode of tonic clonic activity for which she was received ativan 2mg. Patient in ER was also noted to be in afib with RVR fir whch she was given 25mg iv pus hof cardizem. Pt had RRT for seizuer activity. Cardizem drip titrated down to 5ml/h as HR.   NP Note discussed with  Primary Attending    Patient is a 54y old  Female who presents with a chief complaint of suspected seizure activity (02 Dec 2019 04:44)      INTERVAL HPI/OVERNIGHT EVENTS: no new complaints    MEDICATIONS  (STANDING):  atorvastatin 40 milliGRAM(s) Oral at bedtime  digoxin  Injectable 0.25 milliGRAM(s) IV Push every 6 hours  diltiazem Infusion 5 mG/Hr (5 mL/Hr) IV Continuous <Continuous>  levETIRAcetam  IVPB 1000 milliGRAM(s) IV Intermittent every 12 hours  potassium chloride  10 mEq/50 mL IVPB 10 milliEquivalent(s) IV Intermittent every 1 hour  rivaroxaban 20 milliGRAM(s) Oral with dinner    MEDICATIONS  (PRN):  LORazepam   Injectable 2 milliGRAM(s) IV Push every 5 minutes PRN breakthrough seizure      __________________________________________________  REVIEW OF SYSTEMS:    CONSTITUTIONAL: No fever,   EYES: no acute visual disturbances  NECK: No pain or stiffness  RESPIRATORY: No cough; No shortness of breath  CARDIOVASCULAR: No chest pain, afib  GASTROINTESTINAL: No pain. No nausea or vomiting; No diarrhea   NEUROLOGICAL: tonic -clonic seizure  MUSCULOSKELETAL: L side weakness  GENITOURINARY: no dysuria, no frequency, no hesitancy  PSYCHIATRY: no depression , no anxiety  ALL OTHER  ROS negative        Vital Signs Last 24 Hrs  T(C): 36.6 (02 Dec 2019 07:30), Max: 37.2 (01 Dec 2019 21:39)  T(F): 97.9 (02 Dec 2019 07:30), Max: 98.9 (01 Dec 2019 21:39)  HR: 77 (02 Dec 2019 07:30) (77 - 178)  BP: 108/76 (02 Dec 2019 07:30) (108/65 - 164/82)  BP(mean): --  RR: 19 (02 Dec 2019 07:30) (16 - 20)  SpO2: 98% (02 Dec 2019 07:30) (94% - 99%)    ________________________________________________  PHYSICAL EXAM:  GENERAL: NAD  HEENT: Normocephalic;  conjunctivae and sclerae clear; moist mucous membranes;   NECK : supple  CHEST/LUNG: Clear to auscultation bilaterally with good air entry   HEART:  irreg, no murmurs, gallops or rubs  ABDOMEN: Soft, Nontender, Nondistended; Bowel sounds present  EXTREMITIES: L side weakness  SKIN: warm and dry; no rash  NERVOUS SYSTEM:  sedated    _________________________________________________  LABS:                        13.7   8.97  )-----------( 190      ( 02 Dec 2019 07:02 )             41.3     12-02    138  |  105  |  12  ----------------------------<  121<H>  3.6   |  27  |  0.64    Ca    8.9      02 Dec 2019 07:02  Mg     2.0     12-02    TPro  7.5  /  Alb  3.2<L>  /  TBili  0.4  /  DBili  x   /  AST  16  /  ALT  22  /  AlkPhos  96  12-02    PT/INR - ( 01 Dec 2019 22:13 )   PT: 14.0 sec;   INR: 1.25 ratio         PTT - ( 01 Dec 2019 22:13 )  PTT:32.3 sec    CAPILLARY BLOOD GLUCOSE      POCT Blood Glucose.: 111 mg/dL (01 Dec 2019 21:45)        RADIOLOGY & ADDITIONAL TESTS:    Imaging  Reviewed:  YES/NO    Consultant(s) Notes Reviewed:   YES/ No      Plan of care was discussed with patient and /or primary care giver; all questions and concerns were addressed         Assessment:   Pt is lethargic, responsive to voice and touch with opening eyes.  No eye contact. Pt could state her name.   Pt's brother at bedside.    Plan:   C/w cardizem drip at 5ml/h  Monitor HR/.BP  Repeat EKG  Potassium krider decrease to 2 doses  Repeat BMP  Possible cardiology consult  Monitor for seizure activities  Seizure precaution  Fall precaution   Aspiration precaution  Keep pt NPO 55yo female from home, with PMH of HTN, HLD, afib (on xarelto), CVA, presented to ER after noted episode of deviation of eyes to left. Patient was keppra loaded in ER however had episode of tonic clonic activity for which she was received ativan 2mg. Patient in ER was also noted to be in afib with RVR for which she was given 25mg iv  cardizem. Cardizem drip was started. Pt had RRT for seizure activity. Pt seen and examined.  Pt is lethargic, responsive to voice and touch, no seizure activities, nad. Vs stable.      INTERVAL HPI/OVERNIGHT EVENTS: no new complaints    MEDICATIONS  (STANDING):  atorvastatin 40 milliGRAM(s) Oral at bedtime  digoxin  Injectable 0.25 milliGRAM(s) IV Push every 6 hours  diltiazem Infusion 5 mG/Hr (5 mL/Hr) IV Continuous <Continuous>  levETIRAcetam  IVPB 1000 milliGRAM(s) IV Intermittent every 12 hours  potassium chloride  10 mEq/50 mL IVPB 10 milliEquivalent(s) IV Intermittent every 1 hour  rivaroxaban 20 milliGRAM(s) Oral with dinner    MEDICATIONS  (PRN):  LORazepam   Injectable 2 milliGRAM(s) IV Push every 5 minutes PRN breakthrough seizure      __________________________________________________  REVIEW OF SYSTEMS:    CONSTITUTIONAL: No fever,   EYES: no acute visual disturbances  NECK: No pain or stiffness  RESPIRATORY: No cough; No shortness of breath  CARDIOVASCULAR: No chest pain, afib  GASTROINTESTINAL: No pain. No nausea or vomiting; No diarrhea   NEUROLOGICAL: tonic -clonic seizure  MUSCULOSKELETAL: L side weakness  GENITOURINARY: no dysuria, no frequency, no hesitancy  PSYCHIATRY: no depression , no anxiety  ALL OTHER  ROS negative        Vital Signs Last 24 Hrs  T(C): 36.6 (02 Dec 2019 07:30), Max: 37.2 (01 Dec 2019 21:39)  T(F): 97.9 (02 Dec 2019 07:30), Max: 98.9 (01 Dec 2019 21:39)  HR: 77 (02 Dec 2019 07:30) (77 - 178)  BP: 108/76 (02 Dec 2019 07:30) (108/65 - 164/82)  BP(mean): --  RR: 19 (02 Dec 2019 07:30) (16 - 20)  SpO2: 98% (02 Dec 2019 07:30) (94% - 99%)    ________________________________________________  PHYSICAL EXAM:  GENERAL: NAD  HEENT: Normocephalic;  conjunctivae and sclerae clear; moist mucous membranes;   NECK : supple  CHEST/LUNG: Clear to auscultation bilaterally with good air entry   HEART:  irreg, no murmurs, gallops or rubs  ABDOMEN: Soft, Nontender, Nondistended; Bowel sounds present  EXTREMITIES: L side weakness  SKIN: warm and dry; no rash  NERVOUS SYSTEM:  sedated    _________________________________________________  LABS:                        13.7   8.97  )-----------( 190      ( 02 Dec 2019 07:02 )             41.3     12-02    138  |  105  |  12  ----------------------------<  121<H>  3.6   |  27  |  0.64    Ca    8.9      02 Dec 2019 07:02  Mg     2.0     12-02    TPro  7.5  /  Alb  3.2<L>  /  TBili  0.4  /  DBili  x   /  AST  16  /  ALT  22  /  AlkPhos  96  12-02    PT/INR - ( 01 Dec 2019 22:13 )   PT: 14.0 sec;   INR: 1.25 ratio         PTT - ( 01 Dec 2019 22:13 )  PTT:32.3 sec    CAPILLARY BLOOD GLUCOSE      POCT Blood Glucose.: 111 mg/dL (01 Dec 2019 21:45)        RADIOLOGY & ADDITIONAL TESTS:    Imaging  Reviewed:  YES/NO    Consultant(s) Notes Reviewed:   YES/ No      Assessment:   55yo female from home, with PMH of HTN, HLD, afib (on xarelto), CVA, presented to ER sp seizure episode, rapid afib   Now stable, cardizem drip titrated to 5ml/h,heart rate controlled. NO seizure activity.       Plan:   C/w cardizem drip at 5ml/h  Monitor HR/.BP  Repeat EKG  Potassium rider  2 doses  Repeat BMP  Consider  cardiology consult  Neurology evaluation   Monitor for seizure activities  Seizure precaution  Fall precaution   Aspiration precaution  Keep pt NPO

## 2019-12-02 NOTE — H&P ADULT - ASSESSMENT
53yo female from home, with PMH of HTN, HLD, afib (on xarelto), CVA, presented to ER after noted episode of deviation of eyes to left, pt was keppra loaded qith 1gm in ER however was RRT for tonic clonic activity requiring 2mg ativan.   Patient in ER was also noted to be in afib with RVR fir whch she was given 25mg iv pus hof cardizem. However following seizure activity (for which RRT was called), she was in persistent RVR for which dig loading was started

## 2019-12-02 NOTE — H&P ADULT - PROBLEM SELECTOR PLAN 1
p/w episode of left sided gaze deviation, s/p seizure activity in ER  -s/p keppra 1000mg and 02mg of ativan in ER  -increased keppra dose to 1000mg bid  -will evaluate for source of infection, f/u UA  -f/u keppra level,   -f/u EEG  -f/u neuro reccs p/w episode of left sided gaze deviation, s/p seizure activity in ER  CT head with no acute change  -s/p keppra 1000mg and 02mg of ativan in ER  -increased keppra dose to 1000mg bid  -will evaluate for source of infection, f/u UA  -f/u keppra level,   -f/u EEG  -f/u neuro reccs p/w episode of left sided gaze deviation, s/p seizure activity in ER  CT head with no acute change  -s/p keppra 1000mg and 2mg of ativan in ER  -increased keppra dose to 1000mg bid  -will evaluate for source of infection, f/u UA  -f/u keppra level,   -f/u EEG  -f/u neuro reccs, Dr Majano consulted

## 2019-12-02 NOTE — ED ADULT NURSE REASSESSMENT NOTE - NS ED NURSE REASSESS COMMENT FT1
after changing pt linen, pt started having generalized seizure-witnessed. Rapid response called, pt given Lorazepam.

## 2019-12-02 NOTE — H&P ADULT - NSHPPHYSICALEXAM_GEN_ALL_CORE
PHYSICAL EXAM:  GENERAL: NAD, well-developed  HEAD:  Atraumatic, Normocephalic  EYES: EOMI, PERRLA, conjunctiva and sclera clear  NECK: Supple, No JVD  CHEST/LUNG: Clear to auscultation bilaterally; No wheeze  HEART: Regular rate and rhythm; No murmurs, rubs, or gallops  ABDOMEN: Soft, Nontender, Nondistended; Bowel sounds present  EXTREMITIES:, No clubbing, cyanosis, or edema  PSYCH: drowsy, reponds to verbal stimulus  NEUROLOGY: left sided hemiparesis, strength 4/5 on right side  SKIN: No rashes or lesions

## 2019-12-02 NOTE — CONSULT NOTE ADULT - ATTENDING COMMENTS
Patient examined at bedside. EEG reviewed, and shows right frontal slowing and sharps, consistent with right MCA/EJ territory stroke and focal seizure, but no subclinical seizures. No seizure triggers identified.    Patient and  at bedside are counseled about secondary stroke prevention and importance of increasing levetiracetam from 750mg to 1000mg BID. Seizure precautions reviewed: No driving x 1 year, and avoid activities that would cause self-harm with sudden LOC for at least 6 months. Patient and  expressed understanding of information. Patient will follow up in Neurology clinic with me.

## 2019-12-02 NOTE — CONSULT NOTE ADULT - SUBJECTIVE AND OBJECTIVE BOX
+++++++++++++++NOTE NOT COMPLETED _+++_____++++++++++++++++++++  Patient is a 54y old  Female who presents with a chief complaint of suspected seizure activity (02 Dec 2019 04:44)      HPI:  55yo female from home, with PMH of HTN, HLD, afib (on xarelto), CVA, presented to ER after noted episode of deviation of eyes to left. Patient was keppra loaded in ER however had episode of tonic clonic activity for which she was received ativan 2mg. Assessed bedside, responds to name and is able to follow commands but is drowsy and unable to provide history. As per brother at bedside, patient was doing apparently fine until around 7pm when she complained of dizziness and developed deviation of gaze to left which is her usual presentation when she has a seizure, hence patient was brought to the ER. He denies recent fever, cough, diarrhea, worsening weakness or headaches. Patient of note was recently admitted to [facility] for similar complaints in sept 2019 when her keppra dose was increased to 750mg bid. Her EEG was significant for potential epileptogenic focus in the right posterior quadrant and cortical dysfunction in the right hemisphere.  Patient in ER was also noted to be in afib with RVR fir whch she was given 25mg iv pus hof cardizem. However following seizure activity (for which RRT was called), she was in persistent RVR for which dig loading was started (02 Dec 2019 04:44)         The seizure is described as  Seizure onset at  The frequency of seizure is  The seizure is brought up by  The patient has been previously on     History of head trauma/ concussion:  History of meningitis:  History of febrile seizures:  Family history of seizures:    Neurological Review of Systems:  No difficulty with language.  No vision loss or double vision.  No dizziness, vertigo or new hearing loss.  No difficulty with speech or swallowing.  No focal weakness.  No focal sensory changes.  No numbness or tingling in the bilateral lower extremities.  No difficulty with balance.  No difficulty with ambulation.        MEDICATIONS  (STANDING):  atorvastatin 40 milliGRAM(s) Oral at bedtime  digoxin  Injectable 0.25 milliGRAM(s) IV Push every 6 hours  diltiazem Infusion 5 mG/Hr (5 mL/Hr) IV Continuous <Continuous>  levETIRAcetam  IVPB 1000 milliGRAM(s) IV Intermittent every 12 hours  potassium chloride  10 mEq/50 mL IVPB 10 milliEquivalent(s) IV Intermittent every 1 hour  rivaroxaban 20 milliGRAM(s) Oral with dinner    MEDICATIONS  (PRN):  LORazepam   Injectable 2 milliGRAM(s) IV Push every 5 minutes PRN breakthrough seizure    Allergies    penicillin (Hives)    Intolerances      PAST MEDICAL & SURGICAL HISTORY:  Atrial fibrillation  Seizure  CVA (cerebral vascular accident)  CVA (cerebral vascular accident)  Hypertension  Atrial fibrillation  No significant past surgical history  H/O tubal ligation    FAMILY HISTORY:  FH: hypertension    SOCIAL HISTORY: non smoker/ former smoker/ active smoker    Review of Systems:  Constitutional: No generalized weakness. No fevers or chills.                    Eyes, Ears, Mouth, Throat: No vision loss   Respiratory: No shortness of breath or cough.                                Cardiovascular: No chest pain or palpitations  Gastrointestinal: No nausea or vomiting.                                         Genitourinary: No urinary incontinence or burning on urination.  Musculoskeletal: No joint pain.                                                           Dermatologic: No rash.  Neurological: as per HPI                                                                      Psychiatric: No behavioral problems.  Endocrine: No known hypoglycemia.               Hematologic/Lymphatic: No easy bleeding.    O:  Vital Signs Last 24 Hrs  T(C): 36.6 (02 Dec 2019 07:30), Max: 37.2 (01 Dec 2019 21:39)  T(F): 97.9 (02 Dec 2019 07:30), Max: 98.9 (01 Dec 2019 21:39)  HR: 77 (02 Dec 2019 07:30) (77 - 178)  BP: 108/76 (02 Dec 2019 07:30) (108/65 - 164/82)  BP(mean): --  RR: 19 (02 Dec 2019 07:30) (16 - 20)  SpO2: 98% (02 Dec 2019 07:30) (94% - 99%)    General Exam:   General appearance: No acute distress                 Cardiovascular: Pedal dorsalis pulses intact bilaterally    Mental Status: Orientated to self, date and place.  Attention intact.  No dysarthria, aphasia or neglect.  Knowledge intact.  Registration intact.  Short and long term memory grossly intact.      Cranial Nerves: CN I - not tested.  PERRL, EOMI, VFF, no nystagmus or diplopia.  No APD.  Fundi not visualized.  CN V1-3 intact to light touch and pinprick.  No facial asymmetry.  Hearing intact to finger rub bilaterally.  Tongue, uvula and palate midline.  Sternocleidomastoid and Trapezius intact bilaterally.    Motor:   Tone: normal.                  Strength intact throughout  No pronator drift bilaterally                      No dysmetria on finger-nose-finger or heel-shin-heel  No truncal ataxia.  No resting, postural or action tremor.  No myoclonus.    Sensation: intact to light touch, pinprick, vibration and proprioception    Deep Tendon Reflexes: 1+ bilateral biceps, triceps, brachioradialis, knee and ankle  Toes flexor bilaterally    Gait: normal and stable.  Rhomberg -andrey.    Other:     LABS:                        13.7   8.97  )-----------( 190      ( 02 Dec 2019 07:02 )             41.3     12-02    138  |  105  |  12  ----------------------------<  121<H>  3.6   |  27  |  0.64    Ca    8.9      02 Dec 2019 07:02  Mg     2.0     12-02    TPro  7.5  /  Alb  3.2<L>  /  TBili  0.4  /  DBili  x   /  AST  16  /  ALT  22  /  AlkPhos  96  12-02    PT/INR - ( 01 Dec 2019 22:13 )   PT: 14.0 sec;   INR: 1.25 ratio         PTT - ( 01 Dec 2019 22:13 )  PTT:32.3 sec      RADIOLOGY & ADDITIONAL STUDIES:  < from: CT Head No Cont (12.01.19 @ 22:55) >  EXAM:  CT BRAIN                            PROCEDURE DATE:  12/01/2019          INTERPRETATION:  CLINICAL HISTORY:  Seizure. On blood thinners. Evaluate   for bleed.    TECHNIQUE:  CT of the head without contrast.  Contiguous transaxial images of the head were acquired from the skull   base to the vertex without the administration of iodinated contrast.   Coronal and sagittal reformatted images are provided.     COMPARISON: CT head from 9/20/2019    FINDINGS:    There is no acute intracranial hemorrhage, mass effect from vasogenic   edema or evidence for acute large vascular territory infarct. Sequela of   prior moderately large right MCA and partial EJ territory infarcts are   unchanged.    The ventricles, sulci and cisternal spaces are stable in size and   configuration demonstrating ex vacuo dilatation of the body of the right   lateral ventricle. There is no hydronephrosis. There is no midline shift   or abnormal extra-axial fluid collection.    The calvarium is intact.  There are no osteoblastic or lytic calvarial or   skull base lesions.  The paranasal sinuses and mastoid air cells are   clear.    IMPRESSION:  Stable exam. No acute intracranial hemorrhage or mass effect vasogenic   edema. Chronic infarcts as above.                    TRE TRINIDAD M.D., RADIOLOGIST    < end of copied text >    Impression:         Recommendations:  1.         UA/UCx, Chest Xray, Utox and alcohol level  3.         MRI brain without contrast  4.         EEG  5.         check level of  6.         Anti-epileptic:  7.         Please give Ativan 2mg for active seizure, can give another 2mg dose of Ativan if the seizure continues or re-occurs, for a maximum of 6mg Ativan in 24 hours.  8.         Please load with Fosphenytoin 20mg/kg over 30-40minutes with cardiac monitor, then start Dilantin 100mg TID, please check Dilantin level in AM.  9.         Seizure and fall precautions  10.        The patient has been advised that driving is not allowed for 1 year after a seizure by [place] law.  The patient is advised to avoid swimming and any activities that may put their life at danger shall they have a seizure.    11.        DVT PPx    Thank you for the courtesy of this consult. Patient is a 54y old  Female who presents with a chief complaint of suspected seizure activity (02 Dec 2019 04:44)      HPI:  55yo female from home, with PMH of HTN, HLD, afib (on xarelto), CVA, presented to ER after noted episode of deviation of eyes to left. Patient was keppra loaded in ER however had episode of tonic clonic activity for which she was received ativan 2mg. Assessed bedside, responds to name and is able to follow commands but is drowsy and unable to provide history. As per brother at bedside, patient was doing apparently fine until around 7pm when she complained of dizziness and developed deviation of gaze to left which is her usual presentation when she has a seizure, hence patient was brought to the ER. He denies recent fever, cough, diarrhea, worsening weakness or headaches. Patient of note was recently admitted to [facility] for similar complaints in sept 2019 when her keppra dose was increased to 750mg bid. Her EEG was significant for potential epileptogenic focus in the right posterior quadrant and cortical dysfunction in the right hemisphere.  Patient in ER was also noted to be in afib with RVR fir whch she was given 25mg iv pus hof cardizem. However following seizure activity (for which RRT was called), she was in persistent RVR for which dig loading was started (02 Dec 2019 04:44)         The seizure is described as body shaking, followed by staring towards the left  Seizure onset at 7:30PM on 12/1  The frequency of seizure this is the 2nd seizure  The seizure is brought up by abdominal pain  The patient has been previously on Keppra 750mg Q 12h    History of head trauma/ concussion: No  History of meningitis: No   History of febrile seizures: No   Family history of seizures: No     Neurological Review of Systems:  No difficulty with language.  No vision loss or double vision.  No dizziness, vertigo or new hearing loss.  No difficulty with speech or swallowing.  No focal weakness.  No focal sensory changes.  No numbness or tingling in the bilateral lower extremities.  No difficulty with balance.  No difficulty with ambulation.        MEDICATIONS  (STANDING):  atorvastatin 40 milliGRAM(s) Oral at bedtime  digoxin  Injectable 0.25 milliGRAM(s) IV Push every 6 hours  diltiazem Infusion 5 mG/Hr (5 mL/Hr) IV Continuous <Continuous>  levETIRAcetam  IVPB 1000 milliGRAM(s) IV Intermittent every 12 hours  potassium chloride  10 mEq/50 mL IVPB 10 milliEquivalent(s) IV Intermittent every 1 hour  rivaroxaban 20 milliGRAM(s) Oral with dinner    MEDICATIONS  (PRN):  LORazepam   Injectable 2 milliGRAM(s) IV Push every 5 minutes PRN breakthrough seizure    Allergies    penicillin (Hives)    Intolerances      PAST MEDICAL & SURGICAL HISTORY:  Atrial fibrillation  Seizure  CVA (cerebral vascular accident)  CVA (cerebral vascular accident)  Hypertension  Atrial fibrillation  No significant past surgical history  H/O tubal ligation    FAMILY HISTORY:  FH: hypertension    SOCIAL HISTORY: non smoker    Review of Systems:  Constitutional: No generalized weakness. No fevers or chills                  Eyes, Ears, Mouth, Throat: No vision loss   Respiratory: No shortness of breath or cough                             Cardiovascular: No chest pain or palpitations  Gastrointestinal: No nausea or vomiting                                     Genitourinary: No urinary incontinence or burning on urination  Musculoskeletal: No joint pain                                                          Dermatologic: No rash  Neurological: as per HPI                                                                      Psychiatric: No behavioral problems  Endocrine: No known hypoglycemia              Hematologic/Lymphatic: No easy bleeding    O:  Vital Signs Last 24 Hrs  T(C): 36.6 (02 Dec 2019 07:30), Max: 37.2 (01 Dec 2019 21:39)  T(F): 97.9 (02 Dec 2019 07:30), Max: 98.9 (01 Dec 2019 21:39)  HR: 77 (02 Dec 2019 07:30) (77 - 178)  BP: 108/76 (02 Dec 2019 07:30) (108/65 - 164/82)  RR: 19 (02 Dec 2019 07:30) (16 - 20)  SpO2: 98% (02 Dec 2019 07:30) (94% - 99%)    General Exam:   General appearance: No acute distress                 Cardiovascular: Pedal dorsalis pulses intact bilaterally    Mental Status: Orientated to place, not self or date.  Attention intact.  No dysarthria, aphasia or neglect.  Knowledge intact.  Registration intact.  Short and long term memory grossly intact.      Cranial Nerves: CN I - not tested.  PERRL, EOMI, VFF, no nystagmus or diplopia.  No APD.  Fundi not visualized.  CN V1-3 intact to light touch.  No facial asymmetry.  Hearing intact to finger rub bilaterally.  Tongue, uvula and palate midline.  Sternocleidomastoid and Trapezius intact bilaterally.    Motor:   Tone: Normal                Strength impaired on left side, residual effects for 2016 stroke.  Right intact                      No dysmetria on finger-nose-finger or heel-shin-heel  No truncal ataxia.  No resting, postural or action tremor.  No myoclonus.    Sensation: intact to light touch    Deep Tendon Reflexes: 2+ bilateral biceps, triceps, brachioradialis, knee and ankle  Right toe flexor.  Left (+) Babinski     Gait: Deferred pt unable d/t tiredness    Other:   NIHSS=8 (LOC-1, Partial Hemianopia-1, LUE-3, LLE-2, & sensory-1)  MRS=0    LABS:                        13.7   8.97  )-----------( 190      ( 02 Dec 2019 07:02 )             41.3     12-02    138  |  105  |  12  ----------------------------<  121<H>  3.6   |  27  |  0.64    Ca    8.9      02 Dec 2019 07:02  Mg     2.0     12-02    TPro  7.5  /  Alb  3.2<L>  /  TBili  0.4  /  DBili  x   /  AST  16  /  ALT  22  /  AlkPhos  96  12-02    PT/INR - ( 01 Dec 2019 22:13 )   PT: 14.0 sec;   INR: 1.25 ratio         PTT - ( 01 Dec 2019 22:13 )  PTT:32.3 sec      RADIOLOGY & ADDITIONAL STUDIES:  < from: CT Head No Cont (12.01.19 @ 22:55) >  EXAM:  CT BRAIN                            PROCEDURE DATE:  12/01/2019          INTERPRETATION:  CLINICAL HISTORY:  Seizure. On blood thinners. Evaluate   for bleed.    TECHNIQUE:  CT of the head without contrast.  Contiguous transaxial images of the head were acquired from the skull   base to the vertex without the administration of iodinated contrast.   Coronal and sagittal reformatted images are provided.     COMPARISON: CT head from 9/20/2019    FINDINGS:    There is no acute intracranial hemorrhage, mass effect from vasogenic   edema or evidence for acute large vascular territory infarct. Sequela of   prior moderately large right MCA and partial EJ territory infarcts are   unchanged.    The ventricles, sulci and cisternal spaces are stable in size and   configuration demonstrating ex vacuo dilatation of the body of the right   lateral ventricle. There is no hydronephrosis. There is no midline shift   or abnormal extra-axial fluid collection.    The calvarium is intact.  There are no osteoblastic or lytic calvarial or   skull base lesions.  The paranasal sinuses and mastoid air cells are   clear.    IMPRESSION:  Stable exam. No acute intracranial hemorrhage or mass effect vasogenic   edema. Chronic infarcts as above.                    TRE TRINIDAD M.D., RADIOLOGIST    < end of copied text > Patient is a 54y old  Female who presents with a chief complaint of suspected seizure activity (02 Dec 2019 04:44)      HPI:  53yo female from home, with PMH of HTN, HLD, afib (on xarelto), CVA, presented to ER after noted episode of deviation of eyes to left. Patient was keppra loaded in ER however had episode of tonic clonic activity for which she was received ativan 2mg. Assessed bedside, responds to name and is able to follow commands but is drowsy and unable to provide history. As per brother at bedside, patient was doing apparently fine until around 7pm when she complained of dizziness and developed deviation of gaze to left which is her usual presentation when she has a seizure, hence patient was brought to the ER. He denies recent fever, cough, diarrhea, worsening weakness or headaches. Patient of note was recently admitted to [facility] for similar complaints in sept 2019 when her keppra dose was increased to 750mg bid. Her EEG was significant for potential epileptogenic focus in the right posterior quadrant and cortical dysfunction in the right hemisphere.  Patient in ER was also noted to be in afib with RVR fir whch she was given 25mg iv pus hof cardizem. However following seizure activity (for which RRT was called), she was in persistent RVR for which dig loading was started (02 Dec 2019 04:44)         The seizure is described as body shaking, followed by staring towards the left  Seizure onset at 7:30PM on 12/1  The frequency of seizure this is the 2nd seizure  The seizure is brought up by abdominal pain  The patient has been previously on Keppra 750mg Q 12h    History of head trauma/ concussion: No  History of meningitis: No   History of febrile seizures: No   Family history of seizures: No     Neurological Review of Systems:  No difficulty with language.  No vision loss or double vision.  No dizziness, vertigo or new hearing loss.  No difficulty with speech or swallowing.  No focal weakness.  No focal sensory changes.  No numbness or tingling in the bilateral lower extremities.  No difficulty with balance.  No difficulty with ambulation.        MEDICATIONS  (STANDING):  atorvastatin 40 milliGRAM(s) Oral at bedtime  digoxin  Injectable 0.25 milliGRAM(s) IV Push every 6 hours  diltiazem Infusion 5 mG/Hr (5 mL/Hr) IV Continuous <Continuous>  levETIRAcetam  IVPB 1000 milliGRAM(s) IV Intermittent every 12 hours  potassium chloride  10 mEq/50 mL IVPB 10 milliEquivalent(s) IV Intermittent every 1 hour  rivaroxaban 20 milliGRAM(s) Oral with dinner    MEDICATIONS  (PRN):  LORazepam   Injectable 2 milliGRAM(s) IV Push every 5 minutes PRN breakthrough seizure    Allergies    penicillin (Hives)    Intolerances      PAST MEDICAL & SURGICAL HISTORY:  Atrial fibrillation  Seizure  CVA (cerebral vascular accident)  CVA (cerebral vascular accident)  Hypertension  Atrial fibrillation  No significant past surgical history  H/O tubal ligation    FAMILY HISTORY:  FH: hypertension    SOCIAL HISTORY: non smoker    Review of Systems:  Constitutional: No generalized weakness. No fevers or chills                  Eyes, Ears, Mouth, Throat: No vision loss   Respiratory: No shortness of breath or cough                             Cardiovascular: No chest pain or palpitations  Gastrointestinal: No nausea or vomiting                                     Genitourinary: No urinary incontinence or burning on urination  Musculoskeletal: No joint pain                                                          Dermatologic: No rash  Neurological: as per HPI                                                                      Psychiatric: No behavioral problems  Endocrine: No known hypoglycemia              Hematologic/Lymphatic: No easy bleeding    O:  Vital Signs Last 24 Hrs  T(C): 36.6 (02 Dec 2019 07:30), Max: 37.2 (01 Dec 2019 21:39)  T(F): 97.9 (02 Dec 2019 07:30), Max: 98.9 (01 Dec 2019 21:39)  HR: 77 (02 Dec 2019 07:30) (77 - 178)  BP: 108/76 (02 Dec 2019 07:30) (108/65 - 164/82)  RR: 19 (02 Dec 2019 07:30) (16 - 20)  SpO2: 98% (02 Dec 2019 07:30) (94% - 99%)    General Exam:   General appearance: No acute distress                 Cardiovascular: Pedal dorsalis pulses intact bilaterally    Mental Status: Orientated to place, not self or date.  Attention intact.  No dysarthria, aphasia or neglect.  Knowledge intact.  Registration intact.  Short and long term memory grossly intact.      Cranial Nerves: CN I - not tested.  PERRL, EOMI, VFF, no nystagmus or diplopia.  No APD.  Fundi not visualized.  CN V1-3 intact to light touch.  No facial asymmetry.  Hearing intact to finger rub bilaterally.  Tongue, uvula and palate midline.  Sternocleidomastoid and Trapezius intact bilaterally.    Motor:   Tone: Normal                Strength impaired on left side, residual effects for 2016 stroke.  Right intact                      No dysmetria on finger-nose-finger or heel-shin-heel  No truncal ataxia.  No resting, postural or action tremor.  No myoclonus.    Sensation: intact to light touch    Deep Tendon Reflexes: 2+ bilateral biceps, triceps, brachioradialis, knee and ankle  Right toe flexor.  Left (+) Babinski     Gait: Deferred pt unable d/t tiredness    Other:   NIHSS=8 (LOC-1, Partial Hemianopia-1, LUE-3, LLE-2, & sensory-1)  MRS=1    LABS:                        13.7   8.97  )-----------( 190      ( 02 Dec 2019 07:02 )             41.3     12-02    138  |  105  |  12  ----------------------------<  121<H>  3.6   |  27  |  0.64    Ca    8.9      02 Dec 2019 07:02  Mg     2.0     12-02    TPro  7.5  /  Alb  3.2<L>  /  TBili  0.4  /  DBili  x   /  AST  16  /  ALT  22  /  AlkPhos  96  12-02    PT/INR - ( 01 Dec 2019 22:13 )   PT: 14.0 sec;   INR: 1.25 ratio         PTT - ( 01 Dec 2019 22:13 )  PTT:32.3 sec      RADIOLOGY & ADDITIONAL STUDIES:  < from: CT Head No Cont (12.01.19 @ 22:55) >  EXAM:  CT BRAIN                            PROCEDURE DATE:  12/01/2019          INTERPRETATION:  CLINICAL HISTORY:  Seizure. On blood thinners. Evaluate   for bleed.    TECHNIQUE:  CT of the head without contrast.  Contiguous transaxial images of the head were acquired from the skull   base to the vertex without the administration of iodinated contrast.   Coronal and sagittal reformatted images are provided.     COMPARISON: CT head from 9/20/2019    FINDINGS:    There is no acute intracranial hemorrhage, mass effect from vasogenic   edema or evidence for acute large vascular territory infarct. Sequela of   prior moderately large right MCA and partial EJ territory infarcts are   unchanged.    The ventricles, sulci and cisternal spaces are stable in size and   configuration demonstrating ex vacuo dilatation of the body of the right   lateral ventricle. There is no hydronephrosis. There is no midline shift   or abnormal extra-axial fluid collection.    The calvarium is intact.  There are no osteoblastic or lytic calvarial or   skull base lesions.  The paranasal sinuses and mastoid air cells are   clear.    IMPRESSION:  Stable exam. No acute intracranial hemorrhage or mass effect vasogenic   edema. Chronic infarcts as above.                    TRE TRINIDAD M.D., RADIOLOGIST    < end of copied text >

## 2019-12-02 NOTE — EEG REPORT - NS EEG TEXT BOX
[facility]  Comprehensive Epilepsy Center  Report of Routine EEG with Video    [facility]: [address] , [place] [address], Phone [phone]  [facility]: [address] [place], [place] [address], [place] [address], Phone [phone]  [place] Office: [address] [facility], [address], [place] [address] Phone [phone]    [facility]: [address] [place] [address], Phone [phone]  [place] Office: [address] [facility], [address], [place] [address], Phone [phone]    Patient Name: THELMA NOLASCO    Age: 54 year  : 1965  Patient ID: -, MRN #: [id number], Location: [address] [place] [address]  Referring Physician: DR PIERRE  EEG #:     Study Date: 2019	    Technical Information:					  On Instrument: -  Placement and Labeling of Electrodes:  The EEG was performed utilizing 20 channels referential EEG connections (coronal over temporal over parasagittal montage) using all standard 10-20 electrode placements with EKG.  Recording was at a sampling rate of 256 samples per second per channel.  Time synchronized digital video recording was done simultaneously with EEG recording.  A low light infrared camera was used for low light recording.  Rolly and seizure detection algorithms were utilized.    History:  ROUTINE EEG PERFORMED AT BEDSIDE  53 Y/O FEMALE   H/O : HTN, HLD, AFIB, CVA  P/W : EPISODE OF DEVIATION OF EYES TO THE LEFT , BREAK THROUGH SEIZURES TONIC CLONIC AVTIVITIES  PATIENT DIDN'T ANSWER TO ANY OF THE QUESTIONS  AS PER BROTHER AT BEDSIDE PATIENT IS RIGHT HANDED  NO HYPERVENTILATION PERFORMED DUE TO CONDITION  PHOTIC STIMULATION PERFORMED  PATIENT RESPONDED TO STIMULATION  NO MEDICATION DRIPPING DURING THE STUDY  AT THE END OF THE TEST MOTHER OF PATIENT CAME AND SHE START TALKING  AT THE END SHE WAS ALERT AND ORIENTED X 3    Pertinent Medication	  Keppra (Levetiracetam)	  Ativan (Lorazepam)	    Study Interpretation:  FINDINGS: The background was continuous, spontaneously variable and reactive. During wakefulness, the posterior dominant rhythm consisted of symmetric, well-modulated 10 Hz activity, within normal limits for age, with amplitude to 30 uV, that attenuated to eye opening.  Low amplitude frontal beta was noted in wakefulness.    Background Slowing:  No generalized background slowing was present.    Focal Slowing:   Polymorphic delta and theta frequency slowing was present in the right frontal region, centered around lead F4.    Sleep Background:  Drowsiness was characterized by fragmentation, attenuation, and slowing of the background activity.    Sleep was characterized by the presence of vertex waves, symmetric sleep spindles and K-complexes.    Other Non-Epileptiform Findings:  Diffuse excess beta activity.    Interictal Epileptiform Activity:   Frequent sharp waves are seen in the right frontal region, centered around lead F4, although they do not persist long enough to develop into electrographic seizures.    Events:  Clinical events: None recorded.  Seizures: None recorded.    Activation Procedures:   Hyperventilation was not performed.    Photic stimulation was performed and did not elicit any abnormalities.      Artifacts:  Intermittent myogenic and movement artifacts were noted.    ECG:  The heart rate on single channel ECG was predominantly between 70 and 80 BPM, with an irregular rhythm.    EEG Summary/Classification:  Abnormal EEG in the awake, drowsy and asleep states.  - Sharps, right frontal  - Focal slowing, right frontal  - Diffuse beta activity     EEG Impression/Clinical Correlate:        Abnormal EEG study.    The presence of focal slowing and sharp waves in the right frontal region indicate the presence of a focal lesion and seizure tendency in the right frontal region, consistent with the patient’s history of right MCA and EJ territory ischemic stroke and post-stroke seizure.    The presence of diffuse beta activity is a nonspecific finding that can be seen in the setting of anxiety or benzodiazepine use, consistent with the recent use of lorazepam (Ativan).    ________________________________________    Wiley Pierre MD  Attending Physician, [facility] [place]

## 2019-12-02 NOTE — H&P ADULT - PROBLEM SELECTOR PROBLEM 1
HPI and Plan:   See dictation    Orders Placed This Encounter   Procedures     Follow-Up with Electrophysiologist     EKG 12-lead complete w/read - Clinics (performed today)     Zio Patch Monitor       Orders Placed This Encounter   Medications     predniSONE (DELTASONE) 20 MG tablet     Sig: Take 40 mg by mouth daily As directed       Medications Discontinued During This Encounter   Medication Reason     metoprolol (LOPRESSOR) 50 MG tablet      apixaban ANTICOAGULANT (ELIQUIS) 2.5 MG tablet          Encounter Diagnosis   Name Primary?     Paroxysmal atrial fibrillation (H)        CURRENT MEDICATIONS:  Current Outpatient Prescriptions   Medication Sig Dispense Refill     predniSONE (DELTASONE) 20 MG tablet Take 40 mg by mouth daily As directed       diltiazem (CARDIZEM CD/CARTIA XT) 120 MG 24 hr capsule Take 1 capsule (120 mg) by mouth daily 90 capsule 3     brimonidine-timolol (COMBIGAN) 0.2-0.5 % ophthalmic solution Place 1 drop into both eyes 2 times daily        Omega-3 Fatty Acids (FISH OIL PO) Take 1,000 mg by mouth daily        Multiple Vitamin (MULTI-VITAMIN) per tablet Take 1 tablet by mouth daily.         ALLERGIES     Allergies   Allergen Reactions     Zocor [Simvastatin - High Dose]      Elevated LFTs       PAST MEDICAL HISTORY:  Past Medical History:   Diagnosis Date     Anxiety      Chronic renal insufficiency      Dementia      Dry eyes, bilateral      Generalized OA      Glaucoma      HTN (hypertension), benign      Hyperlipidemia LDL goal < 130      Liver dysfunction      Paroxysmal a-fib (H)        PAST SURGICAL HISTORY:  Past Surgical History:   Procedure Laterality Date     DILATION AND CURETTAGE       HYSTERECTOMY      with USO, not for cancer     S/p partial vaginectomy       SEPTOPLASTY       TONSILLECTOMY & ADENOIDECTOMY         FAMILY HISTORY:  Family History   Problem Relation Age of Onset     Hypertension Mother       age 95     Alzheimer Disease Father 75     also CHF     CANCER  "Brother       of lung ca age 69     Lipids Brother      Alzheimer Disease Paternal Uncle      Alzheimer Disease Paternal Uncle        SOCIAL HISTORY:  Social History     Social History     Marital status:      Spouse name: N/A     Number of children: N/A     Years of education: N/A     Social History Main Topics     Smoking status: Former Smoker     Quit date: 1990     Smokeless tobacco: Never Used      Comment: quit      Alcohol use No     Drug use: No     Sexual activity: Not Currently     Partners: Male     Other Topics Concern     None     Social History Narrative    , 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  Walks 3miles per day        dtr (34) from [place] and lives in [facility]    Son from [place] and has mental health issues       Review of Systems:  Skin:  Negative       Eyes:  Positive for glasses    ENT:  Negative      Respiratory:  Positive for dyspnea on exertion     Cardiovascular:    Positive for;fatigue;palpitations    Gastroenterology: Negative      Genitourinary:  Negative      Musculoskeletal:  Positive for arthritis    Neurologic:  Positive for headaches occ  Psychiatric:  Positive for anxiety;depression    Heme/Lymph/Imm:  Negative      Endocrine:  Negative        Physical Exam:  Vitals: /68  Pulse 60  Ht 1.626 m (5' 4\")  Wt 62.1 kg (137 lb)  BMI 23.52 kg/m2    Constitutional:  cooperative, alert and oriented, well developed, well nourished, in no acute distress        Skin:  warm and dry to the touch, no apparent skin lesions or masses noted          Head:  normocephalic, no masses or lesions        Eyes:  pupils equal and round, conjunctivae and lids unremarkable, sclera white, no xanthalasma, EOMS intact, no nystagmus        Lymph:No Cervical lymphadenopathy present     ENT:  no pallor or cyanosis, dentition good        Neck:  carotid pulses are full and equal bilaterally, JVP normal, no carotid bruit        Respiratory:  normal breath sounds, clear to auscultation, " normal A-P diameter, normal symmetry, normal respiratory excursion, no use of accessory muscles         Cardiac: regular rhythm, normal S1/S2, no S3 or S4, apical impulse not displaced, no murmurs, gallops or rubs                                                         GI:  abdomen soft, non-tender, BS normoactive, no mass, no HSM, no bruits        Extremities and Muscular Skeletal:  no deformities, clubbing, cyanosis, erythema observed              Neurological:  no gross motor deficits        Psych:  Alert and Oriented x 3        CC  Olga Andino, MICHELE  [address]  [place] [address]               Seizure

## 2019-12-02 NOTE — H&P ADULT - HISTORY OF PRESENT ILLNESS
53yo female from home, with PMH of HTN, HLD, afib (on xarelto), CVA, presented to ER after noted episode of deviation of eyes to left. Patient was keppra loaded in ER however had episode of tonic clonic activity for which she was received ativan 2mg. Assessed bedside, responds to name and is able to follow commands but is drowsy and unable to provide history. As per brother at bedside, patient was doing apparently fine until around 7pm when she complained of dizziness and developed deviation of gaze to left which is her usual presentation when she has a seizure, hence patient was brought to the ER. He denies recent fever, cough, diarrhea, worsening weakness or headaches. Patient of note was recently admitted to [facility] for similar complaints in sept 2019 when her keppra dose was increased to 750mg bid. Her EEG was significant for potential epileptogenic focus in the right posterior quadrant and cortical dysfunction in the right hemisphere.  Patient in ER was also noted to be in afib with RVR fir whch she was given 25mg iv pus hof cardizem. However following seizure activity (for which RRT was called), she was in persistent RVR for which dig loading was started

## 2019-12-02 NOTE — SWALLOW BEDSIDE ASSESSMENT ADULT - COMMENTS
Pt AA&Ox3. HOB elevated to 45 degrees. Brother & his wife bedside. Pt speaks both English & Amharic. Followed basic directions & responded to basic questions appropriately. Slight left sided facial droop noted. Fed self w/right hand after food prep/set up. Vocal quality hoarse w/low volume at baseline. RRT called 12/2 at 1:07am due to seizure. Mild oral residue on tongue cleared w/sips of thin liquids. Needed liquid wash to clear bolus. Took big bites.

## 2019-12-02 NOTE — SWALLOW BEDSIDE ASSESSMENT ADULT - SWALLOW EVAL: DIAGNOSIS
Pt presented with s/s of oropharyngeal dysphagia. Pt exhibited Reduced oral grading. Prolonged oral transit & bolus transport. Initiation of swallow timely w/reduced laryngeal elevation. Multiple swallows. No overt s/s of aspiration noted at this time.

## 2019-12-02 NOTE — SWALLOW BEDSIDE ASSESSMENT ADULT - SLP PERTINENT HISTORY OF CURRENT PROBLEM
55yo female from home, with PMH of HTN, HLD, afib (on xarelto), CVA, presented to ER after noted episode of deviation of eyes to left. Patient was keppra loaded in ER however had episode of tonic clonic activity for which she was received ativan 2mg. Recently admitted to [facility] for similar complaints in sept 2019 when her keppra dose was increased to 750mg bid.

## 2019-12-02 NOTE — CONSULT NOTE ADULT - ASSESSMENT
55yo female w/ Post Stroke Seizure, improving    Recommendations:    -No new deficits concerning for stroke therefore no further stroke workup    -EEG to evaluate for seizure    -No evidence of infection as etiology for seizure

## 2019-12-02 NOTE — RAPID RESPONSE TEAM SUMMARY - NSOTHERINTERVENTIONSRRT_GEN_ALL_CORE
ECG  Cardiac enzymes  lactate, procalcitonin, CK  EEG  Would recommend CTA and MRI for further evaluation- neurology consultation  Would also recommend digoxin loading given persistent RVR- cardiology consultation ECG  Cardiac enzymes, lactate, procalcitonin, CK  EEG, CTA and MRI brain for further evaluation- neurology consultation  Would also recommend digoxin loading given persistent RVR- cardiology consultation  Aspiration and seizures precautions  Keep NPO

## 2019-12-02 NOTE — SWALLOW BEDSIDE ASSESSMENT ADULT - CONSISTENCIES ADMINISTERED
puree/4x 3x/nectar thick 5x/thin liquid saltine crackers + applesauce 3x/mech soft solid/saltine crackers 2x

## 2019-12-02 NOTE — H&P ADULT - PROBLEM SELECTOR PLAN 2
pt in afib qwith RVR in ER, s/p 25mg iv push cardizem  HR elevated s/p seizure despite being on cardizem drip, with no response to iv lopressor 5mg  -start dig loading  -xarelto when pt is able to take po (if prolonged npo status, consider full dose lovenox)  -resume metoprolol when blood pressure more stable pt in afib qwith RVR in ER, s/p 25mg iv push cardizem  HR elevated s/p seizure despite being on cardizem drip, with no response to iv lopressor 5mg  -started dig loading  -xarelto when pt is able to take po (if prolonged npo status, consider full dose lovenox)  -resume metoprolol when blood pressure more stable

## 2019-12-02 NOTE — RAPID RESPONSE TEAM SUMMARY - NSSITUATIONBACKGROUNDRRT_GEN_ALL_CORE
54F w/ Hx CVA (residual L hemiparesis), Seizure disorder (On Keppra at  home), admitted for breakthrough seizures. RRT called around 1AM for tonic-clonic activity. Patient was given Ativan 2mg IV w/ resolution of seizure activity. Patient saturating 97% on 3L NC, clenching her jaw. Protecting her airway at the moment. Patient also noted to be on Afib w/ RVR, currently on Cardizem w/ rate of 10. 54F w/ Hx CVA (residual L hemiparesis), Seizure disorder (On Keppra at  home), admitted for breakthrough seizures. RRT called around 1AM for tonic-clonic activity. Patient was given Ativan 2mg IV w/ resolution of seizure activity. Patient saturating 97% on 3L NC, clenching her jaw, w/ R horizontal nystagmus. Protecting her airway at the moment. Patient also noted to be on Afib w/ RVR, currently on Cardizem w/ rate of 10.

## 2019-12-02 NOTE — H&P ADULT - ATTENDING COMMENTS
Pt seen and examined at bedside. Discussed with Housestaff, agree with notes above with independent assessment documented below.  54 year old woman with PMH of HTN / HLD/ a-fib on OAC, CVA with residual left sided deficits who presents to the ED with changes in mental status associated with a left gaze nystagmus. This is similar to her past presentation with non-convulsive seizures. There was no fevers, chills, or any systemic symptoms per son at bedside. She has been treated for breakthrough seizures in the past and her keppra was increased to 750mg PO BID on her last visit.  Son says she is compliant with her medications.  Of note, she was noted to have AF with RVR in the ED.    Vital Signs Last 24 Hrs  T(C): 37.2 (01 Dec 2019 21:39), Max: 37.2 (01 Dec 2019 21:39)  T(F): 98.9 (01 Dec 2019 21:39), Max: 98.9 (01 Dec 2019 21:39)  HR: 101 (02 Dec 2019 01:14) (101 - 178)  BP: 109/79 (02 Dec 2019 01:14) (109/79 - 164/82)  RR: 16 (02 Dec 2019 01:14) (16 - 16)  SpO2: 99% (02 Dec 2019 01:14) (94% - 99%)    Middle aged woman, sedated s/p treatment for seizures, no agitation   CTA B/L RRR S1S2 only( presently)  Soft, not distended,   No pedal edema  neuro exam deferred    Labs reviewed  K - 3.2  CT head - no acute findings    Impression  - Acute encephalopathy   - Non-convulsive seizures  - Breakthrough seizure  - Hypokalemia    Plan  Admit to Medicine  s/p Keppra loading ; no baseline keppra level   fall and seizure risk  Correct low potassium; check Mg/PO4 and correct if low  Check urinalysis and treat any underlying infection  Continue home meds  Neurology consult

## 2019-12-02 NOTE — SWALLOW BEDSIDE ASSESSMENT ADULT - PHARYNGEAL PHASE
Decreased laryngeal elevation Multiple swallows/Decreased laryngeal elevation Decreased laryngeal elevation/Multiple swallows

## 2019-12-02 NOTE — PATIENT PROFILE ADULT - IS PATIENT PREGNANT?
"              After Visit Summary   10/16/2018    Lucretia Barlow    MRN: [id number]           Patient Information     Date Of Birth          1977        Visit Information        Provider Department      10/16/2018 9:30 AM Len Campos MD [facility]        Today's Diagnoses     Patellar dislocation, left, subsequent encounter    -  1       Follow-ups after your visit        Who to contact     If you have questions or need follow up information about today's clinic visit or your schedule please contact [facility] directly at [phone].  Normal or non-critical lab and imaging results will be communicated to you by [employer], letter or phone within 4 business days after the clinic has received the results. If you do not hear from us within 7 days, please contact the clinic through [employer] or phone. If you have a critical or abnormal lab result, we will notify you by phone as soon as possible.  Submit refill requests through [employer] or call your pharmacy and they will forward the refill request to us. Please allow 3 business days for your refill to be completed.          Additional Information About Your Visit        MyChart Information     [employer] gives you secure access to your electronic health record. If you see a primary care provider, you can also send messages to your care team and make appointments. If you have questions, please call your primary care clinic.  If you do not have a primary care provider, please call [phone] and they will assist you.        Care EveryWhere ID     This is your Care EveryWhere ID. This could be used by other organizations to access your [place] medical records  [id number]        Your Vitals Were     Pulse Temperature Respirations Height Pulse Oximetry Breastfeeding?    120 98.1  F (36.7  C) (Temporal) 18 5' 3\" (1.6 m) 100% No    BMI (Body Mass Index)                   25.33 kg/m2            Blood Pressure from Last 3 " Encounters:   10/16/18 138/72   06/28/18 118/70   06/20/18 (!) 130/92    Weight from Last 3 Encounters:   10/16/18 143 lb (64.9 kg)   06/28/18 139 lb 12.8 oz (63.4 kg)   06/20/18 140 lb (63.5 kg)              We Performed the Following     DRAIN/INJECT LARGE JOINT/BURSA        Primary Care Provider Office Phone # Fax #    Len Mike Campos [phone] [address] [place] [address]        Equal Access to Services     [facility]: Hadii aad ku hadasho Soomaali, waaxda luqadaha, qaybta kaalmada adewaliyada, pb brown . So [facility] [phone].    ATENCIÓN: Si habla español, tiene a rodríguez disposición servicios gratuitos de asistencia lingüística. [facility] [phone].    We comply with applicable federal civil rights laws and [place] laws. We do not discriminate on the basis of race, color, national origin, age, disability, sex, sexual orientation, or gender identity.            Thank you!     Thank you for choosing [facility]  for your care. Our goal is always to provide you with excellent care. Hearing back from our patients is one way we can continue to improve our services. Please take a few minutes to complete the written survey that you may receive in the mail after your visit with us. Thank you!             Your Updated Medication List - Protect others around you: Learn how to safely use, store and throw away your medicines at www.disposemymeds.org.          This list is accurate as of 10/16/18 12:00 PM.  Always use your most recent med list.                   Brand Name Dispense Instructions for use Diagnosis    cyclobenzaprine 10 MG tablet    FLEXERIL    180 tablet    Take 2 tablets (20 mg) by mouth At Bedtime    Chronic back pain, unspecified back location, unspecified back pain laterality       hydrochlorothiazide 12.5 MG capsule    MICROZIDE    90 capsule    Take 1 capsule (12.5 mg) by mouth every morning    Essential hypertension  with goal blood pressure less than 140/90       levonorgestrel 20 MCG/24HR IUD    MIRENA     1 each by Intrauterine route once        MAGNESIUM OXIDE PO      Take 400 mg by mouth daily        naratriptan 2.5 MG tablet    AMERGE    9 tablet    Take 1 tablet (2.5 mg) by mouth at onset of headache for migraine May repeat in 4 hours. Max 2 tablets/24 hours.    Nonintractable episodic headache, unspecified headache type       valACYclovir 1000 mg tablet    VALTREX    21 tablet    Take 1 tablet (1,000 mg) by mouth 3 times daily for 7 days    Herpes zoster without complication       WELLBUTRIN PO      Take 300 mg by mouth daily        ZOLOFT PO      Take 50 mg by mouth daily           no

## 2019-12-02 NOTE — SWALLOW BEDSIDE ASSESSMENT ADULT - ASR SWALLOW ASPIRATION MONITOR
gurgly voice/pneumonia/throat clearing/oral hygiene/position upright (90Y)/change of breathing pattern/cough/fever/upper respiratory infection

## 2019-12-02 NOTE — H&P ADULT - NSTOBACCOSCREENHP_GEN_A_CS
Patient returns to the office today after approximately a year.  Last seen, she was moving towards GYN procedure for an endometrial polyp which was contributing to bleeding as well as pelvic discomfort.  Additional finding was that her uterus was fairly fixed both on exam and on ultrasound possibly due to endometriosis or old infection.  The patient has spent time in the cardiology clinic and is in better shape now than she was a year ago.  She is on the transplant list.  She wishes to proceed with the GYN plans and states that her cardiologist has given her verbal clearance for managing her menorrhagia.  A repeat ultrasound will be done to reassess the endometrial lining and update her pelvic findings.  Patient was counseled that in view of her cardiac history as well as the mobility of her uterus suggesting significant scar tissue, a safer and better approach might be hysteroscopy with D&C and endometrial ablation.  Ultrasound is scheduled and we will begin to workup management for this patient's GYN condition including a preoperative clearance from cardiology.    Ultrasound was done April 25, 2018.  It shows a somewhat larger uterus than on previous ultrasound in January 2017 with a fundal endometrial polyp, a flicker endometrial lining, and an additional cervical polyp.  Findings were discussed with the patient and at this time the best plan seems to be hysteroscopy with polypectomy, D&C, and endometrial ablation.  This would be the safest considering her medical problems.  If this does not take care of problems and a future hysterectomy would be considered,  but this would involve more risk for the patient.  
No

## 2019-12-02 NOTE — H&P ADULT - NSICDXPASTMEDICALHX_GEN_ALL_CORE_FT
PAST MEDICAL HISTORY:  Atrial fibrillation     Atrial fibrillation     CVA (cerebral vascular accident)     CVA (cerebral vascular accident)     Hypertension     Seizure

## 2019-12-03 LAB
ANION GAP SERPL CALC-SCNC: 8 MMOL/L — SIGNIFICANT CHANGE UP (ref 5–17)
BUN SERPL-MCNC: 18 MG/DL — SIGNIFICANT CHANGE UP (ref 7–18)
CALCIUM SERPL-MCNC: 9.5 MG/DL — SIGNIFICANT CHANGE UP (ref 8.4–10.5)
CHLORIDE SERPL-SCNC: 108 MMOL/L — SIGNIFICANT CHANGE UP (ref 96–108)
CO2 SERPL-SCNC: 24 MMOL/L — SIGNIFICANT CHANGE UP (ref 22–31)
CREAT SERPL-MCNC: 0.73 MG/DL — SIGNIFICANT CHANGE UP (ref 0.5–1.3)
CULTURE RESULTS: SIGNIFICANT CHANGE UP
GLUCOSE SERPL-MCNC: 82 MG/DL — SIGNIFICANT CHANGE UP (ref 70–99)
HCT VFR BLD CALC: 46 % — HIGH (ref 34.5–45)
HGB BLD-MCNC: 14.6 G/DL — SIGNIFICANT CHANGE UP (ref 11.5–15.5)
MAGNESIUM SERPL-MCNC: 2.2 MG/DL — SIGNIFICANT CHANGE UP (ref 1.6–2.6)
MCHC RBC-ENTMCNC: 28.1 PG — SIGNIFICANT CHANGE UP (ref 27–34)
MCHC RBC-ENTMCNC: 31.7 GM/DL — LOW (ref 32–36)
MCV RBC AUTO: 88.5 FL — SIGNIFICANT CHANGE UP (ref 80–100)
NRBC # BLD: 0 /100 WBCS — SIGNIFICANT CHANGE UP (ref 0–0)
PHOSPHATE SERPL-MCNC: 2.4 MG/DL — LOW (ref 2.5–4.5)
PLATELET # BLD AUTO: 181 K/UL — SIGNIFICANT CHANGE UP (ref 150–400)
POTASSIUM SERPL-MCNC: 3.8 MMOL/L — SIGNIFICANT CHANGE UP (ref 3.5–5.3)
POTASSIUM SERPL-SCNC: 3.8 MMOL/L — SIGNIFICANT CHANGE UP (ref 3.5–5.3)
RBC # BLD: 5.2 M/UL — SIGNIFICANT CHANGE UP (ref 3.8–5.2)
RBC # FLD: 13.8 % — SIGNIFICANT CHANGE UP (ref 10.3–14.5)
SODIUM SERPL-SCNC: 140 MMOL/L — SIGNIFICANT CHANGE UP (ref 135–145)
SPECIMEN SOURCE: SIGNIFICANT CHANGE UP
WBC # BLD: 6.57 K/UL — SIGNIFICANT CHANGE UP (ref 3.8–10.5)
WBC # FLD AUTO: 6.57 K/UL — SIGNIFICANT CHANGE UP (ref 3.8–10.5)

## 2019-12-03 PROCEDURE — 99233 SBSQ HOSP IP/OBS HIGH 50: CPT

## 2019-12-03 RX ORDER — METOPROLOL TARTRATE 50 MG
25 TABLET ORAL
Refills: 0 | Status: DISCONTINUED | OUTPATIENT
Start: 2019-12-03 | End: 2019-12-03

## 2019-12-03 RX ORDER — LEVETIRACETAM 250 MG/1
1000 TABLET, FILM COATED ORAL
Refills: 0 | Status: DISCONTINUED | OUTPATIENT
Start: 2019-12-03 | End: 2019-12-04

## 2019-12-03 RX ORDER — METOPROLOL TARTRATE 50 MG
25 TABLET ORAL THREE TIMES A DAY
Refills: 0 | Status: DISCONTINUED | OUTPATIENT
Start: 2019-12-03 | End: 2019-12-04

## 2019-12-03 RX ORDER — SODIUM,POTASSIUM PHOSPHATES 278-250MG
1 POWDER IN PACKET (EA) ORAL ONCE
Refills: 0 | Status: COMPLETED | OUTPATIENT
Start: 2019-12-03 | End: 2019-12-03

## 2019-12-03 RX ADMIN — Medication 25 MILLIGRAM(S): at 14:14

## 2019-12-03 RX ADMIN — RIVAROXABAN 20 MILLIGRAM(S): KIT at 17:12

## 2019-12-03 RX ADMIN — ATORVASTATIN CALCIUM 40 MILLIGRAM(S): 80 TABLET, FILM COATED ORAL at 21:17

## 2019-12-03 RX ADMIN — PREGABALIN 1000 MICROGRAM(S): 225 CAPSULE ORAL at 12:16

## 2019-12-03 RX ADMIN — LEVETIRACETAM 1000 MILLIGRAM(S): 250 TABLET, FILM COATED ORAL at 17:12

## 2019-12-03 RX ADMIN — LEVETIRACETAM 400 MILLIGRAM(S): 250 TABLET, FILM COATED ORAL at 05:30

## 2019-12-03 RX ADMIN — Medication 25 MILLIGRAM(S): at 21:17

## 2019-12-03 RX ADMIN — Medication 1 PACKET(S): at 14:14

## 2019-12-03 NOTE — PROGRESS NOTE ADULT - PROBLEM SELECTOR PLAN 1
p/w episode of left sided gaze deviation, s/p seizure activity in ER  CT head with no acute change  -s/p keppra 1000mg and 2mg of ativan in ER  -c/w keppra 1000mg bid PO  -will evaluate for source of infection, f/u UA  - EEG: abnormal study  -f/u keppra level,   -f/u neuro reccs, Dr Majano  f/u PT

## 2019-12-03 NOTE — PROGRESS NOTE ADULT - PROBLEM SELECTOR PLAN 2
pt in afib qwith RVR in ER, s/p 25mg iv push cardizem  HR elevated s/p seizure despite being on cardizem drip, with no response to iv lopressor 5mg  -started dig loading  -xarelto when pt is able to take po (if prolonged npo status, consider full dose lovenox)  -resumed metoprolol 25mg TID

## 2019-12-03 NOTE — PROGRESS NOTE ADULT - SUBJECTIVE AND OBJECTIVE BOX
No new neurological events overnight.  Stable left-sided hemiparesis on examination.  EEG shows right frontal sharps, without developing into seizure, as expected for patient with known history of right hemispheric stroke and focal seizures    Recs:  - Continue levetiracetam at higher dose of 1000mg BID  - Continue PT/OT to guide discharge plan  - Follow up in Neurology clinic      NOTE TO BE COMPLETED - PLEASE REFER TO ABOVE ONLY AND IGNORE INFORMATION BELOW    Neurology Follow up note    Name  THELMA NOLASCO    HPI:  53yo female from home, with PMH of HTN, HLD, afib (on xarelto), CVA, presented to ER after noted episode of deviation of eyes to left. Patient was keppra loaded in ER however had episode of tonic clonic activity for which she was received ativan 2mg. Assessed bedside, responds to name and is able to follow commands but is drowsy and unable to provide history. As per brother at bedside, patient was doing apparently fine until around 7pm when she complained of dizziness and developed deviation of gaze to left which is her usual presentation when she has a seizure, hence patient was brought to the ER. He denies recent fever, cough, diarrhea, worsening weakness or headaches. Patient of note was recently admitted to [facility] for similar complaints in sept 2019 when her keppra dose was increased to 750mg bid. Her EEG was significant for potential epileptogenic focus in the right posterior quadrant and cortical dysfunction in the right hemisphere.  Patient in ER was also noted to be in afib with RVR fir whch she was given 25mg iv pus hof cardizem. However following seizure activity (for which RRT was called), she was in persistent RVR for which dig loading was started (02 Dec 2019 04:44)      Interval History -        Subjective:        MEDICATIONS  (STANDING):  atorvastatin 40 milliGRAM(s) Oral at bedtime  cyanocobalamin 1000 MICROGram(s) Oral daily  ergocalciferol 33446 Unit(s) Oral <User Schedule>  levETIRAcetam 1000 milliGRAM(s) Oral two times a day  metoprolol tartrate 25 milliGRAM(s) Oral three times a day  rivaroxaban 20 milliGRAM(s) Oral with dinner    MEDICATIONS  (PRN):  acetaminophen   Tablet .. 650 milliGRAM(s) Oral every 6 hours PRN Mild Pain (1 - 3)  LORazepam   Injectable 2 milliGRAM(s) IV Push every 5 minutes PRN breakthrough seizure      Allergies    penicillin (Hives)    Intolerances        Review of Systems:  General: [ ] None, [ ] chills, [ ]fatigue, [ ] fevers  Skin: [ ] None, [ ] rash   HEENT: [ ] None, [ ] head injury, [ ] blurred vision, [ ] double vision, [ ] eye pain, [ ] visual loss, [ ] hearing loss, [ ] deafness, [ ] ear pain, [ ] ringing in the ears, [ ] vertigo, [ ] sinus pain, [ ] voice changes  Neck: [ ] None, [ ] neck stiffness  Respiratory: [ ] None, [ ] cough, [ ] difficulty breathing  Cardiovascular: [ ] None, [ ] calf cramps, [ ] chest pain, [ ] leg pain, [ ] swelling, [ ] rapid heart rate, [ ] shortness of breath  Gastrointestinal: [ ] None, [ ] abdominal pain, [ ] nausea, [ ] vomiting  Musculoskeletal: [ ] None, [ ] back pain, [ ] joint pain, [ ] joint stiffness, [ ] leg cramps, [ ] muscle atrophy, [ ] muscle cramps, [ ] muscle weakness, [ ] swelling of extremities  Neurological: [ ] None, [ ] Dizziness, [ ] decreased memory, [ ] fainting, [ ] focal neurological symptoms, [ ] headaches, [ ] incontinence of stool, [ ] incontinence of urine, [ ] loss of consciousness, [ ] numbness, [ ] seizures, [ ] spinning sensation, [ ] stroke, [ ] trouble walking, [ ] unsteadiness, [ ] visual changes, [ ] weakness  Psychiatric: [ ] None,  [ ] depression, [ ] anxiety, [ ] hallucinations, [ ] inability to concentrate, [ ] mood changes, [ ] panic attacks  Hematology: [ ] None,  [ ] blood clots, [ ] spontaneous bleeding      Objective:   Vital Signs Last 24 Hrs  T(C): 36.8 (03 Dec 2019 21:16), Max: 37 (03 Dec 2019 05:00)  T(F): 98.3 (03 Dec 2019 21:16), Max: 98.6 (03 Dec 2019 05:00)  HR: 94 (03 Dec 2019 21:16) (67 - 94)  BP: 134/95 (03 Dec 2019 21:16) (114/76 - 136/84)  BP(mean): --  RR: 19 (03 Dec 2019 21:16) (18 - 19)  SpO2: 98% (03 Dec 2019 21:16) (95% - 98%)    General Exam:   General appearance: No acute distress                 Cardiovascular: Pedal dorsalis pulses intact bilaterally    Neurological Exam:  Mental Status: Orientated to self, date and place.  Attention intact.  No dysarthria, aphasia or neglect.  Knowledge intact.  Registration intact.  Short and long term memory grossly intact.      Cranial Nerves: CN I - not tested.  PERRL, EOMI, VFF, no nystagmus or diplopia.  No APD.  Fundi not visualized bilaterally.  CN V1-3 intact to light touch and pinprick.  No facial asymmetry.  Hearing intact to finger rub bilaterally.  Tongue, uvula and palate midline.  Sternocleidomastoid and Trapezius intact bilaterally.    Motor:   Tone: normal.                  Strength: intact throughout  Pronator drift: none                 Dysmeria: None to finger-nose-finger or heel-shin-heel  No truncal ataxia.    Tremor: No resting, postural or action tremor.  No myoclonus.    Sensation: intact to light touch, pinprick, vibration and proprioception    Deep Tendon Reflexes: 1+ bilateral biceps, triceps, brachioradialis, knee and ankle  Toes flexor bilaterally    Gait: normal and stable.      Other:    12-03    140  |  108  |  18  ----------------------------<  82  3.8   |  24  |  0.73    Ca    9.5      03 Dec 2019 08:40  Phos  2.4     12-03  Mg     2.2     12-03    TPro  7.5  /  Alb  3.2<L>  /  TBili  0.4  /  DBili  x   /  AST  16  /  ALT  22  /  AlkPhos  96  12-02 12-03    140  |  108  |  18  ----------------------------<  82  3.8   |  24  |  0.73    Ca    9.5      03 Dec 2019 08:40  Phos  2.4     12-03  Mg     2.2     12-03    TPro  7.5  /  Alb  3.2<L>  /  TBili  0.4  /  DBili  x   /  AST  16  /  ALT  22  /  AlkPhos  96  12-02    LIVER FUNCTIONS - ( 02 Dec 2019 07:02 )  Alb: 3.2 g/dL / Pro: 7.5 g/dL / ALK PHOS: 96 U/L / ALT: 22 U/L DA / AST: 16 U/L / GGT: x             Radiology    EKG:  tele:  TTE:  EEG:                 Please contact the Neurology consult service with any questions.    Wiley Majano MD   of Neurology  [facility] [employer] at [facility] Neurology Follow up note    Name  THELMA NOLASCO    HPI:  53yo female from home, with PMH of HTN, HLD, afib (on xarelto), CVA, presented to ER after noted episode of deviation of eyes to left. Patient was keppra loaded in ER however had episode of tonic clonic activity for which she was received ativan 2mg. Assessed bedside, responds to name and is able to follow commands but is drowsy and unable to provide history. As per brother at bedside, patient was doing apparently fine until around 7pm when she complained of dizziness and developed deviation of gaze to left which is her usual presentation when she has a seizure, hence patient was brought to the ER. He denies recent fever, cough, diarrhea, worsening weakness or headaches. Patient of note was recently admitted to [facility] for similar complaints in sept 2019 when her keppra dose was increased to 750mg bid. Her EEG was significant for potential epileptogenic focus in the right posterior quadrant and cortical dysfunction in the right hemisphere.  Patient in ER was also noted to be in afib with RVR fir whch she was given 25mg iv pus hof cardizem. However following seizure activity (for which RRT was called), she was in persistent RVR for which dig loading was started (02 Dec 2019 04:44)    NEURO HPI:  The seizure is described as body shaking, followed by staring towards the left  Seizure onset at 7:30PM on 12/1  The frequency of seizure this is the 2nd seizure  The seizure is brought up by abdominal pain  The patient has been previously on Keppra 750mg Q 12h    Interval History -  No new neurological events overnight.    MEDICATIONS  (STANDING):  atorvastatin 40 milliGRAM(s) Oral at bedtime  cyanocobalamin 1000 MICROGram(s) Oral daily  ergocalciferol 18611 Unit(s) Oral <User Schedule>  levETIRAcetam 1000 milliGRAM(s) Oral two times a day  metoprolol tartrate 25 milliGRAM(s) Oral three times a day  rivaroxaban 20 milliGRAM(s) Oral with dinner    MEDICATIONS  (PRN):  acetaminophen   Tablet .. 650 milliGRAM(s) Oral every 6 hours PRN Mild Pain (1 - 3)  LORazepam   Injectable 2 milliGRAM(s) IV Push every 5 minutes PRN breakthrough seizure    Allergies  penicillin (Hives)    Review of Systems: Fourteen systems reviewed and negative except as in HPI / Interval History.      Objective:   Vital Signs Last 24 Hrs  T(C): 36.8 (03 Dec 2019 21:16), Max: 37 (03 Dec 2019 05:00)  T(F): 98.3 (03 Dec 2019 21:16), Max: 98.6 (03 Dec 2019 05:00)  HR: 94 (03 Dec 2019 21:16) (67 - 94)  BP: 134/95 (03 Dec 2019 21:16) (114/76 - 136/84)  RR: 19 (03 Dec 2019 21:16) (18 - 19)  SpO2: 98% (03 Dec 2019 21:16) (95% - 98%)    General Exam:   General appearance: No acute distress                 Cardiovascular: Pedal dorsalis pulses intact bilaterally    Mental Status: Orientated to place, not self or date.  Attention intact.  No dysarthria, aphasia or neglect.  Knowledge intact.  Registration intact.  Short and long term memory grossly intact.      Cranial Nerves:  PERRL, EOMI x 2, VFF, no nystagmus or diplopia.  No APD.  Fundi not visualized.  CN V1-3 intact to light touch.  No facial asymmetry.  Hearing intact to finger rub bilaterally.  Tongue, uvula and palate midline.  Sternocleidomastoid and Trapezius intact bilaterally.    Motor:   Tone: Normal x 4         Strength impaired on left side, residual effects for 2016 stroke.  Right intact                      No dysmetria on finger-nose-finger or heel-shin-heel  No truncal ataxia.  No resting, postural or action tremor, or myoclonus.    Sensation: intact to light touch    Deep Tendon Reflexes: 2+ bilateral biceps, triceps, brachioradialis, knee and ankle  Right toe flexor.  Left (+) Babinski     Gait and Romberg testing deferred due to left-sided hemiparesis.      NIHSS=8 (LOC-1, Partial Hemianopia-1, LUE-3, LLE-2, & sensory-1)  MRS=1      Labs:    12-03    140  |  108  |  18  ----------------------------<  82  3.8   |  24  |  0.73    Ca    9.5      03 Dec 2019 08:40  Phos  2.4     12-03  Mg     2.2     12-03    TPro  7.5  /  Alb  3.2<L>  /  TBili  0.4  /  DBili  x   /  AST  16  /  ALT  22  /  AlkPhos  96  12-02    Hemoglobin A1C, Whole Blood (09.22.19 @ 22:12)    Hemoglobin A1C, Whole Blood: 5.7%      Neuroimaging:    CT Head (12/1/19):  - No acute intracranial abnormalities  - Chronic infarcts in right EJ & MCA territories  - Chronic microvascular disease    EEG (12/2/19):   Abnormal EEG in the awake, drowsy and asleep states.  - Sharps, right frontal  - Focal slowing, right frontal  - Diffuse beta activity   - No clinical or electrographic seizures      Assessment:  54 RHF with breakthrough post-stroke seizure and chronic left-sided hemiparesis secondary to chronic right hemispheric infarcts, secondary to atrial fibrillation.      Recommendations:    - Continue levetiracetam at higher dose of 1000mg BID    - Secondary stroke prevention:       - Continue atorvastatin 40mg QHS       - Resume aspirin 81mg daily       - Continue rivaroxaban and metoprolol for atrial fibrillation       - Treat BP > 120/80    - Continue PT/OT to guide discharge plan    - Follow up in Neurology clinic for seizure management and secondary stroke prevention.      Please contact the Neurology consult service with any questions.    Wiley Majano MD   of Neurology  [facility] [employer] [facility] at \[facility]\""/[facility]

## 2019-12-03 NOTE — PROGRESS NOTE ADULT - SUBJECTIVE AND OBJECTIVE BOX
PGY1 Note discussed with supervising resident and primary attending.    Patient is a 54y old  Female who presents with a chief complaint of suspected seizure activity (02 Dec 2019 09:49)      INTERVAL HPI/OVERNIGHT EVENTS:    MEDICATIONS  (STANDING):  atorvastatin 40 milliGRAM(s) Oral at bedtime  cyanocobalamin 1000 MICROGram(s) Oral daily  ergocalciferol 50816 Unit(s) Oral <User Schedule>  levETIRAcetam 1000 milliGRAM(s) Oral two times a day  metoprolol tartrate 25 milliGRAM(s) Oral three times a day  rivaroxaban 20 milliGRAM(s) Oral with dinner    MEDICATIONS  (PRN):  acetaminophen   Tablet .. 650 milliGRAM(s) Oral every 6 hours PRN Mild Pain (1 - 3)  LORazepam   Injectable 2 milliGRAM(s) IV Push every 5 minutes PRN breakthrough seizure      Allergies    penicillin (Hives)    Intolerances        REVIEW OF SYSTEMS:  CONSTITUTIONAL: No fever, weight loss, or fatigue  RESPIRATORY: No cough, wheezing, chills or hemoptysis; No shortness of breath  CARDIOVASCULAR: No chest pain, palpitations, dizziness, or leg swelling  GASTROINTESTINAL: No abdominal or epigastric pain. No nausea, vomiting, or hematemesis; No diarrhea or constipation. No melena or hematochezia.  NEUROLOGICAL: No headaches, memory loss, loss of strength on LUE &LLE, numbness, or tremors  SKIN: No itching, burning, rashes, or lesions     Vital Signs Last 24 Hrs  T(C): 36.5 (03 Dec 2019 16:04), Max: 37.1 (02 Dec 2019 19:45)  T(F): 97.7 (03 Dec 2019 16:04), Max: 98.7 (02 Dec 2019 19:45)  HR: 64 (03 Dec 2019 16:04) (64 - 82)  BP: 124/93 (03 Dec 2019 16:04) (114/76 - 136/84)  BP(mean): --  RR: 17 (03 Dec 2019 16:04) (17 - 18)  SpO2: 100% (03 Dec 2019 16:04) (95% - 100%)    PHYSICAL EXAM:  GENERAL: NAD, well-groomed, well-developed  HEAD:  Atraumatic, Normocephalic  EYES: EOMI, PERRLA, conjunctiva and sclera clear  NECK: Supple, No JVD, Normal thyroid  CHEST/LUNG: Clear to percussion bilaterally; No rales, rhonchi, wheezing, or rubs  HEART: Regular rate and rhythm; No murmurs, rubs, or gallops  ABDOMEN: Soft, Nontender, Nondistended; Bowel sounds present  NERVOUS SYSTEM:  Alert & Oriented X3, Good concentration; Motor Strength 5/5 B/L except LUE 1/5, LLE 3/5   EXTREMITIES:  2+ Peripheral Pulses, No clubbing, cyanosis, or edema  SKIN;    LABS:                        14.6   6.57  )-----------( 181      ( 03 Dec 2019 08:40 )             46.0     12-    140  |  108  |  18  ----------------------------<  82  3.8   |  24  |  0.73    Ca    9.5      03 Dec 2019 08:40  Phos  2.4     12-03  Mg     2.2     12-03    TPro  7.5  /  Alb  3.2<L>  /  TBili  0.4  /  DBili  x   /  AST  16  /  ALT  22  /  AlkPhos  96  12-02    PT/INR - ( 01 Dec 2019 22:13 )   PT: 14.0 sec;   INR: 1.25 ratio         PTT - ( 01 Dec 2019 22:13 )  PTT:32.3 sec  Urinalysis Basic - ( 02 Dec 2019 10:16 )    Color: Yellow / Appearance: Clear / S.005 / pH: x  Gluc: x / Ketone: Negative  / Bili: Negative / Urobili: Negative   Blood: x / Protein: Negative / Nitrite: Negative   Leuk Esterase: Negative / RBC: x / WBC x   Sq Epi: x / Non Sq Epi: x / Bacteria: x      CAPILLARY BLOOD GLUCOSE          RADIOLOGY & ADDITIONAL TESTS:    Imaging Personally Reviewed:  [ ] YES  [ ] NO    Consultant(s) Notes Reviewed:  [ ] YES  [ ] NO

## 2019-12-03 NOTE — PROGRESS NOTE ADULT - ASSESSMENT
55yo female from home, with PMH of HTN, HLD, afib (on xarelto), CVA, presented to ER after noted episode of deviation of eyes to left, pt was keppra loaded qith 1gm in ER however was RRT for tonic clonic activity requiring 2mg ativan.   Patient in ER was also noted to be in afib with RVR fir whch she was given 25mg iv pus hof cardizem. However following seizure activity (for which RRT was called), she was in persistent RVR for which dig loading was started

## 2019-12-04 ENCOUNTER — TRANSCRIPTION ENCOUNTER (OUTPATIENT)
Age: 54
End: 2019-12-04

## 2019-12-04 VITALS
OXYGEN SATURATION: 98 % | DIASTOLIC BLOOD PRESSURE: 82 MMHG | TEMPERATURE: 98 F | RESPIRATION RATE: 18 BRPM | HEART RATE: 72 BPM | SYSTOLIC BLOOD PRESSURE: 135 MMHG

## 2019-12-04 LAB
GLUCOSE BLDC GLUCOMTR-MCNC: 145 MG/DL — HIGH (ref 70–99)
LEVETIRACETAM SERPL-MCNC: 11.8 MCG/ML — LOW (ref 12–46)
TOPIRAMATE SERPL-MCNC: <1 MCG/ML — SIGNIFICANT CHANGE UP

## 2019-12-04 PROCEDURE — 99291 CRITICAL CARE FIRST HOUR: CPT | Mod: 25

## 2019-12-04 PROCEDURE — 85027 COMPLETE CBC AUTOMATED: CPT

## 2019-12-04 PROCEDURE — 85730 THROMBOPLASTIN TIME PARTIAL: CPT

## 2019-12-04 PROCEDURE — 80053 COMPREHEN METABOLIC PANEL: CPT

## 2019-12-04 PROCEDURE — 82746 ASSAY OF FOLIC ACID SERUM: CPT

## 2019-12-04 PROCEDURE — 82553 CREATINE MB FRACTION: CPT

## 2019-12-04 PROCEDURE — 99233 SBSQ HOSP IP/OBS HIGH 50: CPT | Mod: GC

## 2019-12-04 PROCEDURE — 70450 CT HEAD/BRAIN W/O DYE: CPT

## 2019-12-04 PROCEDURE — 84100 ASSAY OF PHOSPHORUS: CPT

## 2019-12-04 PROCEDURE — 84702 CHORIONIC GONADOTROPIN TEST: CPT

## 2019-12-04 PROCEDURE — 85610 PROTHROMBIN TIME: CPT

## 2019-12-04 PROCEDURE — 80177 DRUG SCRN QUAN LEVETIRACETAM: CPT

## 2019-12-04 PROCEDURE — 80201 ASSAY OF TOPIRAMATE: CPT

## 2019-12-04 PROCEDURE — 93005 ELECTROCARDIOGRAM TRACING: CPT

## 2019-12-04 PROCEDURE — 96374 THER/PROPH/DIAG INJ IV PUSH: CPT

## 2019-12-04 PROCEDURE — 83690 ASSAY OF LIPASE: CPT

## 2019-12-04 PROCEDURE — 80048 BASIC METABOLIC PNL TOTAL CA: CPT

## 2019-12-04 PROCEDURE — 84145 PROCALCITONIN (PCT): CPT

## 2019-12-04 PROCEDURE — 84484 ASSAY OF TROPONIN QUANT: CPT

## 2019-12-04 PROCEDURE — 82306 VITAMIN D 25 HYDROXY: CPT

## 2019-12-04 PROCEDURE — 71045 X-RAY EXAM CHEST 1 VIEW: CPT

## 2019-12-04 PROCEDURE — 36415 COLL VENOUS BLD VENIPUNCTURE: CPT

## 2019-12-04 PROCEDURE — 83605 ASSAY OF LACTIC ACID: CPT

## 2019-12-04 PROCEDURE — 87086 URINE CULTURE/COLONY COUNT: CPT

## 2019-12-04 PROCEDURE — 82550 ASSAY OF CK (CPK): CPT

## 2019-12-04 PROCEDURE — 95957 EEG DIGITAL ANALYSIS: CPT

## 2019-12-04 PROCEDURE — 93306 TTE W/DOPPLER COMPLETE: CPT

## 2019-12-04 PROCEDURE — 92610 EVALUATE SWALLOWING FUNCTION: CPT

## 2019-12-04 PROCEDURE — 95819 EEG AWAKE AND ASLEEP: CPT

## 2019-12-04 PROCEDURE — 84443 ASSAY THYROID STIM HORMONE: CPT

## 2019-12-04 PROCEDURE — 99233 SBSQ HOSP IP/OBS HIGH 50: CPT

## 2019-12-04 PROCEDURE — 82962 GLUCOSE BLOOD TEST: CPT

## 2019-12-04 PROCEDURE — 83735 ASSAY OF MAGNESIUM: CPT

## 2019-12-04 PROCEDURE — 96375 TX/PRO/DX INJ NEW DRUG ADDON: CPT

## 2019-12-04 PROCEDURE — 81003 URINALYSIS AUTO W/O SCOPE: CPT

## 2019-12-04 PROCEDURE — 82607 VITAMIN B-12: CPT

## 2019-12-04 RX ORDER — METOPROLOL TARTRATE 50 MG
1 TABLET ORAL
Qty: 0 | Refills: 0 | DISCHARGE

## 2019-12-04 RX ORDER — LEVETIRACETAM 250 MG/1
1 TABLET, FILM COATED ORAL
Qty: 60 | Refills: 0
Start: 2019-12-04 | End: 2020-01-02

## 2019-12-04 RX ORDER — METOPROLOL TARTRATE 50 MG
1 TABLET ORAL
Qty: 0 | Refills: 0 | DISCHARGE
Start: 2019-12-04 | End: 2020-01-02

## 2019-12-04 RX ORDER — METOPROLOL TARTRATE 50 MG
5 TABLET ORAL ONCE
Refills: 0 | Status: COMPLETED | OUTPATIENT
Start: 2019-12-04 | End: 2019-12-04

## 2019-12-04 RX ORDER — SODIUM,POTASSIUM PHOSPHATES 278-250MG
1 POWDER IN PACKET (EA) ORAL
Refills: 0 | Status: DISCONTINUED | OUTPATIENT
Start: 2019-12-04 | End: 2019-12-04

## 2019-12-04 RX ORDER — LEVETIRACETAM 250 MG/1
1 TABLET, FILM COATED ORAL
Qty: 0 | Refills: 0 | DISCHARGE

## 2019-12-04 RX ORDER — ERGOCALCIFEROL 1.25 MG/1
1 CAPSULE ORAL
Qty: 7 | Refills: 0
Start: 2019-12-04 | End: 2020-01-21

## 2019-12-04 RX ORDER — METOPROLOL TARTRATE 50 MG
1 TABLET ORAL
Qty: 90 | Refills: 0
Start: 2019-12-04 | End: 2020-01-02

## 2019-12-04 RX ORDER — PREGABALIN 225 MG/1
1 CAPSULE ORAL
Qty: 30 | Refills: 0
Start: 2019-12-04 | End: 2020-01-02

## 2019-12-04 RX ORDER — ATORVASTATIN CALCIUM 80 MG/1
1 TABLET, FILM COATED ORAL
Qty: 0 | Refills: 0 | DISCHARGE
Start: 2019-12-04

## 2019-12-04 RX ORDER — METOPROLOL TARTRATE 50 MG
50 TABLET ORAL THREE TIMES A DAY
Refills: 0 | Status: DISCONTINUED | OUTPATIENT
Start: 2019-12-04 | End: 2019-12-04

## 2019-12-04 RX ADMIN — Medication 5 MILLIGRAM(S): at 10:28

## 2019-12-04 RX ADMIN — Medication 1 PACKET(S): at 11:09

## 2019-12-04 RX ADMIN — PREGABALIN 1000 MICROGRAM(S): 225 CAPSULE ORAL at 11:09

## 2019-12-04 RX ADMIN — LEVETIRACETAM 1000 MILLIGRAM(S): 250 TABLET, FILM COATED ORAL at 06:47

## 2019-12-04 RX ADMIN — Medication 25 MILLIGRAM(S): at 05:13

## 2019-12-04 NOTE — DISCHARGE NOTE PROVIDER - NSDCMRMEDTOKEN_GEN_ALL_CORE_FT
aspirin 81 mg oral tablet, chewable: 1 tab(s) orally once a day   atorvastatin 40 mg oral tablet: 1 tab(s) orally once a day (at bedtime)  Keppra 750 mg oral tablet: 1 tab(s) orally 2 times a day  lisinopril-hydrochlorothiazide 20 mg-12.5 mg oral tablet: 1 tab(s) orally once a day  metoprolol succinate 50 mg oral tablet, extended release: 1 tab(s) orally once a day  topiramate 50 mg oral tablet: 1 tab(s) orally once a day (at bedtime)   Xarelto 20 mg oral tablet: 1 tab(s) orally once a day (in the evening) aspirin 81 mg oral tablet, chewable: 1 tab(s) orally once a day   atorvastatin 40 mg oral tablet: 1 tab(s) orally once a day (at bedtime)  cyanocobalamin 1000 mcg oral tablet: 1 tab(s) orally once a day  Drisdol 50,000 intl units (1.25 mg) oral capsule: 1 cap(s) orally once a week  levETIRAcetam 1000 mg oral tablet: 1 tab(s) orally 2 times a day  lisinopril-hydrochlorothiazide 20 mg-12.5 mg oral tablet: 1 tab(s) orally once a day  metoprolol tartrate 50 mg oral tablet: 1 tab(s) orally 3 times a day  Xarelto 20 mg oral tablet: 1 tab(s) orally once a day (in the evening)

## 2019-12-04 NOTE — DISCHARGE NOTE PROVIDER - NSFOLLOWUPCLINICS_GEN_ALL_ED_FT
[facility]  Neurology  [address] [place] [address]  Phone: [phone]  Fax: [phone]  Follow Up Time: 1 month

## 2019-12-04 NOTE — PROGRESS NOTE ADULT - REASON FOR ADMISSION
suspected seizure activity

## 2019-12-04 NOTE — DISCHARGE NOTE NURSING/CASE MANAGEMENT/SOCIAL WORK - NSDCFUADDAPPT_GEN_ALL_CORE_FT
Please follow up with Dr. Majano on 1/8/20 at 1pm. Contact information is above.  Please follow up your cardiologist Dr. Devlin in 1 week.

## 2019-12-04 NOTE — PHYSICAL THERAPY INITIAL EVALUATION ADULT - PERTINENT HX OF CURRENT PROBLEM, REHAB EVAL
none
Patient was brought to ED from home with deviation of eyes to Left. Imaging negative for acute findings. Patient had episode of seizure in the ED. Patient also has a h/o stroke

## 2019-12-04 NOTE — PHYSICAL THERAPY INITIAL EVALUATION ADULT - TRANSFER SAFETY CONCERNS NOTED: SIT/STAND, REHAB EVAL
decreased safety awareness/decreased balance during turns/decreased weight-shifting ability/losing balance

## 2019-12-04 NOTE — DISCHARGE NOTE PROVIDER - NSDCCPCAREPLAN_GEN_ALL_CORE_FT
PRINCIPAL DISCHARGE DIAGNOSIS  Diagnosis: Seizure disorder as sequela of cerebrovascular accident  Assessment and Plan of Treatment:       SECONDARY DISCHARGE DIAGNOSES  Diagnosis: Acute encephalopathy  Assessment and Plan of Treatment:     Diagnosis: Atrial fibrillation with RVR  Assessment and Plan of Treatment:     Diagnosis: Seizure disorder as sequela of cerebrovascular accident  Assessment and Plan of Treatment: PRINCIPAL DISCHARGE DIAGNOSIS  Diagnosis: Seizure disorder as sequela of cerebrovascular accident  Assessment and Plan of Treatment: seizure activity (for which RRT was called), she was in persistent RVR for which dig loading was started   You had seizure activity and received ativan 2mg.   Xray Chest showed mild congestive changes.   CT Head w/o cont showed Sequela of prior moderately large right MCA and partial EJ territory infarcts, unchanged.  EEG showed abnormal EEG in the awake, drowsy and asleep states, which is Sharps, right frontal, Focal slowing, right frontal, Diffuse beta activity. Speech and swallow recommended Mechanical Soft Diet (chopped vegetables & ground meats w/gravy if possible) and Thin Liquids. Keppra was increased from pt's home dose 750mg to 1000mg BID. Physical Therapy saw the pt and recommended acute rehab, but you decided to go home with Home health aid and outpatient PT.  Please follow up with neurology Dr. Majano on 1/8/20 at 1pm ([phone])      SECONDARY DISCHARGE DIAGNOSES  Diagnosis: Chronic congestive heart failure with left ventricular diastolic dysfunction  Assessment and Plan of Treatment: Transthoracic Echocardiogram showed normal Left Ventricular Systolic Function,  (EF = 55 to 60%) and grade II diastolic dysfunction, which didn't change from the previous echocardiogram.    Diagnosis: Acute encephalopathy  Assessment and Plan of Treatment:     Diagnosis: Atrial fibrillation with RVR  Assessment and Plan of Treatment: You had afib with RVR  whch you were given 25mg iv push of cardizem. Cardizem drip was started. HR went down and it was stopped. HR was managed with metoprolol 50mg BID. HR went up to 180s and IV lopressor was given accordingly.    Diagnosis: Seizure disorder as sequela of cerebrovascular accident  Assessment and Plan of Treatment: PRINCIPAL DISCHARGE DIAGNOSIS  Diagnosis: Seizure disorder as sequela of cerebrovascular accident  Assessment and Plan of Treatment: You had seizure activity and received ativan 2mg.   Xray Chest showed mild congestive changes.   CT Head w/o cont showed Sequela of prior moderately large right MCA and partial EJ territory infarcts, unchanged.  EEG showed abnormal EEG in the awake, drowsy and asleep states, which is Sharps, right frontal, Focal slowing, right frontal, Diffuse beta activity. Speech and swallow recommended Mechanical Soft Diet (chopped vegetables & ground meats w/gravy if possible) and Thin Liquids. Keppra was increased from pt's home dose 750mg to 1000mg BID. Physical Therapy saw the pt and recommended acute rehab, but you decided to go home with Home health aid and outpatient PT.  Please follow up with neurology Dr. Majano on 1/8/20 at 1pm ([phone])      SECONDARY DISCHARGE DIAGNOSES  Diagnosis: Chronic congestive heart failure with left ventricular diastolic dysfunction  Assessment and Plan of Treatment: Transthoracic Echocardiogram showed normal Left Ventricular Systolic Function,  (EF = 55 to 60%) and grade II diastolic dysfunction, which didn't change from the previous echocardiogram.    Diagnosis: History of CVA in adulthood  Assessment and Plan of Treatment: Please continue with aspirin, statin and follow up with neurology Dr. Majano on 1/8/20 at 1pm ([phone])    Diagnosis: Hypertension  Assessment and Plan of Treatment: We held off your home blood pressure medication as your blood pressure was normal at the beggining. Please eat healthy food ( more vegetable and low fat & low salt food),  and try to reduce your weight. You can re-start your lisinopril-hydrochlorothiazidepill. Please follow up with your primary care doctor.    Diagnosis: HLD (hyperlipidemia)  Assessment and Plan of Treatment: Lipid (LDL cholesterol, triglyceride) in your blood is high. Please have a healthy diet (low fat, high fiber) and take home medication. Please follow up with your primary care doctor.    Diagnosis: Atrial fibrillation with RVR  Assessment and Plan of Treatment: You had afib with RVR  whch you were given 25mg iv push of cardizem and digoxine. Cardizem drip was started. HR went down and it was stopped. HR was managed with metoprolol 50mg twice a day. HR went up to 180s and IV lopressor was given accordingly. Please continue metoprolol 50mg twice a day and Xarelto home dosage. Please follow up with your primary care doctor.

## 2019-12-04 NOTE — PROGRESS NOTE ADULT - ATTENDING COMMENTS
Counseling included discussion of diagnosis of post-stroke seizure, and medication modifications to help prevent future seizures. Plan was discussed with the patient and her  at bedside.
Counseling included discussion of the need for an increased dose of levetiracetam to help prevent seizures. Coordination of care included arrangement of a close follow-up appointment in Neurology clinic. Plan was discussed with family and patient at bedside.
Stable for discharge.
Patient seen and examined.     T(C): 36.8 (12-03-19 @ 21:16), Max: 36.8 (12-03-19 @ 21:16)  HR: 94 (12-03-19 @ 21:16) (72 - 94)  BP: 134/95 (12-03-19 @ 21:16) (114/77 - 136/84)  RR: 19 (12-03-19 @ 21:16) (18 - 19)  SpO2: 98% (12-03-19 @ 21:16) (95% - 98%)    Seizure   HTN   A.fib   Neuro follow up appreciated, increased Keppra with abnormal EEG showing  focal lesion and seizure tendency in the right frontal region consistent with CTA findings.   Speech swallow eval appreciated, recommend soft diet, resume Xarelto.   PT d/c planning.   .

## 2019-12-04 NOTE — DISCHARGE NOTE NURSING/CASE MANAGEMENT/SOCIAL WORK - PATIENT PORTAL LINK FT
You can access the FollowMyHealth Patient Portal offered by [facility] by registering at the following website: [email or web address]. By joining [facility]’s FollowMyHealth portal, you will also be able to view your health information using other applications (apps) compatible with our system.

## 2019-12-04 NOTE — PROGRESS NOTE ADULT - SUBJECTIVE AND OBJECTIVE BOX
No new seizures or other neurological events overnight.  Stable left-sided hemiparesis, able to ambulate with a walker.    Dx: Breakthrough post-stroke seizure    Recs:  - Continue levetiracetam at higher dose of 1000mg BID  - Continue secondary stroke prevention  - Follow-up appointment scheduled on 1/8/19 at 1pm in [facility]      NOTE TO BE COMPLETED - PLEASE REFER TO ABOVE ONLY AND IGNORE INFORMATION BELOW    Neurology Follow up note    Name  THELMA NOLASCO    HPI:  53yo female from home, with PMH of HTN, HLD, afib (on xarelto), CVA, presented to ER after noted episode of deviation of eyes to left. Patient was keppra loaded in ER however had episode of tonic clonic activity for which she was received ativan 2mg. Assessed bedside, responds to name and is able to follow commands but is drowsy and unable to provide history. As per brother at bedside, patient was doing apparently fine until around 7pm when she complained of dizziness and developed deviation of gaze to left which is her usual presentation when she has a seizure, hence patient was brought to the ER. He denies recent fever, cough, diarrhea, worsening weakness or headaches. Patient of note was recently admitted to [facility] for similar complaints in sept 2019 when her keppra dose was increased to 750mg bid. Her EEG was significant for potential epileptogenic focus in the right posterior quadrant and cortical dysfunction in the right hemisphere.  Patient in ER was also noted to be in afib with RVR fir whch she was given 25mg iv pus hof cardizem. However following seizure activity (for which RRT was called), she was in persistent RVR for which dig loading was started (02 Dec 2019 04:44)      Interval History -        Subjective:        MEDICATIONS  (STANDING):  atorvastatin 40 milliGRAM(s) Oral at bedtime  cyanocobalamin 1000 MICROGram(s) Oral daily  ergocalciferol 54969 Unit(s) Oral <User Schedule>  levETIRAcetam 1000 milliGRAM(s) Oral two times a day  metoprolol tartrate 50 milliGRAM(s) Oral three times a day  potassium phosphate / sodium phosphate powder 1 Packet(s) Oral four times a day  rivaroxaban 20 milliGRAM(s) Oral with dinner    MEDICATIONS  (PRN):  acetaminophen   Tablet .. 650 milliGRAM(s) Oral every 6 hours PRN Mild Pain (1 - 3)  LORazepam   Injectable 2 milliGRAM(s) IV Push every 5 minutes PRN breakthrough seizure      Allergies    penicillin (Hives)    Intolerances        Review of Systems:  General: [ ] None, [ ] chills, [ ]fatigue, [ ] fevers  Skin: [ ] None, [ ] rash   HEENT: [ ] None, [ ] head injury, [ ] blurred vision, [ ] double vision, [ ] eye pain, [ ] visual loss, [ ] hearing loss, [ ] deafness, [ ] ear pain, [ ] ringing in the ears, [ ] vertigo, [ ] sinus pain, [ ] voice changes  Neck: [ ] None, [ ] neck stiffness  Respiratory: [ ] None, [ ] cough, [ ] difficulty breathing  Cardiovascular: [ ] None, [ ] calf cramps, [ ] chest pain, [ ] leg pain, [ ] swelling, [ ] rapid heart rate, [ ] shortness of breath  Gastrointestinal: [ ] None, [ ] abdominal pain, [ ] nausea, [ ] vomiting  Musculoskeletal: [ ] None, [ ] back pain, [ ] joint pain, [ ] joint stiffness, [ ] leg cramps, [ ] muscle atrophy, [ ] muscle cramps, [ ] muscle weakness, [ ] swelling of extremities  Neurological: [ ] None, [ ] Dizziness, [ ] decreased memory, [ ] fainting, [ ] focal neurological symptoms, [ ] headaches, [ ] incontinence of stool, [ ] incontinence of urine, [ ] loss of consciousness, [ ] numbness, [ ] seizures, [ ] spinning sensation, [ ] stroke, [ ] trouble walking, [ ] unsteadiness, [ ] visual changes, [ ] weakness  Psychiatric: [ ] None,  [ ] depression, [ ] anxiety, [ ] hallucinations, [ ] inability to concentrate, [ ] mood changes, [ ] panic attacks  Hematology: [ ] None,  [ ] blood clots, [ ] spontaneous bleeding      Objective:   Vital Signs Last 24 Hrs  T(C): 36.8 (04 Dec 2019 15:29), Max: 36.9 (04 Dec 2019 04:37)  T(F): 98.2 (04 Dec 2019 15:29), Max: 98.5 (04 Dec 2019 04:37)  HR: 72 (04 Dec 2019 15:29) (72 - 100)  BP: 135/82 (04 Dec 2019 15:29) (124/78 - 145/91)  BP(mean): --  RR: 18 (04 Dec 2019 15:29) (18 - 19)  SpO2: 98% (04 Dec 2019 15:29) (96% - 100%)    General Exam:   General appearance: No acute distress                 Cardiovascular: Pedal dorsalis pulses intact bilaterally    Neurological Exam:  Mental Status: Orientated to self, date and place.  Attention intact.  No dysarthria, aphasia or neglect.  Knowledge intact.  Registration intact.  Short and long term memory grossly intact.      Cranial Nerves: CN I - not tested.  PERRL, EOMI, VFF, no nystagmus or diplopia.  No APD.  Fundi not visualized bilaterally.  CN V1-3 intact to light touch and pinprick.  No facial asymmetry.  Hearing intact to finger rub bilaterally.  Tongue, uvula and palate midline.  Sternocleidomastoid and Trapezius intact bilaterally.    Motor:   Tone: normal.                  Strength: intact throughout  Pronator drift: none                 Dysmeria: None to finger-nose-finger or heel-shin-heel  No truncal ataxia.    Tremor: No resting, postural or action tremor.  No myoclonus.    Sensation: intact to light touch, pinprick, vibration and proprioception    Deep Tendon Reflexes: 1+ bilateral biceps, triceps, brachioradialis, knee and ankle  Toes flexor bilaterally    Gait: normal and stable.      Other:    12-03    140  |  108  |  18  ----------------------------<  82  3.8   |  24  |  0.73    Ca    9.5      03 Dec 2019 08:40  Phos  2.4     12-03  Mg     2.2     12-03 12-03    140  |  108  |  18  ----------------------------<  82  3.8   |  24  |  0.73    Ca    9.5      03 Dec 2019 08:40  Phos  2.4     12-03  Mg     2.2     12-03          Radiology    EKG:  tele:  TTE:  EEG:                 Please contact the Neurology consult service with any questions.    Wiley Majano MD   of Neurology  [facility] at [facility] Neurology Follow up note    Name  THELMA NOLASCO    HPI:  55yo female from home, with PMH of HTN, HLD, afib (on xarelto), CVA, presented to ER after noted episode of deviation of eyes to left. Patient was keppra loaded in ER however had episode of tonic clonic activity for which she was received ativan 2mg. Assessed bedside, responds to name and is able to follow commands but is drowsy and unable to provide history. As per brother at bedside, patient was doing apparently fine until around 7pm when she complained of dizziness and developed deviation of gaze to left which is her usual presentation when she has a seizure, hence patient was brought to the ER. He denies recent fever, cough, diarrhea, worsening weakness or headaches. Patient of note was recently admitted to [facility] for similar complaints in sept 2019 when her keppra dose was increased to 750mg bid. Her EEG was significant for potential epileptogenic focus in the right posterior quadrant and cortical dysfunction in the right hemisphere.  Patient in ER was also noted to be in afib with RVR fir whch she was given 25mg iv pus hof cardizem. However following seizure activity (for which RRT was called), she was in persistent RVR for which dig loading was started (02 Dec 2019 04:44)    NEURO HPI:  The seizure is described as body shaking, followed by staring towards the left  Seizure onset at 7:30PM on 12/1  The frequency of seizure this is the 2nd seizure  The seizure is brought up by abdominal pain  The patient has been previously on Keppra 750mg Q 12h    Interval History -  No new seizures or neurological events overnight.    MEDICATIONS  (STANDING):  atorvastatin 40 milliGRAM(s) Oral at bedtime  cyanocobalamin 1000 MICROGram(s) Oral daily  ergocalciferol 63718 Unit(s) Oral <User Schedule>  levETIRAcetam 1000 milliGRAM(s) Oral two times a day  metoprolol tartrate 50 milliGRAM(s) Oral three times a day  potassium phosphate / sodium phosphate powder 1 Packet(s) Oral four times a day  rivaroxaban 20 milliGRAM(s) Oral with dinner    MEDICATIONS  (PRN):  acetaminophen   Tablet .. 650 milliGRAM(s) Oral every 6 hours PRN Mild Pain (1 - 3)  LORazepam   Injectable 2 milliGRAM(s) IV Push every 5 minutes PRN breakthrough seizure    Allergies  Penicillin (Hives)    Review of Systems: Fourteen systems reviewed and negative except as in HPI / Interval History.      Objective:   Vital Signs Last 24 Hrs  T(C): 36.8 (04 Dec 2019 15:29), Max: 36.9 (04 Dec 2019 04:37)  T(F): 98.2 (04 Dec 2019 15:29), Max: 98.5 (04 Dec 2019 04:37)  HR: 72 (04 Dec 2019 15:29) (72 - 100)  BP: 135/82 (04 Dec 2019 15:29) (124/78 - 145/91)  RR: 18 (04 Dec 2019 15:29) (18 - 19)  SpO2: 98% (04 Dec 2019 15:29) (96% - 100%)    General Exam:   General appearance: No acute distress                 Cardiovascular: Irregularly irregular rhythm, Rate WNL, Pedal dorsalis pulses intact bilaterally    Mental Status: Orientated to place, not self or date.  Attention intact.  No dysarthria, aphasia or neglect.  Knowledge intact.  Registration intact.  Short and long term memory grossly intact.      Cranial Nerves:  PERRL, EOMI x 2, VFF, no nystagmus or diplopia.  No APD.  Fundi not visualized.  CN V1-3 intact to light touch.  No facial asymmetry.  Hearing intact to finger rub bilaterally.  Tongue, uvula and palate midline.  Sternocleidomastoid and Trapezius intact bilaterally.    Motor:   Tone: Normal x 4         Strength impaired on left side, residual effects for 2016 stroke.  Right intact                      Coordination: No dysmetria on finger-nose-finger or heel-shin-heel  No truncal ataxia.  No resting, postural or action tremor, or myoclonus.    Sensation: intact to light touch    Deep Tendon Reflexes: 2+ bilateral biceps, triceps, brachioradialis, knee and ankle  Right toe flexor.  Left (+) Babinski     Gait: Able to ambulate with walker, with wide-based gait. Unable to complete Romberg or specialized gait testing.      NIHSS=8 (LOC-1, Partial Hemianopia-1, LUE-3, LLE-2, & sensory-1)  MRS=1      Labs:    12-03    140  |  108  |  18  ----------------------------<  82  3.8   |  24  |  0.73    Ca    9.5      03 Dec 2019 08:40  Phos  2.4     12-03  Mg     2.2     12-03    Hemoglobin A1C, Whole Blood (09.22.19 @ 22:12)    Hemoglobin A1C, Whole Blood: 5.7%      Neuroimaging:    CT Head (12/1/19):  - No acute intracranial abnormalities  - Chronic infarcts in right EJ & MCA territories  - Chronic microvascular disease    EEG (12/2/19):   Abnormal EEG in the awake, drowsy and asleep states.  - Sharps, right frontal  - Focal slowing, right frontal  - Diffuse beta activity   - No clinical or electrographic seizures      Assessment:  54 RHF with breakthrough post-stroke seizure and chronic left-sided hemiparesis secondary to chronic right hemispheric infarcts, secondary to atrial fibrillation.      Recommendations:    - Continue levetiracetam at higher dose of 1000mg BID    - Secondary stroke prevention:       - Continue atorvastatin 40mg QHS       - Resume aspirin 81mg daily       - Continue rivaroxaban and metoprolol for atrial fibrillation       - Treat BP > 120/80    - Continue PT/OT to guide discharge plan    - Follow up in Neurology clinic for seizure management and secondary stroke prevention - Appointment made in my clinic at [address] [place] [address]. on 1/8/19 at 1pm - [phone].      Please contact the Neurology consult service with any questions.    Wiley Majano MD   of Neurology  [facility] at [facility]

## 2019-12-04 NOTE — PROGRESS NOTE ADULT - SUBJECTIVE AND OBJECTIVE BOX
Patient is a 54y old  Female who presents with a chief complaint of suspected seizure activity (04 Dec 2019 08:24)      INTERVAL HPI/OVERNIGHT EVENTS: seen and examined. No new complains. No seizure episodes.   PT recommended acute rehab, but patient prefers  to have PT in outpatient setting.       MEDICATIONS  (STANDING):  atorvastatin 40 milliGRAM(s) Oral at bedtime  cyanocobalamin 1000 MICROGram(s) Oral daily  ergocalciferol 40587 Unit(s) Oral <User Schedule>  levETIRAcetam 1000 milliGRAM(s) Oral two times a day  metoprolol tartrate 50 milliGRAM(s) Oral three times a day  potassium phosphate / sodium phosphate powder 1 Packet(s) Oral four times a day  rivaroxaban 20 milliGRAM(s) Oral with dinner    MEDICATIONS  (PRN):  acetaminophen   Tablet .. 650 milliGRAM(s) Oral every 6 hours PRN Mild Pain (1 - 3)  LORazepam   Injectable 2 milliGRAM(s) IV Push every 5 minutes PRN breakthrough seizure      Allergies    penicillin (Hives)    Intolerances        REVIEW OF SYSTEMS:  CONSTITUTIONAL: No fever, weight loss, or fatigue  RESPIRATORY: No cough, wheezing, chills or hemoptysis; No shortness of breath  CARDIOVASCULAR: No chest pain, palpitations, dizziness, or leg swelling  GASTROINTESTINAL: No abdominal or epigastric pain. No nausea, vomiting, or hematemesis; No diarrhea or constipation. No melena or hematochezia.  NEUROLOGICAL: No headaches, memory loss, loss of strength, numbness, or tremors  MUSCULOSKELETAL: No joint pain or swelling; No muscle, back, or extremity pain      Vital Signs Last 24 Hrs  T(C): 36.8 (04 Dec 2019 15:29), Max: 36.9 (04 Dec 2019 04:37)  T(F): 98.2 (04 Dec 2019 15:29), Max: 98.5 (04 Dec 2019 04:37)  HR: 72 (04 Dec 2019 15:29) (72 - 100)  BP: 135/82 (04 Dec 2019 15:29) (124/78 - 145/91)  BP(mean): --  RR: 18 (04 Dec 2019 15:29) (18 - 19)  SpO2: 98% (04 Dec 2019 15:29) (96% - 100%)    PHYSICAL EXAM:  GENERAL: NAD, well-groomed, well-developed  HEAD:  Atraumatic, Normocephalic  EYES: EOMI, PERRLA, conjunctiva and sclera clear  NECK: Supple, No JVD, Normal thyroid  NERVOUS SYSTEM:  Alert & Oriented X3, No gross focal deficits  CHEST/LUNG: Clear to percussion bilaterally; No rales, rhonchi, wheezing, or rubs  HEART: Regular rate and rhythm; No murmurs, rubs, or gallops  ABDOMEN: Soft, Nontender, Nondistended; Bowel sounds present  EXTREMITIES:  No clubbing, cyanosis, or edema  SKIN: No rashes or lesions    LABS:                        14.6   6.57  )-----------( 181      ( 03 Dec 2019 08:40 )             46.0     12-03    140  |  108  |  18  ----------------------------<  82  3.8   |  24  |  0.73    Ca    9.5      03 Dec 2019 08:40  Phos  2.4     12-03  Mg     2.2     12-03          CAPILLARY BLOOD GLUCOSE          RADIOLOGY & ADDITIONAL TESTS:    Imaging Personally Reviewed:  [ ] YES  [ ] NO    Consultant(s) Notes Reviewed:  [ ] YES  [ ] NO    Care Discussed with Consultants/Other Providers [ ] YES  [ ] NO    Plan of Care discussed with Housestaff [ ]YES [ ] NO

## 2019-12-04 NOTE — DISCHARGE NOTE PROVIDER - NSDCFUSCHEDAPPT_GEN_ALL_CORE_FT
THELMA NOLASCO ; 01/08/2020 ; NPP Neuro [address] [facility] THELMA NOLASCO ; 01/08/2020 ; NPP Neuro [address] [facility]

## 2019-12-04 NOTE — DISCHARGE NOTE NURSING/CASE MANAGEMENT/SOCIAL WORK - NSDCPEXARELTO_GEN_ALL_CORE
Rivaroxaban/Xarelto - Dietary Advice/Rivaroxaban/Xarelto - Follow up monitoring/Rivaroxaban/Xarelto - Potential for adverse drug reactions and interactions/Rivaroxaban/Xarelto - Compliance

## 2019-12-04 NOTE — DISCHARGE NOTE PROVIDER - NSDCFUADDAPPT_GEN_ALL_CORE_FT
Please follow up with your neurologist in 1 week. Please follow up with Dr. Majano on 1/8/20 at 1pm. Contact information is above. Please follow up with Dr. Majano on 1/8/20 at 1pm. Contact information is above.  Please follow up your cardiologist Dr. Devlin in 1 week.

## 2019-12-04 NOTE — DISCHARGE NOTE NURSING/CASE MANAGEMENT/SOCIAL WORK - NSDCPEPTSTRK_GEN_ALL_CORE
Call 911 for stroke/Need for follow up after discharge/Stroke education booklet/Signs and symptoms of stroke/Prescribed medications/Risk factors for stroke/Stroke support groups for patients, families, and friends/Stroke warning signs and symptoms

## 2019-12-06 LAB — LEVETIRACETAM SERPL-MCNC: 15.5 MCG/ML — SIGNIFICANT CHANGE UP (ref 12–46)

## 2020-01-03 ENCOUNTER — MOBILE ON CALL (OUTPATIENT)
Age: 55
End: 2020-01-03

## 2020-01-03 RX ORDER — LEVETIRACETAM 750 MG/1
750 TABLET, FILM COATED ORAL TWICE DAILY
Qty: 120 | Refills: 3 | Status: DISCONTINUED | COMMUNITY
Start: 2019-10-10 | End: 2020-01-03

## 2020-01-08 ENCOUNTER — APPOINTMENT (OUTPATIENT)
Dept: NEUROLOGY | Facility: CLINIC | Age: 55
End: 2020-01-08
Payer: MEDICAID

## 2020-01-08 VITALS — SYSTOLIC BLOOD PRESSURE: 109 MMHG | DIASTOLIC BLOOD PRESSURE: 75 MMHG | HEART RATE: 88 BPM

## 2020-01-08 VITALS — OXYGEN SATURATION: 98 % | WEIGHT: 202 LBS | HEIGHT: 62 IN | BODY MASS INDEX: 37.17 KG/M2

## 2020-01-08 DIAGNOSIS — E53.8 DEFICIENCY OF OTHER SPECIFIED B GROUP VITAMINS: ICD-10-CM

## 2020-01-08 PROCEDURE — 99215 OFFICE O/P EST HI 40 MIN: CPT

## 2020-01-09 NOTE — HISTORY OF PRESENT ILLNESS
[FreeTextEntry1] : FROM 1/25/19:\par This is a 53-year-old right-handed woman with a previous right hemispheric ischemic stroke and residual left-sided hemiparesis, who presented to [facility] ED on January 12, 2019 after experiencing inability to speak and worsened left-sided hemiparesis, followed by a generalized convulsion. EMS was called and the patient was administered lorazepam 2mg IV x 1, which caused the seizure to resolve. The patient was started on levetiracetam 500mg PO BID after receiving a 1000mg IV load, and has since had no similar events, and no change to her baseline left-sided hemiparesis. She had no tongue/cheek bite, head trauma, or urinary/bowel incontinence. She denies any recent history of fever, headache, alcohol use, added stress, or poor sleep hygiene.\par \par FROM 8/16/19:\par The patient states that since I saw her on 1/25/19, she has had one episode of confusion associated with abdominal pain, although there was no witnessed seizure activity or new stroke-like symptoms. She presented to [facility] ED on 3/24/19 - per ED notes, she had generalized weakness with abdominal pain, but was found to have a normal CT abdomen and resolution of abdominal pain, prompting discharge to home. She has been compliant with her medications, although she has not taken aspirin 81mg daily while taking Xarelto. She has occasional mild frontal headache, which resolves on its own or with over-the-counter Tylenol.\par \par FROM 11/13/19:\par Since the last clinic visit, the patient presented to the ED in early October for breakthrough seizures, and her levetiracetam dosage was increased from 500mg to 750mg PO BID. Since then, she has not had any new seizures, and has been compliant with all of her medications for seizure prophylaxis and secondary stroke prevention. She completed four sessions of physical therapy, which helped her improve her left arm and leg strength, but she was not approved by her insurance plan for more sessions.\par \par FROM 1/8/20:\par The patient was admitted to [facility] during December 2-4, 2019, for breakthrough seizure. Her levetiracetam dosage was increased from 750mg to 1000mg PO BID. Since then, she has been stable without any new seizures. She has been continuing her physical therapy course since the hospital discharge, as well.\par

## 2020-01-09 NOTE — PHYSICAL EXAM
[General Appearance - In No Acute Distress] : in no acute distress [General Appearance - Alert] : alert [General Appearance - Well Nourished] : well nourished [Oriented To Time, Place, And Person] : oriented to person, place, and time [Person] : oriented to person [Place] : oriented to place [Time] : oriented to time [Fluency] : fluency intact [Comprehension] : comprehension intact [Cranial Nerves Optic (II)] : visual acuity intact bilaterally,  visual fields full to confrontation, pupils equal round and reactive to light [Cranial Nerves Oculomotor (III)] : extraocular motion intact [Cranial Nerves Trigeminal (V)] : facial sensation intact symmetrically [Cranial Nerves Vestibulocochlear (VIII)] : hearing was intact bilaterally [Cranial Nerves Glossopharyngeal (IX)] : tongue and palate midline [Cranial Nerves Hypoglossal (XII)] : there was no tongue deviation with protrusion [Cranial Nerves Accessory (XI - Cranial And Spinal)] : head turning and shoulder shrug symmetric [Motor Handedness Right-Handed] : the patient is right hand dominant [Involuntary Movements] : no involuntary movements were seen [Paresis Pronator Drift Left-Sided] : a left-sided pronator drift was present [1] : shoulder adduction 1/5 [Hand Weakness Left] : the hand  was weak [5] : ankle plantar flexion 5/5 [2] : ankle dorsiflexion 2/5 [3] : ankle plantar flexion 3/5 [Coordination - Dysmetria Impaired Finger-to-Nose Left Only] : present on the left side [Coordination Dysmetria Impaired Heel-to-Shin Using Left Heel] : present on the left side [1+] : Patella left 1+ [0] : Ankle jerk left 0 [Plantar Reflex Right Only] : abnormal on the right [Sclera] : the sclera and conjunctiva were normal [PERRL With Normal Accommodation] : pupils were equal in size, round, reactive to light, with normal accommodation [Optic Disc Abnormality] : the optic disc were normal in size and color [Outer Ear] : the ears and nose were normal in appearance [Hearing Threshold Finger Rub Not [place]] : hearing was normal [Oropharynx] : the oropharynx was normal [Neck Appearance] : the appearance of the neck was normal [Auscultation Breath Sounds / Voice Sounds] : lungs were clear to auscultation bilaterally [Arterial Pulses Carotid] : carotid pulses were normal with no bruits [Full Pulse] : the pedal pulses are present [Abdomen Soft] : soft [Bowel Sounds] : normal bowel sounds [Abdomen Tenderness] : non-tender [No CVA Tenderness] : no ~M costovertebral angle tenderness [No Spinal Tenderness] : no spinal tenderness [Nail Clubbing] : no clubbing  or cyanosis of the fingernails [Skin Turgor] : normal skin turgor [] : no rash [Flattening Of Nasolabial Fold On The Right] : no flattening of the nasolabial fold [Flattening Of Nasolabial Fold On The Left] : flattening of the  nasolabial fold [Paresis Pronator Drift Right-Sided] : no pronator drift on the right [Hemiparesis Of Left Side] : hemiparesis was present on the left [Hand Weakness Right] : normal hand  [Tactile Decrease Entire Right Arm] : normal right arm [Allodynia] : no ~T allodynia present [Tactile Decrease Entire Left Arm] : normal left arm [Tactile Decrease Entire Leg Left] : normal left leg [Tactile Decrease Entire Leg Right] : normal right leg [Tactile Decrease Hand] : normal left hand [Pain / Temperature Decrease Entire Arm Left] : normal left arm [Pain / Temperature Decrease Entire Arm Right] : normal right arm [Pain / Temp Decrease Hand] : normal left hand [Pain / Temp Decrease Entire Leg - Right] : normal right leg [Coordination Dysmetria Impaired Heel-Shin Using Right Heel] : not present on the ride side [Pain / Temp Decrease Entire Leg - Left] : normal left leg [Coordination - Dysmetria Impaired Finger-to-Nose Right Only] : not present on the ride side [___] : absent on the right [Plantar Reflex Left Only] : normal on the left [___] : absent on the left [FreeTextEntry4] : Mild dysarthria present [FreeTextEntry8] : Wide-based stance and gait. Hemiparetic gait present. Unable to perform tiptoe, heel, and tandem gaits, nor to be tested for Romberg sign, due to left leg weakness. Coordination exams on left are limited due to weakness. [FreeTextEntry1] : Irregularly irregular rhythm, Rate WNL [Extraocular Movements] : extraocular movements were intact

## 2020-01-09 NOTE — ASSESSMENT
[FreeTextEntry1] : 53 RHF with right MCA territory ischemic stroke secondary to atrial fibrillation, with stable left-sided hemiparesis, sensory deficit, and coordination deficit, with recent post-stroke seizure one month ago that is now controlled on a higher dose of levetiracetam.

## 2020-01-09 NOTE — DATA REVIEWED
[de-identified] : (01/13/2019): Right temporal sharps and electrographic seizure; Bilateral temporal intermittent rhythmic delta activity (TIRDA)\par \par (12/02/2019): Right frontal sharps and focal slowing, Diffuse beta activity [de-identified] : CT Head and CT Angio Head & Neck (01/12/2019): Chronic right MCA infarct, Absent right PCA, Otherwise normal intracranial and neck vasculature\par \par Echocardiogram (01/14/2019): Severely dilated left atrium, Trace mitral regurgitation\par \par Labs (01/15/2019): CBC Normal, INR 1.67 (high), BMP notable for low sodium (133), low potassium (3.3), and high glucose (107)\par \par Labs (01/13/2019): Hemoglobin A1c 5.6%, Fasting lipid panel: Cholesterol 152, Triglycerides 43, HDL 65, LDL 78\par \par CT Head (12/01/2019): Chronic right MCA (large) and right EJ (partial) infarcts without new intracranial abnormalities\par \par Labs (12/03/2019): Notable for Hct 46.0% <H>, Phosphorus 2.4 <L>, Vitamin D 17.1 <L>, Normal Vit B12 & Folate

## 2020-02-28 ENCOUNTER — INPATIENT (INPATIENT)
Facility: HOSPITAL | Age: 55
LOS: 7 days | Discharge: ANOTHER HOSPITAL | DRG: 309 | End: 2020-03-07
Attending: INTERNAL MEDICINE | Admitting: INTERNAL MEDICINE
Payer: COMMERCIAL

## 2020-02-28 VITALS
OXYGEN SATURATION: 96 % | TEMPERATURE: 98 F | WEIGHT: 199.96 LBS | RESPIRATION RATE: 18 BRPM | HEART RATE: 64 BPM | HEIGHT: 64 IN | SYSTOLIC BLOOD PRESSURE: 155 MMHG | DIASTOLIC BLOOD PRESSURE: 103 MMHG

## 2020-02-28 DIAGNOSIS — Z98.51 TUBAL LIGATION STATUS: Chronic | ICD-10-CM

## 2020-02-28 DIAGNOSIS — I48.91 UNSPECIFIED ATRIAL FIBRILLATION: ICD-10-CM

## 2020-02-28 LAB
ANION GAP SERPL CALC-SCNC: 5 MMOL/L — SIGNIFICANT CHANGE UP (ref 5–17)
BASOPHILS # BLD AUTO: 0.03 K/UL — SIGNIFICANT CHANGE UP (ref 0–0.2)
BASOPHILS NFR BLD AUTO: 0.3 % — SIGNIFICANT CHANGE UP (ref 0–2)
BUN SERPL-MCNC: 14 MG/DL — SIGNIFICANT CHANGE UP (ref 7–18)
CALCIUM SERPL-MCNC: 9.2 MG/DL — SIGNIFICANT CHANGE UP (ref 8.4–10.5)
CHLORIDE SERPL-SCNC: 106 MMOL/L — SIGNIFICANT CHANGE UP (ref 96–108)
CO2 SERPL-SCNC: 27 MMOL/L — SIGNIFICANT CHANGE UP (ref 22–31)
CREAT SERPL-MCNC: 0.75 MG/DL — SIGNIFICANT CHANGE UP (ref 0.5–1.3)
EOSINOPHIL # BLD AUTO: 0.03 K/UL — SIGNIFICANT CHANGE UP (ref 0–0.5)
EOSINOPHIL NFR BLD AUTO: 0.3 % — SIGNIFICANT CHANGE UP (ref 0–6)
GLUCOSE SERPL-MCNC: 98 MG/DL — SIGNIFICANT CHANGE UP (ref 70–99)
HCT VFR BLD CALC: 43.4 % — SIGNIFICANT CHANGE UP (ref 34.5–45)
HGB BLD-MCNC: 14.3 G/DL — SIGNIFICANT CHANGE UP (ref 11.5–15.5)
IMM GRANULOCYTES NFR BLD AUTO: 0.2 % — SIGNIFICANT CHANGE UP (ref 0–1.5)
LYMPHOCYTES # BLD AUTO: 1.01 K/UL — SIGNIFICANT CHANGE UP (ref 1–3.3)
LYMPHOCYTES # BLD AUTO: 11 % — LOW (ref 13–44)
MCHC RBC-ENTMCNC: 29.5 PG — SIGNIFICANT CHANGE UP (ref 27–34)
MCHC RBC-ENTMCNC: 32.9 GM/DL — SIGNIFICANT CHANGE UP (ref 32–36)
MCV RBC AUTO: 89.5 FL — SIGNIFICANT CHANGE UP (ref 80–100)
MONOCYTES # BLD AUTO: 0.44 K/UL — SIGNIFICANT CHANGE UP (ref 0–0.9)
MONOCYTES NFR BLD AUTO: 4.8 % — SIGNIFICANT CHANGE UP (ref 2–14)
NEUTROPHILS # BLD AUTO: 7.66 K/UL — HIGH (ref 1.8–7.4)
NEUTROPHILS NFR BLD AUTO: 83.4 % — HIGH (ref 43–77)
NRBC # BLD: 0 /100 WBCS — SIGNIFICANT CHANGE UP (ref 0–0)
PLATELET # BLD AUTO: 168 K/UL — SIGNIFICANT CHANGE UP (ref 150–400)
POTASSIUM SERPL-MCNC: 4 MMOL/L — SIGNIFICANT CHANGE UP (ref 3.5–5.3)
POTASSIUM SERPL-SCNC: 4 MMOL/L — SIGNIFICANT CHANGE UP (ref 3.5–5.3)
RBC # BLD: 4.85 M/UL — SIGNIFICANT CHANGE UP (ref 3.8–5.2)
RBC # FLD: 14.6 % — HIGH (ref 10.3–14.5)
SODIUM SERPL-SCNC: 138 MMOL/L — SIGNIFICANT CHANGE UP (ref 135–145)
WBC # BLD: 9.19 K/UL — SIGNIFICANT CHANGE UP (ref 3.8–10.5)
WBC # FLD AUTO: 9.19 K/UL — SIGNIFICANT CHANGE UP (ref 3.8–10.5)

## 2020-02-28 PROCEDURE — 99284 EMERGENCY DEPT VISIT MOD MDM: CPT

## 2020-02-28 RX ORDER — SODIUM CHLORIDE 9 MG/ML
1000 INJECTION INTRAMUSCULAR; INTRAVENOUS; SUBCUTANEOUS ONCE
Refills: 0 | Status: COMPLETED | OUTPATIENT
Start: 2020-02-28 | End: 2020-02-28

## 2020-02-28 RX ORDER — ACETAMINOPHEN 500 MG
650 TABLET ORAL EVERY 6 HOURS
Refills: 0 | Status: DISCONTINUED | OUTPATIENT
Start: 2020-02-28 | End: 2020-02-29

## 2020-02-28 RX ORDER — METOPROLOL TARTRATE 50 MG
5 TABLET ORAL ONCE
Refills: 0 | Status: COMPLETED | OUTPATIENT
Start: 2020-02-28 | End: 2020-02-28

## 2020-02-28 RX ORDER — METOPROLOL TARTRATE 50 MG
50 TABLET ORAL ONCE
Refills: 0 | Status: DISCONTINUED | OUTPATIENT
Start: 2020-02-28 | End: 2020-03-02

## 2020-02-28 RX ORDER — METOCLOPRAMIDE HCL 10 MG
10 TABLET ORAL ONCE
Refills: 0 | Status: COMPLETED | OUTPATIENT
Start: 2020-02-28 | End: 2020-02-28

## 2020-02-28 RX ADMIN — Medication 650 MILLIGRAM(S): at 22:37

## 2020-02-28 RX ADMIN — Medication 650 MILLIGRAM(S): at 20:50

## 2020-02-28 RX ADMIN — SODIUM CHLORIDE 1000 MILLILITER(S): 9 INJECTION INTRAMUSCULAR; INTRAVENOUS; SUBCUTANEOUS at 17:36

## 2020-02-28 RX ADMIN — Medication 5 MILLIGRAM(S): at 17:36

## 2020-02-28 NOTE — H&P ADULT - NSHPLABSRESULTS_GEN_ALL_CORE
LABS:                        14.3   9.19  )-----------( 168      ( 28 Feb 2020 17:32 )             43.4     02-28    138  |  106  |  14  ----------------------------<  98  4.0   |  27  |  0.75    Ca    9.2      28 Feb 2020 17:32

## 2020-02-28 NOTE — H&P ADULT - ASSESSMENT
Pt is 53 y/o F from home, lives with son, walks with cane with PMH of Afib on Xarelto, Seizure,  CVA in 2016 s/p left residual paralysis presented to ED  with Eye twitching and Headache since yesterday.   ED EKG showed Afib with RVR and s/p one dose of Metoprolol and 1L NS. Admitted to medicine for Afib with RVR..

## 2020-02-28 NOTE — H&P ADULT - HISTORY OF PRESENT ILLNESS
55 y/o F from home, lives with son, walks with cane with atrial fibrillation on xaralto and metoprolol, sp CVA, with hx 2 seizures in the last 6 months on keppra presents with eye twitching and headache.  Pt and her son state that patient developed a headache and eye twitching which is what happened to here before her last two seizures so he brought her to the ER preemptively.  She denies dysphagia, diplopia, dysarthria, weakness, numbness, tingling.  GOC Full code 53 y/o F from home, lives with son, walks with cane with PMH of Afib on Xarelto, Seizure CVA in 2016 s/p left residual paralysis with hx 2 seizures in the last 6 months on keppra presents with eye twitching and headache.  Pt and her son state that patient developed a headache and eye twitching which is what happened to here before her last two seizures so he brought her to the ER preemptively.  She denies dysphagia, diplopia, dysarthria, weakness, numbness, tingling.  GOC Full code Pt is 55 y/o F from home, lives with son, walks with cane with PMH of Afib on Xarelto, Seizure,  CVA in 2016 s/p left residual paralysis presented to ED  with Eye twitching and Headache since yesterday. Pt was diagnosed with Seizures in Aug 2019 and  she presented with similar complaints of headache and eye twitching, looking to left side, Epigastric pain, so she came to  the ER preemptively. She was admitted for Seizures in December, her Keppra medication is adjusted.  She visited Dr Majano Neurologist in February. She is compliant with medications. She denies dysphagia, diplopia, dysarthria, weakness, numbness, tingling, urinary incontinence, Fecal incontinence, Tongue biting.   GOC Full code

## 2020-02-28 NOTE — ED PROVIDER NOTE - CLINICAL SUMMARY MEDICAL DECISION MAKING FREE TEXT BOX
patient having headache and eye twitching which she had last time she had a seizure, 3 months ago, son concerned same will happen.  pt in rapid afib, will give ivfs, metoprolol, treat headache and reassess

## 2020-02-28 NOTE — H&P ADULT - NSHPSOCIALHISTORY_GEN_ALL_CORE
Pt denies smoking, drinking alcohol and illicit drug use. 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

## 2020-02-28 NOTE — ED PROVIDER NOTE - OBJECTIVE STATEMENT
55 yo female with atrial fibrillation on xaralto and metoprolol, sp CVA, with hx 2 seizures in the last 6 months on keppra presents with eye twitching and headache.  Pt and her son state that patient developeda g 53 yo female with atrial fibrillation on xaralto and metoprolol, sp CVA, with hx 2 seizures in the last 6 months on keppra presents with eye twitching and headache.  Pt and her son state that patient developed a headache and eye twitching which is what happened to here before her last two seizures so he brought her to the ER preemptively.  She denies dysphagia, diplopia, dysarthria, weakness, numbness, tingling.

## 2020-02-28 NOTE — H&P ADULT - PROBLEM SELECTOR PLAN 6
IMPROVE VTE Individual Risk Assessment    RISK                                                                Points  [  ] Previous VTE                                                  3  [  ] Thrombophilia                                               2  [  ] Lower limb paralysis                                      2        (unable to hold up >15 seconds)    [  ] Current Cancer                                              2         (within 6 months)  [x ] Immobilization > 24 hrs                                1  [  ] ICU/CCU stay > 24 hours                              1  [x  ] Age > 60                                                      1  IMPROVE VTE Score ___DVT ppx ___Xarelto  )

## 2020-02-28 NOTE — H&P ADULT - PROBLEM SELECTOR PLAN 1
EKG showed showed Afib with RVR  A fib: Rate controlled on Metoprolol, continue Xarelto  CHADS2-VASc score is 4 (, HTN, , CVA +2, female 1 ), consistent with >4.8% annual risk of stroke if off systemic anticoagulation  Tele monitoring,   Troponins on admission , f/u T2 and T3  ECHO on 12/2019 showed Normal EF, Gr 2 DD.   f/u ECHO  c/w  metoprolol 50   Cardiology Consulted Dr. Zepeda EKG showed showed Afib with RVR  A fib: Rate controlled on Metoprolol, continue Xarelto  CHADS2-VASc score is 4 (, HTN, , CVA +2, female 1 ), consistent with >4.8% annual risk of stroke if off systemic anticoagulation  Tele monitoring,   Troponins on admission , f/u T2 and T3  ECHO on 12/2019 showed Normal EF, Gr 2 DD.   f/u ECHO  c/w  metoprolol 50   Cardiology Consulted Dr. Johnson

## 2020-02-28 NOTE — H&P ADULT - NSHPPHYSICALEXAM_GEN_ALL_CORE
Vital Signs Last 24 Hrs  T(C): 36.4 (28 Feb 2020 21:55), Max: 36.6 (28 Feb 2020 15:26)  T(F): 97.5 (28 Feb 2020 21:55), Max: 97.9 (28 Feb 2020 15:26)  HR: 101 (28 Feb 2020 21:55) (64 - 101)  BP: 123/74 (28 Feb 2020 21:55) (123/74 - 155/103)  BP(mean): --  RR: 18 (28 Feb 2020 21:55) (18 - 18)  SpO2: 99% (28 Feb 2020 21:55) (96% - 99%)  PHYSICAL EXAM:  GENERAL: NAD, obese  HEAD:  Atraumatic, Normocephalic  EYES:  conjunctiva and sclera clear  NECK: Supple, No JVD, Normal thyroid  CHEST/LUNG: Clear to auscultation. Clear to percussion bilaterally; No rales, rhonchi, wheezing, or rubs  HEART: Regular rate and rhythm; No murmurs, rubs, or gallops  ABDOMEN: Soft, Nontender, Nondistended; Bowel sounds present  NERVOUS SYSTEM:  Alert & Oriented X3,    EXTREMITIES:  2+ Peripheral Pulses, No clubbing, cyanosis, or edema, LEFT UPPER EXTREMITY 4/5, LEFT LOWER EXTREMITY 4/5.  SKIN: warm dry

## 2020-02-28 NOTE — H&P ADULT - NSHPREVIEWOFSYSTEMS_GEN_ALL_CORE
REVIEW OF SYSTEMS:    CONSTITUTIONAL: No weakness, fevers or chills  EYES/ENT: No visual changes;  No vertigo or throat pain   HEAD  headache and eye twitching  NECK: No pain or stiffness  RESPIRATORY: No cough, wheezing, hemoptysis; No shortness of breath  CARDIOVASCULAR: No chest pain or palpitations  GASTROINTESTINAL: No abdominal or epigastric pain. No nausea, vomiting, or hematemesis; No diarrhea or constipation. No melena or hematochezia.  GENITOURINARY: No dysuria, frequency or hematuria  NEUROLOGICAL: No numbness or weakness  SKIN: No itching, burning, rashes, or lesions   All other review of systems is negative unless indicated above.

## 2020-02-28 NOTE — ED ADULT NURSE NOTE - NSIMPLEMENTINTERV_GEN_ALL_ED
Implemented All Universal Safety Interventions:  [place] to call system. Call bell, personal items and telephone within reach. Instruct patient to call for assistance. Room bathroom lighting operational. Non-slip footwear when patient is off stretcher. Physically safe environment: no spills, clutter or unnecessary equipment. Stretcher in lowest position, wheels locked, appropriate side rails in place.

## 2020-02-29 DIAGNOSIS — R56.9 UNSPECIFIED CONVULSIONS: ICD-10-CM

## 2020-02-29 DIAGNOSIS — I10 ESSENTIAL (PRIMARY) HYPERTENSION: ICD-10-CM

## 2020-02-29 DIAGNOSIS — R51 HEADACHE: ICD-10-CM

## 2020-02-29 DIAGNOSIS — I63.9 CEREBRAL INFARCTION, UNSPECIFIED: ICD-10-CM

## 2020-02-29 DIAGNOSIS — Z29.9 ENCOUNTER FOR PROPHYLACTIC MEASURES, UNSPECIFIED: ICD-10-CM

## 2020-02-29 DIAGNOSIS — I48.91 UNSPECIFIED ATRIAL FIBRILLATION: ICD-10-CM

## 2020-02-29 LAB
24R-OH-CALCIDIOL SERPL-MCNC: 17 NG/ML — LOW (ref 30–80)
ANION GAP SERPL CALC-SCNC: 7 MMOL/L — SIGNIFICANT CHANGE UP (ref 5–17)
BUN SERPL-MCNC: 11 MG/DL — SIGNIFICANT CHANGE UP (ref 7–18)
CALCIUM SERPL-MCNC: 9.2 MG/DL — SIGNIFICANT CHANGE UP (ref 8.4–10.5)
CHLORIDE SERPL-SCNC: 109 MMOL/L — HIGH (ref 96–108)
CHOLEST SERPL-MCNC: 131 MG/DL — SIGNIFICANT CHANGE UP (ref 10–199)
CK MB BLD-MCNC: <2 % — SIGNIFICANT CHANGE UP (ref 0–3.5)
CK MB CFR SERPL CALC: <1 NG/ML — SIGNIFICANT CHANGE UP (ref 0–3.6)
CK SERPL-CCNC: 51 U/L — SIGNIFICANT CHANGE UP (ref 21–215)
CO2 SERPL-SCNC: 28 MMOL/L — SIGNIFICANT CHANGE UP (ref 22–31)
CREAT SERPL-MCNC: 0.69 MG/DL — SIGNIFICANT CHANGE UP (ref 0.5–1.3)
ERYTHROCYTE [SEDIMENTATION RATE] IN BLOOD: 11 MM/HR — SIGNIFICANT CHANGE UP (ref 0–20)
FOLATE SERPL-MCNC: 15.1 NG/ML — SIGNIFICANT CHANGE UP
GLUCOSE SERPL-MCNC: 77 MG/DL — SIGNIFICANT CHANGE UP (ref 70–99)
HBA1C BLD-MCNC: 5.7 % — HIGH (ref 4–5.6)
HCT VFR BLD CALC: 42.3 % — SIGNIFICANT CHANGE UP (ref 34.5–45)
HDLC SERPL-MCNC: 53 MG/DL — SIGNIFICANT CHANGE UP
HGB BLD-MCNC: 13.7 G/DL — SIGNIFICANT CHANGE UP (ref 11.5–15.5)
LIPID PNL WITH DIRECT LDL SERPL: 69 MG/DL — SIGNIFICANT CHANGE UP
MAGNESIUM SERPL-MCNC: 2.3 MG/DL — SIGNIFICANT CHANGE UP (ref 1.6–2.6)
MCHC RBC-ENTMCNC: 29.1 PG — SIGNIFICANT CHANGE UP (ref 27–34)
MCHC RBC-ENTMCNC: 32.4 GM/DL — SIGNIFICANT CHANGE UP (ref 32–36)
MCV RBC AUTO: 89.8 FL — SIGNIFICANT CHANGE UP (ref 80–100)
NRBC # BLD: 0 /100 WBCS — SIGNIFICANT CHANGE UP (ref 0–0)
PHOSPHATE SERPL-MCNC: 3.1 MG/DL — SIGNIFICANT CHANGE UP (ref 2.5–4.5)
PLATELET # BLD AUTO: 158 K/UL — SIGNIFICANT CHANGE UP (ref 150–400)
POTASSIUM SERPL-MCNC: 3.9 MMOL/L — SIGNIFICANT CHANGE UP (ref 3.5–5.3)
POTASSIUM SERPL-SCNC: 3.9 MMOL/L — SIGNIFICANT CHANGE UP (ref 3.5–5.3)
RBC # BLD: 4.71 M/UL — SIGNIFICANT CHANGE UP (ref 3.8–5.2)
RBC # FLD: 14.6 % — HIGH (ref 10.3–14.5)
SODIUM SERPL-SCNC: 144 MMOL/L — SIGNIFICANT CHANGE UP (ref 135–145)
TOTAL CHOLESTEROL/HDL RATIO MEASUREMENT: 2.5 RATIO — LOW (ref 3.3–7.1)
TRIGL SERPL-MCNC: 43 MG/DL — SIGNIFICANT CHANGE UP (ref 10–149)
TROPONIN I SERPL-MCNC: <0.015 NG/ML — SIGNIFICANT CHANGE UP (ref 0–0.04)
TSH SERPL-MCNC: 1.08 UU/ML — SIGNIFICANT CHANGE UP (ref 0.34–4.82)
VIT B12 SERPL-MCNC: 537 PG/ML — SIGNIFICANT CHANGE UP (ref 232–1245)
WBC # BLD: 6.76 K/UL — SIGNIFICANT CHANGE UP (ref 3.8–10.5)
WBC # FLD AUTO: 6.76 K/UL — SIGNIFICANT CHANGE UP (ref 3.8–10.5)

## 2020-02-29 RX ORDER — LEVETIRACETAM 250 MG/1
1000 TABLET, FILM COATED ORAL
Refills: 0 | Status: DISCONTINUED | OUTPATIENT
Start: 2020-02-29 | End: 2020-03-07

## 2020-02-29 RX ORDER — HYDROCHLOROTHIAZIDE 25 MG
12.5 TABLET ORAL DAILY
Refills: 0 | Status: DISCONTINUED | OUTPATIENT
Start: 2020-02-29 | End: 2020-03-04

## 2020-02-29 RX ORDER — ATORVASTATIN CALCIUM 80 MG/1
40 TABLET, FILM COATED ORAL AT BEDTIME
Refills: 0 | Status: DISCONTINUED | OUTPATIENT
Start: 2020-02-29 | End: 2020-03-07

## 2020-02-29 RX ORDER — RIVAROXABAN 15 MG-20MG
20 KIT ORAL
Refills: 0 | Status: DISCONTINUED | OUTPATIENT
Start: 2020-02-29 | End: 2020-03-07

## 2020-02-29 RX ORDER — METOPROLOL TARTRATE 50 MG
50 TABLET ORAL
Refills: 0 | Status: DISCONTINUED | OUTPATIENT
Start: 2020-02-29 | End: 2020-03-03

## 2020-02-29 RX ORDER — ACETAMINOPHEN 500 MG
650 TABLET ORAL EVERY 6 HOURS
Refills: 0 | Status: DISCONTINUED | OUTPATIENT
Start: 2020-02-29 | End: 2020-03-07

## 2020-02-29 RX ORDER — ASPIRIN/CALCIUM CARB/MAGNESIUM 324 MG
81 TABLET ORAL DAILY
Refills: 0 | Status: DISCONTINUED | OUTPATIENT
Start: 2020-02-29 | End: 2020-03-07

## 2020-02-29 RX ORDER — LISINOPRIL 2.5 MG/1
20 TABLET ORAL DAILY
Refills: 0 | Status: DISCONTINUED | OUTPATIENT
Start: 2020-02-29 | End: 2020-03-03

## 2020-02-29 RX ADMIN — RIVAROXABAN 20 MILLIGRAM(S): KIT at 18:15

## 2020-02-29 RX ADMIN — Medication 50 MILLIGRAM(S): at 18:15

## 2020-02-29 RX ADMIN — LEVETIRACETAM 1000 MILLIGRAM(S): 250 TABLET, FILM COATED ORAL at 05:24

## 2020-02-29 RX ADMIN — Medication 81 MILLIGRAM(S): at 15:19

## 2020-02-29 RX ADMIN — LEVETIRACETAM 1000 MILLIGRAM(S): 250 TABLET, FILM COATED ORAL at 18:14

## 2020-02-29 RX ADMIN — ATORVASTATIN CALCIUM 40 MILLIGRAM(S): 80 TABLET, FILM COATED ORAL at 21:33

## 2020-02-29 NOTE — PHYSICAL THERAPY INITIAL EVALUATION ADULT - BED MOBILITY LIMITATIONS, REHAB EVAL
LEFT UE/LE/decreased ability to use arms for pushing/pulling/decreased ability to use legs for bridging/pushing

## 2020-02-29 NOTE — PHYSICAL THERAPY INITIAL EVALUATION ADULT - ACTIVE RANGE OF MOTION EXAMINATION, REHAB EVAL
L UE/LE WFL w/ AAROM ex for sh flex, finger/wrist ext limited; ankle DF/Right LE Active ROM was WFL (within functional limits)/Right UE Active ROM was WFL (within functional limits)

## 2020-02-29 NOTE — CONSULT NOTE ADULT - SUBJECTIVE AND OBJECTIVE BOX
Emanuel Cesar MD  Interventional Cardiology / Endovascular Specialist  [place] Office : [phone] [place] [address] [place] [address]  Tel:   [place] Office : [address] [facility] [place] [address]  Tel: [phone]  Cell : [phone]      HISTORY OF PRESENTING ILLNESS:    Pt is 53 y/o F from home, lives with son, walks with cane with PMH of Afib on Xarelto, Seizure,  CVA in 2016 s/p left residual paralysis presented to ED  with Eye twitching and Headache since yesterday. Pt was diagnosed with Seizures in Aug 2019 and  she presented with similar complaints of headache and eye twitching, looking to left side, Epigastric pain, so she came to  the ER preemptively. She was admitted for Seizures in December, her Keppra medication is adjusted.  She visited Dr Majano Neurologist in February. She is compliant with medications. She denies dysphagia, diplopia, dysarthria, weakness, numbness, tingling, urinary incontinence, Fecal incontinence, Tongue biting.   [facility] Full code    PAST MEDICAL & SURGICAL HISTORY:  Atrial fibrillation  Seizure  CVA (cerebral vascular accident)  CVA (cerebral vascular accident)  Hypertension  Atrial fibrillation  No significant past surgical history  H/O tubal ligation      SOCIAL HISTORY: Substance Use (street drugs): ( x ) never used  (  ) other:    FAMILY HISTORY:  FH: hypertension: MOTHER, FAISAL      REVIEW OF SYSTEMS:  CONSTITUTIONAL: No fever, weight loss, or fatigue  EYES: No eye pain, visual disturbances, or discharge  ENMT:  No difficulty hearing, tinnitus, vertigo; No sinus or throat pain  BREASTS: No pain, masses, or nipple discharge  GASTROINTESTINAL: No abdominal or epigastric pain. No nausea, vomiting, or hematemesis; No diarrhea or constipation. No melena or hematochezia.  GENITOURINARY: No dysuria, frequency, hematuria, or incontinence  NEUROLOGICAL: No headaches, memory loss, loss of strength, numbness, or tremors  ENDOCRINE: No heat or cold intolerance; No hair loss  MUSCULOSKELETAL: No joint pain or swelling; No muscle, back, or extremity pain  PSYCHIATRIC: No depression, anxiety, mood swings, or difficulty sleeping  HEME/LYMPH: No easy bruising, or bleeding gums  All others negative    MEDICATIONS:  aspirin  chewable 81 milliGRAM(s) Oral daily  hydrochlorothiazide 12.5 milliGRAM(s) Oral daily  lisinopril 20 milliGRAM(s) Oral daily  metoprolol tartrate 50 milliGRAM(s) Oral once  metoprolol tartrate 50 milliGRAM(s) Oral two times a day  rivaroxaban 20 milliGRAM(s) Oral with dinner        acetaminophen   Tablet .. 650 milliGRAM(s) Oral every 6 hours PRN  levETIRAcetam 1000 milliGRAM(s) Oral two times a day      atorvastatin 40 milliGRAM(s) Oral at bedtime        FAMILY HISTORY:  FH: hypertension: MOTHER, FAHTHER        Allergies    penicillin (Hives)    Intolerances    	      PHYSICAL EXAM:  T(C): 36.4 (02-29-20 @ 11:39), Max: 36.8 (02-29-20 @ 05:11)  HR: 78 (02-29-20 @ 11:39) (64 - 101)  BP: 132/78 (02-29-20 @ 11:39) (97/73 - 155/103)  RR: 18 (02-29-20 @ 11:39) (16 - 20)  SpO2: 100% (02-29-20 @ 11:39) (96% - 100%)  Wt(kg): --  I&O's Summary      GENERAL: NAD  EYES: EOMI, PERRLA, conjunctiva and sclera clear  ENMT: No tonsillar erythema, exudates, or enlargement; Moist mucous membranes  Cardiovascular: Normal S1 S2, No JVD, No murmurs  Respiratory: Lungs clear to auscultation	  Gastrointestinal:  Soft, Non-tender, + BS	  Extremities: No clubbing, cyanosis or edema    LABS:	 	    CARDIAC MARKERS:  Troponin I, Serum: <0.015 ng/mL (02-29 @ 06:21)                                  13.7   6.76  )-----------( 158      ( 29 Feb 2020 06:21 )             42.3     02-29    144  |  109<H>  |  11  ----------------------------<  77  3.9   |  28  |  0.69    Ca    9.2      29 Feb 2020 06:21  Phos  3.1     02-29  Mg     2.3     02-29      proBNP:   Lipid Profile:   HgA1c: Hemoglobin A1C, Whole Blood: 5.7 % (02-29 @ 10:09)    TSH: Thyroid Stimulating Hormone, Serum: 1.08 uU/mL (02-29 @ 06:21)      Consultant(s) Notes Reviewed:  [x ] YES  [ ] NO    Care Discussed with Consultants/Other Providers [ x] YES  [ ] NO    Imaging Personally Reviewed independently:  [x] YES  [ ] NO    All labs, radiologic studies, vitals, orders and medications list reviewed. Patient is seen and examined at bedside. Case discussed with medical team.    ASSESSMENT/PLAN:

## 2020-02-29 NOTE — CONSULT NOTE ADULT - ASSESSMENT
EKG Fib RVR     Echo: < from: Transthoracic Echocardiogram (12.02.19 @ 07:32) >  1. Normal mitral valve. Mild mitral regurgitation.  2. Normal trileaflet aortic valve.  3. Aortic Root: 3.4 cm.    4. Mildly dilated left atrium.  LA volume index = 37 cc/m2.  5. Normal left ventricular internal dimensions and wall  thicknesses.  6. Normal Left Ventricular Systolic Function,  (EF = 55 to  60%)  7. Grade II diastolic dysfunction.  8. Normal right atrium.  9. Normal right ventricular size and systolic function  (TAPSE 1.8 cm).  10. RA Pressure is 8 mm Hg.  11. RV systolic pressure is normal at  26 mm Hg.  12. There is mild tricuspid regurgitation.  13. Normal pulmonic valve.  14. Normal pericardium with no pericardial effusion.    < end of copied text >    Assessment and Plan:    1) Afib RVR: now rate controlled , Bradycardic monitor on tele . c/w metoprolol /Xeralto     2) Seizure : c/w meds t/t per primary team     3) DVT PPX Xeralto

## 2020-02-29 NOTE — PHYSICAL THERAPY INITIAL EVALUATION ADULT - PERTINENT HX OF CURRENT PROBLEM, REHAB EVAL
Pt admitted with c/o weakness, eye twitching and epigastric pain. Pt w/ PMH of stroke in 2016 w/residual left side weakness, s/p admission on Dec for seizure

## 2020-02-29 NOTE — PHYSICAL THERAPY INITIAL EVALUATION ADULT - CRITERIA FOR SKILLED THERAPEUTIC INTERVENTIONS
home with assist and outpatient PT/functional limitations in following categories/risk reduction/prevention/anticipated discharge recommendation/impairments found

## 2020-02-29 NOTE — PHYSICAL THERAPY INITIAL EVALUATION ADULT - IMPAIRMENTS CONTRIBUTING IMPAIRED BED MOBILITY, REHAB EVAL
impaired motor control/decreased flexibility/abnormal muscle tone/decreased strength/impaired balance

## 2020-02-29 NOTE — PHYSICAL THERAPY INITIAL EVALUATION ADULT - GENERAL OBSERVATIONS, REHAB EVAL
Pt seen supine in bed w/telemetry, (+) left side weakness. Pt speaks Mongolian and English, refused  service, son at bedside assisted with translation when needed. Pt was cooperative during assessment

## 2020-02-29 NOTE — PHYSICAL THERAPY INITIAL EVALUATION ADULT - DIAGNOSIS, PT EVAL
difficulty with bed mobility, transfers and ambulation due to left side weakness and poor activity tolerance

## 2020-02-29 NOTE — PHYSICAL THERAPY INITIAL EVALUATION ADULT - PLANNED THERAPY INTERVENTIONS, PT EVAL
transfer training/balance training/bed mobility training/neuromuscular re-education/gait training/strengthening

## 2020-03-01 LAB
ANION GAP SERPL CALC-SCNC: 7 MMOL/L — SIGNIFICANT CHANGE UP (ref 5–17)
BUN SERPL-MCNC: 12 MG/DL — SIGNIFICANT CHANGE UP (ref 7–18)
CALCIUM SERPL-MCNC: 9.1 MG/DL — SIGNIFICANT CHANGE UP (ref 8.4–10.5)
CHLORIDE SERPL-SCNC: 109 MMOL/L — HIGH (ref 96–108)
CO2 SERPL-SCNC: 27 MMOL/L — SIGNIFICANT CHANGE UP (ref 22–31)
CREAT SERPL-MCNC: 0.73 MG/DL — SIGNIFICANT CHANGE UP (ref 0.5–1.3)
GLUCOSE SERPL-MCNC: 82 MG/DL — SIGNIFICANT CHANGE UP (ref 70–99)
HCT VFR BLD CALC: 41.1 % — SIGNIFICANT CHANGE UP (ref 34.5–45)
HGB BLD-MCNC: 13.4 G/DL — SIGNIFICANT CHANGE UP (ref 11.5–15.5)
LEVETIRACETAM SERPL-MCNC: 31.1 MCG/ML — SIGNIFICANT CHANGE UP (ref 12–46)
MAGNESIUM SERPL-MCNC: 2.1 MG/DL — SIGNIFICANT CHANGE UP (ref 1.6–2.6)
MCHC RBC-ENTMCNC: 29.1 PG — SIGNIFICANT CHANGE UP (ref 27–34)
MCHC RBC-ENTMCNC: 32.6 GM/DL — SIGNIFICANT CHANGE UP (ref 32–36)
MCV RBC AUTO: 89.3 FL — SIGNIFICANT CHANGE UP (ref 80–100)
NRBC # BLD: 0 /100 WBCS — SIGNIFICANT CHANGE UP (ref 0–0)
PHOSPHATE SERPL-MCNC: 3.3 MG/DL — SIGNIFICANT CHANGE UP (ref 2.5–4.5)
PLATELET # BLD AUTO: 167 K/UL — SIGNIFICANT CHANGE UP (ref 150–400)
POTASSIUM SERPL-MCNC: 3.6 MMOL/L — SIGNIFICANT CHANGE UP (ref 3.5–5.3)
POTASSIUM SERPL-SCNC: 3.6 MMOL/L — SIGNIFICANT CHANGE UP (ref 3.5–5.3)
RBC # BLD: 4.6 M/UL — SIGNIFICANT CHANGE UP (ref 3.8–5.2)
RBC # FLD: 14.7 % — HIGH (ref 10.3–14.5)
SODIUM SERPL-SCNC: 143 MMOL/L — SIGNIFICANT CHANGE UP (ref 135–145)
WBC # BLD: 5.32 K/UL — SIGNIFICANT CHANGE UP (ref 3.8–10.5)
WBC # FLD AUTO: 5.32 K/UL — SIGNIFICANT CHANGE UP (ref 3.8–10.5)

## 2020-03-01 PROCEDURE — 99222 1ST HOSP IP/OBS MODERATE 55: CPT

## 2020-03-01 RX ADMIN — Medication 12.5 MILLIGRAM(S): at 05:46

## 2020-03-01 RX ADMIN — Medication 81 MILLIGRAM(S): at 12:45

## 2020-03-01 RX ADMIN — RIVAROXABAN 20 MILLIGRAM(S): KIT at 17:58

## 2020-03-01 RX ADMIN — ATORVASTATIN CALCIUM 40 MILLIGRAM(S): 80 TABLET, FILM COATED ORAL at 21:45

## 2020-03-01 RX ADMIN — Medication 50 MILLIGRAM(S): at 05:46

## 2020-03-01 RX ADMIN — Medication 50 MILLIGRAM(S): at 17:58

## 2020-03-01 RX ADMIN — LEVETIRACETAM 1000 MILLIGRAM(S): 250 TABLET, FILM COATED ORAL at 17:58

## 2020-03-01 RX ADMIN — LISINOPRIL 20 MILLIGRAM(S): 2.5 TABLET ORAL at 05:46

## 2020-03-01 RX ADMIN — LEVETIRACETAM 1000 MILLIGRAM(S): 250 TABLET, FILM COATED ORAL at 05:45

## 2020-03-01 NOTE — CONSULT NOTE ADULT - SUBJECTIVE AND OBJECTIVE BOX
53 y/o female PMH CVA with left hemiparesis, seizure disorder presents with acute onset abdominal pain followed by eye twitching with preference to left and lightheadedness. No alteration in LOC. This appears to be similar to her prior seizure episodes , however, without extremity tonic clonic movements. She has been on ACD - Keppra 1000 mgs bid. She feels this was not a seizure because of the lack of body twitching. She denies any specific triggers.     She is alert Sudanese speaking female. The majority of her hx was obtained through her son.  Speech fluent understands simple and complex commands.   Dense left hemiparesis with spasticity and hyperreflexia  Gait not tested.

## 2020-03-01 NOTE — CONSULT NOTE ADULT - ATTENDING COMMENTS
Right hemispheric CVA   Focal seizures with left eye gaze preference  Asymptomatic at time of evaluation    She is being followed by Dr. Majnao, Neurology    If not done would recommend repeat CT head  Possible consider adding additional ACD to Keppra or increase Keppra dose as per Dr. Majano.  Patient does not drive

## 2020-03-02 LAB
ANION GAP SERPL CALC-SCNC: 8 MMOL/L — SIGNIFICANT CHANGE UP (ref 5–17)
BUN SERPL-MCNC: 15 MG/DL — SIGNIFICANT CHANGE UP (ref 7–18)
CALCIUM SERPL-MCNC: 9.5 MG/DL — SIGNIFICANT CHANGE UP (ref 8.4–10.5)
CHLORIDE SERPL-SCNC: 109 MMOL/L — HIGH (ref 96–108)
CO2 SERPL-SCNC: 25 MMOL/L — SIGNIFICANT CHANGE UP (ref 22–31)
CREAT SERPL-MCNC: 0.71 MG/DL — SIGNIFICANT CHANGE UP (ref 0.5–1.3)
D DIMER BLD IA.RAPID-MCNC: <150 NG/ML DDU — SIGNIFICANT CHANGE UP
GLUCOSE SERPL-MCNC: 93 MG/DL — SIGNIFICANT CHANGE UP (ref 70–99)
HCT VFR BLD CALC: 41.9 % — SIGNIFICANT CHANGE UP (ref 34.5–45)
HGB BLD-MCNC: 13.8 G/DL — SIGNIFICANT CHANGE UP (ref 11.5–15.5)
LEVETIRACETAM SERPL-MCNC: 30.3 MCG/ML — SIGNIFICANT CHANGE UP (ref 12–46)
MAGNESIUM SERPL-MCNC: 2.1 MG/DL — SIGNIFICANT CHANGE UP (ref 1.6–2.6)
MCHC RBC-ENTMCNC: 29.3 PG — SIGNIFICANT CHANGE UP (ref 27–34)
MCHC RBC-ENTMCNC: 32.9 GM/DL — SIGNIFICANT CHANGE UP (ref 32–36)
MCV RBC AUTO: 89 FL — SIGNIFICANT CHANGE UP (ref 80–100)
NRBC # BLD: 0 /100 WBCS — SIGNIFICANT CHANGE UP (ref 0–0)
PHOSPHATE SERPL-MCNC: 3.8 MG/DL — SIGNIFICANT CHANGE UP (ref 2.5–4.5)
PLATELET # BLD AUTO: 156 K/UL — SIGNIFICANT CHANGE UP (ref 150–400)
POTASSIUM SERPL-MCNC: 3.9 MMOL/L — SIGNIFICANT CHANGE UP (ref 3.5–5.3)
POTASSIUM SERPL-SCNC: 3.9 MMOL/L — SIGNIFICANT CHANGE UP (ref 3.5–5.3)
RBC # BLD: 4.71 M/UL — SIGNIFICANT CHANGE UP (ref 3.8–5.2)
RBC # FLD: 14.6 % — HIGH (ref 10.3–14.5)
SODIUM SERPL-SCNC: 142 MMOL/L — SIGNIFICANT CHANGE UP (ref 135–145)
TROPONIN I SERPL-MCNC: <0.015 NG/ML — SIGNIFICANT CHANGE UP (ref 0–0.04)
WBC # BLD: 5.43 K/UL — SIGNIFICANT CHANGE UP (ref 3.8–10.5)
WBC # FLD AUTO: 5.43 K/UL — SIGNIFICANT CHANGE UP (ref 3.8–10.5)

## 2020-03-02 RX ORDER — METOPROLOL TARTRATE 50 MG
5 TABLET ORAL ONCE
Refills: 0 | Status: COMPLETED | OUTPATIENT
Start: 2020-03-02 | End: 2020-03-02

## 2020-03-02 RX ORDER — ERGOCALCIFEROL 1.25 MG/1
50000 CAPSULE ORAL
Refills: 0 | Status: DISCONTINUED | OUTPATIENT
Start: 2020-03-02 | End: 2020-03-07

## 2020-03-02 RX ORDER — METOPROLOL TARTRATE 50 MG
5 TABLET ORAL EVERY 6 HOURS
Refills: 0 | Status: DISCONTINUED | OUTPATIENT
Start: 2020-03-02 | End: 2020-03-02

## 2020-03-02 RX ADMIN — Medication 5 MILLIGRAM(S): at 16:45

## 2020-03-02 RX ADMIN — RIVAROXABAN 20 MILLIGRAM(S): KIT at 16:45

## 2020-03-02 RX ADMIN — Medication 81 MILLIGRAM(S): at 11:14

## 2020-03-02 RX ADMIN — LEVETIRACETAM 1000 MILLIGRAM(S): 250 TABLET, FILM COATED ORAL at 06:46

## 2020-03-02 RX ADMIN — ATORVASTATIN CALCIUM 40 MILLIGRAM(S): 80 TABLET, FILM COATED ORAL at 21:34

## 2020-03-02 RX ADMIN — LEVETIRACETAM 1000 MILLIGRAM(S): 250 TABLET, FILM COATED ORAL at 16:45

## 2020-03-02 RX ADMIN — Medication 50 MILLIGRAM(S): at 17:06

## 2020-03-02 RX ADMIN — ERGOCALCIFEROL 50000 UNIT(S): 1.25 CAPSULE ORAL at 11:14

## 2020-03-02 RX ADMIN — Medication 5 MILLIGRAM(S): at 13:45

## 2020-03-03 LAB
ANION GAP SERPL CALC-SCNC: 9 MMOL/L — SIGNIFICANT CHANGE UP (ref 5–17)
BUN SERPL-MCNC: 12 MG/DL — SIGNIFICANT CHANGE UP (ref 7–18)
CALCIUM SERPL-MCNC: 9.4 MG/DL — SIGNIFICANT CHANGE UP (ref 8.4–10.5)
CHLORIDE SERPL-SCNC: 109 MMOL/L — HIGH (ref 96–108)
CO2 SERPL-SCNC: 22 MMOL/L — SIGNIFICANT CHANGE UP (ref 22–31)
CREAT SERPL-MCNC: 0.75 MG/DL — SIGNIFICANT CHANGE UP (ref 0.5–1.3)
GLUCOSE SERPL-MCNC: 97 MG/DL — SIGNIFICANT CHANGE UP (ref 70–99)
HCT VFR BLD CALC: 43.1 % — SIGNIFICANT CHANGE UP (ref 34.5–45)
HGB BLD-MCNC: 14.4 G/DL — SIGNIFICANT CHANGE UP (ref 11.5–15.5)
MCHC RBC-ENTMCNC: 29.6 PG — SIGNIFICANT CHANGE UP (ref 27–34)
MCHC RBC-ENTMCNC: 33.4 GM/DL — SIGNIFICANT CHANGE UP (ref 32–36)
MCV RBC AUTO: 88.5 FL — SIGNIFICANT CHANGE UP (ref 80–100)
NRBC # BLD: 0 /100 WBCS — SIGNIFICANT CHANGE UP (ref 0–0)
PLATELET # BLD AUTO: 173 K/UL — SIGNIFICANT CHANGE UP (ref 150–400)
POTASSIUM SERPL-MCNC: 3.7 MMOL/L — SIGNIFICANT CHANGE UP (ref 3.5–5.3)
POTASSIUM SERPL-SCNC: 3.7 MMOL/L — SIGNIFICANT CHANGE UP (ref 3.5–5.3)
RBC # BLD: 4.87 M/UL — SIGNIFICANT CHANGE UP (ref 3.8–5.2)
RBC # FLD: 14.6 % — HIGH (ref 10.3–14.5)
SODIUM SERPL-SCNC: 140 MMOL/L — SIGNIFICANT CHANGE UP (ref 135–145)
WBC # BLD: 5.01 K/UL — SIGNIFICANT CHANGE UP (ref 3.8–10.5)
WBC # FLD AUTO: 5.01 K/UL — SIGNIFICANT CHANGE UP (ref 3.8–10.5)

## 2020-03-03 RX ORDER — LISINOPRIL 2.5 MG/1
10 TABLET ORAL DAILY
Refills: 0 | Status: DISCONTINUED | OUTPATIENT
Start: 2020-03-03 | End: 2020-03-03

## 2020-03-03 RX ORDER — METOPROLOL TARTRATE 50 MG
100 TABLET ORAL
Refills: 0 | Status: DISCONTINUED | OUTPATIENT
Start: 2020-03-03 | End: 2020-03-04

## 2020-03-03 RX ORDER — METOPROLOL TARTRATE 50 MG
50 TABLET ORAL ONCE
Refills: 0 | Status: COMPLETED | OUTPATIENT
Start: 2020-03-03 | End: 2020-03-03

## 2020-03-03 RX ORDER — DILTIAZEM HCL 120 MG
30 CAPSULE, EXT RELEASE 24 HR ORAL EVERY 6 HOURS
Refills: 0 | Status: DISCONTINUED | OUTPATIENT
Start: 2020-03-03 | End: 2020-03-07

## 2020-03-03 RX ADMIN — Medication 81 MILLIGRAM(S): at 11:15

## 2020-03-03 RX ADMIN — Medication 30 MILLIGRAM(S): at 17:44

## 2020-03-03 RX ADMIN — LISINOPRIL 20 MILLIGRAM(S): 2.5 TABLET ORAL at 05:43

## 2020-03-03 RX ADMIN — LEVETIRACETAM 1000 MILLIGRAM(S): 250 TABLET, FILM COATED ORAL at 17:43

## 2020-03-03 RX ADMIN — Medication 100 MILLIGRAM(S): at 17:44

## 2020-03-03 RX ADMIN — RIVAROXABAN 20 MILLIGRAM(S): KIT at 17:44

## 2020-03-03 RX ADMIN — LEVETIRACETAM 1000 MILLIGRAM(S): 250 TABLET, FILM COATED ORAL at 05:43

## 2020-03-03 RX ADMIN — ATORVASTATIN CALCIUM 40 MILLIGRAM(S): 80 TABLET, FILM COATED ORAL at 21:40

## 2020-03-03 RX ADMIN — Medication 50 MILLIGRAM(S): at 05:43

## 2020-03-03 RX ADMIN — Medication 12.5 MILLIGRAM(S): at 05:43

## 2020-03-04 LAB
ANION GAP SERPL CALC-SCNC: 8 MMOL/L — SIGNIFICANT CHANGE UP (ref 5–17)
BUN SERPL-MCNC: 10 MG/DL — SIGNIFICANT CHANGE UP (ref 7–18)
CALCIUM SERPL-MCNC: 9.2 MG/DL — SIGNIFICANT CHANGE UP (ref 8.4–10.5)
CHLORIDE SERPL-SCNC: 109 MMOL/L — HIGH (ref 96–108)
CO2 SERPL-SCNC: 23 MMOL/L — SIGNIFICANT CHANGE UP (ref 22–31)
CREAT SERPL-MCNC: 0.8 MG/DL — SIGNIFICANT CHANGE UP (ref 0.5–1.3)
GLUCOSE SERPL-MCNC: 95 MG/DL — SIGNIFICANT CHANGE UP (ref 70–99)
HCT VFR BLD CALC: 42.1 % — SIGNIFICANT CHANGE UP (ref 34.5–45)
HGB BLD-MCNC: 13.7 G/DL — SIGNIFICANT CHANGE UP (ref 11.5–15.5)
LEVETIRACETAM SERPL-MCNC: 43.3 MCG/ML — SIGNIFICANT CHANGE UP (ref 12–46)
MCHC RBC-ENTMCNC: 29 PG — SIGNIFICANT CHANGE UP (ref 27–34)
MCHC RBC-ENTMCNC: 32.5 GM/DL — SIGNIFICANT CHANGE UP (ref 32–36)
MCV RBC AUTO: 89 FL — SIGNIFICANT CHANGE UP (ref 80–100)
NRBC # BLD: 0 /100 WBCS — SIGNIFICANT CHANGE UP (ref 0–0)
PLATELET # BLD AUTO: 181 K/UL — SIGNIFICANT CHANGE UP (ref 150–400)
POTASSIUM SERPL-MCNC: 3.8 MMOL/L — SIGNIFICANT CHANGE UP (ref 3.5–5.3)
POTASSIUM SERPL-SCNC: 3.8 MMOL/L — SIGNIFICANT CHANGE UP (ref 3.5–5.3)
RBC # BLD: 4.73 M/UL — SIGNIFICANT CHANGE UP (ref 3.8–5.2)
RBC # FLD: 14.6 % — HIGH (ref 10.3–14.5)
SODIUM SERPL-SCNC: 140 MMOL/L — SIGNIFICANT CHANGE UP (ref 135–145)
WBC # BLD: 6.21 K/UL — SIGNIFICANT CHANGE UP (ref 3.8–10.5)
WBC # FLD AUTO: 6.21 K/UL — SIGNIFICANT CHANGE UP (ref 3.8–10.5)

## 2020-03-04 RX ORDER — METOPROLOL TARTRATE 50 MG
75 TABLET ORAL
Refills: 0 | Status: DISCONTINUED | OUTPATIENT
Start: 2020-03-04 | End: 2020-03-07

## 2020-03-04 RX ADMIN — Medication 12.5 MILLIGRAM(S): at 06:12

## 2020-03-04 RX ADMIN — Medication 30 MILLIGRAM(S): at 06:12

## 2020-03-04 RX ADMIN — LEVETIRACETAM 1000 MILLIGRAM(S): 250 TABLET, FILM COATED ORAL at 06:13

## 2020-03-04 RX ADMIN — ATORVASTATIN CALCIUM 40 MILLIGRAM(S): 80 TABLET, FILM COATED ORAL at 22:13

## 2020-03-04 RX ADMIN — Medication 81 MILLIGRAM(S): at 12:05

## 2020-03-04 RX ADMIN — Medication 30 MILLIGRAM(S): at 23:58

## 2020-03-04 RX ADMIN — RIVAROXABAN 20 MILLIGRAM(S): KIT at 17:17

## 2020-03-04 RX ADMIN — LEVETIRACETAM 1000 MILLIGRAM(S): 250 TABLET, FILM COATED ORAL at 17:17

## 2020-03-04 RX ADMIN — Medication 100 MILLIGRAM(S): at 06:13

## 2020-03-04 RX ADMIN — Medication 30 MILLIGRAM(S): at 00:44

## 2020-03-05 LAB
ANION GAP SERPL CALC-SCNC: 6 MMOL/L — SIGNIFICANT CHANGE UP (ref 5–17)
BUN SERPL-MCNC: 17 MG/DL — SIGNIFICANT CHANGE UP (ref 7–18)
CALCIUM SERPL-MCNC: 9.3 MG/DL — SIGNIFICANT CHANGE UP (ref 8.4–10.5)
CHLORIDE SERPL-SCNC: 108 MMOL/L — SIGNIFICANT CHANGE UP (ref 96–108)
CO2 SERPL-SCNC: 26 MMOL/L — SIGNIFICANT CHANGE UP (ref 22–31)
CREAT SERPL-MCNC: 0.69 MG/DL — SIGNIFICANT CHANGE UP (ref 0.5–1.3)
GLUCOSE BLDC GLUCOMTR-MCNC: 103 MG/DL — HIGH (ref 70–99)
GLUCOSE BLDC GLUCOMTR-MCNC: 88 MG/DL — SIGNIFICANT CHANGE UP (ref 70–99)
GLUCOSE SERPL-MCNC: 89 MG/DL — SIGNIFICANT CHANGE UP (ref 70–99)
HCT VFR BLD CALC: 42.4 % — SIGNIFICANT CHANGE UP (ref 34.5–45)
HGB BLD-MCNC: 13.9 G/DL — SIGNIFICANT CHANGE UP (ref 11.5–15.5)
MCHC RBC-ENTMCNC: 29.1 PG — SIGNIFICANT CHANGE UP (ref 27–34)
MCHC RBC-ENTMCNC: 32.8 GM/DL — SIGNIFICANT CHANGE UP (ref 32–36)
MCV RBC AUTO: 88.9 FL — SIGNIFICANT CHANGE UP (ref 80–100)
NRBC # BLD: 0 /100 WBCS — SIGNIFICANT CHANGE UP (ref 0–0)
PLATELET # BLD AUTO: 159 K/UL — SIGNIFICANT CHANGE UP (ref 150–400)
POTASSIUM SERPL-MCNC: 3.8 MMOL/L — SIGNIFICANT CHANGE UP (ref 3.5–5.3)
POTASSIUM SERPL-SCNC: 3.8 MMOL/L — SIGNIFICANT CHANGE UP (ref 3.5–5.3)
RBC # BLD: 4.77 M/UL — SIGNIFICANT CHANGE UP (ref 3.8–5.2)
RBC # FLD: 14.6 % — HIGH (ref 10.3–14.5)
SODIUM SERPL-SCNC: 140 MMOL/L — SIGNIFICANT CHANGE UP (ref 135–145)
WBC # BLD: 5.12 K/UL — SIGNIFICANT CHANGE UP (ref 3.8–10.5)
WBC # FLD AUTO: 5.12 K/UL — SIGNIFICANT CHANGE UP (ref 3.8–10.5)

## 2020-03-05 RX ORDER — METOPROLOL TARTRATE 50 MG
5 TABLET ORAL ONCE
Refills: 0 | Status: COMPLETED | OUTPATIENT
Start: 2020-03-05 | End: 2020-03-05

## 2020-03-05 RX ADMIN — Medication 5 MILLIGRAM(S): at 15:48

## 2020-03-05 RX ADMIN — Medication 75 MILLIGRAM(S): at 17:51

## 2020-03-05 RX ADMIN — LEVETIRACETAM 1000 MILLIGRAM(S): 250 TABLET, FILM COATED ORAL at 17:49

## 2020-03-05 RX ADMIN — RIVAROXABAN 20 MILLIGRAM(S): KIT at 17:49

## 2020-03-05 RX ADMIN — ATORVASTATIN CALCIUM 40 MILLIGRAM(S): 80 TABLET, FILM COATED ORAL at 22:28

## 2020-03-05 RX ADMIN — Medication 30 MILLIGRAM(S): at 11:49

## 2020-03-05 RX ADMIN — Medication 81 MILLIGRAM(S): at 11:49

## 2020-03-05 RX ADMIN — Medication 30 MILLIGRAM(S): at 17:49

## 2020-03-05 RX ADMIN — LEVETIRACETAM 1000 MILLIGRAM(S): 250 TABLET, FILM COATED ORAL at 05:53

## 2020-03-05 NOTE — DIETITIAN INITIAL EVALUATION ADULT. - PERTINENT MEDS FT
MEDICATIONS  (STANDING):  aspirin  chewable 81 milliGRAM(s) Oral daily  atorvastatin 40 milliGRAM(s) Oral at bedtime  diltiazem    Tablet 30 milliGRAM(s) Oral every 6 hours  ergocalciferol 60951 Unit(s) Oral <User Schedule>  levETIRAcetam 1000 milliGRAM(s) Oral two times a day  metoprolol tartrate 75 milliGRAM(s) Oral two times a day  rivaroxaban 20 milliGRAM(s) Oral with dinner

## 2020-03-05 NOTE — DIETITIAN INITIAL EVALUATION ADULT. - PERTINENT LABORATORY DATA
03-05 Na140 mmol/L Glu 89 mg/dL K+ 3.8 mmol/L Cr  0.69 mg/dL BUN 17 mg/dL   03-02 Phos 3.8 mg/dL       02-29 [id number] 5.7 %<H>   02-29 Chol 131 mg/dL LDL 69 mg/dL HDL 53 mg/dL Trig 43 mg/dL

## 2020-03-05 NOTE — DIETITIAN INITIAL EVALUATION ADULT. - OTHER INFO
Pt alert, oriented, speaks Fijian and fluent English, able to communicate well well, son at bedside, lives home with family PTA; appetite good, intended wt loss ( see below), denied GI distress, chewing or swallowing problem at present, no specific food choices reported; wants more nutrition information for wt loss, heart healthy, very receptive to information given.

## 2020-03-05 NOTE — DIETITIAN INITIAL EVALUATION ADULT. - NS FNS WEIGHT CHANGE REASON
intentional/wt data from [facility] EMR reviewed: 201.7 lb 9/21/2019; 190.4 lb 12/4/2019; 183.2 lb 3/5/2020

## 2020-03-05 NOTE — ACUTE INTERFACILITY TRANSFER NOTE - HOSPITAL COURSE
53 y/o F from home, lives with son, walks with cane with PMH of Afib on Xarelto, Seizure,  CVA in 2016 s/p left residual paralysis presented to ED  with Eye twitching and Headache since yesterday. Pt was diagnosed with Seizures in Aug 2019 and  she presented with similar complaints of headache and eye twitching, looking to left side, Epigastric pain, so she came to  the ER preemptively. She was admitted for Seizures in December, her Keppra medication is adjusted.  She visited Dr Majano Neurologist in February. She is compliant with medications. She denies dysphagia, diplopia, dysarthria, weakness, numbness, tingling, urinary incontinence, Fecal incontinence, Tongue biting.   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

## 2020-03-05 NOTE — DIETITIAN INITIAL EVALUATION ADULT. - NS FNS WEIGHT USED FOR CALC
Ht=5' 4"   WUR=047 lb+185=615 lb   Admission wj=557 lb?  Current gs=803.6 lb 2/29/2020; 183.2 lb 3/5/2020   BMi=/ideal Ht=5' 4"   UBE=977 lb+573=884 lb   Admission zl=877 lb?  Current vu=057.6 lb 2/29/2020; 183.2 lb 3/5/2020   BMI=31.4/ideal

## 2020-03-06 LAB
ANION GAP SERPL CALC-SCNC: 4 MMOL/L — LOW (ref 5–17)
BUN SERPL-MCNC: 18 MG/DL — SIGNIFICANT CHANGE UP (ref 7–18)
CALCIUM SERPL-MCNC: 9.1 MG/DL — SIGNIFICANT CHANGE UP (ref 8.4–10.5)
CHLORIDE SERPL-SCNC: 109 MMOL/L — HIGH (ref 96–108)
CO2 SERPL-SCNC: 27 MMOL/L — SIGNIFICANT CHANGE UP (ref 22–31)
CREAT SERPL-MCNC: 0.73 MG/DL — SIGNIFICANT CHANGE UP (ref 0.5–1.3)
GLUCOSE BLDC GLUCOMTR-MCNC: 105 MG/DL — HIGH (ref 70–99)
GLUCOSE BLDC GLUCOMTR-MCNC: 107 MG/DL — HIGH (ref 70–99)
GLUCOSE SERPL-MCNC: 84 MG/DL — SIGNIFICANT CHANGE UP (ref 70–99)
HCT VFR BLD CALC: 41.7 % — SIGNIFICANT CHANGE UP (ref 34.5–45)
HGB BLD-MCNC: 13.7 G/DL — SIGNIFICANT CHANGE UP (ref 11.5–15.5)
MCHC RBC-ENTMCNC: 29.3 PG — SIGNIFICANT CHANGE UP (ref 27–34)
MCHC RBC-ENTMCNC: 32.9 GM/DL — SIGNIFICANT CHANGE UP (ref 32–36)
MCV RBC AUTO: 89.3 FL — SIGNIFICANT CHANGE UP (ref 80–100)
NRBC # BLD: 0 /100 WBCS — SIGNIFICANT CHANGE UP (ref 0–0)
PLATELET # BLD AUTO: 155 K/UL — SIGNIFICANT CHANGE UP (ref 150–400)
POTASSIUM SERPL-MCNC: 4.2 MMOL/L — SIGNIFICANT CHANGE UP (ref 3.5–5.3)
POTASSIUM SERPL-SCNC: 4.2 MMOL/L — SIGNIFICANT CHANGE UP (ref 3.5–5.3)
RBC # BLD: 4.67 M/UL — SIGNIFICANT CHANGE UP (ref 3.8–5.2)
RBC # FLD: 14.6 % — HIGH (ref 10.3–14.5)
SODIUM SERPL-SCNC: 140 MMOL/L — SIGNIFICANT CHANGE UP (ref 135–145)
WBC # BLD: 7.18 K/UL — SIGNIFICANT CHANGE UP (ref 3.8–10.5)
WBC # FLD AUTO: 7.18 K/UL — SIGNIFICANT CHANGE UP (ref 3.8–10.5)

## 2020-03-06 RX ADMIN — Medication 30 MILLIGRAM(S): at 18:10

## 2020-03-06 RX ADMIN — Medication 81 MILLIGRAM(S): at 11:22

## 2020-03-06 RX ADMIN — Medication 30 MILLIGRAM(S): at 11:22

## 2020-03-06 RX ADMIN — Medication 30 MILLIGRAM(S): at 23:32

## 2020-03-06 RX ADMIN — LEVETIRACETAM 1000 MILLIGRAM(S): 250 TABLET, FILM COATED ORAL at 18:11

## 2020-03-06 RX ADMIN — Medication 75 MILLIGRAM(S): at 05:54

## 2020-03-06 RX ADMIN — RIVAROXABAN 20 MILLIGRAM(S): KIT at 18:10

## 2020-03-06 RX ADMIN — Medication 30 MILLIGRAM(S): at 05:54

## 2020-03-06 RX ADMIN — Medication 75 MILLIGRAM(S): at 18:11

## 2020-03-06 RX ADMIN — ATORVASTATIN CALCIUM 40 MILLIGRAM(S): 80 TABLET, FILM COATED ORAL at 22:17

## 2020-03-06 RX ADMIN — LEVETIRACETAM 1000 MILLIGRAM(S): 250 TABLET, FILM COATED ORAL at 05:54

## 2020-03-07 ENCOUNTER — INPATIENT (INPATIENT)
Facility: HOSPITAL | Age: 55
LOS: 2 days | Discharge: HOME HEALTH | End: 2020-03-10
Attending: INTERNAL MEDICINE | Admitting: INTERNAL MEDICINE
Payer: MEDICARE

## 2020-03-07 VITALS
OXYGEN SATURATION: 98 % | HEART RATE: 60 BPM | DIASTOLIC BLOOD PRESSURE: 53 MMHG | RESPIRATION RATE: 17 BRPM | TEMPERATURE: 98 F | SYSTOLIC BLOOD PRESSURE: 97 MMHG

## 2020-03-07 VITALS
HEIGHT: 62 IN | DIASTOLIC BLOOD PRESSURE: 71 MMHG | WEIGHT: 190.92 LBS | HEART RATE: 113 BPM | SYSTOLIC BLOOD PRESSURE: 122 MMHG | OXYGEN SATURATION: 99 % | TEMPERATURE: 98 F | RESPIRATION RATE: 17 BRPM

## 2020-03-07 DIAGNOSIS — I48.91 UNSPECIFIED ATRIAL FIBRILLATION: ICD-10-CM

## 2020-03-07 DIAGNOSIS — Z29.9 ENCOUNTER FOR PROPHYLACTIC MEASURES, UNSPECIFIED: ICD-10-CM

## 2020-03-07 DIAGNOSIS — Z98.51 TUBAL LIGATION STATUS: Chronic | ICD-10-CM

## 2020-03-07 DIAGNOSIS — E78.5 HYPERLIPIDEMIA, UNSPECIFIED: ICD-10-CM

## 2020-03-07 DIAGNOSIS — R56.9 UNSPECIFIED CONVULSIONS: ICD-10-CM

## 2020-03-07 DIAGNOSIS — I63.9 CEREBRAL INFARCTION, UNSPECIFIED: ICD-10-CM

## 2020-03-07 DIAGNOSIS — I49.5 SICK SINUS SYNDROME: ICD-10-CM

## 2020-03-07 DIAGNOSIS — I49.9 CARDIAC ARRHYTHMIA, UNSPECIFIED: ICD-10-CM

## 2020-03-07 DIAGNOSIS — I10 ESSENTIAL (PRIMARY) HYPERTENSION: ICD-10-CM

## 2020-03-07 LAB
ANION GAP SERPL CALC-SCNC: 4 MMOL/L — LOW (ref 5–17)
BUN SERPL-MCNC: 16 MG/DL — SIGNIFICANT CHANGE UP (ref 7–18)
CALCIUM SERPL-MCNC: 9.2 MG/DL — SIGNIFICANT CHANGE UP (ref 8.4–10.5)
CHLORIDE SERPL-SCNC: 109 MMOL/L — HIGH (ref 96–108)
CO2 SERPL-SCNC: 26 MMOL/L — SIGNIFICANT CHANGE UP (ref 22–31)
CREAT SERPL-MCNC: 0.73 MG/DL — SIGNIFICANT CHANGE UP (ref 0.5–1.3)
GLUCOSE SERPL-MCNC: 94 MG/DL — SIGNIFICANT CHANGE UP (ref 70–99)
HCT VFR BLD CALC: 43.5 % — SIGNIFICANT CHANGE UP (ref 34.5–45)
HGB BLD-MCNC: 13.9 G/DL — SIGNIFICANT CHANGE UP (ref 11.5–15.5)
MCHC RBC-ENTMCNC: 28.7 PG — SIGNIFICANT CHANGE UP (ref 27–34)
MCHC RBC-ENTMCNC: 32 GM/DL — SIGNIFICANT CHANGE UP (ref 32–36)
MCV RBC AUTO: 89.7 FL — SIGNIFICANT CHANGE UP (ref 80–100)
NRBC # BLD: 0 /100 WBCS — SIGNIFICANT CHANGE UP (ref 0–0)
PLATELET # BLD AUTO: 168 K/UL — SIGNIFICANT CHANGE UP (ref 150–400)
POTASSIUM SERPL-MCNC: 4.3 MMOL/L — SIGNIFICANT CHANGE UP (ref 3.5–5.3)
POTASSIUM SERPL-SCNC: 4.3 MMOL/L — SIGNIFICANT CHANGE UP (ref 3.5–5.3)
RBC # BLD: 4.85 M/UL — SIGNIFICANT CHANGE UP (ref 3.8–5.2)
RBC # FLD: 14.6 % — HIGH (ref 10.3–14.5)
SODIUM SERPL-SCNC: 139 MMOL/L — SIGNIFICANT CHANGE UP (ref 135–145)
WBC # BLD: 6.52 K/UL — SIGNIFICANT CHANGE UP (ref 3.8–10.5)
WBC # FLD AUTO: 6.52 K/UL — SIGNIFICANT CHANGE UP (ref 3.8–10.5)

## 2020-03-07 PROCEDURE — 82607 VITAMIN B-12: CPT

## 2020-03-07 PROCEDURE — 93306 TTE W/DOPPLER COMPLETE: CPT

## 2020-03-07 PROCEDURE — 82553 CREATINE MB FRACTION: CPT

## 2020-03-07 PROCEDURE — 80048 BASIC METABOLIC PNL TOTAL CA: CPT

## 2020-03-07 PROCEDURE — 96374 THER/PROPH/DIAG INJ IV PUSH: CPT

## 2020-03-07 PROCEDURE — 83735 ASSAY OF MAGNESIUM: CPT

## 2020-03-07 PROCEDURE — 82550 ASSAY OF CK (CPK): CPT

## 2020-03-07 PROCEDURE — 84484 ASSAY OF TROPONIN QUANT: CPT

## 2020-03-07 PROCEDURE — 82746 ASSAY OF FOLIC ACID SERUM: CPT

## 2020-03-07 PROCEDURE — 83036 HEMOGLOBIN GLYCOSYLATED A1C: CPT

## 2020-03-07 PROCEDURE — 85379 FIBRIN DEGRADATION QUANT: CPT

## 2020-03-07 PROCEDURE — 93010 ELECTROCARDIOGRAM REPORT: CPT

## 2020-03-07 PROCEDURE — 97110 THERAPEUTIC EXERCISES: CPT

## 2020-03-07 PROCEDURE — 84443 ASSAY THYROID STIM HORMONE: CPT

## 2020-03-07 PROCEDURE — 82306 VITAMIN D 25 HYDROXY: CPT

## 2020-03-07 PROCEDURE — 84100 ASSAY OF PHOSPHORUS: CPT

## 2020-03-07 PROCEDURE — 93005 ELECTROCARDIOGRAM TRACING: CPT

## 2020-03-07 PROCEDURE — 99285 EMERGENCY DEPT VISIT HI MDM: CPT | Mod: 25

## 2020-03-07 PROCEDURE — 80177 DRUG SCRN QUAN LEVETIRACETAM: CPT

## 2020-03-07 PROCEDURE — 82962 GLUCOSE BLOOD TEST: CPT

## 2020-03-07 PROCEDURE — 97530 THERAPEUTIC ACTIVITIES: CPT

## 2020-03-07 PROCEDURE — 85027 COMPLETE CBC AUTOMATED: CPT

## 2020-03-07 PROCEDURE — 97162 PT EVAL MOD COMPLEX 30 MIN: CPT

## 2020-03-07 PROCEDURE — 36415 COLL VENOUS BLD VENIPUNCTURE: CPT

## 2020-03-07 PROCEDURE — 85652 RBC SED RATE AUTOMATED: CPT

## 2020-03-07 PROCEDURE — 80061 LIPID PANEL: CPT

## 2020-03-07 RX ORDER — RIVAROXABAN 15 MG-20MG
20 KIT ORAL
Refills: 0 | Status: DISCONTINUED | OUTPATIENT
Start: 2020-03-07 | End: 2020-03-08

## 2020-03-07 RX ORDER — DILTIAZEM HCL 120 MG
30 CAPSULE, EXT RELEASE 24 HR ORAL EVERY 6 HOURS
Refills: 0 | Status: DISCONTINUED | OUTPATIENT
Start: 2020-03-07 | End: 2020-03-10

## 2020-03-07 RX ORDER — SODIUM CHLORIDE 9 MG/ML
3 INJECTION INTRAMUSCULAR; INTRAVENOUS; SUBCUTANEOUS EVERY 8 HOURS
Refills: 0 | Status: DISCONTINUED | OUTPATIENT
Start: 2020-03-07 | End: 2020-03-10

## 2020-03-07 RX ORDER — ASPIRIN/CALCIUM CARB/MAGNESIUM 324 MG
81 TABLET ORAL DAILY
Refills: 0 | Status: DISCONTINUED | OUTPATIENT
Start: 2020-03-07 | End: 2020-03-10

## 2020-03-07 RX ORDER — LEVETIRACETAM 250 MG/1
1000 TABLET, FILM COATED ORAL
Refills: 0 | Status: DISCONTINUED | OUTPATIENT
Start: 2020-03-07 | End: 2020-03-10

## 2020-03-07 RX ORDER — ATORVASTATIN CALCIUM 80 MG/1
40 TABLET, FILM COATED ORAL AT BEDTIME
Refills: 0 | Status: DISCONTINUED | OUTPATIENT
Start: 2020-03-07 | End: 2020-03-10

## 2020-03-07 RX ORDER — INFLUENZA VIRUS VACCINE 15; 15; 15; 15 UG/.5ML; UG/.5ML; UG/.5ML; UG/.5ML
0.5 SUSPENSION INTRAMUSCULAR ONCE
Refills: 0 | Status: DISCONTINUED | OUTPATIENT
Start: 2020-03-07 | End: 2020-03-10

## 2020-03-07 RX ORDER — LISINOPRIL/HYDROCHLOROTHIAZIDE 10-12.5 MG
1 TABLET ORAL
Qty: 0 | Refills: 0 | DISCHARGE

## 2020-03-07 RX ORDER — METOPROLOL TARTRATE 50 MG
50 TABLET ORAL
Refills: 0 | Status: DISCONTINUED | OUTPATIENT
Start: 2020-03-07 | End: 2020-03-09

## 2020-03-07 RX ADMIN — RIVAROXABAN 20 MILLIGRAM(S): KIT at 19:53

## 2020-03-07 RX ADMIN — Medication 30 MILLIGRAM(S): at 12:27

## 2020-03-07 RX ADMIN — Medication 50 MILLIGRAM(S): at 21:48

## 2020-03-07 RX ADMIN — SODIUM CHLORIDE 3 MILLILITER(S): 9 INJECTION INTRAMUSCULAR; INTRAVENOUS; SUBCUTANEOUS at 21:48

## 2020-03-07 RX ADMIN — ATORVASTATIN CALCIUM 40 MILLIGRAM(S): 80 TABLET, FILM COATED ORAL at 21:48

## 2020-03-07 RX ADMIN — Medication 81 MILLIGRAM(S): at 12:27

## 2020-03-07 RX ADMIN — LEVETIRACETAM 1000 MILLIGRAM(S): 250 TABLET, FILM COATED ORAL at 05:26

## 2020-03-07 RX ADMIN — Medication 75 MILLIGRAM(S): at 05:26

## 2020-03-07 RX ADMIN — Medication 30 MILLIGRAM(S): at 21:48

## 2020-03-07 RX ADMIN — LEVETIRACETAM 1000 MILLIGRAM(S): 250 TABLET, FILM COATED ORAL at 21:48

## 2020-03-07 RX ADMIN — Medication 30 MILLIGRAM(S): at 05:25

## 2020-03-07 NOTE — CONSULT NOTE ADULT - ASSESSMENT
53 y/o Slovenian s[peaking female from home, lives with son, walks with cane with PMH of Afib on Xarelto, HTN, Seizure,  CVA in 2016 s/p left residual paralysis presented to [place] ED with Eye twitching and Headache found in atrial fibrillation RVR on Cardizem and metoprolol c/b tachy/clare syndrome. Transfer to [facility] for micra pacemaker 53 y/o [place]ian s[peaking female from home, lives with son, walks with cane with PMH of atrial fibrillation since 2016  on Xarelto( no h/o cardioversion), HTN, Seizure,  CVA in 2016 s/p left residual paralysis and  multiple seizures presented to [place] ED with acute onset abdominal pain followed by eye twitching with preference to left and lightheadedness, found in atrial fibrillation RVR on Cardizem and metoprolol c/b tachy/clare syndrome. Transfer to [facility] for micra pacemaker

## 2020-03-07 NOTE — PROGRESS NOTE ADULT - PROBLEM SELECTOR PROBLEM 4
CVA (cerebral vascular accident)

## 2020-03-07 NOTE — CONSULT NOTE ADULT - PROBLEM SELECTOR RECOMMENDATION 9
admit to tele  c/w cardizem 30mg Po q6, metoprolol 50mg po bid admit to tele  12 leads EKG stat  c/w Cardizem 30mg Po q6, metoprolol 50mg po bid  c/w rivaroxaban 20 milliGRAM(s) Oral daily  NPO after sunday MN for micra pacemaker on Monday

## 2020-03-07 NOTE — PATIENT PROFILE ADULT - NSPROEDAREADYLEARN_GEN_A_NUR
Spoke with patient:  He wants to reschedule his 3:00 appointment today to a later date.   12/18 10:00 AM
none

## 2020-03-07 NOTE — TRANSFER ACCEPTANCE NOTE - FAMILY HISTORY
Father  Still living? Yes, Estimated age: Age Unknown  FH: hypertension, Age at diagnosis: Age Unknown     Mother  Still living? Yes, Estimated age: Age Unknown  FH: hypertension, Age at diagnosis: Age Unknown

## 2020-03-07 NOTE — PROGRESS NOTE ADULT - PROBLEM SELECTOR PLAN 4
c/w Aspirin and statin

## 2020-03-07 NOTE — PATIENT PROFILE ADULT - FUNCTIONAL SCREEN CURRENT LEVEL: EATING, MLM
Mom reports bottle feedings going well. Encouraged to try at breast and pump if bottle feeding. See progress notes for feeding plan. Paper copy given to mom. Encouraged frequent feeding. Watch output. Declined rental pump. Plans to borrow from a friend today. Offered outpatient appointment once milk is in if baby is still not latching. Call as needed. 0 = independent

## 2020-03-07 NOTE — TRANSFER ACCEPTANCE NOTE - ASSESSMENT
53 y/o female with a hx of Afib on Xarelto, HTN, HLD, CVA in 2016 with residual Left sided hemiplegia, Seizure d/o transferred from [facility] for PPM placement for tachy clare syndrome.

## 2020-03-07 NOTE — PROGRESS NOTE ADULT - PROBLEM SELECTOR PLAN 5
Pain management with Tylenol

## 2020-03-07 NOTE — TRANSFER ACCEPTANCE NOTE - PMH
Atrial fibrillation    Atrial fibrillation    CVA (cerebral vascular accident)    CVA (cerebral vascular accident)    Hypertension    Seizure    Tachy-clare syndrome

## 2020-03-07 NOTE — PROGRESS NOTE ADULT - PROBLEM SELECTOR PLAN 2
f/u Keppra level in am  c/w Keppra   neurology f/u
f/u Keppra level in am  c/w Keppra   neurology f/u
kepra levels wnl  will continue with same dose of kepra

## 2020-03-07 NOTE — PROGRESS NOTE ADULT - PROBLEM SELECTOR PROBLEM 1
Rapid atrial fibrillation

## 2020-03-07 NOTE — TRANSFER ACCEPTANCE NOTE - PROBLEM SELECTOR PLAN 1
House EP consult noted  Pt awaiting micra PPM insertion  Cont to monitor on tele  Keep K ~4 and Mg ~2

## 2020-03-07 NOTE — TRANSFER ACCEPTANCE NOTE - HISTORY OF PRESENT ILLNESS
55 y/o female with a hx of Afib on Xarelto, HTN, HLD, hx CVA in 2016 with residual left sided hemiparalysis (ambulates with cane), Seizure d/o, initially presented to [facility] for eye twitching, HA, and epigastric pain. Pt found to have Afib with RVR on EKG and admitted to telemetry. While on tele, pt was noted to have tachy clare syndrome. Pt was seen by cardiologist, Dr. Cesar, during her admission at [facility], recommendation was made for pt to be transferred to [facility] for PPM insertion.     Pt denies any chest pain, palpitations, SOB, dizziness, weakness, f/c/n/v, cough, speech/visual changes, numbness/tingling, or syncope.

## 2020-03-07 NOTE — TRANSFER ACCEPTANCE NOTE - RADIOLOGY RESULTS AND INTERPRETATION
Transthoracic Echocardiogram (02.29.20 @ 07:02)  1. Normal mitral valve. Mild mitral regurgitation.  2. Normal trileaflet aortic valve.  3. Aortic Root: 3.2 cm.  4. Moderately dilated left atrium.  LA volume index = 46 cc/m2.  5. Normal left ventricular internal dimensions and wall thicknesses.  6. Normal Left Ventricular Systolic Function,  (EF = 55 %)  7. Diastolic function incompletely assessed.  8. Normal right atrium.  9. Normal right ventricular size and systolic function (TAPSE  1.8cm).  10. RV systolic pressure is normal at  23 mm Hg.  11. There is trace tricuspid regurgitation.  12. There is mild pulmonic regurgitation.  13. Normal pericardium with no pericardial effusion.

## 2020-03-07 NOTE — PROGRESS NOTE ADULT - ASSESSMENT
EKG Fib RVR     Echo: < from: Transthoracic Echocardiogram (12.02.19 @ 07:32) >  1. Normal mitral valve. Mild mitral regurgitation.  2. Normal trileaflet aortic valve.  3. Aortic Root: 3.4 cm.    4. Mildly dilated left atrium.  LA volume index = 37 cc/m2.  5. Normal left ventricular internal dimensions and wall  thicknesses.  6. Normal Left Ventricular Systolic Function,  (EF = 55 to  60%)  7. Grade II diastolic dysfunction.  8. Normal right atrium.  9. Normal right ventricular size and systolic function  (TAPSE 1.8 cm).  10. RA Pressure is 8 mm Hg.  11. RV systolic pressure is normal at  26 mm Hg.  12. There is mild tricuspid regurgitation.  13. Normal pulmonic valve.  14. Normal pericardium with no pericardial effusion.    < end of copied text >    Assessment and Plan:    1) Afib RVR: bradycardic concern for tachybrady syndrome cont toprol and cardizem , for transfer to [facility] for ppm cont xarelto    2) Seizure : c/w meds t/t per primary team     3) DVT PPX Xeralto
EKG Fib RVR     Echo: < from: Transthoracic Echocardiogram (12.02.19 @ 07:32) >  1. Normal mitral valve. Mild mitral regurgitation.  2. Normal trileaflet aortic valve.  3. Aortic Root: 3.4 cm.    4. Mildly dilated left atrium.  LA volume index = 37 cc/m2.  5. Normal left ventricular internal dimensions and wall  thicknesses.  6. Normal Left Ventricular Systolic Function,  (EF = 55 to  60%)  7. Grade II diastolic dysfunction.  8. Normal right atrium.  9. Normal right ventricular size and systolic function  (TAPSE 1.8 cm).  10. RA Pressure is 8 mm Hg.  11. RV systolic pressure is normal at  26 mm Hg.  12. There is mild tricuspid regurgitation.  13. Normal pulmonic valve.  14. Normal pericardium with no pericardial effusion.    < end of copied text >    Assessment and Plan:    1) Afib RVR: bradycardic concern for tachybrady syndrome cont toprol and cardizem , for transfer to [facility] for ppm cont xarelto    2) Seizure : c/w meds t/t per primary team     3) DVT PPX Xeralto
EKG Fib RVR     Echo: < from: Transthoracic Echocardiogram (12.02.19 @ 07:32) >  1. Normal mitral valve. Mild mitral regurgitation.  2. Normal trileaflet aortic valve.  3. Aortic Root: 3.4 cm.    4. Mildly dilated left atrium.  LA volume index = 37 cc/m2.  5. Normal left ventricular internal dimensions and wall  thicknesses.  6. Normal Left Ventricular Systolic Function,  (EF = 55 to  60%)  7. Grade II diastolic dysfunction.  8. Normal right atrium.  9. Normal right ventricular size and systolic function  (TAPSE 1.8 cm).  10. RA Pressure is 8 mm Hg.  11. RV systolic pressure is normal at  26 mm Hg.  12. There is mild tricuspid regurgitation.  13. Normal pulmonic valve.  14. Normal pericardium with no pericardial effusion.    < end of copied text >    Assessment and Plan:    1) Afib RVR: bradycardic concern for tachybrady syndrome cont toprol and cardizem , for transfer to [facility] for ppm cont xarelto, pt has been bed bound for 2 days as her heart rate goes to > 150 on ambulation, told to ambulate today to see how high heart rate goes    2) Seizure : c/w meds t/t per primary team     3) DVT PPX Xeralto
EKG Fib RVR     Echo: < from: Transthoracic Echocardiogram (12.02.19 @ 07:32) >  1. Normal mitral valve. Mild mitral regurgitation.  2. Normal trileaflet aortic valve.  3. Aortic Root: 3.4 cm.    4. Mildly dilated left atrium.  LA volume index = 37 cc/m2.  5. Normal left ventricular internal dimensions and wall  thicknesses.  6. Normal Left Ventricular Systolic Function,  (EF = 55 to  60%)  7. Grade II diastolic dysfunction.  8. Normal right atrium.  9. Normal right ventricular size and systolic function  (TAPSE 1.8 cm).  10. RA Pressure is 8 mm Hg.  11. RV systolic pressure is normal at  26 mm Hg.  12. There is mild tricuspid regurgitation.  13. Normal pulmonic valve.  14. Normal pericardium with no pericardial effusion.    < end of copied text >    Assessment and Plan:    1) Afib RVR: bradycardic concern for tachybrady syndrome currently asymptomatic monitor on tele if pauses >3 will transfer for MARGE cardioversion and possible PPM placement. c/w metoprolol /Xeralto/ cardizem if HR stable and no pauses >3 sec would switch to dilt  mg po    2) Seizure : c/w meds t/t per primary team     3) DVT PPX Xeralto
EKG Fib RVR     Echo: < from: Transthoracic Echocardiogram (12.02.19 @ 07:32) >  1. Normal mitral valve. Mild mitral regurgitation.  2. Normal trileaflet aortic valve.  3. Aortic Root: 3.4 cm.    4. Mildly dilated left atrium.  LA volume index = 37 cc/m2.  5. Normal left ventricular internal dimensions and wall  thicknesses.  6. Normal Left Ventricular Systolic Function,  (EF = 55 to  60%)  7. Grade II diastolic dysfunction.  8. Normal right atrium.  9. Normal right ventricular size and systolic function  (TAPSE 1.8 cm).  10. RA Pressure is 8 mm Hg.  11. RV systolic pressure is normal at  26 mm Hg.  12. There is mild tricuspid regurgitation.  13. Normal pulmonic valve.  14. Normal pericardium with no pericardial effusion.    < end of copied text >    Assessment and Plan:    1) Afib RVR: now rate controlled , Bradycardic monitor on tele refused pacemaker in the past, as per pt . c/w metoprolol /Xeralto     2) Seizure : c/w meds t/t per primary team     3) DVT PPX Xeralto
EKG Fib RVR     Echo: < from: Transthoracic Echocardiogram (12.02.19 @ 07:32) >  1. Normal mitral valve. Mild mitral regurgitation.  2. Normal trileaflet aortic valve.  3. Aortic Root: 3.4 cm.    4. Mildly dilated left atrium.  LA volume index = 37 cc/m2.  5. Normal left ventricular internal dimensions and wall  thicknesses.  6. Normal Left Ventricular Systolic Function,  (EF = 55 to  60%)  7. Grade II diastolic dysfunction.  8. Normal right atrium.  9. Normal right ventricular size and systolic function  (TAPSE 1.8 cm).  10. RA Pressure is 8 mm Hg.  11. RV systolic pressure is normal at  26 mm Hg.  12. There is mild tricuspid regurgitation.  13. Normal pulmonic valve.  14. Normal pericardium with no pericardial effusion.    < end of copied text >    Assessment and Plan:    1) Afib RVR: rate uncontrolled , Bradycardic monitor on tele refused pacemaker in the past, as per pt . c/w metoprolol /Xeralto add cardizem    2) Seizure : c/w meds t/t per primary team     3) DVT PPX Xeralto
Pt is 53 y/o F from home, lives with son, walks with cane with PMH of Afib on Xarelto, Seizure,  CVA in 2016 s/p left residual paralysis presented to ED  with Eye twitching and Headache since yesterday.   ED EKG showed Afib with RVR and s/p one dose of Metoprolol and 1L NS. Admitted to medicine for Afib with RVR..
Pt is 55 y/o F from home, lives with son, walks with cane with PMH of Afib on Xarelto, Seizure,  CVA in 2016 s/p left residual paralysis presented to ED  with Eye twitching and Headache since yesterday.   ED EKG showed Afib with RVR and s/p one dose of Metoprolol and 1L NS. Admitted to medicine for Afib with RVR..

## 2020-03-07 NOTE — PROGRESS NOTE ADULT - REASON FOR ADMISSION
Twitching of eye and headache

## 2020-03-07 NOTE — TRANSFER ACCEPTANCE NOTE - PROBLEM SELECTOR PLAN 4
Keppra level obtained 3/2/2020 at [facility] noted and is therapeutic  Cont keppra  Seizure precautions

## 2020-03-07 NOTE — TRANSFER ACCEPTANCE NOTE - RS GEN PE MLT RESP DETAILS PC
good air movement/clear to auscultation bilaterally/airway patent/no rales/no rhonchi/no wheezes/breath sounds equal/respirations non-labored

## 2020-03-07 NOTE — PROGRESS NOTE ADULT - PROBLEM SELECTOR PLAN 1
EKG showed showed Afib with RVR  A fib: Rate controlled on Metoprolol, continue Xarelto  CHADS2-VASc score is 4 (, HTN, , CVA +2, female 1 ), consistent with >4.8% annual risk of stroke if off systemic anticoagulation  Tele monitoring,   Troponins on admission , f/u T2 and T3  ECHO on 12/2019 showed Normal EF, Gr 2 DD.   f/u ECHO  c/w  metoprolol 50
EKG showed showed Afib with RVR  A fib: Rate controlled on Metoprolol, continue Xarelto  CHADS2-VASc score is 4 (, HTN, , CVA +2, female 1 ), consistent with >4.8% annual risk of stroke if off systemic anticoagulation  Tele monitoring,   Troponins on admission , f/u T2 and T3  ECHO on 12/2019 showed Normal EF, Gr 2 DD.   f/u ECHO  c/w  metoprolol 50
Rate was uncontrolled today  IV lopressor 5mg was given  will p[elian on increasing the lopressor dose if patient's hr is uncontrolled  continue with tele
rate is still uncontrolled to 160s  lopressor was increased to 100mg bid today  blood pressor is on softer side , lisinopril was decreased  c/w tele
she is having tachy clare syndrome.  She had tachy episodes since yesterday  Patient refused cardioversion.  She is preferring pacemaer  SHe is for transfer to [facility]
she is having tachy clare syndrome.  She had tachy episodes since yesterday  Patient refused cardioversion.  She is preferring pacemaer  SHe is for transfer to [facility]
she is having tachy clare syndrome.  She had tachy episodes since yesterday  Plan is for cardioversion tomorrow  NPO after midnight  c/w tele
she is having tachy clare syndrome.  Overnight had pasue of 2.7 sec with clare upto 40s. She also had tachy upto 150s.  lopressor was decreased to 75mg bid, cardizem was added  blood pressor is on softer side , lisinopril was decreased  c/w tele

## 2020-03-07 NOTE — PROGRESS NOTE ADULT - PROVIDER SPECIALTY LIST ADULT
Cardiology
Internal Medicine

## 2020-03-07 NOTE — PROGRESS NOTE ADULT - PROBLEM SELECTOR PLAN 3
Started on Home medications Lisinopril
Started on Home medications Lisinopril
Started on Home medications Lisinopril and hctz  monitor bp
bp is on softer side  lisinopril dose is decreased   lopressor was increased to 75mg bid  monitor bp
bp is on softer side  lisinopril dose is decreased from tomorrow  lopressor was increased to 100mg bid  monitor bp
bp is on softer side  lisinopril dose is decreased   lopressor was increased to 75mg bid  monitor bp

## 2020-03-07 NOTE — PROGRESS NOTE ADULT - SUBJECTIVE AND OBJECTIVE BOX
Emanuel Cesar MD  Interventional Cardiology / Advance Heart Failure and Cardiac Transplant Specialist  [place] Office : [address] [place] [address] [place]. [address]  Tel:   [place] Office : [address] [facility] [place] [address]  Tel: [phone]  Cell : [phone]    Pt is lying in bed comfortable not in distress, no chest pains no SOB no palpitations  	  MEDICATIONS:  aspirin  chewable 81 milliGRAM(s) Oral daily  diltiazem    Tablet 30 milliGRAM(s) Oral every 6 hours  hydrochlorothiazide 12.5 milliGRAM(s) Oral daily  metoprolol tartrate 100 milliGRAM(s) Oral two times a day  rivaroxaban 20 milliGRAM(s) Oral with dinner        acetaminophen   Tablet .. 650 milliGRAM(s) Oral every 6 hours PRN  levETIRAcetam 1000 milliGRAM(s) Oral two times a day      atorvastatin 40 milliGRAM(s) Oral at bedtime    ergocalciferol 13105 Unit(s) Oral <User Schedule>      PAST MEDICAL/SURGICAL HISTORY  PAST MEDICAL & SURGICAL HISTORY:  Atrial fibrillation  Seizure  CVA (cerebral vascular accident)  CVA (cerebral vascular accident)  Hypertension  Atrial fibrillation  No significant past surgical history  H/O tubal ligation      SOCIAL HISTORY: Substance Use (street drugs): ( x ) never used  (  ) other:    FAMILY HISTORY:  FH: hypertension: MOTHER, FAISAL      PHYSICAL EXAM:  T(C): 36.6 (03-03-20 @ 19:50), Max: 36.7 (03-02-20 @ 23:08)  HR: 54 (03-03-20 @ 19:50) (54 - 97)  BP: 122/79 (03-03-20 @ 19:50) (97/48 - 123/83)  RR: 18 (03-03-20 @ 19:50) (16 - 18)  SpO2: 96% (03-03-20 @ 19:50) (96% - 100%)  Wt(kg): --  I&O's Summary          EYES: EOMI, PERRLA, conjunctiva and sclera clear  ENMT: No tonsillar erythema, exudates, or enlargement; Moist mucous membranes, Good dentition, No lesions  Cardiovascular: Normal S1 S2, No JVD, No murmurs, No edema RVR  Respiratory: Lungs clear to auscultation	  Gastrointestinal:  Soft, Non-tender, + BS	  Extremities: no edema                                    14.4   5.01  )-----------( 173      ( 03 Mar 2020 07:27 )             43.1     03-03    140  |  109<H>  |  12  ----------------------------<  97  3.7   |  22  |  0.75    Ca    9.4      03 Mar 2020 07:27  Phos  3.8     03-02  Mg     2.1     03-02      proBNP:   Lipid Profile:   HgA1c:   TSH:     Consultant(s) Notes Reviewed:  [x ] YES  [ ] NO    Care Discussed with Consultants/Other Providers [ x] YES  [ ] NO    Imaging Personally Reviewed independently:  [x] YES  [ ] NO    All labs, radiologic studies, vitals, orders and medications list reviewed. Patient is seen and examined at bedside. Case discussed with medical team.
Emanuel Cesar MD  Interventional Cardiology / Advance Heart Failure and Cardiac Transplant Specialist  [place] Office : [address] [place] [address] [place]. [address]  Tel:   [place] Office : [address] [facility] [place] [address]  Tel: [phone]  Cell : [phone]    Pt is lying in bed comfortable not in distress, no chest pains no SOB no palpitations  	  MEDICATIONS:  aspirin  chewable 81 milliGRAM(s) Oral daily  diltiazem    Tablet 30 milliGRAM(s) Oral every 6 hours  metoprolol tartrate 75 milliGRAM(s) Oral two times a day  rivaroxaban 20 milliGRAM(s) Oral with dinner        acetaminophen   Tablet .. 650 milliGRAM(s) Oral every 6 hours PRN  levETIRAcetam 1000 milliGRAM(s) Oral two times a day      atorvastatin 40 milliGRAM(s) Oral at bedtime    ergocalciferol 03218 Unit(s) Oral <User Schedule>      PAST MEDICAL/SURGICAL HISTORY  PAST MEDICAL & SURGICAL HISTORY:  Atrial fibrillation  Seizure  CVA (cerebral vascular accident)  CVA (cerebral vascular accident)  Hypertension  Atrial fibrillation  No significant past surgical history  H/O tubal ligation      SOCIAL HISTORY: Substance Use (street drugs): ( x ) never used  (  ) other:    FAMILY HISTORY:  FH: hypertension: MOTHER, FAHTHER      REVIEW OF SYSTEMS:  CONSTITUTIONAL: No fever, weight loss, or fatigue  EYES: No eye pain, visual disturbances, or discharge  ENMT:  No difficulty hearing, tinnitus, vertigo; No sinus or throat pain  BREASTS: No pain, masses, or nipple discharge  GASTROINTESTINAL: No abdominal or epigastric pain. No nausea, vomiting, or hematemesis; No diarrhea or constipation. No melena or hematochezia.  GENITOURINARY: No dysuria, frequency, hematuria, or incontinence  NEUROLOGICAL: No headaches, memory loss, loss of strength, numbness, or tremors  ENDOCRINE: No heat or cold intolerance; No hair loss  MUSCULOSKELETAL: No joint pain or swelling; No muscle, back, or extremity pain  PSYCHIATRIC: No depression, anxiety, mood swings, or difficulty sleeping  HEME/LYMPH: No easy bruising, or bleeding gums  All others negative    PHYSICAL EXAM:  T(C): 36.3 (03-07-20 @ 11:08), Max: 36.6 (03-06-20 @ 19:20)  HR: 77 (03-07-20 @ 11:08) (55 - 81)  BP: 100/64 (03-07-20 @ 11:08) (100/50 - 118/63)  RR: 16 (03-07-20 @ 11:08) (16 - 18)  SpO2: 98% (03-07-20 @ 11:08) (96% - 100%)  Wt(kg): --  I&O's Summary    07 Mar 2020 06:01  -  07 Mar 2020 15:32  --------------------------------------------------------  IN: 430 mL / OUT: 0 mL / NET: 430 mL          GENERAL: NAD  EYES: EOMI, PERRLA, conjunctiva and sclera clear  ENMT: No tonsillar erythema, exudates, or enlargement; Moist mucous membranes, Good dentition, No lesions  Cardiovascular: Normal S1 S2, No JVD, No murmurs, No edema, irregularly irregular  Respiratory: Lungs clear to auscultation	  Gastrointestinal:  Soft, Non-tender, + BS	  Extremities: Normal range of motion, No clubbing, cyanosis or edema  LYMPH: No lymphadenopathy noted  NERVOUS SYSTEM:  Alert & Oriented X3, Good concentration; Motor Strength 5/5 B/L upper and lower extremities; DTRs 2+ intact and symmetric                                    13.9   6.52  )-----------( 168      ( 07 Mar 2020 06:27 )             43.5     03-07    139  |  109<H>  |  16  ----------------------------<  94  4.3   |  26  |  0.73    Ca    9.2      07 Mar 2020 06:27      proBNP:   Lipid Profile:   HgA1c:   TSH:     Consultant(s) Notes Reviewed:  [x ] YES  [ ] NO    Care Discussed with Consultants/Other Providers [ x] YES  [ ] NO    Imaging Personally Reviewed independently:  [x] YES  [ ] NO    All labs, radiologic studies, vitals, orders and medications list reviewed. Patient is seen and examined at bedside. Case discussed with medical team.
Emanuel Cesar MD  Interventional Cardiology / Advance Heart Failure and Cardiac Transplant Specialist  [place] Office : [address] [place] [address]  Tel:   [place] Office : [address] [facility] [place] [address]  Tel: [phone]  Cell : [phone]    Pt is lying in bed comfortable not in distress, no chest pains no SOB no palpitations  	  MEDICATIONS:  aspirin  chewable 81 milliGRAM(s) Oral daily  diltiazem    Tablet 30 milliGRAM(s) Oral every 6 hours  metoprolol tartrate 75 milliGRAM(s) Oral two times a day  rivaroxaban 20 milliGRAM(s) Oral with dinner  acetaminophen   Tablet .. 650 milliGRAM(s) Oral every 6 hours PRN  levETIRAcetam 1000 milliGRAM(s) Oral two times a day  atorvastatin 40 milliGRAM(s) Oral at bedtime  ergocalciferol 92359 Unit(s) Oral <User Schedule>    PAST MEDICAL/SURGICAL HISTORY  PAST MEDICAL & SURGICAL HISTORY:  Atrial fibrillation  Seizure  CVA (cerebral vascular accident)  CVA (cerebral vascular accident)  Hypertension  Atrial fibrillation  No significant past surgical history  H/O tubal ligation      SOCIAL HISTORY: Substance Use (street drugs): ( x ) never used  (  ) other:    FAMILY HISTORY:  FH: hypertension: MOTHER, FAISAL        PHYSICAL EXAM:  T(C): 36.4 (03-05-20 @ 19:19), Max: 36.8 (03-04-20 @ 23:10)  HR: 59 (03-05-20 @ 19:19) (59 - 89)  BP: 104/71 (03-05-20 @ 19:19) (94/57 - 113/75)  RR: 18 (03-05-20 @ 19:19) (16 - 18)  SpO2: 98% (03-05-20 @ 19:19) (96% - 100%)  Wt(kg): --  I&O's Summary    04 Mar 2020 07:01  -  05 Mar 2020 07:00  --------------------------------------------------------  IN: 300 mL / OUT: 0 mL / NET: 300 mL            EYES: EOMI, PERRLA, conjunctiva and sclera clear  ENMT: No tonsillar erythema, exudates, or enlargement; Moist mucous membranes, Good dentition, No lesions  Cardiovascular: Normal S1 S2, No JVD, No murmurs, No edema irregular   Respiratory: Lungs clear to auscultation	  Gastrointestinal:  Soft, Non-tender, + BS	  Extremities: no edema                                  13.9   5.12  )-----------( 159      ( 05 Mar 2020 06:46 )             42.4     03-05    140  |  108  |  17  ----------------------------<  89  3.8   |  26  |  0.69    Ca    9.3      05 Mar 2020 06:46      proBNP:   Lipid Profile:   HgA1c:   TSH:     Consultant(s) Notes Reviewed:  [x ] YES  [ ] NO    Care Discussed with Consultants/Other Providers [ x] YES  [ ] NO    Imaging Personally Reviewed independently:  [x] YES  [ ] NO    All labs, radiologic studies, vitals, orders and medications list reviewed. Patient is seen and examined at bedside. Case discussed with medical team.
Emanuel Cesar MD  Interventional Cardiology / Endovascular Specialist  [place] Office : [address] [place] [address] [place]. [address]  Tel:   [place] Office : [address] [facility] [place] [address]  Tel: [phone]  Cell : [phone]    HISTORY OF PRESENTING ILLNESS:    Pt is lying in bed comfortable not in distress, no chest pains no SOB no palpitations  	  MEDICATIONS:  aspirin  chewable 81 milliGRAM(s) Oral daily  diltiazem    Tablet 30 milliGRAM(s) Oral every 6 hours  metoprolol tartrate 75 milliGRAM(s) Oral two times a day  rivaroxaban 20 milliGRAM(s) Oral with dinner        acetaminophen   Tablet .. 650 milliGRAM(s) Oral every 6 hours PRN  levETIRAcetam 1000 milliGRAM(s) Oral two times a day      atorvastatin 40 milliGRAM(s) Oral at bedtime    ergocalciferol 15858 Unit(s) Oral <User Schedule>      PAST MEDICAL/SURGICAL HISTORY  PAST MEDICAL & SURGICAL HISTORY:  Atrial fibrillation  Seizure  CVA (cerebral vascular accident)  CVA (cerebral vascular accident)  Hypertension  Atrial fibrillation  No significant past surgical history  H/O tubal ligation      SOCIAL HISTORY: Substance Use (street drugs): ( x ) never used  (  ) other:    FAMILY HISTORY:  FH: hypertension: MOTHER, FAHTHER      REVIEW OF SYSTEMS:  CONSTITUTIONAL: No fever, weight loss, or fatigue  EYES: No eye pain, visual disturbances, or discharge  ENMT:  No difficulty hearing, tinnitus, vertigo; No sinus or throat pain  BREASTS: No pain, masses, or nipple discharge  GASTROINTESTINAL: No abdominal or epigastric pain. No nausea, vomiting, or hematemesis; No diarrhea or constipation. No melena or hematochezia.  GENITOURINARY: No dysuria, frequency, hematuria, or incontinence  NEUROLOGICAL: No headaches, memory loss, loss of strength, numbness, or tremors  ENDOCRINE: No heat or cold intolerance; No hair loss  MUSCULOSKELETAL: No joint pain or swelling; No muscle, back, or extremity pain  PSYCHIATRIC: No depression, anxiety, mood swings, or difficulty sleeping  HEME/LYMPH: No easy bruising, or bleeding gums  All others negative    PHYSICAL EXAM:  T(C): 36.4 (03-06-20 @ 16:02), Max: 36.6 (03-06-20 @ 07:45)  HR: 81 (03-06-20 @ 16:02) (54 - 89)  BP: 116/75 (03-06-20 @ 16:02) (101/60 - 116/75)  RR: 18 (03-06-20 @ 16:02) (16 - 18)  SpO2: 98% (03-06-20 @ 16:02) (96% - 100%)  Wt(kg): --  I&O's Summary        EYES: EOMI, PERRLA, conjunctiva and sclera clear  ENMT: No tonsillar erythema, exudates, or enlargement; Moist mucous membranes, Good dentition, No lesions  Cardiovascular: Normal S1 S2, No JVD, No murmurs, No edema irregular   Respiratory: Lungs clear to auscultation	  Gastrointestinal:  Soft, Non-tender, + BS	  Extremities: no edema                                  13.7   7.18  )-----------( 155      ( 06 Mar 2020 05:51 )             41.7     03-06    140  |  109<H>  |  18  ----------------------------<  84  4.2   |  27  |  0.73    Ca    9.1      06 Mar 2020 05:51      proBNP:   Lipid Profile:   HgA1c:   TSH:     Consultant(s) Notes Reviewed:  [x ] YES  [ ] NO    Care Discussed with Consultants/Other Providers [ x] YES  [ ] NO    Imaging Personally Reviewed independently:  [x] YES  [ ] NO    All labs, radiologic studies, vitals, orders and medications list reviewed. Patient is seen and examined at bedside. Case discussed with medical team.
Emanuel Cesar MD  Interventional Cardiology / Endovascular Specialist  [place] Office : [address] [place] [address] [place] [address]  Tel:   [place] Office : [address] [facility] [place] [address]  Tel: [phone]  Cell : [phone]    HISTORY OF PRESENTING ILLNESS:    Pt is lying in bed comfortable not in distress, no chest pains no SOB no palpitations, dizziness , weakness or per-syncope   	  MEDICATIONS:  aspirin  chewable 81 milliGRAM(s) Oral daily  diltiazem    Tablet 30 milliGRAM(s) Oral every 6 hours  metoprolol tartrate 75 milliGRAM(s) Oral two times a day  rivaroxaban 20 milliGRAM(s) Oral with dinner        acetaminophen   Tablet .. 650 milliGRAM(s) Oral every 6 hours PRN  levETIRAcetam 1000 milliGRAM(s) Oral two times a day      atorvastatin 40 milliGRAM(s) Oral at bedtime    ergocalciferol 21404 Unit(s) Oral <User Schedule>      PAST MEDICAL/SURGICAL HISTORY  PAST MEDICAL & SURGICAL HISTORY:  Atrial fibrillation  Seizure  CVA (cerebral vascular accident)  CVA (cerebral vascular accident)  Hypertension  Atrial fibrillation  No significant past surgical history  H/O tubal ligation      SOCIAL HISTORY: Substance Use (street drugs): ( x ) never used  (  ) other:    FAMILY HISTORY:  FH: hypertension: MOTHER, FAISAL      REVIEW OF SYSTEMS:  CONSTITUTIONAL: No fever, weight loss, or fatigue  EYES: No eye pain, visual disturbances, or discharge  ENMT:  No difficulty hearing, tinnitus, vertigo; No sinus or throat pain  BREASTS: No pain, masses, or nipple discharge  GASTROINTESTINAL: No abdominal or epigastric pain. No nausea, vomiting, or hematemesis; No diarrhea or constipation. No melena or hematochezia.  GENITOURINARY: No dysuria, frequency, hematuria, or incontinence  NEUROLOGICAL: No headaches, memory loss, loss of strength, numbness, or tremors  ENDOCRINE: No heat or cold intolerance; No hair loss  MUSCULOSKELETAL: No joint pain or swelling; No muscle, back, or extremity pain  PSYCHIATRIC: No depression, anxiety, mood swings, or difficulty sleeping  HEME/LYMPH: No easy bruising, or bleeding gums  All others negative    PHYSICAL EXAM:  T(C): 36.4 (03-04-20 @ 19:49), Max: 36.7 (03-04-20 @ 04:33)  HR: 61 (03-04-20 @ 19:49) (53 - 93)  BP: 123/83 (03-04-20 @ 19:49) (98/67 - 123/83)  RR: 16 (03-04-20 @ 19:49) (16 - 18)  SpO2: 100% (03-04-20 @ 19:49) (96% - 100%)  Wt(kg): --  I&O's Summary    03 Mar 2020 07:01  -  04 Mar 2020 07:00  --------------------------------------------------------  IN: 200 mL / OUT: 0 mL / NET: 200 mL    04 Mar 2020 07:01  -  04 Mar 2020 22:57  --------------------------------------------------------  IN: 300 mL / OUT: 0 mL / NET: 300 mL          EYES: EOMI, PERRLA, conjunctiva and sclera clear  ENMT: No tonsillar erythema, exudates, or enlargement; Moist mucous membranes, Good dentition, No lesions  Cardiovascular: Normal S1 S2, No JVD, No murmurs, No edema RVR  Respiratory: Lungs clear to auscultation	  Gastrointestinal:  Soft, Non-tender, + BS	  Extremities: no edema                              13.7   6.21  )-----------( 181      ( 04 Mar 2020 06:29 )             42.1     03-04    140  |  109<H>  |  10  ----------------------------<  95  3.8   |  23  |  0.80    Ca    9.2      04 Mar 2020 06:29      proBNP:   Lipid Profile:   HgA1c:   TSH:     Consultant(s) Notes Reviewed:  [x ] YES  [ ] NO    Care Discussed with Consultants/Other Providers [ x] YES  [ ] NO    Imaging Personally Reviewed independently:  [x] YES  [ ] NO    All labs, radiologic studies, vitals, orders and medications list reviewed. Patient is seen and examined at bedside. Case discussed with medical team.
Emanuel Cesar MD  Interventional Cardiology / Endovascular Specialist  [place] Office : [address] [place] [address] [place] [address]  Tel:   [place] Office : [address] [facility] [place] [address]  Tel: [phone]  Cell : [phone]    HISTORY OF PRESENTING ILLNESS:    53 y/o F from home, lives with son, walks with cane with PMH of Afib on Xarelto, Seizure,  CVA in 2016 s/p left residual paralysis presented to ED  with Eye twitching and Headache  	  MEDICATIONS:  aspirin  chewable 81 milliGRAM(s) Oral daily  hydrochlorothiazide 12.5 milliGRAM(s) Oral daily  lisinopril 20 milliGRAM(s) Oral daily  metoprolol tartrate 50 milliGRAM(s) Oral once  metoprolol tartrate 50 milliGRAM(s) Oral two times a day  rivaroxaban 20 milliGRAM(s) Oral with dinner        acetaminophen   Tablet .. 650 milliGRAM(s) Oral every 6 hours PRN  levETIRAcetam 1000 milliGRAM(s) Oral two times a day      atorvastatin 40 milliGRAM(s) Oral at bedtime        PAST MEDICAL/SURGICAL HISTORY  PAST MEDICAL & SURGICAL HISTORY:  Atrial fibrillation  Seizure  CVA (cerebral vascular accident)  CVA (cerebral vascular accident)  Hypertension  Atrial fibrillation  No significant past surgical history  H/O tubal ligation      SOCIAL HISTORY: Substance Use (street drugs): ( x ) never used  (  ) other:    FAMILY HISTORY:  FH: hypertension: MOTHER, FAHTHER      REVIEW OF SYSTEMS:  CONSTITUTIONAL: No fever, weight loss, or fatigue  EYES: No eye pain, visual disturbances, or discharge  ENMT:  No difficulty hearing, tinnitus, vertigo; No sinus or throat pain  BREASTS: No pain, masses, or nipple discharge  GASTROINTESTINAL: No abdominal or epigastric pain. No nausea, vomiting, or hematemesis; No diarrhea or constipation. No melena or hematochezia.  GENITOURINARY: No dysuria, frequency, hematuria, or incontinence  NEUROLOGICAL: No headaches, memory loss, loss of strength, numbness, or tremors  ENDOCRINE: No heat or cold intolerance; No hair loss  MUSCULOSKELETAL: No joint pain or swelling; No muscle, back, or extremity pain  PSYCHIATRIC: No depression, anxiety, mood swings, or difficulty sleeping  HEME/LYMPH: No easy bruising, or bleeding gums  All others negative    PHYSICAL EXAM:  T(C): 36.3 (03-01-20 @ 11:38), Max: 36.6 (02-29-20 @ 20:15)  HR: 62 (03-01-20 @ 11:38) (56 - 97)  BP: 103/78 (03-01-20 @ 11:38) (103/78 - 124/79)  RR: 18 (03-01-20 @ 11:38) (18 - 18)  SpO2: 100% (03-01-20 @ 11:38) (97% - 100%)  Wt(kg): --  I&O's Summary  GENERAL: NAD  EYES: EOMI, PERRLA, conjunctiva and sclera clear  ENMT: No tonsillar erythema, exudates, or enlargement; Moist mucous membranes  Cardiovascular: Normal S1 S2, No JVD, No murmurs  Respiratory: Lungs clear to auscultation	  Gastrointestinal:  Soft, Non-tender, + BS	  Extremities: No clubbing, cyanosis or edema                          13.4   5.32  )-----------( 167      ( 01 Mar 2020 06:09 )             41.1     03-01    143  |  109<H>  |  12  ----------------------------<  82  3.6   |  27  |  0.73    Ca    9.1      01 Mar 2020 06:09  Phos  3.3     03-01  Mg     2.1     03-01      proBNP:   Lipid Profile:   HgA1c:   TSH:     Consultant(s) Notes Reviewed:  [x ] YES  [ ] NO    Care Discussed with Consultants/Other Providers [ x] YES  [ ] NO    Imaging Personally Reviewed independently:  [x] YES  [ ] NO    All labs, radiologic studies, vitals, orders and medications list reviewed. Patient is seen and examined at bedside. Case discussed with medical team.
PGY 1 Note discussed with supervising resident and primary attending    Patient is a 54y old  Female who presents with a chief complaint of Twitching of eye and headache (01 Mar 2020 17:03)      INTERVAL HPI/OVERNIGHT EVENTS:  Patient kepra levels were wnl  She was running RVR on tele. IV dose of lopresssor was given which controlled her rate.   D dimer was negative  will continue tele monitoring      MEDICATIONS  (STANDING):  aspirin  chewable 81 milliGRAM(s) Oral daily  atorvastatin 40 milliGRAM(s) Oral at bedtime  ergocalciferol 51310 Unit(s) Oral <User Schedule>  hydrochlorothiazide 12.5 milliGRAM(s) Oral daily  levETIRAcetam 1000 milliGRAM(s) Oral two times a day  lisinopril 20 milliGRAM(s) Oral daily  metoprolol tartrate 50 milliGRAM(s) Oral once  metoprolol tartrate 50 milliGRAM(s) Oral two times a day  rivaroxaban 20 milliGRAM(s) Oral with dinner    MEDICATIONS  (PRN):  acetaminophen   Tablet .. 650 milliGRAM(s) Oral every 6 hours PRN Mild Pain (1 - 3)      __________________________________________________  REVIEW OF SYSTEMS:    CONSTITUTIONAL: No fever,   EYES: no acute visual disturbances  NECK: No pain or stiffness  RESPIRATORY: No cough; No shortness of breath  CARDIOVASCULAR: No chest pain, no palpitations  GASTROINTESTINAL: No pain. No nausea or vomiting; No diarrhea   NEUROLOGICAL: No headache or numbness, no tremors  MUSCULOSKELETAL: No joint pain, no muscle pain  GENITOURINARY: no dysuria, no frequency, no hesitancy  PSYCHIATRY: no depression , no anxiety  ALL OTHER  ROS negative        Vital Signs Last 24 Hrs  T(C): 36.2 (02 Mar 2020 11:11), Max: 36.3 (01 Mar 2020 15:38)  T(F): 97.1 (02 Mar 2020 11:11), Max: 97.4 (01 Mar 2020 15:38)  HR: 85 (02 Mar 2020 13:55) (55 - 122)  BP: 103/72 (02 Mar 2020 13:55) (103/72 - 118/77)  BP(mean): --  RR: 16 (02 Mar 2020 13:55) (16 - 17)  SpO2: 100% (02 Mar 2020 13:55) (96% - 100%)    ________________________________________________  PHYSICAL EXAM:  GENERAL: NAD  HEENT: Normocephalic;  conjunctivae and sclerae clear; moist mucous membranes;   NECK : supple  CHEST/LUNG: Clear to auscultation bilaterally with good air entry   HEART: S1 S2  irregular afib 120-140  ABDOMEN: Soft, Nontender, Nondistended; Bowel sounds present  EXTREMITIES: no cyanosis; no edema; no calf tenderness  SKIN: warm and dry; no rash  NERVOUS SYSTEM:  Awake and alert; Oriented  to place, person and time ; no new deficits    _________________________________________________  LABS:                        13.8   5.43  )-----------( 156      ( 02 Mar 2020 07:01 )             41.9     03-02    142  |  109<H>  |  15  ----------------------------<  93  3.9   |  25  |  0.71    Ca    9.5      02 Mar 2020 07:01  Phos  3.8     03-02  Mg     2.1     03-02          CAPILLARY BLOOD GLUCOSE            RADIOLOGY & ADDITIONAL TESTS:    Imaging Personally Reviewed:  YES/NO    Consultant(s) Notes Reviewed:   YES/ No    Care Discussed with Consultants :     Plan of care was discussed with patient and /or primary care giver; all questions and concerns were addressed and care was aligned with patient's wishes.
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 650 milliGRAM(s) Oral every 6 hours PRN Mild Pain (1 - 3)      __________________________________________________  REVIEW OF SYSTEMS:    CONSTITUTIONAL: No fever,   EYES: no acute visual disturbances  NECK: No pain or stiffness  RESPIRATORY: No cough; No shortness of breath  CARDIOVASCULAR: No chest pain, no palpitations  GASTROINTESTINAL: No pain. No nausea or vomiting; No diarrhea   NEUROLOGICAL: No headache or numbness, no tremors  MUSCULOSKELETAL: No joint pain, no muscle pain  GENITOURINARY: no dysuria, no frequency, no hesitancy  PSYCHIATRY: no depression , no anxiety  ALL OTHER  ROS negative        Vital Signs Last 24 Hrs  T(C): 36.2 (03 Mar 2020 07:50), Max: 36.8 (02 Mar 2020 16:16)  T(F): 97.2 (03 Mar 2020 07:50), Max: 98.2 (02 Mar 2020 16:16)  HR: 93 (03 Mar 2020 07:50) (71 - 130)  BP: 105/68 (03 Mar 2020 07:50) (103/72 - 123/83)  BP(mean): --  RR: 16 (03 Mar 2020 07:50) (16 - 16)  SpO2: 100% (03 Mar 2020 07:50) (98% - 100%)    ________________________________________________  PHYSICAL EXAM:  GENERAL: NAD  HEENT: Normocephalic;  conjunctivae and sclerae clear; moist mucous membranes;   NECK : supple  CHEST/LUNG: Clear to auscultation bilaterally with good air entry   HEART: S1 S2  irregular hr afib rvr  ABDOMEN: Soft, Nontender, Nondistended; Bowel sounds present  EXTREMITIES: no cyanosis; no edema; no calf tenderness  SKIN: warm and dry; no rash  NERVOUS SYSTEM:  Awake and alert; Oriented  to place, person and time ; no new deficits    _________________________________________________  LABS:                        14.4   5.01  )-----------( 173      ( 03 Mar 2020 07:27 )             43.1     03-03    140  |  109<H>  |  12  ----------------------------<  97  3.7   |  22  |  0.75    Ca    9.4      03 Mar 2020 07:27  Phos  3.8     03-02  Mg     2.1     03-02          CAPILLARY BLOOD GLUCOSE            RADIOLOGY & ADDITIONAL TESTS:    Imaging Personally Reviewed:  YES/NO    Consultant(s) Notes Reviewed:   YES/ No    Care Discussed with Consultants :     Plan of care was discussed with patient and /or primary care giver; all questions and concerns were addressed and care was aligned with patient's wishes.
PGY 1 Note discussed with supervising resident and primary attending    Patient is a 54y old  Female who presents with a chief complaint of Twitching of eye and headache (03 Mar 2020 11:19)      INTERVAL HPI/OVERNIGHT EVENTS:  Patient is having bradycardia to 40s on tele. she had pauses of 2.7 sec   lopressor was decreased to 75mg bid    MEDICATIONS  (STANDING):  aspirin  chewable 81 milliGRAM(s) Oral daily  atorvastatin 40 milliGRAM(s) Oral at bedtime  diltiazem    Tablet 30 milliGRAM(s) Oral every 6 hours  ergocalciferol 17338 Unit(s) Oral <User Schedule>  levETIRAcetam 1000 milliGRAM(s) Oral two times a day  metoprolol tartrate 75 milliGRAM(s) Oral two times a day  rivaroxaban 20 milliGRAM(s) Oral with dinner    MEDICATIONS  (PRN):  acetaminophen   Tablet .. 650 milliGRAM(s) Oral every 6 hours PRN Mild Pain (1 - 3)      __________________________________________________  REVIEW OF SYSTEMS:    CONSTITUTIONAL: No fever,   EYES: no acute visual disturbances  NECK: No pain or stiffness  RESPIRATORY: No cough; No shortness of breath  CARDIOVASCULAR: No chest pain, no palpitations  GASTROINTESTINAL: No pain. No nausea or vomiting; No diarrhea   NEUROLOGICAL: No headache or numbness, no tremors  MUSCULOSKELETAL: No joint pain, no muscle pain  GENITOURINARY: no dysuria, no frequency, no hesitancy  PSYCHIATRY: no depression , no anxiety  ALL OTHER  ROS negative        Vital Signs Last 24 Hrs  T(C): 36.4 (04 Mar 2020 11:24), Max: 36.7 (04 Mar 2020 04:33)  T(F): 97.5 (04 Mar 2020 11:24), Max: 98.1 (04 Mar 2020 04:33)  HR: 93 (04 Mar 2020 11:24) (54 - 93)  BP: 103/71 (04 Mar 2020 11:24) (98/67 - 122/79)  BP(mean): --  RR: 16 (04 Mar 2020 11:24) (16 - 18)  SpO2: 100% (04 Mar 2020 11:24) (96% - 100%)    ________________________________________________  PHYSICAL EXAM:  GENERAL: NAD  HEENT: Normocephalic;  conjunctivae and sclerae clear; moist mucous membranes;   NECK : supple  CHEST/LUNG: Clear to auscultation bilaterally with good air entry   HEART: S1 S2  regular; no murmurs, gallops or rubs  ABDOMEN: Soft, Nontender, Nondistended; Bowel sounds present  EXTREMITIES: no cyanosis; no edema; no calf tenderness  SKIN: warm and dry; no rash  NERVOUS SYSTEM:  Awake and alert; Oriented  to place, person and time ; no new deficits    _________________________________________________  LABS:                        13.7   6.21  )-----------( 181      ( 04 Mar 2020 06:29 )             42.1     03-04    140  |  109<H>  |  10  ----------------------------<  95  3.8   |  23  |  0.80    Ca    9.2      04 Mar 2020 06:29          CAPILLARY BLOOD GLUCOSE            RADIOLOGY & ADDITIONAL TESTS:    Imaging Personally Reviewed:  YES/NO    Consultant(s) Notes Reviewed:   YES/ No    Care Discussed with Consultants :     Plan of care was discussed with patient and /or primary care giver; all questions and concerns were addressed and care was aligned with patient's wishes.
PGY 1 Note discussed with supervising resident and primary attending    Patient is a 54y old  Female who presents with a chief complaint of Twitching of eye and headache (04 Mar 2020 13:10)      INTERVAL HPI/OVERNIGHT EVENTS:   patient is having tachycardia again  Plan is for cardioversion tomorrow  Npo after midnight    MEDICATIONS  (STANDING):  aspirin  chewable 81 milliGRAM(s) Oral daily  atorvastatin 40 milliGRAM(s) Oral at bedtime  diltiazem    Tablet 30 milliGRAM(s) Oral every 6 hours  ergocalciferol 98703 Unit(s) Oral <User Schedule>  levETIRAcetam 1000 milliGRAM(s) Oral two times a day  metoprolol tartrate 75 milliGRAM(s) Oral two times a day  rivaroxaban 20 milliGRAM(s) Oral with dinner    MEDICATIONS  (PRN):  acetaminophen   Tablet .. 650 milliGRAM(s) Oral every 6 hours PRN Mild Pain (1 - 3)      __________________________________________________  REVIEW OF SYSTEMS:    CONSTITUTIONAL: No fever,   EYES: no acute visual disturbances  NECK: No pain or stiffness  RESPIRATORY: No cough; No shortness of breath  CARDIOVASCULAR: No chest pain, no palpitations  GASTROINTESTINAL: No pain. No nausea or vomiting; No diarrhea   NEUROLOGICAL: No headache or numbness, no tremors  MUSCULOSKELETAL: No joint pain, no muscle pain  GENITOURINARY: no dysuria, no frequency, no hesitancy  PSYCHIATRY: no depression , no anxiety  ALL OTHER  ROS negative        Vital Signs Last 24 Hrs  T(C): 36.4 (05 Mar 2020 07:29), Max: 36.8 (04 Mar 2020 23:10)  T(F): 97.5 (05 Mar 2020 07:29), Max: 98.2 (04 Mar 2020 23:10)  HR: 74 (05 Mar 2020 07:29) (53 - 93)  BP: 97/54 (05 Mar 2020 07:29) (95/59 - 123/83)  BP(mean): --  RR: 16 (05 Mar 2020 07:29) (16 - 16)  SpO2: 100% (05 Mar 2020 07:29) (97% - 100%)    ________________________________________________  PHYSICAL EXAM:  GENERAL: NAD  HEENT: Normocephalic;  conjunctivae and sclerae clear; moist mucous membranes;   NECK : supple  CHEST/LUNG: Clear to auscultation bilaterally with good air entry   HEART: S1 S2  regular; no murmurs, gallops or rubs  ABDOMEN: Soft, Nontender, Nondistended; Bowel sounds present  EXTREMITIES: no cyanosis; no edema; no calf tenderness  SKIN: warm and dry; no rash  NERVOUS SYSTEM:  Awake and alert; Oriented  to place, person and time ; no new deficits    _________________________________________________  LABS:                        13.9   5.12  )-----------( 159      ( 05 Mar 2020 06:46 )             42.4     03-05    140  |  108  |  17  ----------------------------<  89  3.8   |  26  |  0.69    Ca    9.3      05 Mar 2020 06:46          CAPILLARY BLOOD GLUCOSE            RADIOLOGY & ADDITIONAL TESTS:    Imaging Personally Reviewed:  YES/NO    Consultant(s) Notes Reviewed:   YES/ No    Care Discussed with Consultants :     Plan of care was discussed with patient and /or primary care giver; all questions and concerns were addressed and care was aligned with patient's wishes.
PGY 1 Note discussed with supervising resident and primary attending    Patient is a 54y old  Female who presents with a chief complaint of Twitching of eye and headache (05 Mar 2020 16:20)      INTERVAL HPI/OVERNIGHT EVENTS:   Patient is refusing cardioversion  SHe wants to go for cardiac pacemaker. Dr Damian is accepting her at [facility] for pacemaker      MEDICATIONS  (STANDING):  aspirin  chewable 81 milliGRAM(s) Oral daily  atorvastatin 40 milliGRAM(s) Oral at bedtime  diltiazem    Tablet 30 milliGRAM(s) Oral every 6 hours  ergocalciferol 59530 Unit(s) Oral <User Schedule>  levETIRAcetam 1000 milliGRAM(s) Oral two times a day  metoprolol tartrate 75 milliGRAM(s) Oral two times a day  rivaroxaban 20 milliGRAM(s) Oral with dinner    MEDICATIONS  (PRN):  acetaminophen   Tablet .. 650 milliGRAM(s) Oral every 6 hours PRN Mild Pain (1 - 3)      __________________________________________________  REVIEW OF SYSTEMS:    CONSTITUTIONAL: No fever,   EYES: no acute visual disturbances  NECK: No pain or stiffness  RESPIRATORY: No cough; No shortness of breath  CARDIOVASCULAR: No chest pain, no palpitations  GASTROINTESTINAL: No pain. No nausea or vomiting; No diarrhea   NEUROLOGICAL: No headache or numbness, no tremors  MUSCULOSKELETAL: No joint pain, no muscle pain  GENITOURINARY: no dysuria, no frequency, no hesitancy  PSYCHIATRY: no depression , no anxiety  ALL OTHER  ROS negative        Vital Signs Last 24 Hrs  T(C): 36.5 (06 Mar 2020 04:56), Max: 36.8 (05 Mar 2020 11:26)  T(F): 97.7 (06 Mar 2020 04:56), Max: 98.2 (05 Mar 2020 11:26)  HR: 82 (06 Mar 2020 05:40) (54 - 89)  BP: 103/55 (06 Mar 2020 04:56) (94/57 - 113/75)  BP(mean): --  RR: 18 (06 Mar 2020 05:40) (16 - 18)  SpO2: 99% (06 Mar 2020 05:40) (96% - 100%)    ________________________________________________  PHYSICAL EXAM:  GENERAL: NAD  HEENT: Normocephalic;  conjunctivae and sclerae clear; moist mucous membranes;   NECK : supple  CHEST/LUNG: Clear to auscultation bilaterally with good air entry   HEART: S1 S2  irregular afib  ABDOMEN: Soft, Nontender, Nondistended; Bowel sounds present  EXTREMITIES: no cyanosis; no edema; no calf tenderness  SKIN: warm and dry; no rash  NERVOUS SYSTEM:  Awake and alert; Oriented  to place, person and time ; no new deficits    _________________________________________________  LABS:                        13.7   7.18  )-----------( 155      ( 06 Mar 2020 05:51 )             41.7     03-06    140  |  109<H>  |  18  ----------------------------<  84  4.2   |  27  |  0.73    Ca    9.1      06 Mar 2020 05:51          CAPILLARY BLOOD GLUCOSE      POCT Blood Glucose.: 88 mg/dL (05 Mar 2020 20:58)  POCT Blood Glucose.: 103 mg/dL (05 Mar 2020 16:20)        RADIOLOGY & ADDITIONAL TESTS:    Imaging Personally Reviewed:  YES/NO    Consultant(s) Notes Reviewed:   YES/ No    Care Discussed with Consultants :     Plan of care was discussed with patient and /or primary care giver; all questions and concerns were addressed and care was aligned with patient's wishes.
PGY 1 Note discussed with supervising resident and primary attending    Patient is a 54y old  Female who presents with a chief complaint of Twitching of eye and headache (06 Mar 2020 17:57)      INTERVAL HPI/OVERNIGHT EVENTS: offers no new complaints; current symptoms resolving    MEDICATIONS  (STANDING):  aspirin  chewable 81 milliGRAM(s) Oral daily  atorvastatin 40 milliGRAM(s) Oral at bedtime  diltiazem    Tablet 30 milliGRAM(s) Oral every 6 hours  ergocalciferol 50565 Unit(s) Oral <User Schedule>  levETIRAcetam 1000 milliGRAM(s) Oral two times a day  metoprolol tartrate 75 milliGRAM(s) Oral two times a day  rivaroxaban 20 milliGRAM(s) Oral with dinner    MEDICATIONS  (PRN):  acetaminophen   Tablet .. 650 milliGRAM(s) Oral every 6 hours PRN Mild Pain (1 - 3)      __________________________________________________  REVIEW OF SYSTEMS:    CONSTITUTIONAL: No fever,   EYES: no acute visual disturbances  NECK: No pain or stiffness  RESPIRATORY: No cough; No shortness of breath  CARDIOVASCULAR: No chest pain, no palpitations  GASTROINTESTINAL: No pain. No nausea or vomiting; No diarrhea   NEUROLOGICAL: No headache or numbness, no tremors  MUSCULOSKELETAL: No joint pain, no muscle pain  GENITOURINARY: no dysuria, no frequency, no hesitancy  PSYCHIATRY: no depression , no anxiety  ALL OTHER  ROS negative        Vital Signs Last 24 Hrs  T(C): 36.6 (07 Mar 2020 07:39), Max: 36.6 (06 Mar 2020 19:20)  T(F): 97.8 (07 Mar 2020 07:39), Max: 97.9 (06 Mar 2020 19:20)  HR: 55 (07 Mar 2020 07:39) (55 - 81)  BP: 100/50 (07 Mar 2020 07:39) (100/50 - 118/63)  BP(mean): --  RR: 16 (07 Mar 2020 07:39) (16 - 18)  SpO2: 98% (07 Mar 2020 07:39) (96% - 100%)    ________________________________________________  PHYSICAL EXAM:  GENERAL: NAD  HEENT: Normocephalic;  conjunctivae and sclerae clear; moist mucous membranes;   NECK : supple  CHEST/LUNG: Clear to auscultation bilaterally with good air entry   HEART: S1 S2  regular; no murmurs, gallops or rubs  ABDOMEN: Soft, Nontender, Nondistended; Bowel sounds present  EXTREMITIES: no cyanosis; no edema; no calf tenderness  SKIN: warm and dry; no rash  NERVOUS SYSTEM:  Awake and alert; Oriented  to place, person and time ; no new deficits    _________________________________________________  LABS:                        13.9   6.52  )-----------( 168      ( 07 Mar 2020 06:27 )             43.5     03-07    139  |  109<H>  |  16  ----------------------------<  94  4.3   |  26  |  0.73    Ca    9.2      07 Mar 2020 06:27          CAPILLARY BLOOD GLUCOSE      POCT Blood Glucose.: 107 mg/dL (06 Mar 2020 20:59)  POCT Blood Glucose.: 105 mg/dL (06 Mar 2020 16:32)        RADIOLOGY & ADDITIONAL TESTS:    Imaging Personally Reviewed:  YES/NO    Consultant(s) Notes Reviewed:   YES/ No    Care Discussed with Consultants :     Plan of care was discussed with patient and /or primary care giver; all questions and concerns were addressed and care was aligned with patient's wishes.
SUBJECTIVE / OVERNIGHT EVENTS: pt seen and examined    MEDICATIONS  (STANDING):  aspirin  chewable 81 milliGRAM(s) Oral daily  atorvastatin 40 milliGRAM(s) Oral at bedtime  hydrochlorothiazide 12.5 milliGRAM(s) Oral daily  levETIRAcetam 1000 milliGRAM(s) Oral two times a day  lisinopril 20 milliGRAM(s) Oral daily  metoprolol tartrate 50 milliGRAM(s) Oral once  metoprolol tartrate 50 milliGRAM(s) Oral two times a day  rivaroxaban 20 milliGRAM(s) Oral with dinner    MEDICATIONS  (PRN):  acetaminophen   Tablet .. 650 milliGRAM(s) Oral every 6 hours PRN Mild Pain (1 - 3)    Vital Signs Last 24 Hrs  T(C): 36.3 (01 Mar 2020 23:41), Max: 36.6 (01 Mar 2020 05:07)  T(F): 97.4 (01 Mar 2020 23:41), Max: 97.8 (01 Mar 2020 05:07)  HR: 79 (01 Mar 2020 23:41) (56 - 97)  BP: 107/75 (01 Mar 2020 23:41) (103/78 - 118/77)  BP(mean): --  RR: 17 (01 Mar 2020 23:41) (17 - 18)  SpO2: 98% (01 Mar 2020 23:41) (98% - 100%)    Constitutional: No fever, fatigue  Skin: No rash.  Eyes: No recent vision problems or eye pain.  ENT: No congestion, ear pain, or sore throat.  Cardiovascular: No chest pain or palpation.  Respiratory: No cough, shortness of breath, congestion, or wheezing.  Gastrointestinal: No abdominal pain, nausea, vomiting, or diarrhea.  Genitourinary: No dysuria.  Musculoskeletal: No joint swelling.  Neurologic: No headache.    PHYSICAL EXAM:  GENERAL: NAD  EYES: EOMI, PERRLA  NECK: Supple, No JVD  CHEST/LUNG: dec breath sounds at bases   HEART:  S1 , S2 +  ABDOMEN: soft , bs+  EXTREMITIES:  no edema  NEUROLOGY:alert awake     LABS:  03-01    143  |  109<H>  |  12  ----------------------------<  82  3.6   |  27  |  0.73    Ca    9.1      01 Mar 2020 06:09  Phos  3.3     03-01  Mg     2.1     03-01      Creatinine Trend: 0.73 <--, 0.69 <--, 0.75 <--                        13.4   5.32  )-----------( 167      ( 01 Mar 2020 06:09 )             41.1     Urine Studies:      CARDIAC MARKERS ( 29 Feb 2020 06:21 )  <0.015 ng/mL / x     / 51 U/L / x     / <1.0 ng/mL              Imaging Personally Reviewed:    Consultant(s) Notes Reviewed:      Care Discussed with Consultants/Other Providers:
SUBJECTIVE / OVERNIGHT EVENTS: pt seen and examined      MEDICATIONS  (STANDING):  aspirin  chewable 81 milliGRAM(s) Oral daily  atorvastatin 40 milliGRAM(s) Oral at bedtime  hydrochlorothiazide 12.5 milliGRAM(s) Oral daily  levETIRAcetam 1000 milliGRAM(s) Oral two times a day  lisinopril 20 milliGRAM(s) Oral daily  metoprolol tartrate 50 milliGRAM(s) Oral once  metoprolol tartrate 50 milliGRAM(s) Oral two times a day  rivaroxaban 20 milliGRAM(s) Oral with dinner    MEDICATIONS  (PRN):  acetaminophen   Tablet .. 650 milliGRAM(s) Oral every 6 hours PRN Mild Pain (1 - 3)    Vital Signs Last 24 Hrs  T(C): 36.3 (29 Feb 2020 15:58), Max: 36.8 (29 Feb 2020 05:11)  T(F): 97.3 (29 Feb 2020 15:58), Max: 98.2 (29 Feb 2020 05:11)  HR: 77 (29 Feb 2020 15:58) (64 - 101)  BP: 113/73 (29 Feb 2020 15:58) (97/73 - 132/78)  BP(mean): --  RR: 18 (29 Feb 2020 15:58) (16 - 20)  SpO2: 97% (29 Feb 2020 15:58) (97% - 100%)    CAPILLARY BLOOD GLUCOSE        I&O's Summary      Constitutional: No fever, fatigue  Skin: No rash.  Eyes: No recent vision problems or eye pain.  ENT: No congestion, ear pain, or sore throat.  Cardiovascular: No chest pain or palpation.  Respiratory: No cough, shortness of breath, congestion, or wheezing.  Gastrointestinal: No abdominal pain, nausea, vomiting, or diarrhea.  Genitourinary: No dysuria.  Musculoskeletal: No joint swelling.  Neurologic: No headache.    PHYSICAL EXAM:  GENERAL: NAD  EYES: EOMI, PERRLA  NECK: Supple, No JVD  CHEST/LUNG: dec breath sounds at bases   HEART:  S1 , S2 +  ABDOMEN: soft , bs+  EXTREMITIES:  no edema  NEUROLOGY:alert awake       LABS:                        13.7   6.76  )-----------( 158      ( 29 Feb 2020 06:21 )             42.3     02-29    144  |  109<H>  |  11  ----------------------------<  77  3.9   |  28  |  0.69    Ca    9.2      29 Feb 2020 06:21  Phos  3.1     02-29  Mg     2.3     02-29        CARDIAC MARKERS ( 29 Feb 2020 06:21 )  <0.015 ng/mL / x     / 51 U/L / x     / <1.0 ng/mL          RADIOLOGY & ADDITIONAL TESTS:    Imaging Personally Reviewed:    Consultant(s) Notes Reviewed:      Care Discussed with Consultants/Other Providers:

## 2020-03-08 LAB
ANION GAP SERPL CALC-SCNC: 11 MMO/L — SIGNIFICANT CHANGE UP (ref 7–14)
APTT BLD: 33.4 SEC — SIGNIFICANT CHANGE UP (ref 27.5–36.3)
BUN SERPL-MCNC: 18 MG/DL — SIGNIFICANT CHANGE UP (ref 7–23)
CALCIUM SERPL-MCNC: 9.4 MG/DL — SIGNIFICANT CHANGE UP (ref 8.4–10.5)
CHLORIDE SERPL-SCNC: 107 MMOL/L — SIGNIFICANT CHANGE UP (ref 98–107)
CO2 SERPL-SCNC: 23 MMOL/L — SIGNIFICANT CHANGE UP (ref 22–31)
CREAT SERPL-MCNC: 0.71 MG/DL — SIGNIFICANT CHANGE UP (ref 0.5–1.3)
GLUCOSE SERPL-MCNC: 94 MG/DL — SIGNIFICANT CHANGE UP (ref 70–99)
HCT VFR BLD CALC: 39.8 % — SIGNIFICANT CHANGE UP (ref 34.5–45)
HGB BLD-MCNC: 13.1 G/DL — SIGNIFICANT CHANGE UP (ref 11.5–15.5)
INR BLD: 1.77 — HIGH (ref 0.88–1.17)
MAGNESIUM SERPL-MCNC: 2 MG/DL — SIGNIFICANT CHANGE UP (ref 1.6–2.6)
MCHC RBC-ENTMCNC: 29.3 PG — SIGNIFICANT CHANGE UP (ref 27–34)
MCHC RBC-ENTMCNC: 32.9 % — SIGNIFICANT CHANGE UP (ref 32–36)
MCV RBC AUTO: 89 FL — SIGNIFICANT CHANGE UP (ref 80–100)
NRBC # FLD: 0 K/UL — SIGNIFICANT CHANGE UP (ref 0–0)
PLATELET # BLD AUTO: 157 K/UL — SIGNIFICANT CHANGE UP (ref 150–400)
PMV BLD: 13.1 FL — HIGH (ref 7–13)
POTASSIUM SERPL-MCNC: 4.4 MMOL/L — SIGNIFICANT CHANGE UP (ref 3.5–5.3)
POTASSIUM SERPL-SCNC: 4.4 MMOL/L — SIGNIFICANT CHANGE UP (ref 3.5–5.3)
PROTHROM AB SERPL-ACNC: 20.5 SEC — HIGH (ref 9.8–13.1)
RBC # BLD: 4.47 M/UL — SIGNIFICANT CHANGE UP (ref 3.8–5.2)
RBC # FLD: 14.6 % — HIGH (ref 10.3–14.5)
SODIUM SERPL-SCNC: 141 MMOL/L — SIGNIFICANT CHANGE UP (ref 135–145)
WBC # BLD: 6.11 K/UL — SIGNIFICANT CHANGE UP (ref 3.8–10.5)
WBC # FLD AUTO: 6.11 K/UL — SIGNIFICANT CHANGE UP (ref 3.8–10.5)

## 2020-03-08 RX ORDER — RIVAROXABAN 15 MG-20MG
20 KIT ORAL
Refills: 0 | Status: DISCONTINUED | OUTPATIENT
Start: 2020-03-08 | End: 2020-03-10

## 2020-03-08 RX ADMIN — ATORVASTATIN CALCIUM 40 MILLIGRAM(S): 80 TABLET, FILM COATED ORAL at 21:51

## 2020-03-08 RX ADMIN — LEVETIRACETAM 1000 MILLIGRAM(S): 250 TABLET, FILM COATED ORAL at 05:16

## 2020-03-08 RX ADMIN — Medication 30 MILLIGRAM(S): at 05:16

## 2020-03-08 RX ADMIN — Medication 81 MILLIGRAM(S): at 11:54

## 2020-03-08 RX ADMIN — Medication 30 MILLIGRAM(S): at 17:21

## 2020-03-08 RX ADMIN — Medication 50 MILLIGRAM(S): at 21:50

## 2020-03-08 RX ADMIN — SODIUM CHLORIDE 3 MILLILITER(S): 9 INJECTION INTRAMUSCULAR; INTRAVENOUS; SUBCUTANEOUS at 05:06

## 2020-03-08 RX ADMIN — LEVETIRACETAM 1000 MILLIGRAM(S): 250 TABLET, FILM COATED ORAL at 17:21

## 2020-03-08 RX ADMIN — SODIUM CHLORIDE 3 MILLILITER(S): 9 INJECTION INTRAMUSCULAR; INTRAVENOUS; SUBCUTANEOUS at 21:50

## 2020-03-08 RX ADMIN — RIVAROXABAN 20 MILLIGRAM(S): KIT at 22:43

## 2020-03-08 RX ADMIN — Medication 30 MILLIGRAM(S): at 11:54

## 2020-03-08 RX ADMIN — SODIUM CHLORIDE 3 MILLILITER(S): 9 INJECTION INTRAMUSCULAR; INTRAVENOUS; SUBCUTANEOUS at 12:26

## 2020-03-08 RX ADMIN — Medication 50 MILLIGRAM(S): at 11:54

## 2020-03-08 NOTE — CONSULT NOTE ADULT - ASSESSMENT
EKG Fib RVR     Echo: < from: Transthoracic Echocardiogram (12.02.19 @ 07:32) >  1. Normal mitral valve. Mild mitral regurgitation.  2. Normal trileaflet aortic valve.  3. Aortic Root: 3.4 cm.    4. Mildly dilated left atrium.  LA volume index = 37 cc/m2.  5. Normal left ventricular internal dimensions and wall  thicknesses.  6. Normal Left Ventricular Systolic Function,  (EF = 55 to  60%)  7. Grade II diastolic dysfunction.  8. Normal right atrium.  9. Normal right ventricular size and systolic function  (TAPSE 1.8 cm).  10. RA Pressure is 8 mm Hg.  11. RV systolic pressure is normal at  26 mm Hg.  12. There is mild tricuspid regurgitation.  13. Normal pulmonic valve.  14. Normal pericardium with no pericardial effusion.    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

## 2020-03-08 NOTE — PROGRESS NOTE ADULT - SUBJECTIVE AND OBJECTIVE BOX
Patient is a 54y old  Female who presents with a chief complaint of Twitching of eye and headache (06 Mar 2020 17:57)      INTERVAL HPI/OVERNIGHT EVENTS: offers no new complaints; current symptoms resolving  MEDICATIONS  (STANDING):  aspirin enteric coated 81 milliGRAM(s) Oral daily  atorvastatin 40 milliGRAM(s) Oral at bedtime  diltiazem    Tablet 30 milliGRAM(s) Oral every 6 hours  influenza   Vaccine 0.5 milliLiter(s) IntraMuscular once  levETIRAcetam 1000 milliGRAM(s) Oral two times a day  metoprolol tartrate 50 milliGRAM(s) Oral two times a day  rivaroxaban 20 milliGRAM(s) Oral with dinner  sodium chloride 0.9% lock flush 3 milliLiter(s) IV Push every 8 hours    MEDICATIONS  (PRN):    CONSTITUTIONAL: No fever,   EYES: no acute visual disturbances  NECK: No pain or stiffness  RESPIRATORY: No cough; No shortness of breath  CARDIOVASCULAR: No chest pain, no palpitations  GASTROINTESTINAL: No pain. No nausea or vomiting; No diarrhea   NEUROLOGICAL: No headache or numbness, no tremors  MUSCULOSKELETAL: No joint pain, no muscle pain  GENITOURINARY: no dysuria, no frequency, no hesitancy  PSYCHIATRY: no depression , no anxiety  ALL OTHER  ROS negative      Vital Signs Last 24 Hrs  T(C): 36.6 (08 Mar 2020 21:48), Max: 36.9 (08 Mar 2020 04:59)  T(F): 97.9 (08 Mar 2020 21:48), Max: 98.4 (08 Mar 2020 04:59)  HR: 68 (08 Mar 2020 21:48) (56 - 165)  BP: 107/69 (08 Mar 2020 21:48) (100/71 - 121/87)  BP(mean): --  RR: 18 (08 Mar 2020 21:48) (16 - 20)  SpO2: 98% (08 Mar 2020 21:48) (98% - 98%)________________________________________________  PHYSICAL EXAM:  GENERAL: NAD  HEENT: Normocephalic;  conjunctivae and sclerae clear; moist mucous membranes;   NECK : supple  CHEST/LUNG: Clear to auscultation bilaterally with good air entry   HEART: S1 S2  regular; no murmurs, gallops or rubs  ABDOMEN: Soft, Nontender, Nondistended; Bowel sounds present  EXTREMITIES: no cyanosis; no edema; no calf tenderness  SKIN: warm and dry; no rash  NERVOUS SYSTEM:  Awake and alert; Oriented  to place, person and time ; no new deficits    _________________________________________________  LABS:  03-08    141  |  107  |  18  ----------------------------<  94  4.4   |  23  |  0.71    Ca    9.4      08 Mar 2020 07:08  Mg     2.0     03-08      Creatinine Trend: 0.71 <--, 0.73 <--, 0.73 <--, 0.69 <--, 0.80 <--, 0.75 <--, 0.71 <--                        13.1   6.11  )-----------( 157      ( 08 Mar 2020 07:08 )             39.8     Urine Studies:              PT/INR - ( 08 Mar 2020 07:08 )   PT: 20.5 SEC;   INR: 1.77          PTT - ( 08 Mar 2020 07:08 )  PTT:33.4 SEC      RADIOLOGY & ADDITIONAL TESTS:    Imaging Personally Reviewed:  YES/NO    Consultant(s) Notes Reviewed:   YES/ No    Care Discussed with Consultants :     Plan of care was discussed with patient and /or primary care giver; all questions and concerns were addressed and care was aligned with patient's wishes.

## 2020-03-08 NOTE — CONSULT NOTE ADULT - SUBJECTIVE AND OBJECTIVE BOX
Emanuel Cesar MD  Interventional Cardiology / Advance Heart Failure and Cardiac Transplant Specialist  [place] Office : [address] [place] [address] [place]. [address]  Tel:   [place] Office : [address] [facility] [place] [address]  Tel: [phone]  Cell : [phone]      HISTORY OF PRESENTING ILLNESS:  HPI:  HISTORY OF PRESENT ILLNESS:55 y/o Korean speaking female from home, lives with son, walks with cane with PMH of Afib on Xarelto since 2016, HTN, Seizure, CVA in 2016 s/p left residual paralysis, multiple seizures presented to [place] ED with acute onset abdominal pain followed by eye twitching with preference to left and lightheadedness. she was seen by neurologist who recommended CTH. In [place] she was found to have afib with RVR on Cardizem and metoprolol. She was found to have Now transfer to [facility] for micra placement by Dr Gibbs.  Patient was seen and examine She denies chest pain ,sob ,dizziness, fever / chills ,diaphoresis,  dysphagia, diplopia, dysarthria, numbness, tingling, urinary incontinence, Fecal incontinence, Tongue biting.    PAST MEDICAL & SURGICAL HISTORY:  Tachy-clare syndrome  Atrial fibrillation  Seizure  CVA (cerebral vascular accident)  CVA (cerebral vascular accident)  Hypertension  Atrial fibrillation  No significant past surgical history  H/O tubal ligation      SOCIAL HISTORY: Substance Use (street drugs): ( x ) never used  (  ) other:    FAMILY HISTORY:  FH: hypertension (Father, Mother): MOTHER, FAHTHER      MEDICATIONS:  aspirin enteric coated 81 milliGRAM(s) Oral daily  diltiazem    Tablet 30 milliGRAM(s) Oral every 6 hours  metoprolol tartrate 50 milliGRAM(s) Oral two times a day  levETIRAcetam 1000 milliGRAM(s) Oral two times a day  atorvastatin 40 milliGRAM(s) Oral at bedtime  influenza   Vaccine 0.5 milliLiter(s) IntraMuscular once  sodium chloride 0.9% lock flush 3 milliLiter(s) IV Push every 8 hours    FAMILY HISTORY:  FH: hypertension (Father, Mother): MOTHER, FAHTHER        Allergies    penicillin (Hives)    Intolerances    	      PHYSICAL EXAM:  T(C): 36.5 (03-08-20 @ 11:38), Max: 36.9 (03-08-20 @ 04:59)  HR: 72 (03-08-20 @ 11:38) (56 - 113)  BP: 103/72 (03-08-20 @ 11:38) (100/71 - 122/71)  RR: 20 (03-08-20 @ 11:38) (16 - 20)  SpO2: 98% (03-08-20 @ 11:38) (98% - 99%)  Wt(kg): --  I&O's Summary        EYES: EOMI, PERRLA, conjunctiva and sclera clear  ENMT: No tonsillar erythema, exudates, or enlargement; Moist mucous membranes, Good dentition, No lesions  Cardiovascular: Normal S1 S2, No JVD, No murmurs, No edema  Respiratory: Lungs clear to auscultation	  Gastrointestinal:  Soft, Non-tender, + BS	  Extremities: no edema      LABS:	 	    CARDIAC MARKERS:                                  13.1   6.11  )-----------( 157      ( 08 Mar 2020 07:08 )             39.8     03-08    141  |  107  |  18  ----------------------------<  94  4.4   |  23  |  0.71    Ca    9.4      08 Mar 2020 07:08  Mg     2.0     03-08      proBNP:   Lipid Profile:   HgA1c:   TSH:     Consultant(s) Notes Reviewed:  [x ] YES  [ ] NO    Care Discussed with Consultants/Other Providers [ x] YES  [ ] NO    Imaging Personally Reviewed independently:  [x] YES  [ ] NO    All labs, radiologic studies, vitals, orders and medications list reviewed. Patient is seen and examined at bedside. Case discussed with medical team.    ASSESSMENT/PLAN:

## 2020-03-09 LAB
ANION GAP SERPL CALC-SCNC: 15 MMO/L — HIGH (ref 7–14)
BLD GP AB SCN SERPL QL: NEGATIVE — SIGNIFICANT CHANGE UP
BUN SERPL-MCNC: 17 MG/DL — SIGNIFICANT CHANGE UP (ref 7–23)
CALCIUM SERPL-MCNC: 9.4 MG/DL — SIGNIFICANT CHANGE UP (ref 8.4–10.5)
CHLORIDE SERPL-SCNC: 104 MMOL/L — SIGNIFICANT CHANGE UP (ref 98–107)
CO2 SERPL-SCNC: 22 MMOL/L — SIGNIFICANT CHANGE UP (ref 22–31)
CREAT SERPL-MCNC: 0.64 MG/DL — SIGNIFICANT CHANGE UP (ref 0.5–1.3)
GLUCOSE SERPL-MCNC: 90 MG/DL — SIGNIFICANT CHANGE UP (ref 70–99)
HCG SERPL-ACNC: < 5 MIU/ML — SIGNIFICANT CHANGE UP
HCT VFR BLD CALC: 42.1 % — SIGNIFICANT CHANGE UP (ref 34.5–45)
HGB BLD-MCNC: 13.3 G/DL — SIGNIFICANT CHANGE UP (ref 11.5–15.5)
MAGNESIUM SERPL-MCNC: 1.9 MG/DL — SIGNIFICANT CHANGE UP (ref 1.6–2.6)
MCHC RBC-ENTMCNC: 28.9 PG — SIGNIFICANT CHANGE UP (ref 27–34)
MCHC RBC-ENTMCNC: 31.6 % — LOW (ref 32–36)
MCV RBC AUTO: 91.3 FL — SIGNIFICANT CHANGE UP (ref 80–100)
NRBC # FLD: 0 K/UL — SIGNIFICANT CHANGE UP (ref 0–0)
PHOSPHATE SERPL-MCNC: 3.2 MG/DL — SIGNIFICANT CHANGE UP (ref 2.5–4.5)
PLATELET # BLD AUTO: 162 K/UL — SIGNIFICANT CHANGE UP (ref 150–400)
PMV BLD: 13.5 FL — HIGH (ref 7–13)
POTASSIUM SERPL-MCNC: 4.1 MMOL/L — SIGNIFICANT CHANGE UP (ref 3.5–5.3)
POTASSIUM SERPL-SCNC: 4.1 MMOL/L — SIGNIFICANT CHANGE UP (ref 3.5–5.3)
RBC # BLD: 4.61 M/UL — SIGNIFICANT CHANGE UP (ref 3.8–5.2)
RBC # FLD: 14.6 % — HIGH (ref 10.3–14.5)
RH IG SCN BLD-IMP: POSITIVE — SIGNIFICANT CHANGE UP
SODIUM SERPL-SCNC: 141 MMOL/L — SIGNIFICANT CHANGE UP (ref 135–145)
WBC # BLD: 5.86 K/UL — SIGNIFICANT CHANGE UP (ref 3.8–10.5)
WBC # FLD AUTO: 5.86 K/UL — SIGNIFICANT CHANGE UP (ref 3.8–10.5)

## 2020-03-09 PROCEDURE — 93320 DOPPLER ECHO COMPLETE: CPT | Mod: 26,GC

## 2020-03-09 PROCEDURE — 93325 DOPPLER ECHO COLOR FLOW MAPG: CPT | Mod: 26,GC

## 2020-03-09 PROCEDURE — 33274 TCAT INSJ/RPL PERM LDLS PM: CPT | Mod: Q0,59

## 2020-03-09 PROCEDURE — 92960 CARDIOVERSION ELECTRIC EXT: CPT

## 2020-03-09 PROCEDURE — 93010 ELECTROCARDIOGRAM REPORT: CPT

## 2020-03-09 PROCEDURE — 93312 ECHO TRANSESOPHAGEAL: CPT | Mod: 26

## 2020-03-09 PROCEDURE — 76376 3D RENDER W/INTRP POSTPROCES: CPT | Mod: 26

## 2020-03-09 RX ORDER — METOPROLOL TARTRATE 50 MG
50 TABLET ORAL
Refills: 0 | Status: DISCONTINUED | OUTPATIENT
Start: 2020-03-09 | End: 2020-03-10

## 2020-03-09 RX ADMIN — SODIUM CHLORIDE 3 MILLILITER(S): 9 INJECTION INTRAMUSCULAR; INTRAVENOUS; SUBCUTANEOUS at 13:31

## 2020-03-09 RX ADMIN — Medication 30 MILLIGRAM(S): at 22:28

## 2020-03-09 RX ADMIN — Medication 30 MILLIGRAM(S): at 12:02

## 2020-03-09 RX ADMIN — LEVETIRACETAM 1000 MILLIGRAM(S): 250 TABLET, FILM COATED ORAL at 05:06

## 2020-03-09 RX ADMIN — Medication 30 MILLIGRAM(S): at 00:35

## 2020-03-09 RX ADMIN — Medication 50 MILLIGRAM(S): at 05:06

## 2020-03-09 RX ADMIN — Medication 30 MILLIGRAM(S): at 05:06

## 2020-03-09 RX ADMIN — SODIUM CHLORIDE 3 MILLILITER(S): 9 INJECTION INTRAMUSCULAR; INTRAVENOUS; SUBCUTANEOUS at 21:18

## 2020-03-09 RX ADMIN — LEVETIRACETAM 1000 MILLIGRAM(S): 250 TABLET, FILM COATED ORAL at 22:28

## 2020-03-09 RX ADMIN — RIVAROXABAN 20 MILLIGRAM(S): KIT at 14:48

## 2020-03-09 RX ADMIN — SODIUM CHLORIDE 3 MILLILITER(S): 9 INJECTION INTRAMUSCULAR; INTRAVENOUS; SUBCUTANEOUS at 05:04

## 2020-03-09 RX ADMIN — ATORVASTATIN CALCIUM 40 MILLIGRAM(S): 80 TABLET, FILM COATED ORAL at 22:28

## 2020-03-09 RX ADMIN — Medication 81 MILLIGRAM(S): at 12:02

## 2020-03-09 NOTE — CHART NOTE - NSCHARTNOTEFT_GEN_A_CORE
54y Female s/p single chamber Micra PPM, and unsuccessful DCCV. Patient without complaints denies any numbness, tingling, CP, or SOB.     Vital Signs Last 24 Hrs  T(C): 36.3 (03-09-20 @ 21:00), Max: 36.6 (03-08-20 @ 21:48)  T(F): 97.4 (03-09-20 @ 21:00), Max: 97.9 (03-08-20 @ 21:48)  HR: 63 (03-09-20 @ 12:00) (63 - 84)  BP: 96/65 (03-09-20 @ 21:00) (96/65 - 117/69)  BP(mean): --  RR: 16 (03-09-20 @ 21:00) (16 - 18)  SpO2: 97% (03-09-20 @ 21:00) (97% - 100%)    PPM site: Dressing is clear/dry/intact. No overt hematoma or bleeding.     Chest Xray ordered for AM per EP    Will continue to monitor. 54y Female s/p single chamber Micra PPM, and unsuccessful DCCV. Patient without complaints denies any numbness, tingling, CP, or SOB.     Vital Signs Last 24 Hrs  T(C): 36.3 (03-09-20 @ 21:00), Max: 36.6 (03-08-20 @ 21:48)  T(F): 97.4 (03-09-20 @ 21:00), Max: 97.9 (03-08-20 @ 21:48)  HR: 63 (03-09-20 @ 12:00) (63 - 84)  BP: 96/65 (03-09-20 @ 21:00) (96/65 - 117/69)  BP(mean): --  RR: 16 (03-09-20 @ 21:00) (16 - 18)  SpO2: 97% (03-09-20 @ 21:00) (97% - 100%)    PPM site: Dressing is clear/dry/intact. No overt hematoma or bleeding.   RFV site: Dressing is C/D/I, no pain, swelling, active bleeding or hematoma. DP pulses 2+.     Chest Xray ordered for AM per EP    Will continue to monitor.

## 2020-03-09 NOTE — PROGRESS NOTE ADULT - SUBJECTIVE AND OBJECTIVE BOX
Emanuel Cesar MD  Interventional Cardiology / Advance Heart Failure and Cardiac Transplant Specialist  [place] Office : [address] [place] [address] [place]. [address]  Tel:   [place] Office : [address] [facility] [place] [address]  Tel: [phone]  Cell : [phone]    Pt is lying in bed comfortable not in distress, no chest pains no SOB no palpitations  	  MEDICATIONS:  aspirin enteric coated 81 milliGRAM(s) Oral daily  diltiazem    Tablet 30 milliGRAM(s) Oral every 6 hours  metoprolol tartrate 50 milliGRAM(s) Oral two times a day  rivaroxaban 20 milliGRAM(s) Oral with dinner        levETIRAcetam 1000 milliGRAM(s) Oral two times a day      atorvastatin 40 milliGRAM(s) Oral at bedtime    influenza   Vaccine 0.5 milliLiter(s) IntraMuscular once  sodium chloride 0.9% lock flush 3 milliLiter(s) IV Push every 8 hours      PAST MEDICAL/SURGICAL HISTORY  PAST MEDICAL & SURGICAL HISTORY:  Tachy-clare syndrome  Atrial fibrillation  Seizure  CVA (cerebral vascular accident)  CVA (cerebral vascular accident)  Hypertension  Atrial fibrillation  No significant past surgical history  H/O tubal ligation      SOCIAL HISTORY: Substance Use (street drugs): ( x ) never used  (  ) other:    FAMILY HISTORY:  FH: hypertension (Father, Mother): MOTHER, FAISAL      REVIEW OF SYSTEMS:  CONSTITUTIONAL: No fever, weight loss, or fatigue  EYES: No eye pain, visual disturbances, or discharge  ENMT:  No difficulty hearing, tinnitus, vertigo; No sinus or throat pain  BREASTS: No pain, masses, or nipple discharge  GASTROINTESTINAL: No abdominal or epigastric pain. No nausea, vomiting, or hematemesis; No diarrhea or constipation. No melena or hematochezia.  GENITOURINARY: No dysuria, frequency, hematuria, or incontinence  NEUROLOGICAL: No headaches, memory loss, loss of strength, numbness, or tremors  ENDOCRINE: No heat or cold intolerance; No hair loss  MUSCULOSKELETAL: No joint pain or swelling; No muscle, back, or extremity pain  PSYCHIATRIC: No depression, anxiety, mood swings, or difficulty sleeping  HEME/LYMPH: No easy bruising, or bleeding gums  All others negative    PHYSICAL EXAM:  T(C): 36.4 (03-09-20 @ 12:00), Max: 36.6 (03-08-20 @ 21:48)  HR: 63 (03-09-20 @ 12:00) (63 - 165)  BP: 111/82 (03-09-20 @ 12:00) (104/74 - 121/87)  RR: 16 (03-09-20 @ 12:00) (16 - 18)  SpO2: 100% (03-09-20 @ 12:00) (98% - 100%)  Wt(kg): --  I&O's Summary    08 Mar 2020 07:01  -  09 Mar 2020 07:00  --------------------------------------------------------  IN: 354 mL / OUT: 0 mL / NET: 354 mL          GENERAL: NAD  EYES: EOMI, PERRLA, conjunctiva and sclera clear  ENMT: No tonsillar erythema, exudates, or enlargement; Moist mucous membranes, Good dentition, No lesions  Cardiovascular: Normal S1 S2, No JVD, No murmurs, No edema  Respiratory: Lungs clear to auscultation	  Gastrointestinal:  Soft, Non-tender, + BS	  Extremities: Normal range of motion, No clubbing, cyanosis or edema  LYMPH: No lymphadenopathy noted  NERVOUS SYSTEM:  Alert & Oriented X3, Good concentration; Motor Strength 5/5 B/L upper and lower extremities; DTRs 2+ intact and symmetric                                    13.3   5.86  )-----------( 162      ( 09 Mar 2020 05:13 )             42.1     03-09    141  |  104  |  17  ----------------------------<  90  4.1   |  22  |  0.64    Ca    9.4      09 Mar 2020 05:13  Phos  3.2     03-09  Mg     1.9     03-09      proBNP:   Lipid Profile:   HgA1c:   TSH:     Consultant(s) Notes Reviewed:  [x ] YES  [ ] NO    Care Discussed with Consultants/Other Providers [ x] YES  [ ] NO    Imaging Personally Reviewed independently:  [x] YES  [ ] NO    All labs, radiologic studies, vitals, orders and medications list reviewed. Patient is seen and examined at bedside. Case discussed with medical team.

## 2020-03-09 NOTE — PROVIDER CONTACT NOTE (OTHER) - ASSESSMENT
patient asymptomatic
BP 96/65. VS in flowsheet. Patient asymptomatic.
Patient feels palpitations, otherwise with stable vital signs.
patient asymptomatic
pt asymptomatic, pt bp 100/71 heart rate 56 a-fib

## 2020-03-09 NOTE — PROGRESS NOTE ADULT - ASSESSMENT
Pt is 55 y/o F from home, lives with son, walks with cane with PMH of Afib on Xarelto, Seizure,  CVA in 2016 s/p left residual paralysis presented to ED  with Eye twitching and Headache since yesterday.   ED EKG showed Afib with RVR and s/p one dose of Metoprolol and 1L NS. Admitted to medicine for Afib with RVR..

## 2020-03-09 NOTE — PROGRESS NOTE ADULT - SUBJECTIVE AND OBJECTIVE BOX
Patient is seen and examined. Denies any chest pain, SOB, palpitations or dizziness. NPO p MN for MARGE/DCCV and permanent pacemaker implantation. Consent in the chart. Patient understands risk-benefits and alternatives of the procedure. All questions answered. Assessment: please see EP preprocedure assessment record

## 2020-03-09 NOTE — PROVIDER CONTACT NOTE (OTHER) - ACTION/TREATMENT ORDERED:
will continue to monitor telemetry
Continue to monitor
Continue to monitor.
awaiting orders. will continue to monitor on tele and will continue to monitor vitals

## 2020-03-09 NOTE — PROGRESS NOTE ADULT - SUBJECTIVE AND OBJECTIVE BOX
Patient is a 54y old  Female who presents with a chief complaint of Twitching of eye and headache (06 Mar 2020 17:57)      INTERVAL HPI/OVERNIGHT EVENTS: offers no new complaints; current symptoms resolving    MEDICATIONS  (STANDING):  aspirin enteric coated 81 milliGRAM(s) Oral daily  atorvastatin 40 milliGRAM(s) Oral at bedtime  diltiazem    Tablet 30 milliGRAM(s) Oral every 6 hours  influenza   Vaccine 0.5 milliLiter(s) IntraMuscular once  levETIRAcetam 1000 milliGRAM(s) Oral two times a day  metoprolol tartrate 50 milliGRAM(s) Oral two times a day  rivaroxaban 20 milliGRAM(s) Oral with dinner  sodium chloride 0.9% lock flush 3 milliLiter(s) IV Push every 8 hours    MEDICATIONS  (PRN):      CONSTITUTIONAL: No fever,   EYES: no acute visual disturbances  NECK: No pain or stiffness  RESPIRATORY: No cough; No shortness of breath  CARDIOVASCULAR: No chest pain, no palpitations  GASTROINTESTINAL: No pain. No nausea or vomiting; No diarrhea   NEUROLOGICAL: No headache or numbness, no tremors  MUSCULOSKELETAL: No joint pain, no muscle pain  GENITOURINARY: no dysuria, no frequency, no hesitancy  PSYCHIATRY: no depression , no anxiety  ALL OTHER  ROS negative      Vital Signs Last 24 Hrs  T(C): 36.6 (08 Mar 2020 21:48), Max: 36.9 (08 Mar 2020 04:59)  T(F): 97.9 (08 Mar 2020 21:48), Max: 98.4 (08 Mar 2020 04:59)  HR: 68 (08 Mar 2020 21:48) (56 - 165)  BP: 107/69 (08 Mar 2020 21:48) (100/71 - 121/87)  BP(mean): --  RR: 18 (08 Mar 2020 21:48) (16 - 20)  SpO2: 98% (08 Mar 2020 21:48) (98% - 98%)________________________________________________  PHYSICAL EXAM:  GENERAL: NAD  HEENT: Normocephalic;  conjunctivae and sclerae clear; moist mucous membranes;   NECK : supple  CHEST/LUNG: Clear to auscultation bilaterally with good air entry   HEART: S1 S2  regular; no murmurs, gallops or rubs  ABDOMEN: Soft, Nontender, Nondistended; Bowel sounds present  EXTREMITIES: no cyanosis; no edema; no calf tenderness  SKIN: warm and dry; no rash  NERVOUS SYSTEM:  Awake and alert; Oriented  to place, person and time ; no new deficits    _________________________________________________  LABS:  03-09    141  |  104  |  17  ----------------------------<  90  4.1   |  22  |  0.64    Ca    9.4      09 Mar 2020 05:13  Phos  3.2     03-09  Mg     1.9     03-09      Creatinine Trend: 0.64 <--, 0.71 <--, 0.73 <--, 0.73 <--, 0.69 <--, 0.80 <--, 0.75 <--                        13.3   5.86  )-----------( 162      ( 09 Mar 2020 05:13 )             42.1     Urine Studies:              PT/INR - ( 08 Mar 2020 07:08 )   PT: 20.5 SEC;   INR: 1.77          PTT - ( 08 Mar 2020 07:08 )  PTT:33.4 SEC       PTT - ( 08 Mar 2020 07:08 )  PTT:33.4 SEC      RADIOLOGY & ADDITIONAL TESTS:    Imaging Personally Reviewed:  YES/NO    Consultant(s) Notes Reviewed:   YES/ No    Care Discussed with Consultants :     Plan of care was discussed with patient and /or primary care giver; all questions and concerns were addressed and care was aligned with patient's wishes.

## 2020-03-09 NOTE — PROGRESS NOTE ADULT - ASSESSMENT
EKG Fib RVR     Echo: < from: Transthoracic Echocardiogram (12.02.19 @ 07:32) >  1. Normal mitral valve. Mild mitral regurgitation.  2. Normal trileaflet aortic valve.  3. Aortic Root: 3.4 cm.    4. Mildly dilated left atrium.  LA volume index = 37 cc/m2.  5. Normal left ventricular internal dimensions and wall  thicknesses.  6. Normal Left Ventricular Systolic Function,  (EF = 55 to  60%)  7. Grade II diastolic dysfunction.  8. Normal right atrium.  9. Normal right ventricular size and systolic function  (TAPSE 1.8 cm).  10. RA Pressure is 8 mm Hg.  11. RV systolic pressure is normal at  26 mm Hg.  12. There is mild tricuspid regurgitation.  13. Normal pulmonic valve.  14. Normal pericardium with no pericardial effusion.    < end of copied text >    Assessment and Plan:    1) Afib RVR: bradycardic concern for tachybrady syndrome cont lopressor  and cardizem , on xarelto for ppm and cardioversion today, echo shows normal lv     2) Seizure : c/w meds t/t per primary team     3) DVT PPX Xeralto

## 2020-03-10 ENCOUNTER — TRANSCRIPTION ENCOUNTER (OUTPATIENT)
Age: 55
End: 2020-03-10

## 2020-03-10 VITALS
TEMPERATURE: 97 F | RESPIRATION RATE: 17 BRPM | SYSTOLIC BLOOD PRESSURE: 117 MMHG | HEART RATE: 77 BPM | DIASTOLIC BLOOD PRESSURE: 77 MMHG | OXYGEN SATURATION: 98 %

## 2020-03-10 PROBLEM — I49.5 SICK SINUS SYNDROME: Chronic | Status: ACTIVE | Noted: 2020-03-07

## 2020-03-10 LAB
ANION GAP SERPL CALC-SCNC: 9 MMO/L — SIGNIFICANT CHANGE UP (ref 7–14)
BUN SERPL-MCNC: 13 MG/DL — SIGNIFICANT CHANGE UP (ref 7–23)
CALCIUM SERPL-MCNC: 9 MG/DL — SIGNIFICANT CHANGE UP (ref 8.4–10.5)
CHLORIDE SERPL-SCNC: 105 MMOL/L — SIGNIFICANT CHANGE UP (ref 98–107)
CO2 SERPL-SCNC: 25 MMOL/L — SIGNIFICANT CHANGE UP (ref 22–31)
CREAT SERPL-MCNC: 0.74 MG/DL — SIGNIFICANT CHANGE UP (ref 0.5–1.3)
GLUCOSE SERPL-MCNC: 93 MG/DL — SIGNIFICANT CHANGE UP (ref 70–99)
HCT VFR BLD CALC: 38 % — SIGNIFICANT CHANGE UP (ref 34.5–45)
HGB BLD-MCNC: 12.6 G/DL — SIGNIFICANT CHANGE UP (ref 11.5–15.5)
MAGNESIUM SERPL-MCNC: 1.9 MG/DL — SIGNIFICANT CHANGE UP (ref 1.6–2.6)
MCHC RBC-ENTMCNC: 29.2 PG — SIGNIFICANT CHANGE UP (ref 27–34)
MCHC RBC-ENTMCNC: 33.2 % — SIGNIFICANT CHANGE UP (ref 32–36)
MCV RBC AUTO: 88 FL — SIGNIFICANT CHANGE UP (ref 80–100)
NRBC # FLD: 0 K/UL — SIGNIFICANT CHANGE UP (ref 0–0)
PHOSPHATE SERPL-MCNC: 4.2 MG/DL — SIGNIFICANT CHANGE UP (ref 2.5–4.5)
PLATELET # BLD AUTO: 147 K/UL — LOW (ref 150–400)
PMV BLD: 13.1 FL — HIGH (ref 7–13)
POTASSIUM SERPL-MCNC: 4.1 MMOL/L — SIGNIFICANT CHANGE UP (ref 3.5–5.3)
POTASSIUM SERPL-SCNC: 4.1 MMOL/L — SIGNIFICANT CHANGE UP (ref 3.5–5.3)
RBC # BLD: 4.32 M/UL — SIGNIFICANT CHANGE UP (ref 3.8–5.2)
RBC # FLD: 14.5 % — SIGNIFICANT CHANGE UP (ref 10.3–14.5)
SODIUM SERPL-SCNC: 139 MMOL/L — SIGNIFICANT CHANGE UP (ref 135–145)
WBC # BLD: 7.24 K/UL — SIGNIFICANT CHANGE UP (ref 3.8–10.5)
WBC # FLD AUTO: 7.24 K/UL — SIGNIFICANT CHANGE UP (ref 3.8–10.5)

## 2020-03-10 PROCEDURE — 99232 SBSQ HOSP IP/OBS MODERATE 35: CPT

## 2020-03-10 RX ORDER — METOPROLOL TARTRATE 50 MG
1 TABLET ORAL
Qty: 30 | Refills: 0
Start: 2020-03-10 | End: 2020-04-08

## 2020-03-10 RX ORDER — DILTIAZEM HCL 120 MG
1 CAPSULE, EXT RELEASE 24 HR ORAL
Qty: 30 | Refills: 0
Start: 2020-03-10 | End: 2020-04-08

## 2020-03-10 RX ORDER — DILTIAZEM HCL 120 MG
1 CAPSULE, EXT RELEASE 24 HR ORAL
Qty: 0 | Refills: 0 | DISCHARGE

## 2020-03-10 RX ADMIN — Medication 30 MILLIGRAM(S): at 11:11

## 2020-03-10 RX ADMIN — SODIUM CHLORIDE 3 MILLILITER(S): 9 INJECTION INTRAMUSCULAR; INTRAVENOUS; SUBCUTANEOUS at 06:20

## 2020-03-10 RX ADMIN — SODIUM CHLORIDE 3 MILLILITER(S): 9 INJECTION INTRAMUSCULAR; INTRAVENOUS; SUBCUTANEOUS at 13:39

## 2020-03-10 RX ADMIN — LEVETIRACETAM 1000 MILLIGRAM(S): 250 TABLET, FILM COATED ORAL at 06:29

## 2020-03-10 RX ADMIN — Medication 81 MILLIGRAM(S): at 11:11

## 2020-03-10 RX ADMIN — Medication 30 MILLIGRAM(S): at 06:29

## 2020-03-10 NOTE — PROGRESS NOTE ADULT - SUBJECTIVE AND OBJECTIVE BOX
Emanuel Cesar MD  Interventional Cardiology / Advance Heart Failure and Cardiac Transplant Specialist  [place] Office : [address] [place] [address]  Tel:   [place] Office : [address] [facility] [place] [address]  Tel: [phone]  Cell : [phone]    Pt is lying in bed comfortable not in distress, no chest pains no SOB no palpitations s/p ppm  	  MEDICATIONS:  aspirin enteric coated 81 milliGRAM(s) Oral daily  diltiazem    Tablet 30 milliGRAM(s) Oral every 6 hours  metoprolol tartrate 50 milliGRAM(s) Oral two times a day  rivaroxaban 20 milliGRAM(s) Oral with dinner  levETIRAcetam 1000 milliGRAM(s) Oral two times a day  atorvastatin 40 milliGRAM(s) Oral at bedtime  influenza   Vaccine 0.5 milliLiter(s) IntraMuscular once  sodium chloride 0.9% lock flush 3 milliLiter(s) IV Push every 8 hours    PAST MEDICAL/SURGICAL HISTORY  PAST MEDICAL & SURGICAL HISTORY:  Tachy-clare syndrome  Atrial fibrillation  Seizure  CVA (cerebral vascular accident)  CVA (cerebral vascular accident)  Hypertension  Atrial fibrillation  No significant past surgical history  H/O tubal ligation      SOCIAL HISTORY: Substance Use (street drugs): ( x ) never used  (  ) other:    FAMILY HISTORY:  FH: hypertension (Father, Mother): MOTHERFAISAL           PHYSICAL EXAM:  T(C): 36.1 (03-10-20 @ 11:10), Max: 36.9 (03-09-20 @ 22:00)  HR: 77 (03-10-20 @ 11:10) (71 - 98)  BP: 117/77 (03-10-20 @ 11:10) (96/65 - 117/77)  RR: 17 (03-10-20 @ 11:10) (16 - 17)  SpO2: 98% (03-10-20 @ 11:10) (97% - 98%)  Wt(kg): --  I&O's Summary    09 Mar 2020 07:01  -  10 Mar 2020 07:00  --------------------------------------------------------  IN: 400 mL / OUT: 500 mL / NET: -100 mL             EYES: EOMI, PERRLA, conjunctiva and sclera clear  ENMT: No tonsillar erythema, exudates, or enlargement; Moist mucous membranes, Good dentition, No lesions  Cardiovascular: Normal S1 S2, No JVD, No murmurs, No edema  Respiratory: Lungs clear to auscultation	  Gastrointestinal:  Soft, Non-tender, + BS	  Extremities: no edema                                    12.6   7.24  )-----------( 147      ( 10 Mar 2020 03:50 )             38.0     03-10    139  |  105  |  13  ----------------------------<  93  4.1   |  25  |  0.74    Ca    9.0      10 Mar 2020 03:50  Phos  4.2     03-10  Mg     1.9     03-10      proBNP:   Lipid Profile:   HgA1c:   TSH:     Consultant(s) Notes Reviewed:  [x ] YES  [ ] NO    Care Discussed with Consultants/Other Providers [ x] YES  [ ] NO    Imaging Personally Reviewed independently:  [x] YES  [ ] NO    All labs, radiologic studies, vitals, orders and medications list reviewed. Patient is seen and examined at bedside. Case discussed with medical team.

## 2020-03-10 NOTE — DISCHARGE NOTE NURSING/CASE MANAGEMENT/SOCIAL WORK - NSDCPEPTSTRK_GEN_ALL_CORE
Stroke support groups for patients, families, and friends/Signs and symptoms of stroke/Call 911 for stroke/Prescribed medications/Stroke education booklet/Risk factors for stroke/Stroke warning signs and symptoms/Need for follow up after discharge

## 2020-03-10 NOTE — DISCHARGE NOTE NURSING/CASE MANAGEMENT/SOCIAL WORK - NSSCNAMETXT_GEN_ALL_CORE
[facility]/COMPLETE CARE PROGRAM for reinstatement of aide as per prior to admission/29 hours

## 2020-03-10 NOTE — DISCHARGE NOTE PROVIDER - CARE PROVIDERS DIRECT ADDRESSES
[email or web address]

## 2020-03-10 NOTE — PROGRESS NOTE ADULT - ASSESSMENT
53 y/o Bengali speaking female from home, lives with son, walks with cane with PMH of Afib on Xarelto since 2016, HTN, Seizure, CVA in 2016 s/p left residual paralysis, multiple seizures presented to [place] ED with acute onset abdominal pain followed by eye twitching with preference to left and lightheadedness. She was seen by neurologist who recommended CTH. In [place] she was found to have afib with RVR on Cardizem and metoprolol. Had unsuccessful Cardioversion and is s/p Micra implantation. Right groin suture removed. Groin site clean, dry and intact with no signs of bleeding or hematoma. Post procedure instructions given and patient demonstrates understanding of the instructions. Device interrogation   - Continue current management  - May d/c home from EP standpoint  - F/U appointment with device clinic and Sai Vicente on 3/26/2020 @ 9:30am and 10am

## 2020-03-10 NOTE — DISCHARGE NOTE PROVIDER - NSDCMRMEDTOKEN_GEN_ALL_CORE_FT
aspirin 81 mg oral tablet, chewable: 1 tab(s) orally once a day   atorvastatin 40 mg oral tablet: 1 tab(s) orally once a day (at bedtime)  Cardizem 30 mg oral tablet: 1 tab(s) orally 4 times a day  levETIRAcetam 1000 mg oral tablet: 1 tab(s) orally 2 times a day  metoprolol tartrate 50 mg oral tablet: 1 tab(s) orally 2 times a day  Xarelto 20 mg oral tablet: 1 tab(s) orally once a day (in the evening) aspirin 81 mg oral tablet, chewable: 1 tab(s) orally once a day   atorvastatin 40 mg oral tablet: 1 tab(s) orally once a day (at bedtime)  Cardizem  mg/24 hours oral capsule, extended release: 1 cap(s) orally once a day   levETIRAcetam 1000 mg oral tablet: 1 tab(s) orally 2 times a day  Toprol- mg oral tablet, extended release: 1 tab(s) orally once a day   Xarelto 20 mg oral tablet: 1 tab(s) orally once a day (in the evening)

## 2020-03-10 NOTE — PROGRESS NOTE ADULT - REASON FOR ADMISSION
Afib- Tachybrady
Abdominal pain and lightheadedness
Twitching of eye and headache
Twitching of eye and headache

## 2020-03-10 NOTE — DISCHARGE NOTE PROVIDER - HOSPITAL COURSE
Pt is 55 y/o F from home, lives with son, walks with cane with PMH of Afib on Xarelto, Seizure,  CVA in 2016 s/p left residual paralysis presented to ED  with Eye twitching and Headache since yesterday.     ED EKG showed Afib with RVR and s/p one dose of Metoprolol and 1L NS. Admitted to medicine for Afib with RVR    Patient was evaluated by electrophysiology, a pacemaker was placed on 3/9 via right groin, sutures removed, slight ooze noted.         dispo: home

## 2020-03-10 NOTE — PROGRESS NOTE ADULT - ATTENDING COMMENTS
55 y/o Albanian speaking female from home, lives with son, walks with cane with PMH of Afib on Xarelto since 2016, HTN, Seizure, CVA in 2016 s/p left residual paralysis, multiple seizures presented to [place] ED with acute onset abdominal pain followed by eye twitching with preference to left and lightheadedness. She was seen by neurologist who recommended CTH which was neg. In [place] she was found to have afib with RVR on Cardizem and metoprolol and multiple puases and AF with clare in the 30s. Had unsuccessful MARGE/Cardioversion and is now s/p Micra implantation. She is doing well and will fu as outpt in office.

## 2020-03-10 NOTE — DISCHARGE NOTE NURSING/CASE MANAGEMENT/SOCIAL WORK - PATIENT PORTAL LINK FT
You can access the FollowMyHealth Patient Portal offered by [facility] by registering at the following website: [email or web address]. By joining [facility]’s FollowMyHealth portal, you will also be able to view your health information using other applications (apps) compatible with our system.

## 2020-03-10 NOTE — DISCHARGE NOTE PROVIDER - NSDCFUSCHEDAPPT_GEN_ALL_CORE_FT
GOLDIE NOLASCO ; 03/25/2020 ; NPP Neuro [address]  GOLDIE NOLASCO ; 03/26/2020 ; NPP Cardio Electro [phone]  GOLDIE NOLASCO ; 03/26/2020 ; NPP Cardio Electro [address] [facility] GOLDIE NOLASCO ; 03/25/2020 ; NPP Neuro [address]  GOLDIE NOLASCO ; 03/26/2020 ; NPP Cardio Electro [phone]  GOLDIE NOLASCO ; 03/26/2020 ; NPP Cardio Electro [address] [facility]

## 2020-03-10 NOTE — PROGRESS NOTE ADULT - SUBJECTIVE AND OBJECTIVE BOX
Patient is a 54y old  Female who presents with a chief complaint of twitching of eye and abdominal pain      PAST MEDICAL & SURGICAL HISTORY:  Tachy-clare syndrome  Atrial fibrillation  Seizure  CVA (cerebral vascular accident)  CVA (cerebral vascular accident)  Hypertension  Atrial fibrillation  No significant past surgical history  H/O tubal ligation  No significant past surgical history      MEDICATIONS  (STANDING):  aspirin enteric coated 81 milliGRAM(s) Oral daily  atorvastatin 40 milliGRAM(s) Oral at bedtime  diltiazem    Tablet 30 milliGRAM(s) Oral every 6 hours  influenza   Vaccine 0.5 milliLiter(s) IntraMuscular once  levETIRAcetam 1000 milliGRAM(s) Oral two times a day  metoprolol tartrate 50 milliGRAM(s) Oral two times a day  rivaroxaban 20 milliGRAM(s) Oral with dinner  sodium chloride 0.9% lock flush 3 milliLiter(s) IV Push every 8 hours    MEDICATIONS  (PRN):    Vital Signs Last 24 Hrs  T(C): 36.1 (10 Mar 2020 11:10), Max: 36.9 (09 Mar 2020 22:00)  T(F): 97 (10 Mar 2020 11:10), Max: 98.5 (09 Mar 2020 22:00)  HR: 77 (10 Mar 2020 11:10) (63 - 98)  BP: 117/77 (10 Mar 2020 11:10) (96/65 - 117/77)  BP(mean): --  RR: 17 (10 Mar 2020 11:10) (16 - 17)  SpO2: 98% (10 Mar 2020 11:10) (97% - 100%)    INTERPRETATION OF TELEMETRY: Atrial fibrillation with VR 60s-70s    LABS:                        12.6   7.24  )-----------( 147      ( 10 Mar 2020 03:50 )             38.0     03-10    139  |  105  |  13  ----------------------------<  93  4.1   |  25  |  0.74    Ca    9.0      10 Mar 2020 03:50  Phos  4.2     03-10  Mg     1.9     03-10    I&O's Summary    09 Mar 2020 07:01  -  10 Mar 2020 07:00  --------------------------------------------------------  IN: 400 mL / OUT: 500 mL / NET: -100 mL    PHYSICAL EXAM:    GENERAL: In no apparent distress, well nourished, and hydrated.  HEART: Regular rate and rhythm; No murmurs, rubs, or gallops.  PULMONARY: Clear to auscultation and percussion.  No rales, wheezing, or rhonchi bilaterally.  ABDOMEN: Soft, Nontender, Nondistended; Bowel sounds present  EXTREMITIES:  2+ Peripheral Pulses, No clubbing, cyanosis, or edema

## 2020-03-10 NOTE — DISCHARGE NOTE PROVIDER - PROVIDER TOKENS
PROVIDER:[TOKEN:[[id number]],SCHEDULEDAPPT:[03/26/2020],SCHEDULEDAPPTTIME:[09:30 AM]] PROVIDER:[TOKEN:[[id number]],SCHEDULEDAPPT:[03/26/2020],SCHEDULEDAPPTTIME:[09:30 AM]],PROVIDER:[TOKEN:[[id number]]]

## 2020-03-10 NOTE — PROGRESS NOTE ADULT - ASSESSMENT
EKG Fib RVR     Echo: < from: Transthoracic Echocardiogram (12.02.19 @ 07:32) >  1. Normal mitral valve. Mild mitral regurgitation.  2. Normal trileaflet aortic valve.  3. Aortic Root: 3.4 cm.    4. Mildly dilated left atrium.  LA volume index = 37 cc/m2.  5. Normal left ventricular internal dimensions and wall  thicknesses.  6. Normal Left Ventricular Systolic Function,  (EF = 55 to  60%)  7. Grade II diastolic dysfunction.  8. Normal right atrium.  9. Normal right ventricular size and systolic function  (TAPSE 1.8 cm).  10. RA Pressure is 8 mm Hg.  11. RV systolic pressure is normal at  26 mm Hg.  12. There is mild tricuspid regurgitation.  13. Normal pulmonic valve.  14. Normal pericardium with no pericardial effusion.    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

## 2020-03-10 NOTE — DISCHARGE NOTE PROVIDER - CARE PROVIDER_API CALL
Odette Gibbs)  Cardiac Electrophysiology; Cardiology; Internal Medicine  [id number] [place] [address]  Phone: [phone]  Fax: [phone]  Scheduled Appointment: 03/26/2020 09:30 AM Odette Gibbs)  Cardiac Electrophysiology; Cardiology; Internal Medicine  [address] [place] [address]  Phone: [phone]  Fax: [phone]  Scheduled Appointment: 03/26/2020 09:30 AM    Emanuel Cesar)  Cardiovascular Disease; Nuclear Cardiology  [address] [place] [address]  Phone: [phone]  Fax: [phone]  Follow Up Time:

## 2020-03-25 ENCOUNTER — APPOINTMENT (OUTPATIENT)
Dept: NEUROLOGY | Facility: CLINIC | Age: 55
End: 2020-03-25

## 2020-03-26 ENCOUNTER — APPOINTMENT (OUTPATIENT)
Dept: ELECTROPHYSIOLOGY | Facility: CLINIC | Age: 55
End: 2020-03-26

## 2020-06-09 ENCOUNTER — EMERGENCY (EMERGENCY)
Facility: HOSPITAL | Age: 55
LOS: 1 days | Discharge: HOME | End: 2020-06-09
Attending: EMERGENCY MEDICINE
Payer: COMMERCIAL

## 2020-06-09 ENCOUNTER — APPOINTMENT (OUTPATIENT)
Dept: ELECTROPHYSIOLOGY | Facility: CLINIC | Age: 55
End: 2020-06-09
Payer: MEDICARE

## 2020-06-09 VITALS
SYSTOLIC BLOOD PRESSURE: 115 MMHG | OXYGEN SATURATION: 98 % | RESPIRATION RATE: 20 BRPM | TEMPERATURE: 98 F | DIASTOLIC BLOOD PRESSURE: 78 MMHG | HEART RATE: 63 BPM

## 2020-06-09 VITALS
OXYGEN SATURATION: 99 % | SYSTOLIC BLOOD PRESSURE: 119 MMHG | DIASTOLIC BLOOD PRESSURE: 68 MMHG | TEMPERATURE: 98 F | HEART RATE: 61 BPM | RESPIRATION RATE: 20 BRPM

## 2020-06-09 DIAGNOSIS — Z98.51 TUBAL LIGATION STATUS: Chronic | ICD-10-CM

## 2020-06-09 LAB
ALBUMIN SERPL ELPH-MCNC: 3.4 G/DL — LOW (ref 3.5–5)
ALP SERPL-CCNC: 92 U/L — SIGNIFICANT CHANGE UP (ref 40–120)
ALT FLD-CCNC: 18 U/L DA — SIGNIFICANT CHANGE UP (ref 10–60)
ANION GAP SERPL CALC-SCNC: 7 MMOL/L — SIGNIFICANT CHANGE UP (ref 5–17)
APPEARANCE UR: CLEAR — SIGNIFICANT CHANGE UP
AST SERPL-CCNC: 18 U/L — SIGNIFICANT CHANGE UP (ref 10–40)
BASOPHILS # BLD AUTO: 0.03 K/UL — SIGNIFICANT CHANGE UP (ref 0–0.2)
BASOPHILS NFR BLD AUTO: 0.4 % — SIGNIFICANT CHANGE UP (ref 0–2)
BILIRUB SERPL-MCNC: 0.7 MG/DL — SIGNIFICANT CHANGE UP (ref 0.2–1.2)
BILIRUB UR-MCNC: NEGATIVE — SIGNIFICANT CHANGE UP
BUN SERPL-MCNC: 9 MG/DL — SIGNIFICANT CHANGE UP (ref 7–18)
CALCIUM SERPL-MCNC: 9.6 MG/DL — SIGNIFICANT CHANGE UP (ref 8.4–10.5)
CHLORIDE SERPL-SCNC: 106 MMOL/L — SIGNIFICANT CHANGE UP (ref 96–108)
CO2 SERPL-SCNC: 28 MMOL/L — SIGNIFICANT CHANGE UP (ref 22–31)
COLOR SPEC: YELLOW — SIGNIFICANT CHANGE UP
CREAT SERPL-MCNC: 0.7 MG/DL — SIGNIFICANT CHANGE UP (ref 0.5–1.3)
DIFF PNL FLD: NEGATIVE — SIGNIFICANT CHANGE UP
EOSINOPHIL # BLD AUTO: 0.04 K/UL — SIGNIFICANT CHANGE UP (ref 0–0.5)
EOSINOPHIL NFR BLD AUTO: 0.6 % — SIGNIFICANT CHANGE UP (ref 0–6)
GLUCOSE SERPL-MCNC: 81 MG/DL — SIGNIFICANT CHANGE UP (ref 70–99)
GLUCOSE UR QL: NEGATIVE — SIGNIFICANT CHANGE UP
HCT VFR BLD CALC: 42.6 % — SIGNIFICANT CHANGE UP (ref 34.5–45)
HGB BLD-MCNC: 14 G/DL — SIGNIFICANT CHANGE UP (ref 11.5–15.5)
IMM GRANULOCYTES NFR BLD AUTO: 0.1 % — SIGNIFICANT CHANGE UP (ref 0–1.5)
KETONES UR-MCNC: NEGATIVE — SIGNIFICANT CHANGE UP
LEUKOCYTE ESTERASE UR-ACNC: NEGATIVE — SIGNIFICANT CHANGE UP
LIDOCAIN IGE QN: 140 U/L — SIGNIFICANT CHANGE UP (ref 73–393)
LYMPHOCYTES # BLD AUTO: 2.39 K/UL — SIGNIFICANT CHANGE UP (ref 1–3.3)
LYMPHOCYTES # BLD AUTO: 35.2 % — SIGNIFICANT CHANGE UP (ref 13–44)
MCHC RBC-ENTMCNC: 29.5 PG — SIGNIFICANT CHANGE UP (ref 27–34)
MCHC RBC-ENTMCNC: 32.9 GM/DL — SIGNIFICANT CHANGE UP (ref 32–36)
MCV RBC AUTO: 89.7 FL — SIGNIFICANT CHANGE UP (ref 80–100)
MONOCYTES # BLD AUTO: 0.62 K/UL — SIGNIFICANT CHANGE UP (ref 0–0.9)
MONOCYTES NFR BLD AUTO: 9.1 % — SIGNIFICANT CHANGE UP (ref 2–14)
NEUTROPHILS # BLD AUTO: 3.7 K/UL — SIGNIFICANT CHANGE UP (ref 1.8–7.4)
NEUTROPHILS NFR BLD AUTO: 54.6 % — SIGNIFICANT CHANGE UP (ref 43–77)
NITRITE UR-MCNC: NEGATIVE — SIGNIFICANT CHANGE UP
NRBC # BLD: 0 /100 WBCS — SIGNIFICANT CHANGE UP (ref 0–0)
NT-PROBNP SERPL-SCNC: 599 PG/ML — HIGH (ref 0–125)
PH UR: 7 — SIGNIFICANT CHANGE UP (ref 5–8)
PLATELET # BLD AUTO: 181 K/UL — SIGNIFICANT CHANGE UP (ref 150–400)
POTASSIUM SERPL-MCNC: 4.1 MMOL/L — SIGNIFICANT CHANGE UP (ref 3.5–5.3)
POTASSIUM SERPL-SCNC: 4.1 MMOL/L — SIGNIFICANT CHANGE UP (ref 3.5–5.3)
PROT SERPL-MCNC: 7.6 G/DL — SIGNIFICANT CHANGE UP (ref 6–8.3)
PROT UR-MCNC: NEGATIVE — SIGNIFICANT CHANGE UP
RBC # BLD: 4.75 M/UL — SIGNIFICANT CHANGE UP (ref 3.8–5.2)
RBC # FLD: 14 % — SIGNIFICANT CHANGE UP (ref 10.3–14.5)
SODIUM SERPL-SCNC: 141 MMOL/L — SIGNIFICANT CHANGE UP (ref 135–145)
SP GR SPEC: 1 — LOW (ref 1.01–1.02)
TROPONIN I SERPL-MCNC: <0.015 NG/ML — SIGNIFICANT CHANGE UP (ref 0–0.04)
TSH SERPL-MCNC: 0.89 UU/ML — SIGNIFICANT CHANGE UP (ref 0.34–4.82)
UROBILINOGEN FLD QL: NEGATIVE — SIGNIFICANT CHANGE UP
WBC # BLD: 6.79 K/UL — SIGNIFICANT CHANGE UP (ref 3.8–10.5)
WBC # FLD AUTO: 6.79 K/UL — SIGNIFICANT CHANGE UP (ref 3.8–10.5)

## 2020-06-09 PROCEDURE — 93294 REM INTERROG EVL PM/LDLS PM: CPT

## 2020-06-09 PROCEDURE — 71045 X-RAY EXAM CHEST 1 VIEW: CPT

## 2020-06-09 PROCEDURE — 36415 COLL VENOUS BLD VENIPUNCTURE: CPT

## 2020-06-09 PROCEDURE — 82962 GLUCOSE BLOOD TEST: CPT

## 2020-06-09 PROCEDURE — 80053 COMPREHEN METABOLIC PANEL: CPT

## 2020-06-09 PROCEDURE — 93005 ELECTROCARDIOGRAM TRACING: CPT

## 2020-06-09 PROCEDURE — 99284 EMERGENCY DEPT VISIT MOD MDM: CPT | Mod: 25

## 2020-06-09 PROCEDURE — 84443 ASSAY THYROID STIM HORMONE: CPT

## 2020-06-09 PROCEDURE — 84484 ASSAY OF TROPONIN QUANT: CPT

## 2020-06-09 PROCEDURE — 99285 EMERGENCY DEPT VISIT HI MDM: CPT

## 2020-06-09 PROCEDURE — 83880 ASSAY OF NATRIURETIC PEPTIDE: CPT

## 2020-06-09 PROCEDURE — 85027 COMPLETE CBC AUTOMATED: CPT

## 2020-06-09 PROCEDURE — 93971 EXTREMITY STUDY: CPT | Mod: 26,LT

## 2020-06-09 PROCEDURE — 71045 X-RAY EXAM CHEST 1 VIEW: CPT | Mod: 26

## 2020-06-09 PROCEDURE — 83690 ASSAY OF LIPASE: CPT

## 2020-06-09 PROCEDURE — 81003 URINALYSIS AUTO W/O SCOPE: CPT

## 2020-06-09 PROCEDURE — 93296 REM INTERROG EVL PM/IDS: CPT

## 2020-06-09 PROCEDURE — 93971 EXTREMITY STUDY: CPT

## 2020-06-09 NOTE — ED PROVIDER NOTE - PATIENT PORTAL LINK FT
You can access the FollowMyHealth Patient Portal offered by [facility] by registering at the following website: [email or web address]. By joining [facility]’s FollowMyHealth portal, you will also be able to view your health information using other applications (apps) compatible with our system.

## 2020-06-09 NOTE — ED PROVIDER NOTE - OBJECTIVE STATEMENT
55 yo female with PMHx of CVA with left sided weakness, Afib on Xarelto, seizures, presents with several days of worsening swelling to left arm and left leg. Patient recently had pacemaker placed. Patient reports walking with a cane without difficulty. She denies any pain, weakness, headache, or ALFREDO.

## 2020-06-09 NOTE — ED PROVIDER NOTE - NEUROLOGICAL, MLM
Alert and oriented, 3/5 muscle strength in LUE, 4/5 strength in LLE, all other extremities 5/5 strength

## 2020-06-09 NOTE — ED PROVIDER NOTE - CLINICAL SUMMARY MEDICAL DECISION MAKING FREE TEXT BOX
55 yo female presents with subjective swelling to left side. Patient currently on blood thinner for Afib. Will perform labs, CXR, and will reassess. Concern for CHF, renal disease, among other concerns. 55 yo female presents with subjective swelling to left side. Patient currently on blood thinner for Afib. Will perform labs, CXR, and will reassess. Concern for CHF, hypoalbinemia, renal disease, DVT among other concerns. Normal neurovascular exam.

## 2020-07-21 NOTE — ED PROVIDER NOTE - NS ED MD TWO NIGHTS YN
Yes O-Z Flap Text: The defect edges were debeveled with a #15 scalpel blade.  Given the location of the defect, shape of the defect and the proximity to free margins an O-Z flap was deemed most appropriate.  Using a sterile surgical marker, an appropriate transposition flap was drawn incorporating the defect and placing the expected incisions within the relaxed skin tension lines where possible. The area thus outlined was incised deep to adipose tissue with a #15 scalpel blade.  The skin margins were undermined to an appropriate distance in all directions utilizing iris scissors.

## 2020-07-30 ENCOUNTER — INPATIENT (INPATIENT)
Facility: HOSPITAL | Age: 55
LOS: 2 days | Discharge: HOME | DRG: 683 | End: 2020-08-02
Attending: STUDENT IN AN ORGANIZED HEALTH CARE EDUCATION/TRAINING PROGRAM | Admitting: STUDENT IN AN ORGANIZED HEALTH CARE EDUCATION/TRAINING PROGRAM
Payer: COMMERCIAL

## 2020-07-30 VITALS — WEIGHT: 182.32 LBS | HEIGHT: 64 IN

## 2020-07-30 DIAGNOSIS — R56.9 UNSPECIFIED CONVULSIONS: ICD-10-CM

## 2020-07-30 DIAGNOSIS — Z98.51 TUBAL LIGATION STATUS: Chronic | ICD-10-CM

## 2020-07-30 LAB
ALBUMIN SERPL ELPH-MCNC: 3.5 G/DL — SIGNIFICANT CHANGE UP (ref 3.5–5)
ALP SERPL-CCNC: 106 U/L — SIGNIFICANT CHANGE UP (ref 40–120)
ALT FLD-CCNC: 24 U/L DA — SIGNIFICANT CHANGE UP (ref 10–60)
ANION GAP SERPL CALC-SCNC: 6 MMOL/L — SIGNIFICANT CHANGE UP (ref 5–17)
APTT BLD: 28.3 SEC — SIGNIFICANT CHANGE UP (ref 27.5–35.5)
AST SERPL-CCNC: 20 U/L — SIGNIFICANT CHANGE UP (ref 10–40)
BASOPHILS # BLD AUTO: 0.04 K/UL — SIGNIFICANT CHANGE UP (ref 0–0.2)
BASOPHILS NFR BLD AUTO: 0.6 % — SIGNIFICANT CHANGE UP (ref 0–2)
BILIRUB SERPL-MCNC: 0.4 MG/DL — SIGNIFICANT CHANGE UP (ref 0.2–1.2)
BUN SERPL-MCNC: 20 MG/DL — HIGH (ref 7–18)
CALCIUM SERPL-MCNC: 8.8 MG/DL — SIGNIFICANT CHANGE UP (ref 8.4–10.5)
CHLORIDE SERPL-SCNC: 106 MMOL/L — SIGNIFICANT CHANGE UP (ref 96–108)
CK MB BLD-MCNC: <1.1 % — SIGNIFICANT CHANGE UP (ref 0–3.5)
CK MB CFR SERPL CALC: <1 NG/ML — SIGNIFICANT CHANGE UP (ref 0–3.6)
CK SERPL-CCNC: 90 U/L — SIGNIFICANT CHANGE UP (ref 21–215)
CO2 SERPL-SCNC: 26 MMOL/L — SIGNIFICANT CHANGE UP (ref 22–31)
CREAT SERPL-MCNC: 0.98 MG/DL — SIGNIFICANT CHANGE UP (ref 0.5–1.3)
EOSINOPHIL # BLD AUTO: 0.05 K/UL — SIGNIFICANT CHANGE UP (ref 0–0.5)
EOSINOPHIL NFR BLD AUTO: 0.8 % — SIGNIFICANT CHANGE UP (ref 0–6)
GLUCOSE SERPL-MCNC: 132 MG/DL — HIGH (ref 70–99)
HCT VFR BLD CALC: 44.5 % — SIGNIFICANT CHANGE UP (ref 34.5–45)
HGB BLD-MCNC: 14.5 G/DL — SIGNIFICANT CHANGE UP (ref 11.5–15.5)
IMM GRANULOCYTES NFR BLD AUTO: 0.2 % — SIGNIFICANT CHANGE UP (ref 0–1.5)
INR BLD: 1.24 RATIO — HIGH (ref 0.88–1.16)
LYMPHOCYTES # BLD AUTO: 1.09 K/UL — SIGNIFICANT CHANGE UP (ref 1–3.3)
LYMPHOCYTES # BLD AUTO: 16.5 % — SIGNIFICANT CHANGE UP (ref 13–44)
MCHC RBC-ENTMCNC: 29.5 PG — SIGNIFICANT CHANGE UP (ref 27–34)
MCHC RBC-ENTMCNC: 32.6 GM/DL — SIGNIFICANT CHANGE UP (ref 32–36)
MCV RBC AUTO: 90.4 FL — SIGNIFICANT CHANGE UP (ref 80–100)
MONOCYTES # BLD AUTO: 0.49 K/UL — SIGNIFICANT CHANGE UP (ref 0–0.9)
MONOCYTES NFR BLD AUTO: 7.4 % — SIGNIFICANT CHANGE UP (ref 2–14)
NEUTROPHILS # BLD AUTO: 4.92 K/UL — SIGNIFICANT CHANGE UP (ref 1.8–7.4)
NEUTROPHILS NFR BLD AUTO: 74.5 % — SIGNIFICANT CHANGE UP (ref 43–77)
NRBC # BLD: 0 /100 WBCS — SIGNIFICANT CHANGE UP (ref 0–0)
PLATELET # BLD AUTO: 160 K/UL — SIGNIFICANT CHANGE UP (ref 150–400)
POTASSIUM SERPL-MCNC: 4.4 MMOL/L — SIGNIFICANT CHANGE UP (ref 3.5–5.3)
POTASSIUM SERPL-SCNC: 4.4 MMOL/L — SIGNIFICANT CHANGE UP (ref 3.5–5.3)
PROT SERPL-MCNC: 8 G/DL — SIGNIFICANT CHANGE UP (ref 6–8.3)
PROTHROM AB SERPL-ACNC: 14.4 SEC — HIGH (ref 10.6–13.6)
RBC # BLD: 4.92 M/UL — SIGNIFICANT CHANGE UP (ref 3.8–5.2)
RBC # FLD: 13.3 % — SIGNIFICANT CHANGE UP (ref 10.3–14.5)
SODIUM SERPL-SCNC: 138 MMOL/L — SIGNIFICANT CHANGE UP (ref 135–145)
TROPONIN I SERPL-MCNC: <0.015 NG/ML — SIGNIFICANT CHANGE UP (ref 0–0.04)
WBC # BLD: 6.6 K/UL — SIGNIFICANT CHANGE UP (ref 3.8–10.5)
WBC # FLD AUTO: 6.6 K/UL — SIGNIFICANT CHANGE UP (ref 3.8–10.5)

## 2020-07-30 PROCEDURE — 99285 EMERGENCY DEPT VISIT HI MDM: CPT

## 2020-07-30 PROCEDURE — 70450 CT HEAD/BRAIN W/O DYE: CPT | Mod: 26

## 2020-07-30 PROCEDURE — 71045 X-RAY EXAM CHEST 1 VIEW: CPT | Mod: 26

## 2020-07-30 PROCEDURE — 99223 1ST HOSP IP/OBS HIGH 75: CPT

## 2020-07-30 RX ORDER — LEVETIRACETAM 250 MG/1
1500 TABLET, FILM COATED ORAL ONCE
Refills: 0 | Status: COMPLETED | OUTPATIENT
Start: 2020-07-30 | End: 2020-07-30

## 2020-07-30 RX ORDER — LEVETIRACETAM 250 MG/1
1500 TABLET, FILM COATED ORAL EVERY 12 HOURS
Refills: 0 | Status: DISCONTINUED | OUTPATIENT
Start: 2020-07-31 | End: 2020-08-02

## 2020-07-30 RX ORDER — LISINOPRIL/HYDROCHLOROTHIAZIDE 10-12.5 MG
1 TABLET ORAL
Qty: 0 | Refills: 0 | DISCHARGE

## 2020-07-30 RX ORDER — SODIUM CHLORIDE 9 MG/ML
500 INJECTION INTRAMUSCULAR; INTRAVENOUS; SUBCUTANEOUS ONCE
Refills: 0 | Status: COMPLETED | OUTPATIENT
Start: 2020-07-30 | End: 2020-07-30

## 2020-07-30 RX ORDER — METOPROLOL TARTRATE 50 MG
1 TABLET ORAL
Qty: 0 | Refills: 0 | DISCHARGE

## 2020-07-30 RX ORDER — RIVAROXABAN 15 MG-20MG
20 KIT ORAL
Refills: 0 | Status: DISCONTINUED | OUTPATIENT
Start: 2020-07-30 | End: 2020-08-02

## 2020-07-30 RX ORDER — ASPIRIN/CALCIUM CARB/MAGNESIUM 324 MG
81 TABLET ORAL DAILY
Refills: 0 | Status: DISCONTINUED | OUTPATIENT
Start: 2020-07-30 | End: 2020-08-02

## 2020-07-30 RX ORDER — DILTIAZEM HCL 120 MG
21 CAPSULE, EXT RELEASE 24 HR ORAL ONCE
Refills: 0 | Status: COMPLETED | OUTPATIENT
Start: 2020-07-30 | End: 2020-07-30

## 2020-07-30 RX ORDER — METOPROLOL TARTRATE 50 MG
50 TABLET ORAL
Refills: 0 | Status: DISCONTINUED | OUTPATIENT
Start: 2020-07-30 | End: 2020-07-31

## 2020-07-30 RX ORDER — ATORVASTATIN CALCIUM 80 MG/1
40 TABLET, FILM COATED ORAL AT BEDTIME
Refills: 0 | Status: DISCONTINUED | OUTPATIENT
Start: 2020-07-30 | End: 2020-08-02

## 2020-07-30 RX ORDER — RIVAROXABAN 15 MG-20MG
1 KIT ORAL
Qty: 0 | Refills: 0 | DISCHARGE

## 2020-07-30 RX ADMIN — SODIUM CHLORIDE 500 MILLILITER(S): 9 INJECTION INTRAMUSCULAR; INTRAVENOUS; SUBCUTANEOUS at 22:42

## 2020-07-30 RX ADMIN — Medication 21 MILLIGRAM(S): at 22:06

## 2020-07-30 RX ADMIN — Medication 2 MILLIGRAM(S): at 17:39

## 2020-07-30 RX ADMIN — LEVETIRACETAM 400 MILLIGRAM(S): 250 TABLET, FILM COATED ORAL at 18:40

## 2020-07-30 NOTE — ED ADULT NURSE NOTE - ED STAT RN HANDOFF DETAILS
Received patient from night RN admitted to telemetry with afib A&OX3 no c/o of pain or discomfort at present time. IV fluid in progress. NO bed assigned as yet continue to monitor patient.

## 2020-07-30 NOTE — H&P ADULT - PROBLEM SELECTOR PLAN 1
Presented with acute encephalopathy secondary to seizure  Given 1.5g of Keppra and Ativan with resolution in symptoms  - Neurology consult  Breakthrough seizure may be related to dehydration and spending prolonged time outdoors in the heat given SILVERIO  - Follow up Keppra levels in AM to determine compliance

## 2020-07-30 NOTE — H&P ADULT - NSHPPHYSICALEXAM_GEN_ALL_CORE
GENERAL: NAD, obese  HEAD:  Atraumatic, Normocephalic  EYES:  conjunctiva and sclera clear  NECK: Supple, No JVD, Normal thyroid  CHEST/LUNG: Clear to auscultation. Clear to percussion bilaterally; No rales, rhonchi, wheezing, or rubs  HEART: Regular rate and rhythm; No murmurs, rubs, or gallops  ABDOMEN: Soft, Nontender, Nondistended; Bowel sounds present  NERVOUS SYSTEM:  Alert & Oriented X3,    EXTREMITIES:  2+ Peripheral Pulses, No clubbing, cyanosis, or edema, LEFT UPPER EXTREMITY 3+/5, LEFT LOWER EXTREMITY 3+/5. 0/5 on left hand   SKIN: warm dry

## 2020-07-30 NOTE — H&P ADULT - ATTENDING COMMENTS
Pt seen and examined.  Case discussed with housestaff  Chart reviewed  55 year old woman with PMH of A-fib on Xarelto, hx of CVA with left sided residual deficits while on OAC (2016), post CVA seizures 2 years after with EEG Abnormal EEG study consistent with structural or functional cerebral dysfunction in the right hemisphere, likely fronto-central. She also has  vertiginous like episodes intermittently Pt seen and examined.  Case discussed with pasqualeta  Chart reviewed  55 year old woman with PMH of A-fib on Xarelto, hx of CVA with left sided residual deficits while on OAC (2016), post CVA seizures 2 years after with abnormal EEG study (2018) consistent with structural or functional cerebral dysfunction in the right hemisphere, likely fronto-central. She also has  vertiginous like episodes intermittently. She was placed on Keppra initially at 500 mg BID increased to 750mg BID with recurrence and now on 1000mg BID. She had a MICRA PPM in [facility] placed 03/2020 for tachy-clare with AF.    Comes in today with alteration in consciousness with non-verbal response and decreased movt. Somewhat similar to past admission with seizures.    Vital Signs Last 24 Hrs  T(C): 36.4 (31 Jul 2020 00:07), Max: 36.6 (30 Jul 2020 22:10)  T(F): 97.5 (31 Jul 2020 00:07), Max: 97.9 (30 Jul 2020 22:10)  HR: 88 (31 Jul 2020 00:07) (88 - 101)  BP: 113/72 (31 Jul 2020 00:07) (103/73 - 121/92)  RR: 21 (31 Jul 2020 00:07) (18 - 21)  SpO2: 100% (31 Jul 2020 00:07) (95% - 100%)      Labs                        14.5   6.60  )-----------( 160      ( 30 Jul 2020 18:04 )             44.5     07-30    138  |  106  |  20  --------------------<  132<H>  4.4   |  26  |  0.98  Ca    8.8      30 Jul 2020 18:04  TPro  8.0  /  Alb  3.5  /  TBili  0.4  /  DBili  x   /  AST  20  /  ALT  24  /  AlkPhos  106  07-30    PT/INR - ( 30 Jul 2020 18:04 )   PT: 14.4 sec;   INR: 1.24 ratio    PTT - ( 30 Jul 2020 18:04 )  PTT:28.3 sec    CT brain - unremarkable  CXR- unremarkable    EKG - atrial flutter 2:1     Impression  55 year old woman with PMH of A-fib on Xarelto, hx of CVA with left sided residual deficits while on OAC (2016), post CVA seizures 2 years after with abnormal EEG study (2018) consistent with structural or functional cerebral dysfunction in the right hemisphere, likely fronto-central. She also has  vertiginous like episodes intermittently. She was placed on Keppra initially at 500 mg BID increased to 750mg BID with recurrence and now on 1000mg BID. She had a MICRA PPM in [facility] placed 03/2020 for tachy-clare with AF.    A/P  1- Acute encephalopathy from  - Breakthrough seizure  Admit to telemetry  keppra level ( before loading) to assess compliance  S/p Loading dose of Keppra  Fall and seizure protocol   Monitor closely  Neurology consult   EEG in AM    2. Atrial flutter with RVR  s/p IV Cardizem injection 20mg   Resume PO metoprolol and monitor rate  ECHO in AM  Cardiology consult  Serial cardiac enzymes and repeat EKG if it breaks Pt seen and examined.  Case discussed with nicol  Chart reviewed  55 year old woman with PMH of A-fib on Xarelto, hx of CVA with left sided residual deficits while on OAC (2016), post CVA seizures 2 years after with abnormal EEG study (2018) consistent with structural or functional cerebral dysfunction in the right hemisphere, likely fronto-central. She also has  vertiginous like episodes intermittently. She was placed on Keppra initially at 500 mg BID increased to 750mg BID with recurrence and now on 1000mg BID. She had a MICRA PPM in [facility] placed 03/2020 for tachy-clare with AF.    Comes in today with alteration in consciousness with non-verbal response and decreased movt. Somewhat similar to past admission with seizures.    Vital Signs Last 24 Hrs  T(C): 36.4 (31 Jul 2020 00:07), Max: 36.6 (30 Jul 2020 22:10)  T(F): 97.5 (31 Jul 2020 00:07), Max: 97.9 (30 Jul 2020 22:10)  HR: 88 (31 Jul 2020 00:07) (88 - 101)  BP: 113/72 (31 Jul 2020 00:07) (103/73 - 121/92)  RR: 21 (31 Jul 2020 00:07) (18 - 21)  SpO2: 100% (31 Jul 2020 00:07) (95% - 100%)    Exam   Middle aged woman, AAO X 3 -  CTA B/L RRR S1S2   Soft NT ND BS +  decreased motor strength 3/5 in left side unchanged ; 5/5 on the right  No pedal edema  Labs                        14.5   6.60  )-----------( 160      ( 30 Jul 2020 18:04 )             44.5     07-30    138  |  106  |  20  --------------------<  132<H>  4.4   |  26  |  0.98  Ca    8.8      30 Jul 2020 18:04  TPro  8.0  /  Alb  3.5  /  TBili  0.4  /  DBili  x   /  AST  20  /  ALT  24  /  AlkPhos  106  07-30    PT/INR - ( 30 Jul 2020 18:04 )   PT: 14.4 sec;   INR: 1.24 ratio    PTT - ( 30 Jul 2020 18:04 )  PTT:28.3 sec    CT brain - unremarkable  CXR- unremarkable    EKG - atrial flutter 2:1     Impression  55 year old woman with PMH of A-fib on Xarelto, hx of CVA with left sided residual deficits while on OAC (2016), post CVA seizures 2 years after with abnormal EEG study (2018) consistent with structural or functional cerebral dysfunction in the right hemisphere, likely fronto-central. She also has  vertiginous like episodes intermittently. She was placed on Keppra initially at 500 mg BID increased to 750mg BID with recurrence and now on 1000mg BID. She had a MICRA PPM in [facility] placed 03/2020 for tachy-clare with AF.    A/P  1- Acute encephalopathy from  - Breakthrough seizure  Admit to telemetry  keppra level ( before loading) to assess compliance  S/p Loading dose of Keppra  Fall and seizure protocol   Monitor closely  Neurology consult   EEG in AM  Unlikely to be CVA    2. Atrial flutter with RVR  s/p IV Cardizem injection 20mg   Resume PO metoprolol and monitor rate  ECHO in AM  Cardiology consult  Serial cardiac enzymes and repeat EKG if it breaks

## 2020-07-30 NOTE — ED ADULT NURSE NOTE - ED STAT RN HANDOFF DETAILS 2
Patient admitted to telemetry no bed assigned , report given to Katlyn TORRES in holding. Patient being transferred via stretcher to [facility] stable in no acute distress safety maintained.

## 2020-07-30 NOTE — H&P ADULT - PROBLEM SELECTOR PLAN 6
Anticoagulated with Xarelto Baseline creatinine of .7 now with .98. Also with elevation in BUN.  Suspect SILVERIO due to prerenal causes. Given 500cc bolus in ED, started on standing fluids  - FOllow up AM creatinine levels  If no improvement, consider urine sodium/creatinine for FeNa calculation

## 2020-07-30 NOTE — ED PROVIDER NOTE - PROGRESS NOTE DETAILS
I spoke with Daughter [phone] sharmin Muse who said at baseline patient is ambulatory with cane, awake alert. Then today she because unresponsive. has seizure history, takes keppra 1000mg BID, also xarelto 20mg QD, metroprolol 50mg QD. lisinopril. I spoke with Dr Majano who sees patient in neuro clinic, he instructed to increase keppra to 1500 BID while inpt.  Seizure activity stopped, patient is arousable, left arm no longer tonic, eyes midline

## 2020-07-30 NOTE — ED PROVIDER NOTE - AGGRAVATING FACTORS
[FreeTextEntry1] : Patient here in follow up to last visit and prior issue\par  [de-identified] : 10 Year risk k 20% due to smoking and HBP\par Patient in followup to several medical problems. She is tolerating her antihypertensives and blood pressure has been low at home. She has no complaints chest pain or shortness of breath she is also to discuss elevated cholesterol none

## 2020-07-30 NOTE — ED PROVIDER NOTE - CLINICAL SUMMARY MEDICAL DECISION MAKING FREE TEXT BOX
Patient brought into to the ED for new onset left sided weakness. Appears to have seizure like activity. Code stroke called. Will give Ativan, Kepra. Will obtain head CT and labs. Patient is not a tPA candidate because of seizure like activity and because she is on Xarelto. Failed dysphagia evaluation. Patient brought into to the ED for new onset worsened left sided weakness. Appears to have seizure like activity. Code stroke called. Will give Ativan, Kepra. Will obtain head CT and labs. Patient is not a tPA candidate because of seizure like activity and because she is on Xarelto. Failed dysphagia evaluation. NIHSS 3

## 2020-07-30 NOTE — H&P ADULT - PROBLEM SELECTOR PLAN 3
Hx of CVA with residual left sided weakness  No concerns for acute CVA at this time as patient's encephalopathy improved with treatment for seizure  Continue with aspirin, atorvastatin History of afib on Eliquis and on metoprolol succinate 50mg BID(Confirmed with daughter)  Noted with RVR in ED, improved with diltiazem push  - Continue with home medications  - Obtain repeat echocardiogram  Cardiology consult if persistent RVR

## 2020-07-30 NOTE — H&P ADULT - HISTORY OF PRESENT ILLNESS
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 Patient was outside in the heat for multiple hours prior to admission. Patient's daughter then noticed deviation of the eyes which is consistent with patient's previous episodes of seizures. Also noted to have altered mental status and rigidity of the extremities, unable to walk.   At time of examination, patient was at baseline. AAOx3, endorsed that her residual weakness was at baseline. Patient has had no complaints of fever, chills, cough, SOB, nausea, vomiting, abdominal pain, dysuria, hematuria.    In the ED, Neuro, Dr. Majano was contacted and advised to increase dose of Keppra to 1.5g BID with improvement in patient's symptoms. Noted to have SILVERIO with elevation in BUN and elevated creatinine from baseline. 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 Patient was outside in the heat for multiple hours prior to admission. Patient's daughter then noticed deviation of the eyes which is consistent with patient's previous episodes of seizures. Also noted to have altered mental status and rigidity of the extremities, unable to walk.   At time of examination, patient was at baseline. AAOx3, endorsed that her residual weakness was at baseline. Patient has had no complaints of fever, chills, cough, SOB, nausea, vomiting, abdominal pain, dysuria, hematuria.    In the ED, Neuro, Dr. Majano was contacted and advised to increase dose of Keppra to 1.5g BID with improvement in patient's symptoms. Noted to have SILVERIO with elevation in BUN and elevated creatinine from baseline. Also noted with afib with RVR, with resolution of RVR with diltiazem push

## 2020-07-30 NOTE — H&P ADULT - PROBLEM SELECTOR PLAN 2
History of afib on Eliquis and on metoprolol succinate 50mg BID(Confirmed with daughter)  - Continue with home medications  - Obtain repeat echocardiogram

## 2020-07-30 NOTE — H&P ADULT - PROBLEM SELECTOR PLAN 5
Baseline creatinine of .7 now with .98. Also with elevation in BUN.  Suspect SILVERIO due to prerenal causes. Given 500cc bolus in ED, started on standing fluids  - FOllow up AM creatinine levels  If no improvement, consider urine sodium/creatinine for FeNa calculation On metoprolol succ. 50mg BID, HCTZ-Lisinopril  Continue with metoprolol for rate control. HCTZ-lisinopril held due to SILVERIO  - Continue to monitor blood pressures

## 2020-07-30 NOTE — ED PROVIDER NOTE - PHYSICAL EXAMINATION
RESPIRATORY:  PROTECTING AIRWAY    NEURO:   LEFT ARM IS STIFF  GAZE DEVIATED TO THE LEFT   BOTH FEET HAVE RHYTHMIC CLONIC MOVEMENT

## 2020-07-30 NOTE — ED PROVIDER NOTE - OBJECTIVE STATEMENT
56 y/o F patient with a significant PMHx of stoke with residual left sided weakness, CHF, afib on Xarelto brought into the ED concerned for stroke. EMS reports patient was last seen normal x2 hours prior to ER arrival. Patient family reportedly left house and when came back found patient not moving her left side and not speaking at all.

## 2020-07-30 NOTE — ED ADULT NURSE NOTE - NSIMPLEMENTINTERV_GEN_ALL_ED
Implemented All Fall with Harm Risk Interventions:  [place] to call system. Call bell, personal items and telephone within reach. Instruct patient to call for assistance. Room bathroom lighting operational. Non-slip footwear when patient is off stretcher. Physically safe environment: no spills, clutter or unnecessary equipment. Stretcher in lowest position, wheels locked, appropriate side rails in place. Provide visual cue, wrist band, yellow gown, etc. Monitor gait and stability. Monitor for mental status changes and reorient to person, place, and time. Review medications for side effects contributing to fall risk. Reinforce activity limits and safety measures with patient and family. Provide visual clues: red socks.

## 2020-07-30 NOTE — H&P ADULT - PROBLEM SELECTOR PLAN 4
On metoprolol succ. 50mg BID, HCTZ-Lisinopril  Continue with metoprolol for rate control. HCTZ-lisinopril held due to SILVERIO  - Continue to monitor blood pressures Hx of CVA with residual left sided weakness  No concerns for acute CVA at this time as patient's encephalopathy improved with treatment for seizure  Continue with aspirin, atorvastatin

## 2020-07-30 NOTE — H&P ADULT - ASSESSMENT
55 year old female with PMHx of afib, CVA with residual left sided weakness, seizures, presented after an episode of break through seizure likely due to dehydration

## 2020-07-30 NOTE — ED ADULT NURSE NOTE - OBJECTIVE STATEMENT
Pt arrived BIBA from home, with left gaze x 2 hrs  Awake and verbal 1 hr after arrival, stated left side is weak from previous strokes

## 2020-07-31 DIAGNOSIS — I48.91 UNSPECIFIED ATRIAL FIBRILLATION: ICD-10-CM

## 2020-07-31 DIAGNOSIS — I10 ESSENTIAL (PRIMARY) HYPERTENSION: ICD-10-CM

## 2020-07-31 DIAGNOSIS — I63.9 CEREBRAL INFARCTION, UNSPECIFIED: ICD-10-CM

## 2020-07-31 DIAGNOSIS — N17.9 ACUTE KIDNEY FAILURE, UNSPECIFIED: ICD-10-CM

## 2020-07-31 DIAGNOSIS — G40.919 EPILEPSY, UNSPECIFIED, INTRACTABLE, WITHOUT STATUS EPILEPTICUS: ICD-10-CM

## 2020-07-31 DIAGNOSIS — Z29.9 ENCOUNTER FOR PROPHYLACTIC MEASURES, UNSPECIFIED: ICD-10-CM

## 2020-07-31 LAB
24R-OH-CALCIDIOL SERPL-MCNC: 13.3 NG/ML — LOW (ref 30–80)
A1C WITH ESTIMATED AVERAGE GLUCOSE RESULT: 5.6 % — SIGNIFICANT CHANGE UP (ref 4–5.6)
ALBUMIN SERPL ELPH-MCNC: 3 G/DL — LOW (ref 3.5–5)
ALP SERPL-CCNC: 90 U/L — SIGNIFICANT CHANGE UP (ref 40–120)
ALT FLD-CCNC: 19 U/L DA — SIGNIFICANT CHANGE UP (ref 10–60)
ANION GAP SERPL CALC-SCNC: 6 MMOL/L — SIGNIFICANT CHANGE UP (ref 5–17)
APPEARANCE UR: CLEAR — SIGNIFICANT CHANGE UP
AST SERPL-CCNC: 15 U/L — SIGNIFICANT CHANGE UP (ref 10–40)
BACTERIA # UR AUTO: ABNORMAL /HPF
BASOPHILS # BLD AUTO: 0.03 K/UL — SIGNIFICANT CHANGE UP (ref 0–0.2)
BASOPHILS NFR BLD AUTO: 0.4 % — SIGNIFICANT CHANGE UP (ref 0–2)
BILIRUB SERPL-MCNC: 0.5 MG/DL — SIGNIFICANT CHANGE UP (ref 0.2–1.2)
BILIRUB UR-MCNC: NEGATIVE — SIGNIFICANT CHANGE UP
BUN SERPL-MCNC: 16 MG/DL — SIGNIFICANT CHANGE UP (ref 7–18)
CALCIUM SERPL-MCNC: 8.9 MG/DL — SIGNIFICANT CHANGE UP (ref 8.4–10.5)
CHLORIDE SERPL-SCNC: 107 MMOL/L — SIGNIFICANT CHANGE UP (ref 96–108)
CHOLEST SERPL-MCNC: 143 MG/DL — SIGNIFICANT CHANGE UP (ref 10–199)
CO2 SERPL-SCNC: 27 MMOL/L — SIGNIFICANT CHANGE UP (ref 22–31)
COLOR SPEC: YELLOW — SIGNIFICANT CHANGE UP
CREAT SERPL-MCNC: 0.67 MG/DL — SIGNIFICANT CHANGE UP (ref 0.5–1.3)
DIFF PNL FLD: ABNORMAL
EOSINOPHIL # BLD AUTO: 0.08 K/UL — SIGNIFICANT CHANGE UP (ref 0–0.5)
EOSINOPHIL NFR BLD AUTO: 1 % — SIGNIFICANT CHANGE UP (ref 0–6)
EPI CELLS # UR: SIGNIFICANT CHANGE UP /HPF
ESTIMATED AVERAGE GLUCOSE: 114 MG/DL — SIGNIFICANT CHANGE UP (ref 68–114)
FOLATE SERPL-MCNC: 11 NG/ML — SIGNIFICANT CHANGE UP
GLUCOSE SERPL-MCNC: 90 MG/DL — SIGNIFICANT CHANGE UP (ref 70–99)
GLUCOSE UR QL: NEGATIVE — SIGNIFICANT CHANGE UP
HCT VFR BLD CALC: 42.2 % — SIGNIFICANT CHANGE UP (ref 34.5–45)
HDLC SERPL-MCNC: 49 MG/DL — LOW
HGB BLD-MCNC: 14.1 G/DL — SIGNIFICANT CHANGE UP (ref 11.5–15.5)
HYALINE CASTS # UR AUTO: ABNORMAL /LPF
IMM GRANULOCYTES NFR BLD AUTO: 0.2 % — SIGNIFICANT CHANGE UP (ref 0–1.5)
KETONES UR-MCNC: NEGATIVE — SIGNIFICANT CHANGE UP
LEUKOCYTE ESTERASE UR-ACNC: ABNORMAL
LIPID PNL WITH DIRECT LDL SERPL: 79 MG/DL — SIGNIFICANT CHANGE UP
LYMPHOCYTES # BLD AUTO: 2.16 K/UL — SIGNIFICANT CHANGE UP (ref 1–3.3)
LYMPHOCYTES # BLD AUTO: 26.9 % — SIGNIFICANT CHANGE UP (ref 13–44)
MAGNESIUM SERPL-MCNC: 2 MG/DL — SIGNIFICANT CHANGE UP (ref 1.6–2.6)
MCHC RBC-ENTMCNC: 29.6 PG — SIGNIFICANT CHANGE UP (ref 27–34)
MCHC RBC-ENTMCNC: 33.4 GM/DL — SIGNIFICANT CHANGE UP (ref 32–36)
MCV RBC AUTO: 88.7 FL — SIGNIFICANT CHANGE UP (ref 80–100)
MONOCYTES # BLD AUTO: 0.64 K/UL — SIGNIFICANT CHANGE UP (ref 0–0.9)
MONOCYTES NFR BLD AUTO: 8 % — SIGNIFICANT CHANGE UP (ref 2–14)
NEUTROPHILS # BLD AUTO: 5.11 K/UL — SIGNIFICANT CHANGE UP (ref 1.8–7.4)
NEUTROPHILS NFR BLD AUTO: 63.5 % — SIGNIFICANT CHANGE UP (ref 43–77)
NITRITE UR-MCNC: NEGATIVE — SIGNIFICANT CHANGE UP
NRBC # BLD: 0 /100 WBCS — SIGNIFICANT CHANGE UP (ref 0–0)
PH UR: 6.5 — SIGNIFICANT CHANGE UP (ref 5–8)
PHOSPHATE SERPL-MCNC: 3.2 MG/DL — SIGNIFICANT CHANGE UP (ref 2.5–4.5)
PLATELET # BLD AUTO: 170 K/UL — SIGNIFICANT CHANGE UP (ref 150–400)
POTASSIUM SERPL-MCNC: 3.6 MMOL/L — SIGNIFICANT CHANGE UP (ref 3.5–5.3)
POTASSIUM SERPL-SCNC: 3.6 MMOL/L — SIGNIFICANT CHANGE UP (ref 3.5–5.3)
PROT SERPL-MCNC: 6.9 G/DL — SIGNIFICANT CHANGE UP (ref 6–8.3)
PROT UR-MCNC: NEGATIVE — SIGNIFICANT CHANGE UP
RBC # BLD: 4.76 M/UL — SIGNIFICANT CHANGE UP (ref 3.8–5.2)
RBC # FLD: 13.2 % — SIGNIFICANT CHANGE UP (ref 10.3–14.5)
RBC CASTS # UR COMP ASSIST: ABNORMAL /HPF (ref 0–2)
SARS-COV-2 IGG SERPL QL IA: POSITIVE
SARS-COV-2 IGM SERPL IA-ACNC: 5.81 INDEX — HIGH
SARS-COV-2 RNA SPEC QL NAA+PROBE: SIGNIFICANT CHANGE UP
SODIUM SERPL-SCNC: 140 MMOL/L — SIGNIFICANT CHANGE UP (ref 135–145)
SP GR SPEC: 1.01 — SIGNIFICANT CHANGE UP (ref 1.01–1.02)
TOTAL CHOLESTEROL/HDL RATIO MEASUREMENT: 2.9 RATIO — LOW (ref 3.3–7.1)
TRIGL SERPL-MCNC: 75 MG/DL — SIGNIFICANT CHANGE UP (ref 10–149)
TSH SERPL-MCNC: 1.49 UU/ML — SIGNIFICANT CHANGE UP (ref 0.34–4.82)
UROBILINOGEN FLD QL: NEGATIVE — SIGNIFICANT CHANGE UP
VIT B12 SERPL-MCNC: 486 PG/ML — SIGNIFICANT CHANGE UP (ref 232–1245)
WBC # BLD: 8.04 K/UL — SIGNIFICANT CHANGE UP (ref 3.8–10.5)
WBC # FLD AUTO: 8.04 K/UL — SIGNIFICANT CHANGE UP (ref 3.8–10.5)
WBC UR QL: SIGNIFICANT CHANGE UP /HPF (ref 0–5)

## 2020-07-31 PROCEDURE — 95819 EEG AWAKE AND ASLEEP: CPT | Mod: 26

## 2020-07-31 PROCEDURE — 99232 SBSQ HOSP IP/OBS MODERATE 35: CPT | Mod: GC

## 2020-07-31 PROCEDURE — 93010 ELECTROCARDIOGRAM REPORT: CPT

## 2020-07-31 PROCEDURE — 99223 1ST HOSP IP/OBS HIGH 75: CPT

## 2020-07-31 RX ORDER — METOPROLOL TARTRATE 50 MG
50 TABLET ORAL EVERY 12 HOURS
Refills: 0 | Status: DISCONTINUED | OUTPATIENT
Start: 2020-07-31 | End: 2020-08-02

## 2020-07-31 RX ORDER — ERGOCALCIFEROL 1.25 MG/1
50000 CAPSULE ORAL
Refills: 0 | Status: DISCONTINUED | OUTPATIENT
Start: 2020-07-31 | End: 2020-08-02

## 2020-07-31 RX ORDER — ACETAMINOPHEN 500 MG
650 TABLET ORAL EVERY 6 HOURS
Refills: 0 | Status: DISCONTINUED | OUTPATIENT
Start: 2020-07-31 | End: 2020-08-02

## 2020-07-31 RX ORDER — SODIUM CHLORIDE 9 MG/ML
1000 INJECTION INTRAMUSCULAR; INTRAVENOUS; SUBCUTANEOUS
Refills: 0 | Status: DISCONTINUED | OUTPATIENT
Start: 2020-07-31 | End: 2020-08-01

## 2020-07-31 RX ORDER — CHOLECALCIFEROL (VITAMIN D3) 125 MCG
1000 CAPSULE ORAL DAILY
Refills: 0 | Status: DISCONTINUED | OUTPATIENT
Start: 2020-07-31 | End: 2020-08-01

## 2020-07-31 RX ADMIN — ATORVASTATIN CALCIUM 40 MILLIGRAM(S): 80 TABLET, FILM COATED ORAL at 21:18

## 2020-07-31 RX ADMIN — Medication 650 MILLIGRAM(S): at 00:43

## 2020-07-31 RX ADMIN — Medication 50 MILLIGRAM(S): at 05:54

## 2020-07-31 RX ADMIN — SODIUM CHLORIDE 85 MILLILITER(S): 9 INJECTION INTRAMUSCULAR; INTRAVENOUS; SUBCUTANEOUS at 05:35

## 2020-07-31 RX ADMIN — RIVAROXABAN 20 MILLIGRAM(S): KIT at 18:09

## 2020-07-31 RX ADMIN — RIVAROXABAN 20 MILLIGRAM(S): KIT at 00:49

## 2020-07-31 RX ADMIN — Medication 81 MILLIGRAM(S): at 12:14

## 2020-07-31 RX ADMIN — SODIUM CHLORIDE 85 MILLILITER(S): 9 INJECTION INTRAMUSCULAR; INTRAVENOUS; SUBCUTANEOUS at 12:15

## 2020-07-31 RX ADMIN — LEVETIRACETAM 400 MILLIGRAM(S): 250 TABLET, FILM COATED ORAL at 05:35

## 2020-07-31 RX ADMIN — Medication 50 MILLIGRAM(S): at 17:39

## 2020-07-31 RX ADMIN — LEVETIRACETAM 400 MILLIGRAM(S): 250 TABLET, FILM COATED ORAL at 17:39

## 2020-07-31 RX ADMIN — Medication 1000 UNIT(S): at 12:44

## 2020-07-31 NOTE — PHYSICAL THERAPY INITIAL EVALUATION ADULT - PASSIVE RANGE OF MOTION EXAMINATION, REHAB EVAL
flexion contracture of left wrist and fingers; Left shoulder limited to 90deg flexion and abduction/Right LE Passive ROM was WFL (within functional limits)

## 2020-07-31 NOTE — PATIENT PROFILE ADULT - BRADEN SCORE
Problem: Hyperbilirubinemia Requiring Phototherapy  Goal: Infant will receive sufficient treatment to produce a decrease in total serum bilirubin  Outcome: Outcome Met, Complete Goal  Note: Patient's bilirubin level is 11.7, down from 19.1 upon admission. Patient is no longer on any phototherapy.       19

## 2020-07-31 NOTE — PROGRESS NOTE ADULT - PROBLEM SELECTOR PLAN 1
Presented with acute encephalopathy secondary to seizure  Given 1.5g of Keppra and Ativan with resolution in symptoms  on keppra 1gm BID at home - will continue with higher dose for now   - Neurology consult

## 2020-07-31 NOTE — PROGRESS NOTE ADULT - PROBLEM SELECTOR PLAN 3
Hx of CVA with residual left sided weakness  No concerns for acute CVA at this time as patient's encephalopathy improved with treatment for seizure  Continue with aspirin, atorvastatin

## 2020-07-31 NOTE — PHYSICAL THERAPY INITIAL EVALUATION ADULT - PERTINENT HX OF CURRENT PROBLEM, REHAB EVAL
Pt is 55 year old female, walks with cane with PMHx of afib on xarelto, CVA with residual left sided weakness, tachy-clare syndrome, admitted due to seizure.

## 2020-07-31 NOTE — SWALLOW BEDSIDE ASSESSMENT ADULT - ASR SWALLOW ASPIRATION MONITOR
upper respiratory infection/position upright (90Y)/cough/fever/oral hygiene/change of breathing pattern/throat clearing/gurgly voice/pneumonia

## 2020-07-31 NOTE — CONSULT NOTE ADULT - ASSESSMENT
Breakthrough seizure in a Pt with known seizure disorder in the setting of:    Prolonged exposure to high outdoor temperatures;     SILVEROI.      I agree with increasing levetiracetam from 1000mg BID to 1500mg BID.      Out-Pt follow-up with Dr. Justice Majano who is her neurologist/epileptologist. Residual L hemiparesis (with essentially plegic and "frozen" LUE, residua of old stroke.    Known seizure disorder post -stroke: focal with generalization.       Breakthrough seizure in the setting of:          prolonged exposure to high outdoor temperatures;           SILVERIO.    Vit D deficiency.    Possibly B12 deficiency.        RECOMMENDATIONS    I agree with increasing levetiracetam from 1000mg BID to 1500mg BID.      Out-Pt follow-up with Dr. Justice Majano who is her neurologist/epileptologist.      Vit D supplementation.    Check methylmalonic acid and homocysteine levels.  Supplement as indicated. Residual L hemiparesis (with essentially plegic and "frozen" LUE, residua of old stroke.    Known seizure disorder post-stroke: focal with generalization.       Breakthrough seizure in the setting of:          prolonged exposure to high outdoor temperatures;           SILVERIO.    Vit D deficiency.    Possibly B12 deficiency.      Mild acute encephalopathy.      RECOMMENDATIONS    I agree with increasing levetiracetam from 1000mg BID to 1500mg BID.      Out-Pt follow-up with Dr. Justice Majano who is her neurologist/epileptologist.      Vit D supplementation.    Check methylmalonic acid and homocysteine levels.  Supplement as indicated.

## 2020-07-31 NOTE — SWALLOW BEDSIDE ASSESSMENT ADULT - COMMENTS
Pt AA+Ox3, seen seated upright at edge of bed. Mild Left-side facial weakness. Apparent Left UE hemiplegia    PATIENT RECEIVED WRITTEN TEACHING ON:   1. Signs and symptoms of stroke.  2. What to do if they are having a stroke.  3. Activation of 911.  4. Modifying risk factors.  5. Discharge medications.  6. The importance of compliance and the need for follow up.

## 2020-07-31 NOTE — PHYSICAL THERAPY INITIAL EVALUATION ADULT - DIAGNOSIS, PT EVAL
Patient with decrease functional mobility due to decrease balance, strength, left hemiparesis due to h/o stroke, weakness due to seizure.

## 2020-07-31 NOTE — PROGRESS NOTE ADULT - PROBLEM SELECTOR PLAN 2
History of afib on Eliquis and on metoprolol succinate 50mg BID(Confirmed with daughter)  - Continue with home medications

## 2020-07-31 NOTE — PHYSICAL THERAPY INITIAL EVALUATION ADULT - AMBULATION SKILLS, REHAB EVAL
"Pediatric Occupational Therapy Progress Note     Name: Monica Sellers  Date of Note: 07/10/2017  Clinic #: [id number]  : 2015     Start Time: 2:35  Stop Time: 3:13  Total Time: 38 min    Visit #4 of 12, referral expiring 2017    Subjective:   "I have been trying to redirect the hand rubbing behaviors," stated MomStar Anderson was brought to therapy by her mother and grandmother, who were present in treatment room during session.     Pain: Pt unable to rate pain due to age and understanding. No pain behaviors noted throughout session.    Objective:     Pt received skilled instruction upon and participated in the following activities to facilitate age-appropriate fine motor skills, visual motor coordination, visual perceptual skills, bilateral coordination, BUE tone, strength and ROM:   Fine Motor/Visual Motor: palmar grasp on toys maintained for up to 10 seconds, required A to move to and release at visual target once placed; continues to attend to and smile at therapist's face; spontaneous reach for phone screen and some toys but not consistent.  Bilateral Coordination: B spontaneous reaching for toys when placed in visual field but not consistent performance, most attentive to mother's phone screen; brought hands to midline on toy but preferred to hold with one hand, R more than L; [place] to pull apart velcro foods [place] A 75%, (I) 25%.  Tone/Postural Control: weighted vest donned without significant change in movements, improved sitting tolerance when playing music on mother's phone, tolerated WBing on UEs with R arm reaching for objects more often than L; seated in [place] chair with headrest and butterfly harness for safety and postural control due to excessive movements, tolerated well; dynamic balance challenges on platform swing, noted improvements in protective extension.   Strength/ROM: WBing in quadruped, independent to position wrists appropriately for weightbearing in quadruped; weight shifting from one UE to " other to reach for toy in frontal plane; [place] A to pull apart velcro items 75%, completed (I) 25% but limited by decreased visual attention to activity.   Sensory/Oral Motor: tolerated vibration on B hands and arms, did not bring to cheeks this date; DPP with less movement throughout brushing, joint compressions and deep pressure massage tolerated well; crawling through tunnel to promote weightbearing, gagged x 2 when touching material, crawled through x 1 then vomited from touching the tunnel.    Assessment:     Monica was seen for follow-up visit today. Since initial evaluation, Monica has demonstrated an improvement in fine motor and visual motor skills, however, continues to demonstrate significant delays compared to peers her age. As demonstrated on the PDMS assessment, Monica's grasping skills are equivalent to a 7 month old, and her visual-motor integration is consistent with an 8 month old.  Monica demonstrates fluctuating participation during sessions, limited by lack of engagement with age-appropriate toys and manipulatives. Monica continues to demonstrate decreased truncal tone with increased extremity tone which are exacerbated by increased accessory movements which limit functional mobility and engagement. 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 Continued occupational therapy is recommended to address aforementioned deficits and progress toward the following goals.     Education:      Discussed continued redirecting hands to functional task when rubbing increased. Continue brushing at home to address underlying tactile sensitivity as demonstrated by touching tunnel. Mom verbalized understanding.     Goals:     Previous Goals:     1. Demonstrate increased manual dexterity as displayed by ability to bring hands to midline to hold bottle with [place] A as needed 50% of trials. (MET, will hold at midline but will not bring to mouth)  2.  Demonstrate increased BUE strength and functional use as displayed by ability to tolerate WBing with appropriate wrist position x 15 seconds 3/5 trials. (MET)  3. Demonstrate increased visual motor coordination as displayed by ability to reach for colored object within 10 seconds of presentation 50% of trials. (MET, mostly with musical toys and light up toys)  4. Demonstrate increased fine motor coordination as displayed by ability to obtain and maintain grasp on 1 cube >10 seconds 50% of trials. (MET on cubes and shapes for no more than 10 seconds)  5. Demonstrate increased sensory integration as displayed by ability to attend to tabletop activity for 1 minute following appropriate proprioceptive input 3/5 sessions. (NOT MET, d/c goal due to no change in movements with weighted materials at this time)  6. Demonstrate increased BUE strength and functional use as displayed by ability to tolerate WBing with appropriate wrist position x 30 seconds 3/5 trials. (MET multiple trials)    Short term goals: (Will continue unmet goals and address new goals until 8/31/2017)    1. Demonstrate increased age-appropriate feeding skills as displayed by ability to obtain finger-food sized objects from tabletop using raking grasp 50% of trials. (NOT MET, no grasp or manipulation of small items)    2. Demonstrate increased visual motor coordination as displayed by ability to reach for colored object within 10 seconds of presentation 90% of trials. (NOT MET, about 50%)    3. Demonstrate increased fine motor coordination as displayed by ability to obtain and maintain grasp on 1 cube >15 seconds 75% of trials. (NOT MET, no more than 10 seconds)    NEW GOALS:  4. Demonstrate increased manual dexterity as displayed by ability to pull apart velcro items with [place] A 75%. (progressing)    5. Demonstrate increased visual motor skills as displayed by ability to complete 3-piece basic shape puzzle with verbal cues 50%. (no attention to  task)     Will reassess goals and update plan of care as needed.     Plan:  Occupational therapy services will be provided 1x/week from 5/8/2017 to 8/31/2017 through direct intervention, parent education and home programming.     PARRISH Tinsley, SHABBIR  07/10/2017         needs device and assist

## 2020-07-31 NOTE — PROGRESS NOTE ADULT - SUBJECTIVE AND OBJECTIVE BOX
[facility] - Medical attending     Patient is a 55y old  Female who presents with a chief complaint of Seizure (2020 23:37)      INTERVAL HPI/OVERNIGHT EVENTS: no events noted overnight.     MEDICATIONS  (STANDING):  aspirin  chewable 81 milliGRAM(s) Oral daily  atorvastatin 40 milliGRAM(s) Oral at bedtime  cholecalciferol 1000 Unit(s) Oral daily  levETIRAcetam  IVPB 1500 milliGRAM(s) IV Intermittent every 12 hours  metoprolol succinate ER 50 milliGRAM(s) Oral every 12 hours  rivaroxaban 20 milliGRAM(s) Oral with dinner  sodium chloride 0.9%. 1000 milliLiter(s) (85 mL/Hr) IV Continuous <Continuous>    MEDICATIONS  (PRN):  acetaminophen   Tablet .. 650 milliGRAM(s) Oral every 6 hours PRN Temp greater or equal to 38C (100.4F), Mild Pain (1 - 3)  LORazepam   Injectable 2 milliGRAM(s) IV Push once PRN Breakthrough Seizure activity      __________________________________________________  REVIEW OF SYSTEMS:    CONSTITUTIONAL: No fever,   EYES: no acute visual disturbances  NECK: No pain or stiffness  RESPIRATORY: No cough; No shortness of breath  CARDIOVASCULAR: No chest pain, no palpitations  GASTROINTESTINAL: No pain. No nausea or vomiting; No diarrhea   NEUROLOGICAL: No headache or numbness, no tremors  MUSCULOSKELETAL: No joint pain, no muscle pain  GENITOURINARY: no dysuria, no frequency, no hesitancy  PSYCHIATRY: no depression , no anxiety  ALL OTHER  ROS negative        Vital Signs Last 24 Hrs  T(C): 36.4 (2020 16:06), Max: 36.6 (2020 22:10)  T(F): 97.6 (2020 16:06), Max: 97.9 (2020 22:10)  HR: 73 (2020 16:30) (58 - 114)  BP: 121/85 (2020 16:30) (103/73 - 128/88)  BP(mean): --  RR: 18 (2020 16:06) (15 - 23)  SpO2: 100% (2020 16:30) (95% - 100%)    ________________________________________________  PHYSICAL EXAM:  GENERAL: NAD  HEENT: Normocephalic;  conjunctivae and sclerae clear; moist mucous membranes;   NECK : supple  CHEST/LUNG: Clear to auscultation bilaterally with good air entry   HEART: S1 S2  regular; no murmurs, gallops or rubs  ABDOMEN: Soft, Nontender, Nondistended; Bowel sounds present  EXTREMITIES: no cyanosis; no edema; no calf tenderness  SKIN: warm and dry; no rash  NERVOUS SYSTEM:  Awake and alert; Oriented  to place, person and time ; no new deficits    _________________________________________________  LABS:                        14.1   8.04  )-----------( 170      ( 2020 06:32 )             42.2     07-31    140  |  107  |  16  ----------------------------<  90  3.6   |  27  |  0.67    Ca    8.9      2020 06:32  Phos  3.2     07-31  Mg     2.0     07-31    TPro  6.9  /  Alb  3.0<L>  /  TBili  0.5  /  DBili  x   /  AST  15  /  ALT  19  /  AlkPhos  90  07-31    PT/INR - ( 2020 18:04 )   PT: 14.4 sec;   INR: 1.24 ratio         PTT - ( 2020 18:04 )  PTT:28.3 sec  Urinalysis Basic - ( 2020 05:47 )    Color: Yellow / Appearance: Clear / S.010 / pH: x  Gluc: x / Ketone: Negative  / Bili: Negative / Urobili: Negative   Blood: x / Protein: Negative / Nitrite: Negative   Leuk Esterase: Trace / RBC: 2-5 /HPF / WBC 3-5 /HPF   Sq Epi: x / Non Sq Epi: Few /HPF / Bacteria: Moderate /HPF      CAPILLARY BLOOD GLUCOSE            RADIOLOGY & ADDITIONAL TESTS:    Imaging Personally Reviewed:  YES    Consultant(s) Notes Reviewed:   YES    Care Discussed with Consultants : YES     Plan of care was discussed with patient and /or primary care giver; all questions and concerns were addressed and care was aligned with patient's wishes.

## 2020-07-31 NOTE — PHYSICAL THERAPY INITIAL EVALUATION ADULT - GENERAL OBSERVATIONS, REHAB EVAL
Patient was seen for PT evaluation today. EMR, laboratory and radiology results reviewed. Pt received sitting at edge of bed, NAD, [id number] in situ and family member in room.

## 2020-07-31 NOTE — SWALLOW BEDSIDE ASSESSMENT ADULT - SWALLOW EVAL: DIAGNOSIS
Pt p/w adequate oral & pharyngeal phases of chew & swallow for tolerating regular diet: Good labial seal, Functional bolus manipulation & propulsion, Unremarkable oral phase; Timely and efficient oropharyngeal swallow with no overt s/s of laryngeal penetration or aspiration at this exam. Pt compensates for hard-to-chew items by allowing extra time.

## 2020-07-31 NOTE — PHYSICAL THERAPY INITIAL EVALUATION ADULT - ADDITIONAL COMMENTS
Patient reports doing household activities and ADL's independently and only asks for help when she can't perform a specific activity that requires both hands. She uses cane for home and community ambulation.

## 2020-07-31 NOTE — EEG REPORT - NS EEG TEXT BOX
[facility]  Report of Routine EEG with Video  And Report of DigitalCompressed Spectral Array Analysis    [facility]: [address], [address] [place] [address], Phone: [phone]  [facility]: [address], [place] [address], Phone: [phone]  Office: [address] [place] [address], [place] [address], [place] [address], Phone: [phone]    Patient Name: THELMA NOLASCO    Age: 55 year  : 1965  Patient ID: -, MRN #: -, Location: [facility] BED 22 B  Referring Physician: -    EEG #: [id number]  Study Date: 2020   Start Time: 1:30:16 PM    End Date:          End Time: 02:18:33 PM     Study Duration: 37.6  		    Technical Information:					  On Instrument: -  Placement and Labeling of Electrodes:  The EEG was performed utilizing 20 channels referential EEG connections (coronal over temporal over parasagittal montage) using all standard 10-20 electrode placements with EKG.  Recording was at a sampling rate of 256 samples per second per channel.  Time synchronized digital video recording was done simultaneously with EEG recording.  A low light infrared camera was used for low light recording.  Rolly and seizure detection algorithms were utilized.  CSA Technical Component:  Quantitative EEG analysis using a separate Compressed Spectral Array (CSA) software package was conducted in real-time and run at bedside after set up by the technician, digitally displaying the power of electrographic frequencies included in the 1-30Hz band using a graded color map.  This data was reviewed and interpreted independently, and is reported in a separate section below.    History:   ROUTINE EEG PERFORMED AT BEDSIDE  56 Y/O FEMALE  H/O : CVA, BREAK THROUGH SEZURE, LATERAL GAZE , AMS   P/W : DIZZINESS , TONICITY OF EXTREMITIES  COR : ALERT AND ORIENTED X 3  PATIENT IS RIGHT HANDED  H/O : HTN, CVA, LEFT SIDE WEAK  NO HYPERVENTILATION PERFORMED DUE TO CONDITION  PHOTIC STIMULATION COMPLETED  AS PER PATIENT UNVOLUNTARY MOVEMENTS ( RAISING LEFT ARM )  -    Medication	  Keppra (Levetiracetam)	  Ativan (Lorazepam)	    Study Interpretation:    FINDINGS:  The background was continuous, spontaneously variable and reactive. During wakefulness, the posteriorly dominant rhythm consisted of 9.5 Hz activity more prominent on the left, with an amplitude to 30 uV, that attenuated to eye opening.     Background Slowing:  Generalized slowing: none  Focal slowing: asymmetric irregular predominantly delta with theta frequencies of medium amplitude, more prominent in the right hemisphere, maximal temporally.      Sleep Background:  Stage II sleep transients were not recorded.    Epileptiform Activity:   Frequent spike/sharp and waves of medium amplitude in P8/O2, with field to Pz and O1, at times polyphasic.    Events:  Patient had event of raising her left arm involuntarily, with no ictal pattern or epileptiform discharges noted on recording.  No seizures were recorded.    Activation Procedures:   -Hyperventilation was not performed.    -Photic stimulation was performed and elicited occasional sharp/sharply contoured waves of medium amplitude in F4/P4/T8/O2.    Artifacts:  Intermittent myogenic and movement artifacts were noted.    ECG:  The heart rate on single channel ECG was predominantly between 68-85 BPM irregular.    EEG Classification / Summary:  Abnormal Routine EEG Study   -Moderate focal slowing maximal overt the right temporal-occipital region  -Frequent epileptiform discharges over P8/O2, with field to Pz and O1    Clinical Impression:  Risk for seizures in the right temporal-occipital region.  Moderate focal cerebral dysfunction in the right hemisphere maximal temporally.      Julio Cabrera MD  PGY4, Neurology Resident    Frankie Galo M.D.   of Neurology, [facility] [place] [facility]  Report of Routine EEG with Video  And Report of DigitalCompressed Spectral Array Analysis    [facility]: [address], [address] [place] [address], Phone: [phone]  [facility]: [address], [place] [address], Phone: [phone]  Office: [address] [place] [address], [place] [address], [place] [address], Phone: [phone]    Patient Name: THELMA NOLASCO    Age: 55 year  : 1965  Patient ID: -, MRN #: -, Location: [facility] BED 22 B  Referring Physician: -    EEG #: [id number]  Study Date: 2020   Start Time: 1:30:16 PM    End Date:          End Time: 02:18:33 PM     Study Duration: 37.6  		    Technical Information:					  On Instrument: -  Placement and Labeling of Electrodes:  The EEG was performed utilizing 20 channels referential EEG connections (coronal over temporal over parasagittal montage) using all standard 10-20 electrode placements with EKG.  Recording was at a sampling rate of 256 samples per second per channel.  Time synchronized digital video recording was done simultaneously with EEG recording.  A low light infrared camera was used for low light recording.  Rolly and seizure detection algorithms were utilized.  CSA Technical Component:  Quantitative EEG analysis using a separate Compressed Spectral Array (CSA) software package was conducted in real-time and run at bedside after set up by the technician, digitally displaying the power of electrographic frequencies included in the 1-30Hz band using a graded color map.  This data was reviewed and interpreted independently, and is reported in a separate section below.    History:  ROUTINE EEG PERFORMED AT BEDSIDE  56 Y/O FEMALE  H/O : CVA, BREAK THROUGH SEIZURE, LATERAL GAZE , AMS   P/W : DIZZINESS , TONICITY OF EXTREMITIES  COR : ALERT AND ORIENTED X 3  PATIENT IS RIGHT HANDED  H/O : HTN, CVA, LEFT SIDE WEAK  NO HYPERVENTILATION PERFORMED DUE TO CONDITION  PHOTIC STIMULATION COMPLETED  AS PER PATIENT UNVOLUNTARY MOVEMENTS ( RAISING LEFT ARM )  -    Medication	  Keppra (Levetiracetam)	  Ativan (Lorazepam)	    Study Interpretation:    FINDINGS:  The background was continuous, spontaneously variable and reactive. During wakefulness, the posteriorly dominant rhythm consisted of 9.5 Hz activity more prominent on the left, with an amplitude to 30 uV, that attenuated to eye opening.     Background Slowing:  Generalized slowing: none  Focal slowing: asymmetric irregular predominantly delta with theta frequencies of medium amplitude, more prominent in the right hemisphere, maximal temporally.    Sleep Background:  Stage II sleep transients were not recorded.    Epileptiform Activity:   Frequent spike/sharp and waves of medium amplitude in P8/O2, with field to Pz and O1, at times polyphasic.    Events:  Patient had event of raising her left arm involuntarily, with no ictal pattern or epileptiform discharges noted on recording.  No seizures were recorded.    Activation Procedures:   -Hyperventilation was not performed.    -Photic stimulation was performed and elicited occasional sharp/sharply contoured waves of medium amplitude in F4/P4/T8/O2.    Artifacts:  Intermittent myogenic and movement artifacts were noted.    ECG:  The heart rate on single channel ECG was predominantly between 68-85 BPM irregular.    EEG Classification / Summary:  Abnormal Routine EEG Study   -Moderate focal slowing maximal overt the right temporal-occipital region  -Frequent epileptiform discharges over P8/O2, with field to Pz and O1    Clinical Impression:  Risk for seizures in the right temporal-occipital region.  Moderate focal cerebral dysfunction in the right hemisphere maximal temporally.      Julio Cabrera MD  PGY4, Neurology Resident    Frankie Galo M.D.   of Neurology, [facility] [place]

## 2020-07-31 NOTE — SWALLOW BEDSIDE ASSESSMENT ADULT - SWALLOW EVAL: RECOMMENDED FEEDING/EATING TECHNIQUES
allow for swallow between intakes/check mouth frequently for oral residue/pocketing/position upright (90 degrees)/oral hygiene

## 2020-07-31 NOTE — PROGRESS NOTE ADULT - PROBLEM SELECTOR PLAN 4
On metoprolol succ. 50mg BID, HCTZ-Lisinopril  hold HCTZ and lisinopril at this time as BP is controlled   restart as tolerated

## 2020-07-31 NOTE — PHYSICAL THERAPY INITIAL EVALUATION ADULT - CRITERIA FOR SKILLED THERAPEUTIC INTERVENTIONS
risk reduction/prevention/anticipated discharge recommendation/functional limitations in following categories/predicted duration of therapy intervention/rehab potential/therapy frequency/anticipated equipment needs at discharge/impairments found

## 2020-07-31 NOTE — CONSULT NOTE ADULT - SUBJECTIVE AND OBJECTIVE BOX
To be completed Pt responds that she's in hospital because she felt weak; makes no mention of seizure.    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 Patient was outside in the heat for multiple hours prior to admission. Patient's daughter then noticed deviation of the eyes which is consistent with patient's previous episodes of seizures. Also noted to have altered mental status and rigidity of the extremities, unable to walk.   At time of examination, patient was at baseline. AAOx3, endorsed that her residual weakness was at baseline. Patient has had no complaints of fever, chills, cough, SOB, nausea, vomiting, abdominal pain, dysuria, hematuria.    In the ED, Neuro, Dr. Majano was contacted and advised to increase dose of Keppra to 1.5g BID with improvement in patient's symptoms. Noted to have SILVERIO with elevation in BUN and elevated creatinine from baseline. Also noted with afib with RVR, with resolution of RVR with diltiazem push         Review of Systems:  Other Review of Systems: All other review of systems negative, except as noted in HPI	      Allergies and Intolerances:        Allergies:  	penicillin: Drug, Hives    Home Medications:   * Patient Currently Takes Medications as of 10-Mar-2020 15:22 documented in Structured Notes  · 	Cardizem  mg/24 hours oral capsule, extended release: 1 cap(s) orally once a day   · 	Toprol- mg oral tablet, extended release: 1 tab(s) orally once a day   · 	atorvastatin 40 mg oral tablet: 1 tab(s) orally once a day (at bedtime)  · 	levETIRAcetam 1000 mg oral tablet: 1 tab(s) orally 2 times a day  · 	aspirin 81 mg oral tablet, chewable: 1 tab(s) orally once a day   · 	Xarelto 20 mg oral tablet: 1 tab(s) orally once a day (in the evening)    Patient History:    Tobacco Screening:  · Core Measure Site	Yes	  · Has the patient used tobacco in the past 30 days?	No	    Risk Assessment:    Present on Admission:  Deep Venous Thrombosis	no	  Pulmonary Embolus	no	     Heart Failure:  Does this patient have a history of or has been diagnosed with heart failure? yes.     LV Function Assessment (LVS function was evaluated before arrival and/or during hospitalization) yes.     Is the Ejection Fraction >40% ? yes.     normal LV function."      EXAMINATION    Awake, alert, obese, in good spirits.  Holds LUE flexed ~ 90 degrees at elbow, hand closed.  Grossly normal comprehension, expression in Croatian (interviewed by me in Croatian).  PERRL; EOMs grossly conjugate and full.  UMN type L facial weakness.   L elbow and shoulder almost "frozen."  No voluntary movements of L fingers elicited.  LUE essentially plegic.  LLE paretic.      She uses a cane held with her R hand to walk.  No cane or other assistive devise available in her room; gait not tested.     Reflex                           Right    Left   Comment    Biceps                             2        3  Triceps                            3       3  Patellar                           2       3  Gastroc                           0       0  clonus on L on forced passive dorsiflexion  Plantar                         mute  extensor    Does not perceive LT applied to the LUE.    On testing LT by DSS (double simultaneous stimulation) she extinguishes stimuli on the L face and LLE.          B12/Folate   486 (of potential concern)/11  Vit D 13.3

## 2020-08-01 ENCOUNTER — TRANSCRIPTION ENCOUNTER (OUTPATIENT)
Age: 55
End: 2020-08-01

## 2020-08-01 LAB
ANION GAP SERPL CALC-SCNC: 7 MMOL/L — SIGNIFICANT CHANGE UP (ref 5–17)
BUN SERPL-MCNC: 25 MG/DL — HIGH (ref 7–18)
CALCIUM SERPL-MCNC: 8.9 MG/DL — SIGNIFICANT CHANGE UP (ref 8.4–10.5)
CHLORIDE SERPL-SCNC: 110 MMOL/L — HIGH (ref 96–108)
CO2 SERPL-SCNC: 24 MMOL/L — SIGNIFICANT CHANGE UP (ref 22–31)
CREAT SERPL-MCNC: 0.77 MG/DL — SIGNIFICANT CHANGE UP (ref 0.5–1.3)
GLUCOSE SERPL-MCNC: 92 MG/DL — SIGNIFICANT CHANGE UP (ref 70–99)
HCT VFR BLD CALC: 43.1 % — SIGNIFICANT CHANGE UP (ref 34.5–45)
HGB BLD-MCNC: 14.2 G/DL — SIGNIFICANT CHANGE UP (ref 11.5–15.5)
MCHC RBC-ENTMCNC: 29.6 PG — SIGNIFICANT CHANGE UP (ref 27–34)
MCHC RBC-ENTMCNC: 32.9 GM/DL — SIGNIFICANT CHANGE UP (ref 32–36)
MCV RBC AUTO: 90 FL — SIGNIFICANT CHANGE UP (ref 80–100)
NRBC # BLD: 0 /100 WBCS — SIGNIFICANT CHANGE UP (ref 0–0)
PLATELET # BLD AUTO: 173 K/UL — SIGNIFICANT CHANGE UP (ref 150–400)
POTASSIUM SERPL-MCNC: 4.1 MMOL/L — SIGNIFICANT CHANGE UP (ref 3.5–5.3)
POTASSIUM SERPL-SCNC: 4.1 MMOL/L — SIGNIFICANT CHANGE UP (ref 3.5–5.3)
RBC # BLD: 4.79 M/UL — SIGNIFICANT CHANGE UP (ref 3.8–5.2)
RBC # FLD: 13.3 % — SIGNIFICANT CHANGE UP (ref 10.3–14.5)
SODIUM SERPL-SCNC: 141 MMOL/L — SIGNIFICANT CHANGE UP (ref 135–145)
WBC # BLD: 5.9 K/UL — SIGNIFICANT CHANGE UP (ref 3.8–10.5)
WBC # FLD AUTO: 5.9 K/UL — SIGNIFICANT CHANGE UP (ref 3.8–10.5)

## 2020-08-01 PROCEDURE — 99232 SBSQ HOSP IP/OBS MODERATE 35: CPT | Mod: GC

## 2020-08-01 RX ORDER — LEVETIRACETAM 250 MG/1
2 TABLET, FILM COATED ORAL
Qty: 120 | Refills: 0
Start: 2020-08-01 | End: 2020-08-30

## 2020-08-01 RX ORDER — CHOLECALCIFEROL (VITAMIN D3) 125 MCG
1 CAPSULE ORAL
Qty: 7 | Refills: 0
Start: 2020-08-01 | End: 2020-09-19

## 2020-08-01 RX ADMIN — ATORVASTATIN CALCIUM 40 MILLIGRAM(S): 80 TABLET, FILM COATED ORAL at 21:40

## 2020-08-01 RX ADMIN — Medication 81 MILLIGRAM(S): at 12:20

## 2020-08-01 RX ADMIN — ERGOCALCIFEROL 50000 UNIT(S): 1.25 CAPSULE ORAL at 14:49

## 2020-08-01 RX ADMIN — Medication 50 MILLIGRAM(S): at 17:11

## 2020-08-01 RX ADMIN — LEVETIRACETAM 400 MILLIGRAM(S): 250 TABLET, FILM COATED ORAL at 18:29

## 2020-08-01 RX ADMIN — Medication 1000 UNIT(S): at 12:20

## 2020-08-01 RX ADMIN — LEVETIRACETAM 400 MILLIGRAM(S): 250 TABLET, FILM COATED ORAL at 06:27

## 2020-08-01 RX ADMIN — Medication 50 MILLIGRAM(S): at 06:28

## 2020-08-01 RX ADMIN — RIVAROXABAN 20 MILLIGRAM(S): KIT at 17:11

## 2020-08-01 NOTE — PROGRESS NOTE ADULT - SUBJECTIVE AND OBJECTIVE BOX
Cristobal Agosto - Internal Medicine Attending Note    Patient is a 55y old  Female who presents with a chief complaint of Seizure (01 Aug 2020 14:37)      INTERVAL HPI/OVERNIGHT EVENTS: multiple episodes of Afib with RVR noted on tele     MEDICATIONS  (STANDING):  aspirin  chewable 81 milliGRAM(s) Oral daily  atorvastatin 40 milliGRAM(s) Oral at bedtime  ergocalciferol 83068 Unit(s) Oral <User Schedule>  levETIRAcetam  IVPB 1500 milliGRAM(s) IV Intermittent every 12 hours  metoprolol succinate ER 50 milliGRAM(s) Oral every 12 hours  rivaroxaban 20 milliGRAM(s) Oral with dinner    MEDICATIONS  (PRN):  acetaminophen   Tablet .. 650 milliGRAM(s) Oral every 6 hours PRN Temp greater or equal to 38C (100.4F), Mild Pain (1 - 3)  LORazepam   Injectable 2 milliGRAM(s) IV Push once PRN Breakthrough Seizure activity      __________________________________________________  REVIEW OF SYSTEMS:    CONSTITUTIONAL: No fever,   EYES: no acute visual disturbances  NECK: No pain or stiffness  RESPIRATORY: No cough; No shortness of breath  CARDIOVASCULAR: No chest pain, no palpitations  GASTROINTESTINAL: No pain. No nausea or vomiting; No diarrhea   NEUROLOGICAL: No headache or numbness, no tremors  MUSCULOSKELETAL: No joint pain, no muscle pain  GENITOURINARY: no dysuria, no frequency, no hesitancy  PSYCHIATRY: no depression , no anxiety  ALL OTHER  ROS negative        Vital Signs Last 24 Hrs  T(C): 36.4 (01 Aug 2020 15:18), Max: 36.9 (2020 23:39)  T(F): 97.5 (01 Aug 2020 15:18), Max: 98.4 (2020 23:39)  HR: 87 (01 Aug 2020 15:18) (41 - 87)  BP: 120/84 (01 Aug 2020 15:18) (91/53 - 120/91)  BP(mean): 84 (2020 23:39) (84 - 84)  RR: 18 (01 Aug 2020 15:18) (17 - 18)  SpO2: 98% (01 Aug 2020 15:18) (97% - 100%)    ________________________________________________  PHYSICAL EXAM:  GENERAL: NAD  HEENT: Normocephalic;  conjunctivae and sclerae clear; moist mucous membranes;   NECK : supple  CHEST/LUNG: Clear to auscultation bilaterally with good air entry   HEART: S1 S2  regular; no murmurs, gallops or rubs  ABDOMEN: Soft, Nontender, Nondistended; Bowel sounds present  EXTREMITIES: no cyanosis; no edema; no calf tenderness  SKIN: warm and dry; no rash  NERVOUS SYSTEM:  Awake and alert; Oriented  to place, person and time ; no new deficits    _________________________________________________  LABS:                        14.2   5.90  )-----------( 173      ( 01 Aug 2020 07:26 )             43.1     08-    141  |  110<H>  |  25<H>  ----------------------------<  92  4.1   |  24  |  0.77    Ca    8.9      01 Aug 2020 07:26  Phos  3.2     07-31  Mg     2.0     07-    TPro  6.9  /  Alb  3.0<L>  /  TBili  0.5  /  DBili  x   /  AST  15  /  ALT  19  /  AlkPhos  90  07-31    PT/INR - ( 2020 18:04 )   PT: 14.4 sec;   INR: 1.24 ratio         PTT - ( 2020 18:04 )  PTT:28.3 sec  Urinalysis Basic - ( 2020 05:47 )    Color: Yellow / Appearance: Clear / S.010 / pH: x  Gluc: x / Ketone: Negative  / Bili: Negative / Urobili: Negative   Blood: x / Protein: Negative / Nitrite: Negative   Leuk Esterase: Trace / RBC: 2-5 /HPF / WBC 3-5 /HPF   Sq Epi: x / Non Sq Epi: Few /HPF / Bacteria: Moderate /HPF      CAPILLARY BLOOD GLUCOSE            RADIOLOGY & ADDITIONAL TESTS:    Imaging Personally Reviewed:  YES    Consultant(s) Notes Reviewed:   YES    Care Discussed with Consultants : YES     Plan of care was discussed with patient and /or primary care giver; all questions and concerns were addressed and care was aligned with patient's wishes.

## 2020-08-01 NOTE — DISCHARGE NOTE PROVIDER - CARE PROVIDER_API CALL
Wiley Majano)  Neurology  Epilepsy  [address], [address]  [place] [address]  Phone: [phone]  Follow Up Time: 2 weeks

## 2020-08-01 NOTE — PROGRESS NOTE ADULT - PROBLEM SELECTOR PLAN 1
Presented with acute encephalopathy secondary to seizure  Given 1.5g of Keppra and Ativan with resolution in symptoms  dose increased to 1500 BID as per neuro recs

## 2020-08-01 NOTE — DISCHARGE NOTE PROVIDER - NSDCMRMEDTOKEN_GEN_ALL_CORE_FT
aspirin 81 mg oral tablet, chewable: 1 tab(s) orally once a day   atorvastatin 40 mg oral tablet: 1 tab(s) orally once a day (at bedtime)  cholecalciferol 50,000 intl units (1250 mcg) oral capsule: 1 cap(s) orally once a week   Keppra 750 mg oral tablet: 2 tab(s) orally 2 times a day   lisinopril-hydrochlorothiazide 20 mg-12.5 mg oral tablet: 1 tab(s) orally once a day  metoprolol succinate 50 mg oral tablet, extended release: 1 tab(s) orally 2 times a day  Xarelto 20 mg oral tablet: 1 tab(s) orally once a day (in the evening)

## 2020-08-01 NOTE — DISCHARGE NOTE PROVIDER - NSDCFUSCHEDAPPT_GEN_ALL_CORE_FT
THELMA NOLASCO ; 09/10/2020 ; [facility] [phone] THELMA NOLASCO ; 09/10/2020 ; [facility] [phone] THELMA NOLASCO ; 12/09/2020 ; [facility] Neuro [address] [facility]

## 2020-08-01 NOTE — PROGRESS NOTE ADULT - PROBLEM SELECTOR PLAN 2
History of afib on Eliquis and on metoprolol succinate 50mg BID(Confirmed with daughter)  - multiple episodes of Afib with RVR   - ekg obtained - afib with RVR

## 2020-08-01 NOTE — DISCHARGE NOTE PROVIDER - HOSPITAL COURSE
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 Patient was outside in the heat for multiple hours prior to admission. Patient's daughter then noticed deviation of the eyes which is consistent with patient's previous episodes of seizures. Also noted to have altered mental status and rigidity of the extremities, unable to walk.     At time of examination, patient was at baseline. AAOx3, endorsed that her residual weakness was at baseline. Patient has had no complaints of fever, chills, cough, SOB, nausea, vomiting, abdominal pain, dysuria, hematuria.        In the ED, Neuro, Dr. Majano was contacted and advised to increase dose of Keppra to 1.5g BID with improvement in patient's symptoms. Noted to have SILVERIO with elevation in BUN and elevated creatinine from baseline. Also noted with afib with RVR, with resolution of RVR with diltiazem push (30 Jul 2020 23:37)        Hospital course;     Patient was admitted with breakthrough seizure - likely form high outdoor temp and SILVERIO. Patient on keppra 1000 BID at home, dose increased to 1500 BID during this admission. Neurology consult was obtained and agrees with increasing keppra dose.     Patient will follow up with outpatient neuro Dr. Majano.         Patient is stable for discharge.

## 2020-08-01 NOTE — DISCHARGE NOTE PROVIDER - NSDCCPCAREPLAN_GEN_ALL_CORE_FT
PRINCIPAL DISCHARGE DIAGNOSIS  Diagnosis: Breakthrough seizure  Assessment and Plan of Treatment: You were admitted with breakthrough seizure - likely form high outdoor temp and SILVERIO. You were on keppra 1000 twice a day at home, dose increased to 1500 BID during this admission. Neurology consult was obtained and agrees with increasing keppra dose.   Please follow up with outpatient neuro Dr. Majano.   Avoid going out in high out door temp and stay hydrated.      SECONDARY DISCHARGE DIAGNOSES  Diagnosis: Vitamin D deficiency  Assessment and Plan of Treatment: Your Vitamin D level was noted 13.3 on admission - replacement was started during hospital. Continue medication once a week for 7 more weeks and follow up with PCP for repeat check.    Diagnosis: Atrial fibrillation with RVR  Assessment and Plan of Treatment: your atrial fibrillation was stable during hospital stay, continue your home regimen- metoprolol and xarelto    Diagnosis: SILVERIO (acute kidney injury)  Assessment and Plan of Treatment: your renal function was high on admission from dehydration which was improved with fluid replcement.   Drink 2-3 l water everyday, avoid going out in heat.    Diagnosis: Hypertension  Assessment and Plan of Treatment: Your BP was well controlled during hospital stay, continue home medications including Hydrochlorothizide-lisinopril.   Follow up with PCP for further recs.    Diagnosis: Stroke  Assessment and Plan of Treatment: previous history of stroke, no new findings noted, conitnue home regimen.

## 2020-08-01 NOTE — DISCHARGE NOTE PROVIDER - CARE PROVIDERS DIRECT ADDRESSES
[email or web address]

## 2020-08-01 NOTE — DISCHARGE NOTE PROVIDER - NSDCPNSUBOBJ_GEN_ALL_CORE
MEDICAL ATTENDING NOTE        Patient is a 55y old  Female who presents with a chief complaint of Seizure (2020 18:30)            INTERVAL HPI/OVERNIGHT EVENTS: patient denies any new compliants at this time.         MEDICATIONS  (STANDING):    aspirin  chewable 81 milliGRAM(s) Oral daily    atorvastatin 40 milliGRAM(s) Oral at bedtime    cholecalciferol 1000 Unit(s) Oral daily    ergocalciferol 43698 Unit(s) Oral <User Schedule>    levETIRAcetam  IVPB 1500 milliGRAM(s) IV Intermittent every 12 hours    metoprolol succinate ER 50 milliGRAM(s) Oral every 12 hours    rivaroxaban 20 milliGRAM(s) Oral with dinner    sodium chloride 0.9%. 1000 milliLiter(s) (85 mL/Hr) IV Continuous <Continuous>        MEDICATIONS  (PRN):    acetaminophen   Tablet .. 650 milliGRAM(s) Oral every 6 hours PRN Temp greater or equal to 38C (100.4F), Mild Pain (1 - 3)    LORazepam   Injectable 2 milliGRAM(s) IV Push once PRN Breakthrough Seizure activity            ----------------------------------------------------------------------------------    REVIEW OF SYSTEMS: negative    Vital Signs Last 24 Hrs    T(C): 36.4 (01 Aug 2020 12:01), Max: 36.9 (2020 23:39)    T(F): 97.5 (01 Aug 2020 12:01), Max: 98.4 (2020 23:39)    HR: 85 (01 Aug 2020 12:01) (41 - 86)    BP: 120/91 (01 Aug 2020 12:01) (91/53 - 126/88)    BP(mean): 84 (2020 23:39) (84 - 84)    RR: 18 (01 Aug 2020 12:01) (17 - 18)    SpO2: 99% (01 Aug 2020 12:01) (97% - 100%)        _________________    PHYSICAL EXAM:    ---------------------------     NAD; Normocephalic;     LUNGS - no wheezing    HEART: S1 S2+     ABDOMEN: Soft, Nontender, non distended    EXTREMITIES: no cyanosis; no edema    NERVOUS SYSTEM:  Awake and alert; no focal neuro deficits appreciated        _________________________________________________    LABS:                            14.2     5.90  )-----------( 173      ( 01 Aug 2020 07:26 )               43.1         08-        141  |  110<H>  |  25<H>    ----------------------------<  92    4.1   |  24  |  0.77        Ca    8.9      01 Aug 2020 07:26    Phos  3.2     07    Mg     2.0             TPro  6.9  /  Alb  3.0<L>  /  TBili  0.5  /  DBili  x   /  AST  15  /  ALT  19  /  AlkPhos  90  07-31        PT/INR - ( 2020 18:04 )   PT: 14.4 sec;   INR: 1.24 ratio               PTT - ( 2020 18:04 )  PTT:28.3 sec    Urinalysis Basic - ( 2020 05:47 )        Color: Yellow / Appearance: Clear / S.010 / pH: x    Gluc: x / Ketone: Negative  / Bili: Negative / Urobili: Negative     Blood: x / Protein: Negative / Nitrite: Negative     Leuk Esterase: Trace / RBC: 2-5 /HPF / WBC 3-5 /HPF     Sq Epi: x / Non Sq Epi: Few /HPF / Bacteria: Moderate /HPF MEDICAL ATTENDING NOTE        Patient is a 55y old  Female who presents with a chief complaint of Seizure (01 Aug 2020 17:15)            INTERVAL HPI/OVERNIGHT EVENTS: no acute issues noted, yesterday her HR was in 130-150s, now     better controlled.     MEDICATIONS  (STANDING):    aspirin  chewable 81 milliGRAM(s) Oral daily    atorvastatin 40 milliGRAM(s) Oral at bedtime    ergocalciferol 99267 Unit(s) Oral <User Schedule>    levETIRAcetam  IVPB 1500 milliGRAM(s) IV Intermittent every 12 hours    metoprolol succinate ER 50 milliGRAM(s) Oral every 12 hours    rivaroxaban 20 milliGRAM(s) Oral with dinner        MEDICATIONS  (PRN):    acetaminophen   Tablet .. 650 milliGRAM(s) Oral every 6 hours PRN Temp greater or equal to 38C (100.4F), Mild Pain (1 - 3)    LORazepam   Injectable 2 milliGRAM(s) IV Push once PRN Breakthrough Seizure activity            ----------------------------------------------------------------------------------    REVIEW OF SYSTEMS: negative    Vital Signs Last 24 Hrs    T(C): 36.8 (02 Aug 2020 08:20), Max: 36.9 (01 Aug 2020 23:51)    T(F): 98.2 (02 Aug 2020 08:20), Max: 98.4 (01 Aug 2020 23:51)    HR: 85 (02 Aug 2020 08:20) (71 - 106)    BP: 111/75 (02 Aug 2020 08:20) (105/66 - 124/81)    BP(mean): --    RR: 19 (02 Aug 2020 08:20) (18 - 20)    SpO2: 98% (02 Aug 2020 08:20) (97% - 98%)        _________________    PHYSICAL EXAM:    ---------------------------     NAD; Normocephalic;     LUNGS - no wheezing    HEART: S1 S2+     ABDOMEN: Soft, Nontender, non distended    EXTREMITIES: no cyanosis; no edema    NERVOUS SYSTEM:  Awake and alert; no new deficit noted         _________________________________________________    LABS:                            12.9     5.73  )-----------( 164      ( 02 Aug 2020 06:59 )               38.9         08-02        143  |  111<H>  |  18    ----------------------------<  90    3.7   |  24  |  0.74        Ca    9.0      02 Aug 2020 06:59

## 2020-08-01 NOTE — DISCHARGE NOTE PROVIDER - ATTENDING COMMENTS
Breakthrough seizure.  Plan: Presented with acute encephalopathy secondary to seizure    Given 1.5g of Keppra and Ativan with resolution in symptoms    neurology consult appreciated, will discharge on keppra 1500 BID         Atrial fibrillation.  Plan: History of afib on Eliquis and on metoprolol succinate 50mg BID(Confirmed with daughter)    - Continue with home medications.         CVA (cerebral vascular accident).  Plan: Hx of CVA with residual left sided weakness    No concerns for acute CVA at this time as patient's encephalopathy improved with treatment for seizure    Continue with aspirin, atorvastatin.             ·  Hypertension.  Plan: On metoprolol succ. 50mg BID, HCTZ-Lisinopril    discharge on home regimen         Patient is stable for discharge.

## 2020-08-02 ENCOUNTER — TRANSCRIPTION ENCOUNTER (OUTPATIENT)
Age: 55
End: 2020-08-02

## 2020-08-02 VITALS
HEART RATE: 85 BPM | DIASTOLIC BLOOD PRESSURE: 75 MMHG | RESPIRATION RATE: 19 BRPM | SYSTOLIC BLOOD PRESSURE: 111 MMHG | OXYGEN SATURATION: 98 % | TEMPERATURE: 98 F

## 2020-08-02 LAB
ANION GAP SERPL CALC-SCNC: 8 MMOL/L — SIGNIFICANT CHANGE UP (ref 5–17)
BUN SERPL-MCNC: 18 MG/DL — SIGNIFICANT CHANGE UP (ref 7–18)
CALCIUM SERPL-MCNC: 9 MG/DL — SIGNIFICANT CHANGE UP (ref 8.4–10.5)
CHLORIDE SERPL-SCNC: 111 MMOL/L — HIGH (ref 96–108)
CO2 SERPL-SCNC: 24 MMOL/L — SIGNIFICANT CHANGE UP (ref 22–31)
CREAT SERPL-MCNC: 0.74 MG/DL — SIGNIFICANT CHANGE UP (ref 0.5–1.3)
GLUCOSE SERPL-MCNC: 90 MG/DL — SIGNIFICANT CHANGE UP (ref 70–99)
HCT VFR BLD CALC: 38.9 % — SIGNIFICANT CHANGE UP (ref 34.5–45)
HGB BLD-MCNC: 12.9 G/DL — SIGNIFICANT CHANGE UP (ref 11.5–15.5)
MCHC RBC-ENTMCNC: 29.7 PG — SIGNIFICANT CHANGE UP (ref 27–34)
MCHC RBC-ENTMCNC: 33.2 GM/DL — SIGNIFICANT CHANGE UP (ref 32–36)
MCV RBC AUTO: 89.6 FL — SIGNIFICANT CHANGE UP (ref 80–100)
NRBC # BLD: 0 /100 WBCS — SIGNIFICANT CHANGE UP (ref 0–0)
PLATELET # BLD AUTO: 164 K/UL — SIGNIFICANT CHANGE UP (ref 150–400)
POTASSIUM SERPL-MCNC: 3.7 MMOL/L — SIGNIFICANT CHANGE UP (ref 3.5–5.3)
POTASSIUM SERPL-SCNC: 3.7 MMOL/L — SIGNIFICANT CHANGE UP (ref 3.5–5.3)
RBC # BLD: 4.34 M/UL — SIGNIFICANT CHANGE UP (ref 3.8–5.2)
RBC # FLD: 13.3 % — SIGNIFICANT CHANGE UP (ref 10.3–14.5)
SODIUM SERPL-SCNC: 143 MMOL/L — SIGNIFICANT CHANGE UP (ref 135–145)
WBC # BLD: 5.73 K/UL — SIGNIFICANT CHANGE UP (ref 3.8–10.5)
WBC # FLD AUTO: 5.73 K/UL — SIGNIFICANT CHANGE UP (ref 3.8–10.5)

## 2020-08-02 PROCEDURE — 95819 EEG AWAKE AND ASLEEP: CPT

## 2020-08-02 PROCEDURE — 85730 THROMBOPLASTIN TIME PARTIAL: CPT

## 2020-08-02 PROCEDURE — 99285 EMERGENCY DEPT VISIT HI MDM: CPT

## 2020-08-02 PROCEDURE — 80177 DRUG SCRN QUAN LEVETIRACETAM: CPT

## 2020-08-02 PROCEDURE — 84443 ASSAY THYROID STIM HORMONE: CPT

## 2020-08-02 PROCEDURE — 86769 SARS-COV-2 COVID-19 ANTIBODY: CPT

## 2020-08-02 PROCEDURE — 84484 ASSAY OF TROPONIN QUANT: CPT

## 2020-08-02 PROCEDURE — 82553 CREATINE MB FRACTION: CPT

## 2020-08-02 PROCEDURE — 81001 URINALYSIS AUTO W/SCOPE: CPT

## 2020-08-02 PROCEDURE — U0003: CPT

## 2020-08-02 PROCEDURE — 84100 ASSAY OF PHOSPHORUS: CPT

## 2020-08-02 PROCEDURE — 80053 COMPREHEN METABOLIC PANEL: CPT

## 2020-08-02 PROCEDURE — 92610 EVALUATE SWALLOWING FUNCTION: CPT

## 2020-08-02 PROCEDURE — 97162 PT EVAL MOD COMPLEX 30 MIN: CPT

## 2020-08-02 PROCEDURE — 83735 ASSAY OF MAGNESIUM: CPT

## 2020-08-02 PROCEDURE — 82306 VITAMIN D 25 HYDROXY: CPT

## 2020-08-02 PROCEDURE — 85610 PROTHROMBIN TIME: CPT

## 2020-08-02 PROCEDURE — 85027 COMPLETE CBC AUTOMATED: CPT

## 2020-08-02 PROCEDURE — 99239 HOSP IP/OBS DSCHRG MGMT >30: CPT

## 2020-08-02 PROCEDURE — 96375 TX/PRO/DX INJ NEW DRUG ADDON: CPT

## 2020-08-02 PROCEDURE — 82550 ASSAY OF CK (CPK): CPT

## 2020-08-02 PROCEDURE — 70450 CT HEAD/BRAIN W/O DYE: CPT

## 2020-08-02 PROCEDURE — 95957 EEG DIGITAL ANALYSIS: CPT

## 2020-08-02 PROCEDURE — 82746 ASSAY OF FOLIC ACID SERUM: CPT

## 2020-08-02 PROCEDURE — 80048 BASIC METABOLIC PNL TOTAL CA: CPT

## 2020-08-02 PROCEDURE — 96374 THER/PROPH/DIAG INJ IV PUSH: CPT

## 2020-08-02 PROCEDURE — 82607 VITAMIN B-12: CPT

## 2020-08-02 PROCEDURE — 93005 ELECTROCARDIOGRAM TRACING: CPT

## 2020-08-02 PROCEDURE — 80061 LIPID PANEL: CPT

## 2020-08-02 PROCEDURE — 36415 COLL VENOUS BLD VENIPUNCTURE: CPT

## 2020-08-02 PROCEDURE — 83036 HEMOGLOBIN GLYCOSYLATED A1C: CPT

## 2020-08-02 PROCEDURE — 71045 X-RAY EXAM CHEST 1 VIEW: CPT

## 2020-08-02 RX ADMIN — LEVETIRACETAM 400 MILLIGRAM(S): 250 TABLET, FILM COATED ORAL at 05:59

## 2020-08-02 RX ADMIN — Medication 50 MILLIGRAM(S): at 05:59

## 2020-08-02 NOTE — DISCHARGE NOTE NURSING/CASE MANAGEMENT/SOCIAL WORK - NSDCPEPTSTRK_GEN_ALL_CORE
Call 911 for stroke/Stroke support groups for patients, families, and friends/Risk factors for stroke/Signs and symptoms of stroke/Need for follow up after discharge/Stroke education booklet/Stroke warning signs and symptoms/Prescribed medications

## 2020-08-02 NOTE — DISCHARGE NOTE NURSING/CASE MANAGEMENT/SOCIAL WORK - PATIENT PORTAL LINK FT
You can access the FollowMyHealth Patient Portal offered by [facility] by registering at the following website: [email or web address]. By joining [facility] [facility]’s FollowMyHealth portal, you will also be able to view your health information using other applications (apps) compatible with our system.

## 2020-08-02 NOTE — DISCHARGE NOTE NURSING/CASE MANAGEMENT/SOCIAL WORK - NSDCPEXARELTOFU_GEN_ALL_CORE
Thanks for coming today.  Ortho[facility]Sports Medicine Clinic  [address] [place] [address]    To schedule future appointments in Ortho Clinic, you may call [phone].    To schedule ordered imaging by your provider:   Call Central [facility] [phone]    To schedule an injection ordered by your provider:  Call Central [facility] Injection scheduling line: [phone]  [facility] [employer] [facility] available online at:  [email or web address]    Please call if any further questions or concerns ([phone]).  Clinic hours 8 am to 5 pm.    Return to clinic (call) if symptoms worsen or fail to improve.    
Go for blood tests as directed. Your doctor will do lab tests at regular visits to monitor the effects of this medicine. Please follow up with your doctor and keep your health care provider appointments.

## 2020-08-03 LAB — LEVETIRACETAM SERPL-MCNC: 86 MCG/ML — HIGH (ref 12–46)

## 2020-09-09 ENCOUNTER — APPOINTMENT (OUTPATIENT)
Dept: NEUROLOGY | Facility: CLINIC | Age: 55
End: 2020-09-09
Payer: MEDICARE

## 2020-09-09 VITALS
DIASTOLIC BLOOD PRESSURE: 78 MMHG | OXYGEN SATURATION: 98 % | HEIGHT: 62 IN | SYSTOLIC BLOOD PRESSURE: 105 MMHG | TEMPERATURE: 97.9 F | HEART RATE: 85 BPM | WEIGHT: 202 LBS | BODY MASS INDEX: 37.17 KG/M2

## 2020-09-09 PROCEDURE — 99215 OFFICE O/P EST HI 40 MIN: CPT

## 2020-09-10 ENCOUNTER — APPOINTMENT (OUTPATIENT)
Dept: ELECTROPHYSIOLOGY | Facility: CLINIC | Age: 55
End: 2020-09-10
Payer: MEDICARE

## 2020-09-14 NOTE — PHYSICAL EXAM
[General Appearance - Alert] : alert [General Appearance - In No Acute Distress] : in no acute distress [Oriented To Time, Place, And Person] : oriented to person, place, and time [General Appearance - Well Nourished] : well nourished [Place] : oriented to place [Person] : oriented to person [Time] : oriented to time [Fluency] : fluency intact [Comprehension] : comprehension intact [Cranial Nerves Optic (II)] : visual acuity intact bilaterally,  visual fields full to confrontation, pupils equal round and reactive to light [Cranial Nerves Oculomotor (III)] : extraocular motion intact [Cranial Nerves Trigeminal (V)] : facial sensation intact symmetrically [Cranial Nerves Vestibulocochlear (VIII)] : hearing was intact bilaterally [Cranial Nerves Glossopharyngeal (IX)] : tongue and palate midline [Cranial Nerves Hypoglossal (XII)] : there was no tongue deviation with protrusion [Cranial Nerves Accessory (XI - Cranial And Spinal)] : head turning and shoulder shrug symmetric [Flattening Of Nasolabial Fold On The Left] : flattening of the  nasolabial fold [Involuntary Movements] : no involuntary movements were seen [Hemiparesis Of Left Side] : hemiparesis was present on the left [Motor Handedness Right-Handed] : the patient is right hand dominant [Paresis Pronator Drift Left-Sided] : a left-sided pronator drift was present [1] : shoulder adduction 1/5 [Hand Weakness Left] : the hand  was weak [5] : ankle plantar flexion 5/5 [2] : ankle dorsiflexion 2/5 [3] : ankle plantar flexion 3/5 [1+] : Patella right 1+ [0] : Ankle jerk left 0 [Plantar Reflex Right Only] : abnormal on the right [Sclera] : the sclera and conjunctiva were normal [PERRL With Normal Accommodation] : pupils were equal in size, round, reactive to light, with normal accommodation [Extraocular Movements] : extraocular movements were intact [Outer Ear] : the ears and nose were normal in appearance [Oropharynx] : the oropharynx was normal [Hearing Threshold Finger Rub Not [place]] : hearing was normal [Neck Appearance] : the appearance of the neck was normal [Auscultation Breath Sounds / Voice Sounds] : lungs were clear to auscultation bilaterally [Arterial Pulses Carotid] : carotid pulses were normal with no bruits [Abdomen Soft] : soft [Full Pulse] : the pedal pulses are present [Abdomen Tenderness] : non-tender [No CVA Tenderness] : no ~M costovertebral angle tenderness [No Spinal Tenderness] : no spinal tenderness [Nail Clubbing] : no clubbing  or cyanosis of the fingernails [] : no rash [Skin Turgor] : normal skin turgor [Impaired Insight] : insight and judgment were intact [Flattening Of Nasolabial Fold On The Right] : no flattening of the nasolabial fold [Paresis Pronator Drift Right-Sided] : no pronator drift on the right [Hand Weakness Right] : normal hand  [Allodynia] : no ~T allodynia present [Tactile Decrease Entire Right Arm] : normal right arm [Tactile Decrease Entire Left Arm] : normal left arm [Tactile Decrease Hand] : normal left hand [Tactile Decrease Entire Leg Right] : normal right leg [Tactile Decrease Entire Leg Left] : normal left leg [Pain / Temperature Decrease Entire Arm Right] : normal right arm [Pain / Temperature Decrease Entire Arm Left] : normal left arm [Pain / Temp Decrease Hand] : normal left hand [Pain / Temp Decrease Entire Leg - Right] : normal right leg [Pain / Temp Decrease Entire Leg - Left] : normal left leg [Coordination - Dysmetria Impaired Finger-to-Nose Bilateral] : not present [Coordination - Dysmetria Impaired Heel-to-Shin Bilateral] : not present [Plantar Reflex Left Only] : normal on the left [___] : absent on the right [___] : absent on the left [FreeTextEntry4] : No dysarthria present [FreeTextEntry8] : Wide-based stance and gait. Hemiparetic gait present. Unable to perform tiptoe, heel, and tandem gaits, nor to be tested for Romberg sign, due to left leg weakness. Coordination exams on left are limited due to weakness. [FreeTextEntry1] : Irregularly irregular rhythm, Rate WNL

## 2020-09-14 NOTE — DATA REVIEWED
[de-identified] : (01/13/2019): Right temporal sharps and electrographic seizure; Bilateral temporal intermittent rhythmic delta activity (TIRDA)\par \par (12/02/2019): Right frontal sharps and focal slowing, Diffuse beta activity [de-identified] : CT Head and CT Angio Head & Neck (01/12/2019): Chronic right MCA infarct, Absent right PCA, Otherwise normal intracranial and neck vasculature\par \par Echocardiogram (01/14/2019): Severely dilated left atrium, Trace mitral regurgitation\par \par Labs (01/15/2019): CBC Normal, INR 1.67 (high), BMP notable for low sodium (133), low potassium (3.3), and high glucose (107)\par \par Labs (01/13/2019): Hemoglobin A1c 5.6%, Fasting lipid panel: Cholesterol 152, Triglycerides 43, HDL 65, LDL 78\par \par CT Head (12/01/2019): Chronic right MCA (large) and right EJ (partial) infarcts without new intracranial abnormalities\par \par Labs (12/03/2019): Notable for Hct 46.0% <H>, Phosphorus 2.4 <L>, Vitamin D 17.1 <L>, Normal Vit B12 & Folate\par \par Labs (08/02/2020): CBC, BMP normal; Levetiracetam level (07/31/2020): 86.0 <H>

## 2020-09-14 NOTE — ASSESSMENT
[FreeTextEntry1] : 55 RHF with right MCA territory ischemic stroke secondary to atrial fibrillation, with stable left-sided hemiparesis, sensory deficit, and coordination deficit, with recent breakthrough post-stroke seizure.

## 2020-09-14 NOTE — HISTORY OF PRESENT ILLNESS
[FreeTextEntry1] : FROM 1/25/19:\par This is a 53-year-old right-handed woman with a previous right hemispheric ischemic stroke and residual left-sided hemiparesis, who presented to [facility] ED on January 12, 2019 after experiencing inability to speak and worsened left-sided hemiparesis, followed by a generalized convulsion. EMS was called and the patient was administered lorazepam 2mg IV x 1, which caused the seizure to resolve. The patient was started on levetiracetam 500mg PO BID after receiving a 1000mg IV load, and has since had no similar events, and no change to her baseline left-sided hemiparesis. She had no tongue/cheek bite, head trauma, or urinary/bowel incontinence. She denies any recent history of fever, headache, alcohol use, added stress, or poor sleep hygiene. PCP: Dr. Jamil Seay.\par \par FROM 8/16/19:\par The patient, now 64, states that since I saw her on 1/25/19, she has had one episode of confusion associated with abdominal pain, although there was no witnessed seizure activity or new stroke-like symptoms. She presented to [facility] ED on 3/24/19 - per ED notes, she had generalized weakness with abdominal pain, but was found to have a normal CT abdomen and resolution of abdominal pain, prompting discharge to home. She has been compliant with her medications, although she has not taken aspirin 81mg daily while taking Xarelto. She has occasional mild frontal headache, which resolves on its own or with over-the-counter Tylenol.\par \par FROM 11/13/19:\par Since the last clinic visit, the patient presented to the ED in early October for breakthrough seizures, and her levetiracetam dosage was increased from 500mg to 750mg PO BID. Since then, she has not had any new seizures, and has been compliant with all of her medications for seizure prophylaxis and secondary stroke prevention. She completed four sessions of physical therapy, which helped her improve her left arm and leg strength, but she was not approved by her insurance plan for more sessions.\par \par FROM 1/8/20:\par The patient was admitted to [facility] during December 2-4, 2019, for breakthrough seizure. Her levetiracetam dosage was increased from 750mg to 1000mg PO BID. Since then, she has been stable without any new seizures. She has been continuing her physical therapy course since the hospital discharge, as well.\par \par FROM 9/9/20:\par Since the last clinic visit, the patient, now 55 was admitted to [facility] during July 30 - August 2, 2020 for breakthrough seizure in the setting of acute renal injury and prolonged exposure to high outdoor temperature. Her levetiracetam dosage was increased from 1000mg to 1500mg twice daily. Since then, she has not had any new seizures nor any concerns for new stroke.

## 2020-09-24 ENCOUNTER — APPOINTMENT (OUTPATIENT)
Dept: ELECTROPHYSIOLOGY | Facility: CLINIC | Age: 55
End: 2020-09-24
Payer: MEDICARE

## 2020-09-24 PROCEDURE — 93296 REM INTERROG EVL PM/IDS: CPT

## 2020-09-24 PROCEDURE — 93294 REM INTERROG EVL PM/LDLS PM: CPT

## 2020-09-27 ENCOUNTER — INPATIENT (INPATIENT)
Facility: HOSPITAL | Age: 55
LOS: 1 days | Discharge: HOME | DRG: 101 | End: 2020-09-29
Attending: STUDENT IN AN ORGANIZED HEALTH CARE EDUCATION/TRAINING PROGRAM | Admitting: STUDENT IN AN ORGANIZED HEALTH CARE EDUCATION/TRAINING PROGRAM
Payer: COMMERCIAL

## 2020-09-27 VITALS
SYSTOLIC BLOOD PRESSURE: 130 MMHG | RESPIRATION RATE: 20 BRPM | TEMPERATURE: 99 F | WEIGHT: 182.1 LBS | DIASTOLIC BLOOD PRESSURE: 82 MMHG | HEIGHT: 64 IN | OXYGEN SATURATION: 95 % | HEART RATE: 107 BPM

## 2020-09-27 DIAGNOSIS — G45.9 TRANSIENT CEREBRAL ISCHEMIC ATTACK, UNSPECIFIED: ICD-10-CM

## 2020-09-27 DIAGNOSIS — Z98.51 TUBAL LIGATION STATUS: Chronic | ICD-10-CM

## 2020-09-27 LAB
ALBUMIN SERPL ELPH-MCNC: 3.5 G/DL — SIGNIFICANT CHANGE UP (ref 3.5–5)
ALP SERPL-CCNC: 100 U/L — SIGNIFICANT CHANGE UP (ref 40–120)
ALT FLD-CCNC: 24 U/L DA — SIGNIFICANT CHANGE UP (ref 10–60)
ANION GAP SERPL CALC-SCNC: 6 MMOL/L — SIGNIFICANT CHANGE UP (ref 5–17)
APTT BLD: 28.6 SEC — SIGNIFICANT CHANGE UP (ref 27.5–35.5)
AST SERPL-CCNC: 17 U/L — SIGNIFICANT CHANGE UP (ref 10–40)
BASOPHILS # BLD AUTO: 0.03 K/UL — SIGNIFICANT CHANGE UP (ref 0–0.2)
BASOPHILS NFR BLD AUTO: 0.4 % — SIGNIFICANT CHANGE UP (ref 0–2)
BILIRUB SERPL-MCNC: 0.5 MG/DL — SIGNIFICANT CHANGE UP (ref 0.2–1.2)
BUN SERPL-MCNC: 14 MG/DL — SIGNIFICANT CHANGE UP (ref 7–18)
CALCIUM SERPL-MCNC: 9.1 MG/DL — SIGNIFICANT CHANGE UP (ref 8.4–10.5)
CHLORIDE SERPL-SCNC: 104 MMOL/L — SIGNIFICANT CHANGE UP (ref 96–108)
CO2 SERPL-SCNC: 27 MMOL/L — SIGNIFICANT CHANGE UP (ref 22–31)
CREAT SERPL-MCNC: 0.92 MG/DL — SIGNIFICANT CHANGE UP (ref 0.5–1.3)
EOSINOPHIL # BLD AUTO: 0.01 K/UL — SIGNIFICANT CHANGE UP (ref 0–0.5)
EOSINOPHIL NFR BLD AUTO: 0.1 % — SIGNIFICANT CHANGE UP (ref 0–6)
ETHANOL SERPL-MCNC: 7 MG/DL — SIGNIFICANT CHANGE UP (ref 0–10)
GLUCOSE SERPL-MCNC: 125 MG/DL — HIGH (ref 70–99)
HCT VFR BLD CALC: 42.6 % — SIGNIFICANT CHANGE UP (ref 34.5–45)
HGB BLD-MCNC: 14.4 G/DL — SIGNIFICANT CHANGE UP (ref 11.5–15.5)
IMM GRANULOCYTES NFR BLD AUTO: 0.2 % — SIGNIFICANT CHANGE UP (ref 0–1.5)
INR BLD: 1.3 RATIO — HIGH (ref 0.88–1.16)
LYMPHOCYTES # BLD AUTO: 1.28 K/UL — SIGNIFICANT CHANGE UP (ref 1–3.3)
LYMPHOCYTES # BLD AUTO: 15.7 % — SIGNIFICANT CHANGE UP (ref 13–44)
MCHC RBC-ENTMCNC: 29.9 PG — SIGNIFICANT CHANGE UP (ref 27–34)
MCHC RBC-ENTMCNC: 33.8 GM/DL — SIGNIFICANT CHANGE UP (ref 32–36)
MCV RBC AUTO: 88.4 FL — SIGNIFICANT CHANGE UP (ref 80–100)
MONOCYTES # BLD AUTO: 0.5 K/UL — SIGNIFICANT CHANGE UP (ref 0–0.9)
MONOCYTES NFR BLD AUTO: 6.1 % — SIGNIFICANT CHANGE UP (ref 2–14)
NEUTROPHILS # BLD AUTO: 6.3 K/UL — SIGNIFICANT CHANGE UP (ref 1.8–7.4)
NEUTROPHILS NFR BLD AUTO: 77.5 % — HIGH (ref 43–77)
NRBC # BLD: 0 /100 WBCS — SIGNIFICANT CHANGE UP (ref 0–0)
PLATELET # BLD AUTO: 202 K/UL — SIGNIFICANT CHANGE UP (ref 150–400)
POTASSIUM SERPL-MCNC: 3.8 MMOL/L — SIGNIFICANT CHANGE UP (ref 3.5–5.3)
POTASSIUM SERPL-SCNC: 3.8 MMOL/L — SIGNIFICANT CHANGE UP (ref 3.5–5.3)
PROT SERPL-MCNC: 7.6 G/DL — SIGNIFICANT CHANGE UP (ref 6–8.3)
PROTHROM AB SERPL-ACNC: 15 SEC — HIGH (ref 10.6–13.6)
RBC # BLD: 4.82 M/UL — SIGNIFICANT CHANGE UP (ref 3.8–5.2)
RBC # FLD: 13.9 % — SIGNIFICANT CHANGE UP (ref 10.3–14.5)
SARS-COV-2 RNA SPEC QL NAA+PROBE: SIGNIFICANT CHANGE UP
SODIUM SERPL-SCNC: 137 MMOL/L — SIGNIFICANT CHANGE UP (ref 135–145)
TROPONIN I SERPL-MCNC: <0.015 NG/ML — SIGNIFICANT CHANGE UP (ref 0–0.04)
WBC # BLD: 8.14 K/UL — SIGNIFICANT CHANGE UP (ref 3.8–10.5)
WBC # FLD AUTO: 8.14 K/UL — SIGNIFICANT CHANGE UP (ref 3.8–10.5)

## 2020-09-27 PROCEDURE — 70450 CT HEAD/BRAIN W/O DYE: CPT | Mod: 26

## 2020-09-27 PROCEDURE — 99285 EMERGENCY DEPT VISIT HI MDM: CPT

## 2020-09-27 PROCEDURE — 71045 X-RAY EXAM CHEST 1 VIEW: CPT | Mod: 26

## 2020-09-27 RX ORDER — ASPIRIN/CALCIUM CARB/MAGNESIUM 324 MG
325 TABLET ORAL ONCE
Refills: 0 | Status: COMPLETED | OUTPATIENT
Start: 2020-09-27 | End: 2020-09-27

## 2020-09-27 RX ADMIN — Medication 325 MILLIGRAM(S): at 22:29

## 2020-09-27 NOTE — ED ADULT NURSE NOTE - ED STAT RN HANDOFF DETAILS
Pt alseep in bed, easily arousable to verbal stinuli, breathing unlabored room aid. Pt received bed, endorsed to 5N for continuation of care.

## 2020-09-27 NOTE — ED PROVIDER NOTE - PHYSICAL EXAMINATION
NEURO: severe weakness to L arm, mild weakness to L leg, motor and strength 5/5 in RUE and RLE, no facial droop, nml speech, AOx3 NEURO: severe weakness to L arm (unchanged from baseline), mild weakness to L leg (unchanged from baseline), motor and strength 5/5 in RUE and RLE, no facial droop, nml speech, AOx3

## 2020-09-27 NOTE — ED PROVIDER NOTE - OBJECTIVE STATEMENT
54 y/o female with PMHx of HTN, afib (on Xarelto), tachy-clare syndrome, seizure, prior CVA (with confirmed residual L sided arm and leg weakness at baseline) BIB EMS to the ED as a notification c/o slurred speech x today. Pt notes new onset slurred speech and confusion, last seen nml around 7:50PM. Sx resolved on arrival. Pt denies new weakness, numbness, HA, fever, chest pain, shortness of breath, or any other complaints. No head injury. No smoking, EtOH, or drug use. Pt allergic to Penicillin (hives).

## 2020-09-27 NOTE — ED ADULT NURSE NOTE - OBJECTIVE STATEMENT
Pt BIBEMS alert oriented to self place and time. breathing unlabored room air. c/o HA x 3Pm x today. Denies nausea vomiting fever chill or difficulty breathing. Pt noted Left side weakness as pt residue from stroke in 2015.

## 2020-09-27 NOTE — ED ADULT TRIAGE NOTE - CHIEF COMPLAINT QUOTE
EMS Notification-slurring of speech noted around 2009 hrs ,l sided weakness(baseline) from prior stroke in 2016 per family per ems

## 2020-09-27 NOTE — ED PROVIDER NOTE - CLINICAL SUMMARY MEDICAL DECISION MAKING FREE TEXT BOX
55 F sent as notification for slurred speech and confusion. Sx resolved PTA. Suspect TIA pt now at her baseline. Stroke code called initially, put will be monitored and reassessed. CT head, labs, EKG, CXR and anticipate admission. 55 F sent as notification for slurred speech and confusion. Sx resolved PTA. Suspect TIA as pt now at her baseline. Stroke code called initially, Pt will be monitored and reassessed. CT head, labs, EKG, CXR and anticipate admission.

## 2020-09-27 NOTE — ED ADULT NURSE NOTE - CHPI ED NUR SYMPTOMS NEG
no confusion/no loss of consciousness/no nausea/no change in level of consciousness/no dizziness/no fever/no vomiting

## 2020-09-27 NOTE — ED ADULT NURSE NOTE - INTERVENTIONS DEFINITIONS
Call bell, personal items and telephone within reach/Stretcher in lowest position, wheels locked, appropriate side rails in place/Monitor for mental status changes and reorient to person, place, and time

## 2020-09-27 NOTE — ED PROVIDER NOTE - PROGRESS NOTE DETAILS
Pt's acute symptoms of reported slurred speech and disorientation were spontaneously resolved at time of arrival to ED--TIA is suspected clinically.  Initially, code stroke was called but then cancelled.  There is no indication for code stroke/telestroke/tPA given that acute stroke symptoms are resolved.    Informed Dr. Infante for admission.

## 2020-09-28 DIAGNOSIS — I10 ESSENTIAL (PRIMARY) HYPERTENSION: ICD-10-CM

## 2020-09-28 DIAGNOSIS — I48.91 UNSPECIFIED ATRIAL FIBRILLATION: ICD-10-CM

## 2020-09-28 DIAGNOSIS — Z29.9 ENCOUNTER FOR PROPHYLACTIC MEASURES, UNSPECIFIED: ICD-10-CM

## 2020-09-28 DIAGNOSIS — E78.5 HYPERLIPIDEMIA, UNSPECIFIED: ICD-10-CM

## 2020-09-28 DIAGNOSIS — R56.9 UNSPECIFIED CONVULSIONS: ICD-10-CM

## 2020-09-28 DIAGNOSIS — I63.9 CEREBRAL INFARCTION, UNSPECIFIED: ICD-10-CM

## 2020-09-28 DIAGNOSIS — G45.9 TRANSIENT CEREBRAL ISCHEMIC ATTACK, UNSPECIFIED: ICD-10-CM

## 2020-09-28 LAB
A1C WITH ESTIMATED AVERAGE GLUCOSE RESULT: 5.6 % — SIGNIFICANT CHANGE UP (ref 4–5.6)
ALBUMIN SERPL ELPH-MCNC: 3.2 G/DL — LOW (ref 3.5–5)
ALP SERPL-CCNC: 100 U/L — SIGNIFICANT CHANGE UP (ref 40–120)
ALT FLD-CCNC: 21 U/L DA — SIGNIFICANT CHANGE UP (ref 10–60)
ANION GAP SERPL CALC-SCNC: 6 MMOL/L — SIGNIFICANT CHANGE UP (ref 5–17)
AST SERPL-CCNC: 14 U/L — SIGNIFICANT CHANGE UP (ref 10–40)
BASOPHILS # BLD AUTO: 0.03 K/UL — SIGNIFICANT CHANGE UP (ref 0–0.2)
BASOPHILS NFR BLD AUTO: 0.4 % — SIGNIFICANT CHANGE UP (ref 0–2)
BILIRUB SERPL-MCNC: 0.7 MG/DL — SIGNIFICANT CHANGE UP (ref 0.2–1.2)
BUN SERPL-MCNC: 10 MG/DL — SIGNIFICANT CHANGE UP (ref 7–18)
CALCIUM SERPL-MCNC: 9.3 MG/DL — SIGNIFICANT CHANGE UP (ref 8.4–10.5)
CHLORIDE SERPL-SCNC: 105 MMOL/L — SIGNIFICANT CHANGE UP (ref 96–108)
CHOLEST SERPL-MCNC: 152 MG/DL
CHOLEST SERPL-MCNC: 156 MG/DL — SIGNIFICANT CHANGE UP (ref 10–199)
CHOLEST/HDLC SERPL: 2.7 RATIO
CO2 SERPL-SCNC: 27 MMOL/L — SIGNIFICANT CHANGE UP (ref 22–31)
CREAT SERPL-MCNC: 0.69 MG/DL — SIGNIFICANT CHANGE UP (ref 0.5–1.3)
EOSINOPHIL # BLD AUTO: 0.05 K/UL — SIGNIFICANT CHANGE UP (ref 0–0.5)
EOSINOPHIL NFR BLD AUTO: 0.6 % — SIGNIFICANT CHANGE UP (ref 0–6)
ESTIMATED AVERAGE GLUCOSE: 114 MG/DL — SIGNIFICANT CHANGE UP (ref 68–114)
FERRITIN SERPL-MCNC: 31 NG/ML — SIGNIFICANT CHANGE UP (ref 15–150)
FOLATE SERPL-MCNC: 14.2 NG/ML — SIGNIFICANT CHANGE UP
GLUCOSE SERPL-MCNC: 88 MG/DL — SIGNIFICANT CHANGE UP (ref 70–99)
HCT VFR BLD CALC: 43.3 % — SIGNIFICANT CHANGE UP (ref 34.5–45)
HDLC SERPL-MCNC: 56 MG/DL
HDLC SERPL-MCNC: 59 MG/DL — SIGNIFICANT CHANGE UP
HGB BLD-MCNC: 14.5 G/DL — SIGNIFICANT CHANGE UP (ref 11.5–15.5)
IMM GRANULOCYTES NFR BLD AUTO: 0.1 % — SIGNIFICANT CHANGE UP (ref 0–1.5)
LDLC SERPL CALC-MCNC: 86 MG/DL
LIPID PNL WITH DIRECT LDL SERPL: 87 MG/DL — SIGNIFICANT CHANGE UP
LYMPHOCYTES # BLD AUTO: 2.52 K/UL — SIGNIFICANT CHANGE UP (ref 1–3.3)
LYMPHOCYTES # BLD AUTO: 32.1 % — SIGNIFICANT CHANGE UP (ref 13–44)
MAGNESIUM SERPL-MCNC: 2.2 MG/DL — SIGNIFICANT CHANGE UP (ref 1.6–2.6)
MCHC RBC-ENTMCNC: 29.4 PG — SIGNIFICANT CHANGE UP (ref 27–34)
MCHC RBC-ENTMCNC: 33.5 GM/DL — SIGNIFICANT CHANGE UP (ref 32–36)
MCV RBC AUTO: 87.7 FL — SIGNIFICANT CHANGE UP (ref 80–100)
MONOCYTES # BLD AUTO: 0.61 K/UL — SIGNIFICANT CHANGE UP (ref 0–0.9)
MONOCYTES NFR BLD AUTO: 7.8 % — SIGNIFICANT CHANGE UP (ref 2–14)
NEUTROPHILS # BLD AUTO: 4.62 K/UL — SIGNIFICANT CHANGE UP (ref 1.8–7.4)
NEUTROPHILS NFR BLD AUTO: 59 % — SIGNIFICANT CHANGE UP (ref 43–77)
NRBC # BLD: 0 /100 WBCS — SIGNIFICANT CHANGE UP (ref 0–0)
PHOSPHATE SERPL-MCNC: 3.4 MG/DL — SIGNIFICANT CHANGE UP (ref 2.5–4.5)
PLATELET # BLD AUTO: 186 K/UL — SIGNIFICANT CHANGE UP (ref 150–400)
POTASSIUM SERPL-MCNC: 3.4 MMOL/L — LOW (ref 3.5–5.3)
POTASSIUM SERPL-SCNC: 3.4 MMOL/L — LOW (ref 3.5–5.3)
PROLACTIN SERPL-MCNC: 8.3 NG/ML — SIGNIFICANT CHANGE UP (ref 3.4–24.1)
PROT SERPL-MCNC: 7.5 G/DL — SIGNIFICANT CHANGE UP (ref 6–8.3)
RBC # BLD: 4.94 M/UL — SIGNIFICANT CHANGE UP (ref 3.8–5.2)
RBC # FLD: 13.8 % — SIGNIFICANT CHANGE UP (ref 10.3–14.5)
SARS-COV-2 IGG SERPL QL IA: POSITIVE
SARS-COV-2 IGM SERPL IA-ACNC: 4.97 INDEX — HIGH
SODIUM SERPL-SCNC: 138 MMOL/L — SIGNIFICANT CHANGE UP (ref 135–145)
TOTAL CHOLESTEROL/HDL RATIO MEASUREMENT: 2.6 RATIO — LOW (ref 3.3–7.1)
TRIGL SERPL-MCNC: 51 MG/DL — SIGNIFICANT CHANGE UP (ref 10–149)
TRIGL SERPL-MCNC: 52 MG/DL
TROPONIN I SERPL-MCNC: <0.015 NG/ML — SIGNIFICANT CHANGE UP (ref 0–0.04)
TSH SERPL-MCNC: 0.75 UU/ML — SIGNIFICANT CHANGE UP (ref 0.34–4.82)
VIT B12 SERPL-MCNC: 1456 PG/ML — HIGH (ref 232–1245)
WBC # BLD: 7.84 K/UL — SIGNIFICANT CHANGE UP (ref 3.8–10.5)
WBC # FLD AUTO: 7.84 K/UL — SIGNIFICANT CHANGE UP (ref 3.8–10.5)

## 2020-09-28 PROCEDURE — 99223 1ST HOSP IP/OBS HIGH 75: CPT

## 2020-09-28 PROCEDURE — 95819 EEG AWAKE AND ASLEEP: CPT | Mod: 26

## 2020-09-28 RX ORDER — ATORVASTATIN CALCIUM 80 MG/1
80 TABLET, FILM COATED ORAL AT BEDTIME
Refills: 0 | Status: DISCONTINUED | OUTPATIENT
Start: 2020-09-28 | End: 2020-09-29

## 2020-09-28 RX ORDER — LEVETIRACETAM 250 MG/1
2000 TABLET, FILM COATED ORAL
Refills: 0 | Status: DISCONTINUED | OUTPATIENT
Start: 2020-09-28 | End: 2020-09-29

## 2020-09-28 RX ORDER — ASPIRIN/CALCIUM CARB/MAGNESIUM 324 MG
81 TABLET ORAL DAILY
Refills: 0 | Status: DISCONTINUED | OUTPATIENT
Start: 2020-09-28 | End: 2020-09-29

## 2020-09-28 RX ORDER — RIVAROXABAN 15 MG-20MG
20 KIT ORAL
Refills: 0 | Status: DISCONTINUED | OUTPATIENT
Start: 2020-09-28 | End: 2020-09-29

## 2020-09-28 RX ORDER — ATORVASTATIN CALCIUM 80 MG/1
40 TABLET, FILM COATED ORAL AT BEDTIME
Refills: 0 | Status: DISCONTINUED | OUTPATIENT
Start: 2020-09-28 | End: 2020-09-28

## 2020-09-28 RX ORDER — METOPROLOL TARTRATE 50 MG
50 TABLET ORAL
Refills: 0 | Status: DISCONTINUED | OUTPATIENT
Start: 2020-09-28 | End: 2020-09-29

## 2020-09-28 RX ORDER — LEVETIRACETAM 250 MG/1
1500 TABLET, FILM COATED ORAL
Refills: 0 | Status: DISCONTINUED | OUTPATIENT
Start: 2020-09-28 | End: 2020-09-28

## 2020-09-28 RX ADMIN — LEVETIRACETAM 2000 MILLIGRAM(S): 250 TABLET, FILM COATED ORAL at 18:21

## 2020-09-28 RX ADMIN — LEVETIRACETAM 1500 MILLIGRAM(S): 250 TABLET, FILM COATED ORAL at 05:01

## 2020-09-28 RX ADMIN — Medication 50 MILLIGRAM(S): at 17:27

## 2020-09-28 RX ADMIN — RIVAROXABAN 20 MILLIGRAM(S): KIT at 17:27

## 2020-09-28 RX ADMIN — ATORVASTATIN CALCIUM 80 MILLIGRAM(S): 80 TABLET, FILM COATED ORAL at 21:54

## 2020-09-28 RX ADMIN — Medication 81 MILLIGRAM(S): at 11:56

## 2020-09-28 NOTE — H&P ADULT - ATTENDING COMMENTS
Pt seen and examined  Case discussed with MAR.  55 year old woman with PMH of CVA with residual left sided weakness, atrial fibrillation on OAC, HTN, tachy-clare syndrome here with a transient episode of slurred speech and confusion witnessed by family members which had resolved by the time she got to the ED.    Vital Signs Last 24 Hrs  T(C): 36.4 (28 Sep 2020 01:32), Max: 37.1 (27 Sep 2020 21:21)  T(F): 97.6 (28 Sep 2020 01:32), Max: 98.8 (27 Sep 2020 21:21)  HR: 104 (28 Sep 2020 01:32) (88 - 115)  BP: 123/91 (28 Sep 2020 01:32) (106/65 - 130/82)  RR: 18 (28 Sep 2020 01:32) (17 - 24)  SpO2: 100% (28 Sep 2020 01:32) (95% - 100%)    Labs                        14.4   8.14  )-----------( 202      ( 27 Sep 2020 21:52 )             42.6     09-27    137  |  104  |  14  --------------------<  125<H>  3.8   |  27  |  0.92    Ca    9.1      27 Sep 2020 21:52    TPro  7.6  /  Alb  3.5  /  TBili  0.5  /  DBili  x   /  AST  17  /  ALT  24  /  Alk Phos  100  09-27    PT/INR - ( 27 Sep 2020 21:52 )   PT: 15.0 sec;   INR: 1.30 ratio  PTT - ( 27 Sep 2020 21:52 )  PTT:28.6 sec    CARDIAC MARKERS ( 27 Sep 2020 21:52 )  <0.015 ng/mL / x     / x     / x     / x      EKG -  CT brain stroke protocol - no acute findings    Impression  55 year old woman with PMH as detailed above presenting with features suggestive of a transient ischemic attack. Pt with prior hx of CVA already on full dose anticoagulation, ASA and statin.     A/P  1. Transient ischemic attack in pt with remote right hemispheric CVA with left sided residual weakness  Admit to telemetry   Serial neuromonitoring  Dysphagia screening  resume ASA, statin and Xarelto  CT head noted  A1c, lipid panel, ESR, Vitamin D  ECHO  Neurology consult  MRI brain/MRA head and neck    2. Atrial fibrillation  No active issues  Continue home meds    3. HTN  Controlled  Continue home meds    4. Seizures  No active issues Pt seen and examined  Case discussed with MAR.  55 year old woman with PMH of CVA with residual left sided weakness, atrial fibrillation on OAC, HTN, tachy-clare syndrome here with a transient episode of slurred speech and confusion witnessed by family members which had resolved by the time she got to the ED. Pt compliant on her medications- able to recall all of them during the history taking.    Vital Signs Last 24 Hrs  T(C): 36.4 (28 Sep 2020 01:32), Max: 37.1 (27 Sep 2020 21:21)  T(F): 97.6 (28 Sep 2020 01:32), Max: 98.8 (27 Sep 2020 21:21)  HR: 104 (28 Sep 2020 01:32) (88 - 115)  BP: 123/91 (28 Sep 2020 01:32) (106/65 - 130/82)  RR: 18 (28 Sep 2020 01:32) (17 - 24)  SpO2: 100% (28 Sep 2020 01:32) (95% - 100%)    Elderly woman, lying in bed, NAD, AAO X 3 - unable to recall history  CTA B/L   Irregular HS,  S1S2 only  Soft NT ND BS +  Left sided CN 7 deficits with deviation of the lower jaw to the left  Strength on right side full; left upper extremity 3+/5 and LLE 3/5  No obvious sensory deficits    Labs                        14.4   8.14  )-----------( 202      ( 27 Sep 2020 21:52 )             42.6     09-27    137  |  104  |  14  --------------------<  125<H>  3.8   |  27  |  0.92    Ca    9.1      27 Sep 2020 21:52  TPro  7.6  /  Alb  3.5  /  TBili  0.5  /  DBili  x   /  AST  17  /  ALT  24  /  Alk Phos  100  09-27  PT/INR - ( 27 Sep 2020 21:52 )   PT: 15.0 sec;   INR: 1.30 ratio  PTT - ( 27 Sep 2020 21:52 )  PTT:28.6 sec    CARDIAC MARKERS ( 27 Sep 2020 21:52 )  <0.015 ng/mL / x     / x     / x     / x      EKG -  CT brain stroke protocol - no acute findings    Impression  55 year old woman with PMH as detailed above presenting with features suggestive of a transient ischemic attack. Pt with prior hx of CVA already on full dose anticoagulation, ASA and statin.     A/P  1. Transient ischemic attack in pt with remote right hemispheric CVA with left sided residual weakness  Admit to telemetry   Serial neuromonitoring  Dysphagia screening  resume ASA, statin and Xarelto  CT head noted  A1c, lipid panel, ESR, Vitamin D  ECHO  Neurology consult  MRI brain/MRA head and neck    2. Atrial fibrillation  No active issues  Continue home meds    3. HTN  Controlled  Continue home meds    4. Seizures  No active issues

## 2020-09-28 NOTE — PHYSICAL THERAPY INITIAL EVALUATION ADULT - CRITERIA FOR SKILLED THERAPEUTIC INTERVENTIONS
rehab potential/impairments found/functional limitations in following categories/risk reduction/prevention/therapy frequency/anticipated discharge recommendation/predicted duration of therapy intervention

## 2020-09-28 NOTE — PHYSICAL THERAPY INITIAL EVALUATION ADULT - IMPAIRMENTS CONTRIBUTING TO GAIT DEVIATIONS, PT EVAL
impaired motor control/abnormal muscle tone/decreased flexibility/decreased strength/decreased balance

## 2020-09-28 NOTE — CHART NOTE - NSCHARTNOTEFT_GEN_A_CORE
Patient seen and evaluated at bedside on [address]    Clinical picture consistent with breakthrough seizure    PE: significant for:   AAOx3   S1S2 RRR  CTAb/l no accessory muscle use  LUE/LLE hemaparesis--> stable    1. Breakthrough seizure    EEG results reviewed revealing Right parietal area of cortical hyperexcitability --> case discussed w/ neurology. Advised  increased dose of keppra to 2000mg bid  will monitor on new dose overnight and likely discharge tomorrow 9/29  Patient DID NOT have a STROKE or TIA  discontinue telemetry  expected discharge 9/29  physical therapy consult

## 2020-09-28 NOTE — PHYSICAL THERAPY INITIAL EVALUATION ADULT - PASSIVE RANGE OF MOTION EXAMINATION, REHAB EVAL
Left shoulder 0-90; abd 0-30; fingers limited in extension due to tightness/deficits as listed below

## 2020-09-28 NOTE — PHYSICAL THERAPY INITIAL EVALUATION ADULT - GENERAL OBSERVATIONS, REHAB EVAL
Pt. received supine in bed, NAD, on telemetry. Pt. cooperative and motivated during eval.  Pt. A&O x 3. Pt. with left sided weakness, UE greater than LE.

## 2020-09-28 NOTE — H&P ADULT - PROBLEM SELECTOR PLAN 3
Patient had CVA in past with some left arm weakness.  MARGE done on 3/9/20 showed 4. Normal left atrium.  No left atrium or left atrial  appendage thrombus.  - Normal left ventricular systolic function. No segmental  wall motion abnormalities.  Will continue with aspirin, statin and xarelto

## 2020-09-28 NOTE — H&P ADULT - ASSESSMENT
55 year old  female with medical history  of HTN, afib (on Xarelto), tachy-clare syndrome, seizure, prior CVA (with  residual L sided arm and leg weakness at baseline) was brought in by  EMS to the ED for complains of slurred speech . Symptoms resolved on arrival. Patient admitted for TIA work up

## 2020-09-28 NOTE — H&P ADULT - PROBLEM SELECTOR PLAN 4
Patient takes 1500 mg of Keppra bid. Will continue with home meds.  seizure precautions.  Follow neurology recommendations Patient takes 1500 mg of Keppra bid. Will continue with home meds.  seizure precautions.  Follow neurology recommendations ( Dr Thomas Consulted )  follow Keppra levels

## 2020-09-28 NOTE — H&P ADULT - HISTORY OF PRESENT ILLNESS
55 year old  female with medical history  of HTN, afib (on Xarelto), tachy-clare syndrome, seizure, prior CVA (with confirmed residual L sided arm and leg weakness at baseline) was brought in by  EMS to the ED for complains of slurred speech . Pt notes new onset slurred speech and confusion, last seen nml around 7:50PM. Sx resolved on arrival. Pt denies new weakness, numbness, HA, fever, chest pain, shortness of breath, or any other complaints. No head injury. No smoking, EtOH, or drug use. 55 year old  female with medical history  of HTN, afib (on Xarelto), tachy-clare syndrome, seizure, prior CVA (with  residual L sided arm and leg weakness at baseline) was brought in by  EMS to the ED for complains of slurred speech . Symptoms resolved on arrival. As per patient she had blurry vision and now its resolved, but as per ED provider EMS was called because patient had sudden on set of slurred speecha and confusion and her symptoms were resolved by the time EMS arrived. Currently patient is denying any new symptoms . Pt denies new weakness, numbness, HA, fever, chest pain, shortness of breath, or any other complaints. No head injury. No smoking,  or drug use. 55 year old  female with medical history  of HTN, afib (on Xarelto), tachy-clare syndrome, seizure, prior CVA (with  residual L sided arm and leg weakness at baseline) was brought in by  EMS to the ED for complains of slurred speech . Symptoms resolved on arrival. As per patient she had blurry vision and now its resolved, but as per ED provider EMS was called because patient had sudden on set of slurred speech and confusion and her symptoms were resolved by the time EMS arrived. Currently patient is denying any new symptoms . Pt denies new weakness, numbness, HA, fever, chest pain, shortness of breath, or any other complaints. No head injury. No smoking,  or drug use.    UPDATE: spoke with patients daughter, she told that patient was sitting and had brief moment of unconsciousness where she was blinking her eyes but was staring blankly and was not responding to her questions. Although denies any abnormal body movements , fecal or urinary incontinence or tongue twist.

## 2020-09-28 NOTE — PHYSICAL THERAPY INITIAL EVALUATION ADULT - IMPAIRED TRANSFERS: SIT/STAND, REHAB EVAL
abnormal muscle tone/impaired coordination/impaired motor control/decreased ROM/decreased strength/decreased flexibility

## 2020-09-28 NOTE — CONSULT NOTE ADULT - SUBJECTIVE AND OBJECTIVE BOX
NEUROLOGY CONSULT NOTE    NAME:  GOLDIE NOLASCO      ASSESSMENT:  55 RHM with breakthrough focal post-stroke seizure, unlikely to represent transient ischemic attack or acute stroke.      PLAN:    - Increase levetiracetam from 1500mg PO BID to 2000mg PO BID - No indication for loading dose given stable clinical examination at present.    - Secondary stroke prevention:       - Increase atorvastatin from 40mg to 80mg PO QHS due to LDL remaining above 70       - Continue aspirin 81mg daily       - Continue rivaroxaban and metoprolol for management of atrial fibrillation       - Treat BP > 140/90 (Goal /80)    - If patient is stable on these dosages, she may be discharged tomorrow from a neurological perspective.    - Routine follow-up appointment scheduled with me at [place] on 12/9/20 at 2:00 pm - Will work on scheduling an earlier appointment.      NOTE TO BE COMPLETED - PLEASE REFER TO ABOVE ONLY AND IGNORE INFORMATION BELOW    *******************************      CHIEF COMPLAINT:  Patient is a 55y old  Female who presents with a chief complaint of TIA (28 Sep 2020 01:21)    HPI:  55 year old  female with medical history  of HTN, afib (on Xarelto), tachy-clare syndrome, seizure, prior CVA (with  residual L sided arm and leg weakness at baseline) was brought in by  EMS to the ED for complains of slurred speech . Symptoms resolved on arrival. As per patient she had blurry vision and now its resolved, but as per ED provider EMS was called because patient had sudden on set of slurred speech and confusion and her symptoms were resolved by the time EMS arrived. Currently patient is denying any new symptoms . Pt denies new weakness, numbness, HA, fever, chest pain, shortness of breath, or any other complaints. No head injury. No smoking,  or drug use.    UPDATE: spoke with patients daughter, she told that patient was sitting and had brief moment of unconsciousness where she was blinking her eyes but was staring blankly and was not responding to her questions. Although denies any abnormal body movements , fecal or urinary incontinence or tongue twist. (28 Sep 2020 01:21)      NEURO HPI:      PAST MEDICAL & SURGICAL HISTORY:  Tachy-clare syndrome    Atrial fibrillation    Seizure    CVA (cerebral vascular accident)    CVA (cerebral vascular accident)    Hypertension    Atrial fibrillation    No significant past surgical history    H/O tubal ligation        MEDICATIONS:  aspirin  chewable 81 milliGRAM(s) Oral daily  atorvastatin 80 milliGRAM(s) Oral at bedtime  levETIRAcetam 2000 milliGRAM(s) Oral two times a day  metoprolol succinate ER 50 milliGRAM(s) Oral two times a day  rivaroxaban 20 milliGRAM(s) Oral with dinner      ALLERGIES:  penicillin (Hives)      FAMILY HISTORY:  FH: hypertension  MOTHER, FAHTHER        SOCIAL HISTORY:  Denies alcohol, tobacco, and illicit drug use    REVIEW OF SYSTEMS:  GENERAL: No fever, weight changes, fatigue  EYES: No eye pain or discharge  EAR/NOSE/MOUTH/THROAT: No sinus or throat pain; No difficulty hearing  NECK: No pain or stiffness  RESPIRATORY: No cough, wheezing, chills, or hemoptysis  CARDIOVASCULAR: No chest pain, palpitations, shortness of breath, or dyspnea on exertion  GASTROINTESTINAL: No abdominal pain, nausea, vomiting, hematemesis, diarrhea, or constipation  GENITOURINARY: No dysuria, frequency, hematuria, or incontinence  SKIN: No rashes or lesions  ENDOCRINE: No heat or cold intolerance  HEMATOLOGIC: No easy bruising or bleeding  PSYCHIATRIC: No depression, anxiety, or mood swings  MUSCULOSKELETAL: No joint pain or swelling  NEUROLOGICAL: As per HPI      OBJECTIVE:    Vital Signs Last 24 Hrs  T(C): 36.4 (28 Sep 2020 15:54), Max: 37.1 (27 Sep 2020 21:21)  T(F): 97.6 (28 Sep 2020 15:54), Max: 98.8 (27 Sep 2020 21:21)  HR: 84 (28 Sep 2020 15:54) (83 - 115)  BP: 114/84 (28 Sep 2020 15:54) (100/58 - 130/82)  BP(mean): 87 (28 Sep 2020 12:07) (83 - 87)  RR: 18 (28 Sep 2020 15:54) (17 - 24)  SpO2: 100% (28 Sep 2020 15:54) (95% - 100%)    General Examination:  General: No acute distress  HEENT: Atraumatic, Normocephalic  Respiratory: CTA B/l.  No crackles, rhonchi, or wheezes.  Cardiovascular: RRR.  Normal S1 & S2.  Normal b/l radial and pedal pulses.    Neurological Examination:  General / Mental Status: AAO x 3.  No aphasia or dysarthria.  Naming and repetition intact.  Cranial Nerves: VFF x 4.  PERRL.  EOMI x 2, No nystagmus or diplopia.  B/l V1-V3 equal and intact to light touch and pinprick.  Symmetric facial movement and palate elevation.  B/l hearing equal to finger rub.  5/5 strength with b/l sternocleidomastoid & trapezius.  Midline tongue protrusion, with no atrophy or fasciculations.  Motor: Normal bulk & tone in all four extremities.  5/5 strength throughout all four extremities.  No downward drift, rigidity, spasticity, or tremors in any of the four extremities.  Sensory: Intact to light touch and pinprick in all four extremities.  Negative Romberg.  Reflex: 2+ and symmetric at b/l biceps, triceps, brachioradialis, patellae, and ankles.  Downgoing toes b/l.  Coordination: No dysmetria with b/l finger-to-nose and heel raise tests.  Symmetric rapid alternating movements b/l.  Gait: Normal, narrow-based gait.  No difficulty with tiptoe, heel, and tandem gaits.        LABORATORY VALUES:                        14.5   7.84  )-----------( 186      ( 28 Sep 2020 06:23 )             43.3     CBC Full  -  ( 28 Sep 2020 06:23 )  WBC Count : 7.84 K/uL  RBC Count : 4.94 M/uL  Hemoglobin : 14.5 g/dL  Hematocrit : 43.3 %  Platelet Count - Automated : 186 K/uL  Mean Cell Volume : 87.7 fl  Mean Cell Hemoglobin : 29.4 pg  Mean Cell Hemoglobin Concentration : 33.5 gm/dL  Auto Neutrophil # : 4.62 K/uL  Auto Lymphocyte # : 2.52 K/uL  Auto Monocyte # : 0.61 K/uL  Auto Eosinophil # : 0.05 K/uL  Auto Basophil # : 0.03 K/uL  Auto Neutrophil % : 59.0 %  Auto Lymphocyte % : 32.1 %  Auto Monocyte % : 7.8 %  Auto Eosinophil % : 0.6 %  Auto Basophil % : 0.4 %      09-28    138  |  105  |  10  ----------------------------<  88  3.4<L>   |  27  |  0.69    Ca    9.3      28 Sep 2020 06:23  Phos  3.4     09-28  Mg     2.2     09-28    TPro  7.5  /  Alb  3.2<L>  /  TBili  0.7  /  DBili  x   /  AST  14  /  ALT  21  /  AlkPhos  100  09-28    LIVER FUNCTIONS - ( 28 Sep 2020 06:23 )  Alb: 3.2 g/dL / Pro: 7.5 g/dL / ALK PHOS: 100 U/L / ALT: 21 U/L DA / AST: 14 U/L / GGT: x             09-28 Chol 156 LDL 87 HDL 59 Trig 51, 07-31 Chol 143 LDL 79 HDL 49<L> Trig 75                NEUROIMAGING:          Please contact the Neurology consult service with any questions.    Wiley Majano MD   of Neurology  [facility] at [facility]  NEUROLOGY CONSULT NOTE    NAME:  GOLDIE NOLASCO      ASSESSMENT:  55 RHM with breakthrough focal post-stroke seizure, unlikely to represent transient ischemic attack or acute stroke.      PLAN:    - Increase levetiracetam from 1500mg PO BID to 2000mg PO BID - No indication for loading dose given stable clinical examination at present.    -       *******************************      CHIEF COMPLAINT:  Patient is a 55y old  Female who presents with a chief complaint of TIA (28 Sep 2020 01:21)    HPI:  55 year old  female with medical history  of HTN, afib (on Xarelto), tachy-clare syndrome, seizure, prior CVA (with  residual L sided arm and leg weakness at baseline) was brought in by  EMS to the ED for complains of slurred speech . Symptoms resolved on arrival. As per patient she had blurry vision and now its resolved, but as per ED provider EMS was called because patient had sudden on set of slurred speech and confusion and her symptoms were resolved by the time EMS arrived. Currently patient is denying any new symptoms . Pt denies new weakness, numbness, HA, fever, chest pain, shortness of breath, or any other complaints. No head injury. No smoking,  or drug use.    UPDATE: spoke with patients daughter, she told that patient was sitting and had brief moment of unconsciousness where she was blinking her eyes but was staring blankly and was not responding to her questions. Although denies any abnormal body movements , fecal or urinary incontinence or tongue twist. (28 Sep 2020 01:21)      NEURO HPI:      PAST MEDICAL & SURGICAL HISTORY:  Tachy-clare syndrome    Atrial fibrillation    Seizure    CVA (cerebral vascular accident)    CVA (cerebral vascular accident)    Hypertension    Atrial fibrillation    No significant past surgical history    H/O tubal ligation        MEDICATIONS:  aspirin  chewable 81 milliGRAM(s) Oral daily  atorvastatin 80 milliGRAM(s) Oral at bedtime  levETIRAcetam 2000 milliGRAM(s) Oral two times a day  metoprolol succinate ER 50 milliGRAM(s) Oral two times a day  rivaroxaban 20 milliGRAM(s) Oral with dinner      ALLERGIES:  penicillin (Hives)      FAMILY HISTORY:  FH: hypertension  MOTHER, FAHTHER        SOCIAL HISTORY:  Denies alcohol, tobacco, and illicit drug use    REVIEW OF SYSTEMS:  GENERAL: No fever, weight changes, fatigue  EYES: No eye pain or discharge  EAR/NOSE/MOUTH/THROAT: No sinus or throat pain; No difficulty hearing  NECK: No pain or stiffness  RESPIRATORY: No cough, wheezing, chills, or hemoptysis  CARDIOVASCULAR: No chest pain, palpitations, shortness of breath, or dyspnea on exertion  GASTROINTESTINAL: No abdominal pain, nausea, vomiting, hematemesis, diarrhea, or constipation  GENITOURINARY: No dysuria, frequency, hematuria, or incontinence  SKIN: No rashes or lesions  ENDOCRINE: No heat or cold intolerance  HEMATOLOGIC: No easy bruising or bleeding  PSYCHIATRIC: No depression, anxiety, or mood swings  MUSCULOSKELETAL: No joint pain or swelling  NEUROLOGICAL: As per HPI      OBJECTIVE:    Vital Signs Last 24 Hrs  T(C): 36.4 (28 Sep 2020 15:54), Max: 37.1 (27 Sep 2020 21:21)  T(F): 97.6 (28 Sep 2020 15:54), Max: 98.8 (27 Sep 2020 21:21)  HR: 84 (28 Sep 2020 15:54) (83 - 115)  BP: 114/84 (28 Sep 2020 15:54) (100/58 - 130/82)  BP(mean): 87 (28 Sep 2020 12:07) (83 - 87)  RR: 18 (28 Sep 2020 15:54) (17 - 24)  SpO2: 100% (28 Sep 2020 15:54) (95% - 100%)    General Examination:  General: No acute distress  HEENT: Atraumatic, Normocephalic  Respiratory: CTA B/l.  No crackles, rhonchi, or wheezes.  Cardiovascular: RRR.  Normal S1 & S2.  Normal b/l radial and pedal pulses.    Neurological Examination:  General / Mental Status: AAO x 3.  No aphasia or dysarthria.  Naming and repetition intact.  Cranial Nerves: VFF x 4.  PERRL.  EOMI x 2, No nystagmus or diplopia.  B/l V1-V3 equal and intact to light touch and pinprick.  Symmetric facial movement and palate elevation.  B/l hearing equal to finger rub.  5/5 strength with b/l sternocleidomastoid & trapezius.  Midline tongue protrusion, with no atrophy or fasciculations.  Motor: Normal bulk & tone in all four extremities.  5/5 strength throughout all four extremities.  No downward drift, rigidity, spasticity, or tremors in any of the four extremities.  Sensory: Intact to light touch and pinprick in all four extremities.  Negative Romberg.  Reflex: 2+ and symmetric at b/l biceps, triceps, brachioradialis, patellae, and ankles.  Downgoing toes b/l.  Coordination: No dysmetria with b/l finger-to-nose and heel raise tests.  Symmetric rapid alternating movements b/l.  Gait: Normal, narrow-based gait.  No difficulty with tiptoe, heel, and tandem gaits.        LABORATORY VALUES:                        14.5   7.84  )-----------( 186      ( 28 Sep 2020 06:23 )             43.3     CBC Full  -  ( 28 Sep 2020 06:23 )  WBC Count : 7.84 K/uL  RBC Count : 4.94 M/uL  Hemoglobin : 14.5 g/dL  Hematocrit : 43.3 %  Platelet Count - Automated : 186 K/uL  Mean Cell Volume : 87.7 fl  Mean Cell Hemoglobin : 29.4 pg  Mean Cell Hemoglobin Concentration : 33.5 gm/dL  Auto Neutrophil # : 4.62 K/uL  Auto Lymphocyte # : 2.52 K/uL  Auto Monocyte # : 0.61 K/uL  Auto Eosinophil # : 0.05 K/uL  Auto Basophil # : 0.03 K/uL  Auto Neutrophil % : 59.0 %  Auto Lymphocyte % : 32.1 %  Auto Monocyte % : 7.8 %  Auto Eosinophil % : 0.6 %  Auto Basophil % : 0.4 %      09-28    138  |  105  |  10  ----------------------------<  88  3.4<L>   |  27  |  0.69    Ca    9.3      28 Sep 2020 06:23  Phos  3.4     09-28  Mg     2.2     09-28    TPro  7.5  /  Alb  3.2<L>  /  TBili  0.7  /  DBili  x   /  AST  14  /  ALT  21  /  AlkPhos  100  09-28 09-28 Chol 156 LDL 87 HDL 59 Trig 51, 07-31 Chol 143 LDL 79 HDL 49<L> Trig 75                NEUROIMAGING:          Please contact the Neurology consult service with any questions.    Wiley Majano MD   of Neurology  [facility] at [facility]   NEUROLOGY CONSULT NOTE    NAME:  GOLDIE NOLASCO      ASSESSMENT:  55 RHM with breakthrough focal post-stroke seizure, unlikely to represent transient ischemic attack or acute stroke.      RECOMMENDATIONS:    - Increase levetiracetam from 1500mg PO BID to 2000mg PO BID - No indication for loading dose given stable clinical examination at present.    - Secondary stroke prevention:       - Increase atorvastatin from 40mg to 80mg PO QHS due to LDL remaining above 70       - Continue aspirin 81mg daily       - Continue rivaroxaban and metoprolol for management of atrial fibrillation       - Treat BP > 140/90 (Goal /80)    - If patient is stable on these dosages, she may be discharged tomorrow from a neurological perspective.    - Routine follow-up appointment scheduled with me at [facility] on 12/9/20 at 2:00 pm - Will work on scheduling an earlier appointment.      NOTE TO BE COMPLETED - PLEASE REFER TO ABOVE ONLY AND IGNORE INFORMATION BELOW    *******************************      CHIEF COMPLAINT:  Patient is a 55y old  Female who presents with a chief complaint of TIA (28 Sep 2020 01:21)    HPI:  55 year old  female with medical history  of HTN, afib (on Xarelto), tachy-clare syndrome, seizure, prior CVA (with  residual L sided arm and leg weakness at baseline) was brought in by  EMS to the ED for complains of slurred speech . Symptoms resolved on arrival. As per patient she had blurry vision and now its resolved, but as per ED provider EMS was called because patient had sudden on set of slurred speech and confusion and her symptoms were resolved by the time EMS arrived. Currently patient is denying any new symptoms . Pt denies new weakness, numbness, HA, fever, chest pain, shortness of breath, or any other complaints. No head injury. No smoking,  or drug use.    UPDATE: spoke with patients daughter, she told that patient was sitting and had brief moment of unconsciousness where she was blinking her eyes but was staring blankly and was not responding to her questions. Although denies any abnormal body movements , fecal or urinary incontinence or tongue twist. (28 Sep 2020 01:21)      NEURO HPI:      PAST MEDICAL & SURGICAL HISTORY:  Tachy-clare syndrome    Atrial fibrillation    Seizure    CVA (cerebral vascular accident)    CVA (cerebral vascular accident)    Hypertension    Atrial fibrillation    No significant past surgical history    H/O tubal ligation        MEDICATIONS:  aspirin  chewable 81 milliGRAM(s) Oral daily  atorvastatin 80 milliGRAM(s) Oral at bedtime  levETIRAcetam 2000 milliGRAM(s) Oral two times a day  metoprolol succinate ER 50 milliGRAM(s) Oral two times a day  rivaroxaban 20 milliGRAM(s) Oral with dinner      ALLERGIES:  penicillin (Hives)      FAMILY HISTORY:  FH: hypertension  MOTHER, FAHTHER        SOCIAL HISTORY:  Denies alcohol, tobacco, and illicit drug use    REVIEW OF SYSTEMS:  GENERAL: No fever, weight changes, fatigue  EYES: No eye pain or discharge  EAR/NOSE/MOUTH/THROAT: No sinus or throat pain; No difficulty hearing  NECK: No pain or stiffness  RESPIRATORY: No cough, wheezing, chills, or hemoptysis  CARDIOVASCULAR: No chest pain, palpitations, shortness of breath, or dyspnea on exertion  GASTROINTESTINAL: No abdominal pain, nausea, vomiting, hematemesis, diarrhea, or constipation  GENITOURINARY: No dysuria, frequency, hematuria, or incontinence  SKIN: No rashes or lesions  ENDOCRINE: No heat or cold intolerance  HEMATOLOGIC: No easy bruising or bleeding  PSYCHIATRIC: No depression, anxiety, or mood swings  MUSCULOSKELETAL: No joint pain or swelling  NEUROLOGICAL: As per HPI      OBJECTIVE:    Vital Signs Last 24 Hrs  T(C): 36.4 (28 Sep 2020 15:54), Max: 37.1 (27 Sep 2020 21:21)  T(F): 97.6 (28 Sep 2020 15:54), Max: 98.8 (27 Sep 2020 21:21)  HR: 84 (28 Sep 2020 15:54) (83 - 115)  BP: 114/84 (28 Sep 2020 15:54) (100/58 - 130/82)  BP(mean): 87 (28 Sep 2020 12:07) (83 - 87)  RR: 18 (28 Sep 2020 15:54) (17 - 24)  SpO2: 100% (28 Sep 2020 15:54) (95% - 100%)    General Examination:  General: No acute distress  HEENT: Atraumatic, Normocephalic  Respiratory: CTA B/l.  No crackles, rhonchi, or wheezes.  Cardiovascular: RRR.  Normal S1 & S2.  Normal b/l radial and pedal pulses.    Neurological Examination:  General / Mental Status: AAO x 3.  No aphasia or dysarthria.  Naming and repetition intact.  Cranial Nerves: VFF x 4.  PERRL.  EOMI x 2, No nystagmus or diplopia.  B/l V1-V3 equal and intact to light touch and pinprick.  Symmetric facial movement and palate elevation.  B/l hearing equal to finger rub.  5/5 strength with b/l sternocleidomastoid & trapezius.  Midline tongue protrusion, with no atrophy or fasciculations.  Motor: Normal bulk & tone in all four extremities.  5/5 strength throughout all four extremities.  No downward drift, rigidity, spasticity, or tremors in any of the four extremities.  Sensory: Intact to light touch and pinprick in all four extremities.  Negative Romberg.  Reflex: 2+ and symmetric at b/l biceps, triceps, brachioradialis, patellae, and ankles.  Downgoing toes b/l.  Coordination: No dysmetria with b/l finger-to-nose and heel raise tests.  Symmetric rapid alternating movements b/l.  Gait: Normal, narrow-based gait.  No difficulty with tiptoe, heel, and tandem gaits.        LABORATORY VALUES:                        14.5   7.84  )-----------( 186      ( 28 Sep 2020 06:23 )             43.3     CBC Full  -  ( 28 Sep 2020 06:23 )  WBC Count : 7.84 K/uL  RBC Count : 4.94 M/uL  Hemoglobin : 14.5 g/dL  Hematocrit : 43.3 %  Platelet Count - Automated : 186 K/uL  Mean Cell Volume : 87.7 fl  Mean Cell Hemoglobin : 29.4 pg  Mean Cell Hemoglobin Concentration : 33.5 gm/dL  Auto Neutrophil # : 4.62 K/uL  Auto Lymphocyte # : 2.52 K/uL  Auto Monocyte # : 0.61 K/uL  Auto Eosinophil # : 0.05 K/uL  Auto Basophil # : 0.03 K/uL  Auto Neutrophil % : 59.0 %  Auto Lymphocyte % : 32.1 %  Auto Monocyte % : 7.8 %  Auto Eosinophil % : 0.6 %  Auto Basophil % : 0.4 %      09-28    138  |  105  |  10  ----------------------------<  88  3.4<L>   |  27  |  0.69    Ca    9.3      28 Sep 2020 06:23  Phos  3.4     09-28  Mg     2.2     09-28    TPro  7.5  /  Alb  3.2<L>  /  TBili  0.7  /  DBili  x   /  AST  14  /  ALT  21  /  AlkPhos  100  09-28    LIVER FUNCTIONS - ( 28 Sep 2020 06:23 )  Alb: 3.2 g/dL / Pro: 7.5 g/dL / ALK PHOS: 100 U/L / ALT: 21 U/L DA / AST: 14 U/L / GGT: x             09-28 Chol 156 LDL 87 HDL 59 Trig 51, 07-31 Chol 143 LDL 79 HDL 49<L> Trig 75                NEUROIMAGING:          Please contact the Neurology consult service with any questions.    Wiley Majano MD   of Neurology  [facility] at [facility]  NEUROLOGY CONSULT NOTE    NAME:  GOLDIE NOLASCO      ASSESSMENT:  55 RHM with breakthrough focal post-stroke seizure, unlikely to represent transient ischemic attack or acute stroke.      PLAN:    - Increase levetiracetam from 1500mg PO BID to 2000mg PO BID - No indication for loading dose given stable clinical examination at present.    -       *******************************      CHIEF COMPLAINT:  Patient is a 55y old  Female who presents with a chief complaint of TIA (28 Sep 2020 01:21)    HPI:  55 year old  female with medical history  of HTN, afib (on Xarelto), tachy-clare syndrome, seizure, prior CVA (with  residual L sided arm and leg weakness at baseline) was brought in by  EMS to the ED for complains of slurred speech . Symptoms resolved on arrival. As per patient she had blurry vision and now its resolved, but as per ED provider EMS was called because patient had sudden on set of slurred speech and confusion and her symptoms were resolved by the time EMS arrived. Currently patient is denying any new symptoms . Pt denies new weakness, numbness, HA, fever, chest pain, shortness of breath, or any other complaints. No head injury. No smoking,  or drug use.    UPDATE: spoke with patients daughter, she told that patient was sitting and had brief moment of unconsciousness where she was blinking her eyes but was staring blankly and was not responding to her questions. Although denies any abnormal body movements , fecal or urinary incontinence or tongue twist. (28 Sep 2020 01:21)      NEURO HPI:      PAST MEDICAL & SURGICAL HISTORY:  Tachy-clare syndrome    Atrial fibrillation    Seizure    CVA (cerebral vascular accident)    CVA (cerebral vascular accident)    Hypertension    Atrial fibrillation    No significant past surgical history    H/O tubal ligation        MEDICATIONS:  aspirin  chewable 81 milliGRAM(s) Oral daily  atorvastatin 80 milliGRAM(s) Oral at bedtime  levETIRAcetam 2000 milliGRAM(s) Oral two times a day  metoprolol succinate ER 50 milliGRAM(s) Oral two times a day  rivaroxaban 20 milliGRAM(s) Oral with dinner      ALLERGIES:  penicillin (Hives)      FAMILY HISTORY:  FH: hypertension  MOTHER, FAHTHER        SOCIAL HISTORY:  Denies alcohol, tobacco, and illicit drug use    REVIEW OF SYSTEMS:  GENERAL: No fever, weight changes, fatigue  EYES: No eye pain or discharge  EAR/NOSE/MOUTH/THROAT: No sinus or throat pain; No difficulty hearing  NECK: No pain or stiffness  RESPIRATORY: No cough, wheezing, chills, or hemoptysis  CARDIOVASCULAR: No chest pain, palpitations, shortness of breath, or dyspnea on exertion  GASTROINTESTINAL: No abdominal pain, nausea, vomiting, hematemesis, diarrhea, or constipation  GENITOURINARY: No dysuria, frequency, hematuria, or incontinence  SKIN: No rashes or lesions  ENDOCRINE: No heat or cold intolerance  HEMATOLOGIC: No easy bruising or bleeding  PSYCHIATRIC: No depression, anxiety, or mood swings  MUSCULOSKELETAL: No joint pain or swelling  NEUROLOGICAL: As per HPI      OBJECTIVE:    Vital Signs Last 24 Hrs  T(C): 36.4 (28 Sep 2020 15:54), Max: 37.1 (27 Sep 2020 21:21)  T(F): 97.6 (28 Sep 2020 15:54), Max: 98.8 (27 Sep 2020 21:21)  HR: 84 (28 Sep 2020 15:54) (83 - 115)  BP: 114/84 (28 Sep 2020 15:54) (100/58 - 130/82)  BP(mean): 87 (28 Sep 2020 12:07) (83 - 87)  RR: 18 (28 Sep 2020 15:54) (17 - 24)  SpO2: 100% (28 Sep 2020 15:54) (95% - 100%)    General Examination:  General: No acute distress  HEENT: Atraumatic, Normocephalic  Respiratory: CTA B/l.  No crackles, rhonchi, or wheezes.  Cardiovascular: RRR.  Normal S1 & S2.  Normal b/l radial and pedal pulses.    Neurological Examination:  General / Mental Status: AAO x 3.  No aphasia or dysarthria.  Naming and repetition intact.  Cranial Nerves: VFF x 4.  PERRL.  EOMI x 2, No nystagmus or diplopia.  B/l V1-V3 equal and intact to light touch and pinprick.  Symmetric facial movement and palate elevation.  B/l hearing equal to finger rub.  5/5 strength with b/l sternocleidomastoid & trapezius.  Midline tongue protrusion, with no atrophy or fasciculations.  Motor: Normal bulk & tone in all four extremities.  5/5 strength throughout all four extremities.  No downward drift, rigidity, spasticity, or tremors in any of the four extremities.  Sensory: Intact to light touch and pinprick in all four extremities.  Negative Romberg.  Reflex: 2+ and symmetric at b/l biceps, triceps, brachioradialis, patellae, and ankles.  Downgoing toes b/l.  Coordination: No dysmetria with b/l finger-to-nose and heel raise tests.  Symmetric rapid alternating movements b/l.  Gait: Normal, narrow-based gait.  No difficulty with tiptoe, heel, and tandem gaits.        LABORATORY VALUES:                        14.5   7.84  )-----------( 186      ( 28 Sep 2020 06:23 )             43.3     CBC Full  -  ( 28 Sep 2020 06:23 )  WBC Count : 7.84 K/uL  RBC Count : 4.94 M/uL  Hemoglobin : 14.5 g/dL  Hematocrit : 43.3 %  Platelet Count - Automated : 186 K/uL  Mean Cell Volume : 87.7 fl  Mean Cell Hemoglobin : 29.4 pg  Mean Cell Hemoglobin Concentration : 33.5 gm/dL  Auto Neutrophil # : 4.62 K/uL  Auto Lymphocyte # : 2.52 K/uL  Auto Monocyte # : 0.61 K/uL  Auto Eosinophil # : 0.05 K/uL  Auto Basophil # : 0.03 K/uL  Auto Neutrophil % : 59.0 %  Auto Lymphocyte % : 32.1 %  Auto Monocyte % : 7.8 %  Auto Eosinophil % : 0.6 %  Auto Basophil % : 0.4 %      09-28    138  |  105  |  10  ----------------------------<  88  3.4<L>   |  27  |  0.69    Ca    9.3      28 Sep 2020 06:23  Phos  3.4     09-28  Mg     2.2     09-28    TPro  7.5  /  Alb  3.2<L>  /  TBili  0.7  /  DBili  x   /  AST  14  /  ALT  21  /  AlkPhos  100  09-28 09-28 Chol 156 LDL 87 HDL 59 Trig 51, 07-31 Chol 143 LDL 79 HDL 49<L> Trig 75                NEUROIMAGING:          Please contact the Neurology consult service with any questions.    Wiley Majano MD   of Neurology  [facility] at [facility]   NEUROLOGY CONSULT NOTE    NAME:  GOLDIE NOLASCO      ASSESSMENT:  55 RHM with breakthrough focal post-stroke seizure, unlikely to represent transient ischemic attack or acute stroke.      RECOMMENDATIONS:    - Increase levetiracetam from 1500mg PO BID to 2000mg PO BID - No indication for loading dose given stable clinical examination and absent seizure on EEG.    - Secondary stroke prevention:       - Increase atorvastatin from 40mg to 80mg PO QHS due to LDL remaining above 70       - Continue aspirin 81mg daily       - Continue rivaroxaban and metoprolol for management of atrial fibrillation       - Treat BP > 140/90 (Goal /80)    - If patient is stable on these dosages, she may be discharged tomorrow from a neurological perspective.    - Routine follow-up appointment scheduled with me at [place] on 12/9/20 at 2:00 pm - Will work on scheduling an earlier appointment.      *******************************      CHIEF COMPLAINT:  Patient is a 55y old  Female who presents with a chief complaint of TIA (28 Sep 2020 01:21)    HPI:  55 year old  female with medical history  of HTN, afib (on Xarelto), tachy-clare syndrome, seizure, prior CVA (with  residual L sided arm and leg weakness at baseline) was brought in by  EMS to the ED for complains of slurred speech . Symptoms resolved on arrival. As per patient she had blurry vision and now its resolved, but as per ED provider EMS was called because patient had sudden on set of slurred speech and confusion and her symptoms were resolved by the time EMS arrived. Currently patient is denying any new symptoms . Pt denies new weakness, numbness, HA, fever, chest pain, shortness of breath, or any other complaints. No head injury. No smoking,  or drug use.  UPDATE: spoke with patients daughter, she told that patient was sitting and had brief moment of unconsciousness where she was blinking her eyes but was staring blankly and was not responding to her questions. Although denies any abnormal body movements , fecal or urinary incontinence or tongue twist. (28 Sep 2020 01:21)      NEURO HPI:  55 RHF with episode of unresponsiveness and rapid blinking, but no convulsions; currently back to her baseline neurological status with left-sided hemiparesis from previous stroke.    PAST MEDICAL & SURGICAL HISTORY:  Tachy-clare syndrome  Atrial fibrillation  Seizure  CVA (cerebral vascular accident)  Hypertension  Atrial fibrillation  No significant past surgical history  H/O tubal ligation    MEDICATIONS:  aspirin  chewable 81 milliGRAM(s) Oral daily  atorvastatin 80 milliGRAM(s) Oral at bedtime  levETIRAcetam 2000 milliGRAM(s) Oral two times a day  metoprolol succinate ER 50 milliGRAM(s) Oral two times a day  rivaroxaban 20 milliGRAM(s) Oral with dinner    ALLERGIES:  penicillin (Hives)    FAMILY HISTORY:  FH: hypertension (MOTHER, FATHER)    SOCIAL HISTORY:  Denies alcohol, tobacco, and illicit drug use    REVIEW OF SYSTEMS:  GENERAL: No fever, weight changes, fatigue  EYES: No eye pain or discharge  EAR/NOSE/MOUTH/THROAT: No sinus or throat pain; No difficulty hearing  NECK: No pain or stiffness  RESPIRATORY: No cough, wheezing, chills, or hemoptysis  CARDIOVASCULAR: No chest pain, palpitations, shortness of breath, or dyspnea on exertion  GASTROINTESTINAL: No abdominal pain, nausea, vomiting, hematemesis, diarrhea, or constipation  GENITOURINARY: No dysuria, frequency, hematuria, or incontinence  SKIN: No rashes or lesions  ENDOCRINE: No heat or cold intolerance  HEMATOLOGIC: No easy bruising or bleeding  PSYCHIATRIC: No depression, anxiety, or mood swings  MUSCULOSKELETAL: No joint pain or swelling  NEUROLOGICAL: As per HPI      OBJECTIVE:    Vital Signs Last 24 Hrs  T(C): 36.4 (28 Sep 2020 15:54), Max: 37.1 (27 Sep 2020 21:21)  T(F): 97.6 (28 Sep 2020 15:54), Max: 98.8 (27 Sep 2020 21:21)  HR: 84 (28 Sep 2020 15:54) (83 - 115)  BP: 114/84 (28 Sep 2020 15:54) (100/58 - 130/82)  BP(mean): 87 (28 Sep 2020 12:07) (83 - 87)  RR: 18 (28 Sep 2020 15:54) (17 - 24)  SpO2: 100% (28 Sep 2020 15:54) (95% - 100%)    General Examination:  General: No acute distress  HEENT: Atraumatic, Normocephalic  Respiratory: CTA B/l.  No crackles, rhonchi, or wheezes.  Cardiovascular: Regular rate, Irregular rhythm.  Normal S1 & S2.  Normal b/l radial and pedal pulses.    Neurological Examination:  General / Mental Status: AAO x 3.  No aphasia or dysarthria.  Naming and repetition intact.  Cranial Nerves: VFF x 4.  PERRL.  EOMI x 2, No nystagmus or diplopia.  B/l V1-V3 equal and intact to light touch and pinprick.  Left nasolabial fold flattening present (1).  Symmetric palate elevation.  B/l hearing equal to finger rub.  5/5 strength with b/l sternocleidomastoid & trapezius.  Midline tongue protrusion, with no atrophy or fasciculations.  Motor: Diminished bulk and tone in left arm and left leg; Normal bulk and tone in right arm and right leg.  At least 2/5 strength throughout left arm and left leg, both of which drift to bed (2, 2).  5/5 strength throughout right arm and right leg, without downward drift.  Sensory: Intact to light touch and pinprick in all four extremities.  Reflex: 1+ at left biceps, triceps, brachioradialis, patella, and ankle.  2+ right biceps, triceps, brachioradialis, patella, and ankle.  Mute toes on left, Donwgoing toes on right.  Coordination: No dysmetria with b/l finger-to-nose and heel raise tests, although efforts are weaker in the left arm and left leg.  Gait and Romberg testing deferred per patient request.      NIHSS Score:    LOC - 0  LOC Questions - 0  LOC Commands - 0  Gaze Preference - 0  Visual Fields - 0  Facial Palsy - 1  Motor Arm Left - 2  Motor Arm Right - 0  Motor Leg Left - 2  Motor Leg Right - 0  Limb Ataxia - 0  Sensory - 0  Language - 0  Speech - 0  Extinction - 0    NIHSS Score Total: 4 - No IV tPA because findings are chronic    Modified Ashley Scale: 2        LABORATORY VALUES:                        14.5   7.84  )-----------( 186      ( 28 Sep 2020 06:23 )             43.3     CBC Full  -  ( 28 Sep 2020 06:23 )  WBC Count : 7.84 K/uL  RBC Count : 4.94 M/uL  Hemoglobin : 14.5 g/dL  Hematocrit : 43.3 %  Platelet Count - Automated : 186 K/uL  Mean Cell Volume : 87.7 fl  Mean Cell Hemoglobin : 29.4 pg  Mean Cell Hemoglobin Concentration : 33.5 gm/dL  Auto Neutrophil # : 4.62 K/uL  Auto Lymphocyte # : 2.52 K/uL  Auto Monocyte # : 0.61 K/uL  Auto Eosinophil # : 0.05 K/uL  Auto Basophil # : 0.03 K/uL  Auto Neutrophil % : 59.0 %  Auto Lymphocyte % : 32.1 %  Auto Monocyte % : 7.8 %  Auto Eosinophil % : 0.6 %  Auto Basophil % : 0.4 %      09-28    138  |  105  |  10  ----------------------------<  88  3.4<L>   |  27  |  0.69    Ca    9.3      28 Sep 2020 06:23  Phos  3.4     09-28  Mg     2.2     09-28    TPro  7.5  /  Alb  3.2<L>  /  TBili  0.7  /  DBili  x   /  AST  14  /  ALT  21  /  AlkPhos  100  09-28 09-28 Chol 156 LDL 87 HDL 59 Trig 51, 07-31 Chol 143 LDL 79 HDL 49<L> Trig 75        NEUROIMAGING:    CT Head (9/27/20):  - No acute intracranial abnormality  - Right EJ & MCA territory encephalomalacia with associated right lateral ventricle ex vacuo dilatation    Routine EEG (9/28/20):  - No electrographic or clinical seizure detected  - Right parietal cortical hyperexcitability  - Right hemispheric structural/functional abnormality        Please contact the Neurology consult service with any questions.    Wiley Majano MD   of Neurology  [facility] at [facility]

## 2020-09-28 NOTE — EEG REPORT - NS EEG TEXT BOX
[facility]   COMPREHENSIVE EPILEPSY CENTER   REPORT OF ROUTINE VIDEO EEG     [facility]: [address], [place] [address], Ph#: [phone]  [facility]: [id number], [place] [address], Ph#: [phone]  [facility]: [address] [place] [address], Ph#: [phone]    Patient Name: GOLDIE NOLASCO  Age and : 55y (63)  MRN #: [id number]  Location: [address]  Referring Physician: Raegan Freeman    Study Date: 20    _____________________________________________________________  TECHNICAL INFORMATION    Placement and Labeling of Electrodes:  The EEG was performed utilizing 20 channels referential EEG connections (coronal over temporal over parasagittal montage) using all standard 10-20 electrode placements with EKG.  Recording was at a sampling rate of 256 samples per second per channel.  Time synchronized digital video recording was done simultaneously with EEG recording.  A low light infrared camera was used for low light recording.  Rolly and seizure detection algorithms were utilized.    _____________________________________________________________  HISTORY    Patient is a 55y old  Female who presents with a chief complaint of TIA (28 Sep 2020 01:21)      PERTINENT MEDICATION:  levETIRAcetam 1500 milliGRAM(s) Oral two times a day  _____________________________________________________________  STUDY INTERPRETATION    Findings: The background was continuous, spontaneously variable and reactive. During wakefulness, the posterior dominant rhythm consisted of asymmetric, well-modulated 9 Hz activity, better seen over the left with amplitude to 30 uV, that attenuated to eye opening.      Background Slowing:  No generalized background slowing was present.    Focal Slowing:   Continuous right hemispheric theta slowing    Sleep Background:  Drowsiness was characterized by fragmentation, attenuation, and slowing of the background activity.    Sleep was characterized by the presence of vertex waves, asymmetric sleep spindles and K-complexes.    Other Non-Epileptiform Findings:  None were present.    Interictal Epileptiform Activity:   Continuous 0.5-2hz fluctuating lateralized P4 max periodic discharges. Discharges sometimes more broadly distributed on the right with a spike wave morphology    Events:  Clinical events: None recorded.  Seizures: None recorded.    Activation Procedures:   Hyperventilation was not performed.    Photic stimulation was performed and did not elicit any abnormality.     Artifacts:  Intermittent myogenic and movement artifacts were noted.    ECG:  The heart rate on single channel ECG was predominantly between 100-110 BPM.    _____________________________________________________________  EEG SUMMARY/CLASSIFICATION    Abnormal EEG in the awake, drowsy and asleep states.  - Continuous 0.5-2hz fluctuating lateralized P4 max periodic discharges. Discharges sometimes more broadly distributed on the right with a spike wave morphology  - Right focal slowing    _____________________________________________________________  EEG IMPRESSION/CLINICAL CORRELATE    Abnormal EEG study.  1. Right parietal area of cortical hyperexcitability   2. right hemispheric area of structural or functional abnormality  3. No seizure seen.    Hernandez Champion MD PGY-5  Epilepsy Fellow    This Preliminary report is based on fellow review. Final report pending attending review. [facility]   COMPREHENSIVE EPILEPSY CENTER   REPORT OF ROUTINE VIDEO EEG     [facility]: [address], [place] [address], Ph#: [phone]  [facility]: [id number], [place] [address], Ph#: [phone]  [facility]: [address] [place] [address], Ph#: [phone]    Patient Name: GOLDIE NOLASCO  Age and : 55y (63)  MRN #: [id number]  Location: [address]  Referring Physician: Raegan Freeman    Study Date: 20    _____________________________________________________________  TECHNICAL INFORMATION    Placement and Labeling of Electrodes:  The EEG was performed utilizing 20 channels referential EEG connections (coronal over temporal over parasagittal montage) using all standard 10-20 electrode placements with EKG.  Recording was at a sampling rate of 256 samples per second per channel.  Time synchronized digital video recording was done simultaneously with EEG recording.  A low light infrared camera was used for low light recording.  Rolly and seizure detection algorithms were utilized.    _____________________________________________________________  HISTORY    Patient is a 55y old  Female who presents with a chief complaint of TIA (28 Sep 2020 01:21)      PERTINENT MEDICATION:  levETIRAcetam 1500 milliGRAM(s) Oral two times a day  _____________________________________________________________  STUDY INTERPRETATION    Findings: The background was continuous, spontaneously variable and reactive. During wakefulness, the posterior dominant rhythm consisted of asymmetric, well-modulated 9 Hz activity, better seen over the left with amplitude to 30 uV, that attenuated to eye opening.      Background Slowing:  No generalized background slowing was present.    Focal Slowing:   Continuous right hemispheric theta slowing    Sleep Background:  Drowsiness was characterized by fragmentation, attenuation, and slowing of the background activity.    Sleep was characterized by the presence of vertex waves, asymmetric sleep spindles and K-complexes.    Other Non-Epileptiform Findings:  None were present.    Interictal Epileptiform Activity:   Continuous 0.5-2hz fluctuating lateralized P4 max periodic discharges. Discharges sometimes more broadly distributed on the right with a polyspike wave morphology (frontal or posterior max field)    Events:  Clinical events: None recorded.  Seizures: None recorded.    Activation Procedures:   Hyperventilation was not performed.    Photic stimulation was performed and did not elicit any abnormality.     Artifacts:  Intermittent myogenic and movement artifacts were noted.    ECG:  The heart rate on single channel ECG was predominantly between 100-110 BPM.    _____________________________________________________________  EEG SUMMARY/CLASSIFICATION    Abnormal EEG in the awake, drowsy and asleep states.  - Continuous 0.5-2hz fluctuating lateralized P4 max periodic discharges. Discharges sometimes more broadly distributed on the right with a polyphasic morphology  - Right focal slowing    _____________________________________________________________  EEG IMPRESSION/CLINICAL CORRELATE    Abnormal EEG study.  1. Right parietal area of cortical hyperexcitability   2. Right hemispheric area of structural or functional abnormality  3. No seizure seen.    Hernandez Champion MD PGY-5  Epilepsy Fellow

## 2020-09-28 NOTE — H&P ADULT - PROBLEM SELECTOR PLAN 1
Patient presented after slurred speech as per EMS    Patient states she had some blurred vision. symptoms resolved on arrival.  CT head done and noted negative for acute infarct or hemorrhage.  EKG showed afib rate controlled. will monitor her on telemetry.  Follow ECHO with bubble study. Patient passed Dysphagia screen in ED.  Will start her on aspirin, statin and Xarelto. ( home meds)  holding hypertensive ( lisinopril and HCTZ)meds for permissive HTN.  neurology consult. Patient presented after slurred speech as per EMS    Patient states she had some blurred vision. symptoms resolved on arrival.  CT head done and noted negative for acute infarct or hemorrhage.  EKG showed afib / aflutter will monitor her on telemetry.  Follow ECHO with bubble study. Patient passed Dysphagia screen in ED.  Will start her on aspirin, statin and Xarelto. ( home meds)  holding hypertensive ( lisinopril and HCTZ)meds for permissive HTN.  neurology consult. Patient presented after slurred speech as per EMS.  TIA Vs Seizure like activity. ( as symptoms explained by daughter)    Patient states she had some blurred vision. symptoms resolved on arrival.  CT head done and noted negative for acute infarct or hemorrhage.  EKG showed afib / aflutter will monitor her on telemetry.  Follow ECHO with bubble study. Patient passed Dysphagia screen in ED.  Will start her on aspirin, statin and Xarelto. ( home meds)  holding hypertensive ( lisinopril and HCTZ)meds for permissive HTN.  neurology consult. Dr Thomas consulted

## 2020-09-28 NOTE — PHYSICAL THERAPY INITIAL EVALUATION ADULT - PERTINENT HX OF CURRENT PROBLEM, REHAB EVAL
Pt. was brought in by  EMS to the ED for complains of slurred speech . Symptoms resolved on arrival. As per patient she had blurry vision and has resolved, but as per ED provider EMS was called because patient had sudden on set of slurred speech and confusion and her symptoms were resolved by the time EMS arrived. Code stroke was called in ED. Brain CT revealed no acute intracranial hemorrhage, large territorial infarct, or mass effect.

## 2020-09-28 NOTE — H&P ADULT - NSHPPHYSICALEXAM_GEN_ALL_CORE
Vital Signs Last 24 Hrs  T(C): 36.4 (28 Sep 2020 01:32), Max: 37.1 (27 Sep 2020 21:21)  T(F): 97.6 (28 Sep 2020 01:32), Max: 98.8 (27 Sep 2020 21:21)  HR: 104 (28 Sep 2020 01:32) (88 - 115)  BP: 123/91 (28 Sep 2020 01:32) (106/65 - 130/82)  BP(mean): --  RR: 18 (28 Sep 2020 01:32) (17 - 24)  SpO2: 100% (28 Sep 2020 01:32) (95% - 100%)    GENERAL: NAD, speaks in full sentences, no signs of respiratory distress  HEAD:  Atraumatic, Normocephalic  EYES: EOMI, PERRLA, conjunctiva and sclera clear  NECK: Supple, No JVD  CHEST/LUNG: Clear to auscultation bilaterally; No wheeze; No crackles; No accessory muscles used  HEART: Regular rate and rhythm; No murmurs;   ABDOMEN: Soft, Nontender, Nondistended; Bowel sounds present; No guarding  EXTREMITIES:  2+ Peripheral Pulses,  Left extremity : LUE 2-3/5, LLE 4/5 ( patient ca move against resistance and gravity but weaker than Right )  PSYCH:  Normal Affect  NEUROLOGY: non-focal, AAO X 3. LUE 2-3/5, LLE 4/5 ( patient ca move against resistance and gravity but weaker than Right )  SKIN: No rashes or lesions Vital Signs Last 24 Hrs  T(C): 36.4 (28 Sep 2020 01:32), Max: 37.1 (27 Sep 2020 21:21)  T(F): 97.6 (28 Sep 2020 01:32), Max: 98.8 (27 Sep 2020 21:21)  HR: 104 (28 Sep 2020 01:32) (88 - 115)  BP: 123/91 (28 Sep 2020 01:32) (106/65 - 130/82)  BP(mean): --  RR: 18 (28 Sep 2020 01:32) (17 - 24)  SpO2: 100% (28 Sep 2020 01:32) (95% - 100%)    GENERAL: NAD, speaks in full sentences,  Flattening of left nasolabial fold   HEAD:  Atraumatic, Normocephalic  EYES: EOMI, PERRLA, conjunctiva and sclera clear  NECK: Supple, No JVD  CHEST/LUNG: Clear to auscultation bilaterally; No wheeze; No crackles; No accessory muscles used  HEART: Regular rate and rhythm; No murmurs;   ABDOMEN: Soft, Nontender, Nondistended; Bowel sounds present; No guarding  EXTREMITIES:  2+ Peripheral Pulses,  Left extremity : LUE 2-3/5, LLE 4/5 ( patient ca move against resistance and gravity but weaker than Right )  PSYCH:  Normal Affect  NEUROLOGY: non-focal, AAO X 3. LUE 2-3/5, LLE 4/5 ( patient ca move against resistance and gravity but weaker than Right )  SKIN: No rashes or lesions

## 2020-09-28 NOTE — H&P ADULT - NSICDXPASTMEDICALHX_GEN_ALL_CORE_FT
PAST MEDICAL HISTORY:  Atrial fibrillation     Atrial fibrillation     CVA (cerebral vascular accident)     CVA (cerebral vascular accident)     Hypertension     Seizure     Tachy-clare syndrome

## 2020-09-28 NOTE — PHYSICAL THERAPY INITIAL EVALUATION ADULT - IMPAIRMENTS CONTRIBUTING IMPAIRED BED MOBILITY, REHAB EVAL
impaired motor control/decreased flexibility/decreased balance/decreased strength/abnormal muscle tone

## 2020-09-29 ENCOUNTER — TRANSCRIPTION ENCOUNTER (OUTPATIENT)
Age: 55
End: 2020-09-29

## 2020-09-29 VITALS
SYSTOLIC BLOOD PRESSURE: 125 MMHG | OXYGEN SATURATION: 98 % | HEART RATE: 91 BPM | RESPIRATION RATE: 18 BRPM | DIASTOLIC BLOOD PRESSURE: 85 MMHG | TEMPERATURE: 98 F

## 2020-09-29 LAB
ANION GAP SERPL CALC-SCNC: 7 MMOL/L — SIGNIFICANT CHANGE UP (ref 5–17)
BUN SERPL-MCNC: 15 MG/DL — SIGNIFICANT CHANGE UP (ref 7–18)
CALCIUM SERPL-MCNC: 9.6 MG/DL — SIGNIFICANT CHANGE UP (ref 8.4–10.5)
CHLORIDE SERPL-SCNC: 108 MMOL/L — SIGNIFICANT CHANGE UP (ref 96–108)
CO2 SERPL-SCNC: 26 MMOL/L — SIGNIFICANT CHANGE UP (ref 22–31)
CREAT SERPL-MCNC: 0.74 MG/DL — SIGNIFICANT CHANGE UP (ref 0.5–1.3)
GLUCOSE SERPL-MCNC: 83 MG/DL — SIGNIFICANT CHANGE UP (ref 70–99)
HCT VFR BLD CALC: 44.6 % — SIGNIFICANT CHANGE UP (ref 34.5–45)
HGB BLD-MCNC: 14.7 G/DL — SIGNIFICANT CHANGE UP (ref 11.5–15.5)
MCHC RBC-ENTMCNC: 29.2 PG — SIGNIFICANT CHANGE UP (ref 27–34)
MCHC RBC-ENTMCNC: 33 GM/DL — SIGNIFICANT CHANGE UP (ref 32–36)
MCV RBC AUTO: 88.7 FL — SIGNIFICANT CHANGE UP (ref 80–100)
NRBC # BLD: 0 /100 WBCS — SIGNIFICANT CHANGE UP (ref 0–0)
PLATELET # BLD AUTO: 196 K/UL — SIGNIFICANT CHANGE UP (ref 150–400)
POTASSIUM SERPL-MCNC: 3.5 MMOL/L — SIGNIFICANT CHANGE UP (ref 3.5–5.3)
POTASSIUM SERPL-SCNC: 3.5 MMOL/L — SIGNIFICANT CHANGE UP (ref 3.5–5.3)
RBC # BLD: 5.03 M/UL — SIGNIFICANT CHANGE UP (ref 3.8–5.2)
RBC # FLD: 14.1 % — SIGNIFICANT CHANGE UP (ref 10.3–14.5)
SODIUM SERPL-SCNC: 141 MMOL/L — SIGNIFICANT CHANGE UP (ref 135–145)
WBC # BLD: 6.14 K/UL — SIGNIFICANT CHANGE UP (ref 3.8–10.5)
WBC # FLD AUTO: 6.14 K/UL — SIGNIFICANT CHANGE UP (ref 3.8–10.5)

## 2020-09-29 PROCEDURE — 80307 DRUG TEST PRSMV CHEM ANLYZR: CPT

## 2020-09-29 PROCEDURE — 83735 ASSAY OF MAGNESIUM: CPT

## 2020-09-29 PROCEDURE — 85730 THROMBOPLASTIN TIME PARTIAL: CPT

## 2020-09-29 PROCEDURE — 36415 COLL VENOUS BLD VENIPUNCTURE: CPT

## 2020-09-29 PROCEDURE — 85025 COMPLETE CBC W/AUTO DIFF WBC: CPT

## 2020-09-29 PROCEDURE — 83036 HEMOGLOBIN GLYCOSYLATED A1C: CPT

## 2020-09-29 PROCEDURE — 80048 BASIC METABOLIC PNL TOTAL CA: CPT

## 2020-09-29 PROCEDURE — 93306 TTE W/DOPPLER COMPLETE: CPT

## 2020-09-29 PROCEDURE — 84484 ASSAY OF TROPONIN QUANT: CPT

## 2020-09-29 PROCEDURE — 80061 LIPID PANEL: CPT

## 2020-09-29 PROCEDURE — 84146 ASSAY OF PROLACTIN: CPT

## 2020-09-29 PROCEDURE — 95957 EEG DIGITAL ANALYSIS: CPT

## 2020-09-29 PROCEDURE — 82728 ASSAY OF FERRITIN: CPT

## 2020-09-29 PROCEDURE — 84443 ASSAY THYROID STIM HORMONE: CPT

## 2020-09-29 PROCEDURE — 82962 GLUCOSE BLOOD TEST: CPT

## 2020-09-29 PROCEDURE — 85027 COMPLETE CBC AUTOMATED: CPT

## 2020-09-29 PROCEDURE — 86769 SARS-COV-2 COVID-19 ANTIBODY: CPT

## 2020-09-29 PROCEDURE — 80053 COMPREHEN METABOLIC PANEL: CPT

## 2020-09-29 PROCEDURE — 93005 ELECTROCARDIOGRAM TRACING: CPT

## 2020-09-29 PROCEDURE — 80177 DRUG SCRN QUAN LEVETIRACETAM: CPT

## 2020-09-29 PROCEDURE — 71045 X-RAY EXAM CHEST 1 VIEW: CPT

## 2020-09-29 PROCEDURE — 82607 VITAMIN B-12: CPT

## 2020-09-29 PROCEDURE — 84100 ASSAY OF PHOSPHORUS: CPT

## 2020-09-29 PROCEDURE — 95819 EEG AWAKE AND ASLEEP: CPT

## 2020-09-29 PROCEDURE — 97162 PT EVAL MOD COMPLEX 30 MIN: CPT

## 2020-09-29 PROCEDURE — 85610 PROTHROMBIN TIME: CPT

## 2020-09-29 PROCEDURE — 70450 CT HEAD/BRAIN W/O DYE: CPT

## 2020-09-29 PROCEDURE — 99285 EMERGENCY DEPT VISIT HI MDM: CPT | Mod: 25

## 2020-09-29 PROCEDURE — 99238 HOSP IP/OBS DSCHRG MGMT 30/<: CPT | Mod: GC

## 2020-09-29 PROCEDURE — 87635 SARS-COV-2 COVID-19 AMP PRB: CPT

## 2020-09-29 PROCEDURE — 82746 ASSAY OF FOLIC ACID SERUM: CPT

## 2020-09-29 RX ORDER — ATORVASTATIN CALCIUM 80 MG/1
1 TABLET, FILM COATED ORAL
Qty: 30 | Refills: 0
Start: 2020-09-29 | End: 2020-10-28

## 2020-09-29 RX ORDER — LEVETIRACETAM 250 MG/1
2 TABLET, FILM COATED ORAL
Qty: 120 | Refills: 0
Start: 2020-09-29 | End: 2020-10-28

## 2020-09-29 RX ADMIN — Medication 50 MILLIGRAM(S): at 07:02

## 2020-09-29 RX ADMIN — LEVETIRACETAM 2000 MILLIGRAM(S): 250 TABLET, FILM COATED ORAL at 07:02

## 2020-09-29 RX ADMIN — Medication 81 MILLIGRAM(S): at 12:04

## 2020-09-29 NOTE — DISCHARGE NOTE PROVIDER - NSDCCPCAREPLAN_GEN_ALL_CORE_FT
PRINCIPAL DISCHARGE DIAGNOSIS  Diagnosis: Seizure  Assessment and Plan of Treatment: You came with blurry vision and slurred speech. Your CT head was negative for acute bleeding. Electroencephalogram of the brain showed right parietal area of cortical hyperexcitability, right hemispheric area of structural or functional abnormality and no seizure seen. For the prevention of future seizure activities. You can continue on levetiracetam 2000mg two times daily for seizure prophylaxis. Follow-up with your PCP within 2 weeks of your discharge.        SECONDARY DISCHARGE DIAGNOSES  Diagnosis: TIA (transient ischemic attack)  Assessment and Plan of Treatment: .dischargestroke       PRINCIPAL DISCHARGE DIAGNOSIS  Diagnosis: Seizure  Assessment and Plan of Treatment: You came with blurry vision and slurred speech. Your CT head was negative for acute bleeding. Electroencephalogram of the brain showed right parietal area of cortical hyperexcitability, right hemispheric area of structural or functional abnormality and no seizure seen. For the prevention of future seizure activities. You can continue on levetiracetam 2000mg two times daily for seizure prophylaxis. Follow-up with your PCP within 2 weeks of your discharge.        SECONDARY DISCHARGE DIAGNOSES  Diagnosis: TIA (transient ischemic attack)  Assessment and Plan of Treatment: You came to the hospital because you had blurry vision and slurred speech. You were admitted because we needed to rule out recurrent stroke. Your CT head head both showed was no acute bleeding and recurrent stroke. Your echocardiogram was normal. Kindly continue your home medications and follow the regimen change as above. You are medically cleared to be discharged with regular out patient follow up with your primary doctor.     PRINCIPAL DISCHARGE DIAGNOSIS  Diagnosis: Seizure  Assessment and Plan of Treatment: You came with blurry vision and slurred speech. Your CT head was negative for acute bleeding. Electroencephalogram of the brain showed right parietal area of cortical hyperexcitability, right hemispheric area of structural or functional abnormality and no seizure seen. For the prevention of future seizure activities. You can continue on levetiracetam 2000mg two times daily for seizure prophylaxis. Follow-up with your PCP within 2 weeks of your discharge.

## 2020-09-29 NOTE — DISCHARGE NOTE PROVIDER - NSDCMRMEDTOKEN_GEN_ALL_CORE_FT
aspirin 81 mg oral tablet, chewable: 1 tab(s) orally once a day   atorvastatin 40 mg oral tablet: 1 tab(s) orally once a day (at bedtime)  cholecalciferol 50,000 intl units (1250 mcg) oral capsule: 1 cap(s) orally once a week   Keppra 750 mg oral tablet: 2 tab(s) orally 2 times a day   lisinopril-hydrochlorothiazide 20 mg-12.5 mg oral tablet: 1 tab(s) orally once a day  metoprolol succinate 50 mg oral tablet, extended release: 1 tab(s) orally 2 times a day  Xarelto 20 mg oral tablet: 1 tab(s) orally once a day (in the evening)   aspirin 81 mg oral tablet, chewable: 1 tab(s) orally once a day   atorvastatin 80 mg oral tablet: 1 tab(s) orally once a day (at bedtime)  cholecalciferol 50,000 intl units (1250 mcg) oral capsule: 1 cap(s) orally once a week   levETIRAcetam 1000 mg oral tablet: 2 tab(s) orally 2 times a day  Lipitor 80 mg oral tablet: 1 tab(s) orally once a day  lisinopril-hydrochlorothiazide 20 mg-12.5 mg oral tablet: 1 tab(s) orally once a day  metoprolol succinate 50 mg oral tablet, extended release: 1 tab(s) orally 2 times a day  Xarelto 20 mg oral tablet: 1 tab(s) orally once a day (in the evening)   aspirin 81 mg oral tablet, chewable: 1 tab(s) orally once a day   atorvastatin 80 mg oral tablet: 1 tab(s) orally once a day (at bedtime)  cholecalciferol 50,000 intl units (1250 mcg) oral capsule: 1 cap(s) orally once a week   levETIRAcetam 1000 mg oral tablet: 2 tab(s) orally 2 times a day  lisinopril-hydrochlorothiazide 20 mg-12.5 mg oral tablet: 1 tab(s) orally once a day  metoprolol succinate 50 mg oral tablet, extended release: 1 tab(s) orally 2 times a day  Xarelto 20 mg oral tablet: 1 tab(s) orally once a day (in the evening)

## 2020-09-29 NOTE — DISCHARGE NOTE NURSING/CASE MANAGEMENT/SOCIAL WORK - NSDCPEPTSTRK_GEN_ALL_CORE
Risk factors for stroke/Stroke warning signs and symptoms/Signs and symptoms of stroke/Stroke support groups for patients, families, and friends/Need for follow up after discharge/Call 911 for stroke/Prescribed medications/Stroke education booklet

## 2020-09-29 NOTE — DISCHARGE NOTE NURSING/CASE MANAGEMENT/SOCIAL WORK - PATIENT PORTAL LINK FT
You can access the FollowMyHealth Patient Portal offered by [facility] by registering at the following website: [email or web address]. By joining [employer]’s FollowMyHealth portal, you will also be able to view your health information using other applications (apps) compatible with our system.

## 2020-09-29 NOTE — DISCHARGE NOTE PROVIDER - NSDCFUSCHEDAPPT_GEN_ALL_CORE_FT
GOLDIE NOLASCO ; 12/09/2020 ; NPP Neuro [address] [facility] GOLDIE NOLASCO ; 12/09/2020 ; NPP Neuro [address] [facility]  GOLDIE NOLASCO ; 12/24/2020 ; NPP Cardio Electro [phone] [facility]

## 2020-09-29 NOTE — DISCHARGE NOTE PROVIDER - YES NO FOR MLM POSITIVE OR NEGATIVE COVID RESULT
"-- DO NOT REPLY / DO NOT REPLY ALL --  -- Message is from the JournalDoc / reason: Patient is requesting an order. Please contact patient back once order is in the system. Thank you. Insurance type: [employer] MEDIC[employer] ADVANTAGE HMO - [facility]  Payor: [address] / Plan: [address] / Product Type: [employer] Risk    Preferred Delivery Method   Call when ready for pickup - phone number to notify: [phone] Information       Type Contact Phone    09/03/2020 03:28 PM CDT Phone (Incoming) Fariba Lazaro (Self) [phone] [facility])          Alternative phone number: n/a    Turnaround time given to caller: ""This message will be sent to [facility] Provider's name]. The clinical team will fulfill your request as soon as they review your message. \""  "
,

## 2020-09-29 NOTE — DISCHARGE NOTE PROVIDER - HOSPITAL COURSE
55 year old  female with medical history  of HTN, afib (on Xarelto), tachy-clare syndrome, seizure, prior CVA (with  residual L sided arm and leg weakness at baseline) was brought in by  EMS to the ED for complains of slurred speech . Symptoms resolved on arrival. As per patient she had blurry vision and now its resolved, but as per ED provider EMS was called because patient had sudden on set of slurred speech and confusion and her symptoms were resolved by the time EMS arrived. Currently patient is denying any new symptoms . Pt denies new weakness, numbness, HA, fever, chest pain, shortness of breath, or any other complaints. No head injury. No smoking,  or drug use. 55 year old  female with medical history  of HTN, afib (on Xarelto), tachy-clare syndrome, seizure, prior CVA (with  residual L sided arm and leg weakness at baseline) was brought in by  EMS to the ED for complains of slurred speech . Symptoms resolved on arrival. As per patient she had blurry vision and now its resolved, but as per ED provider EMS was called because patient had sudden on set of slurred speech and confusion and her symptoms were resolved by the time EMS arrived. Currently patient is denying any new symptoms . Pt denies new weakness, numbness, HA, fever, chest pain, shortness of breath, or any other complaints. No head injury. No smoking,  or drug use. CT head was done and negative for any acute infarct or hemorrhage. EKG showed Atrial Fibrillation. EEG showed Right parietal area of cortical hyperexcitability, right hemispheric area of structural or functional abnormality. No seizure seen.   55 year old  female with medical history  of HTN, afib (on Xarelto), tachy-clare syndrome, seizure, prior CVA (with  residual L sided arm and leg weakness at baseline) was brought in by  EMS to the ED for complains of slurred speech . Symptoms resolved on arrival. As per patient she had blurry vision and now its resolved, but as per ED provider EMS was called because patient had sudden on set of slurred speech and confusion and her symptoms were resolved by the time EMS arrived. Currently patient is denying any new symptoms . Pt denies new weakness, numbness, HA, fever, chest pain, shortness of breath, or any other complaints. No head injury. No smoking,  or drug use. CT head was done and negative for any acute infarct or hemorrhage. EKG showed Atrial Fibrillation. EEG showed Right parietal area of cortical hyperexcitability, right hemispheric area of structural or functional abnormality. No seizure seen. Neuro Dr. Majano consulted and asked to increase Keppra to 2000mg BID and increase Lipitor to 80mg OD. 55 year old  female with medical history  of HTN, afib (on Xarelto), tachy-clare syndrome, seizure, prior CVA (with  residual L sided arm and leg weakness at baseline) was brought in by  EMS to the ED for complains of slurred speech . Symptoms resolved on arrival. As per patient she had blurry vision and now its resolved, but as per ED provider EMS was called because patient had sudden on set of slurred speech and confusion and her symptoms were resolved by the time EMS arrived. Currently patient is denying any new symptoms . Pt denies new weakness, numbness, HA, fever, chest pain, shortness of breath, or any other complaints. No head injury. No smoking,  or drug use. CT head was done and negative for any acute infarct or hemorrhage. EKG showed Atrial Fibrillation. ECHO showed EF 55%, moderate atrial enlargement, mild MR. EEG showed Right parietal area of cortical hyperexcitability, right hemispheric area of structural or functional abnormality. No seizure seen. Neuro Dr. Majano consulted and asked to increase Keppra to 2000mg BID and increase Lipitor to 80mg OD. Pt is clinically stable for discharge. 55 year old  female with medical history  of HTN, afib (on Xarelto), tachy-clare syndrome, seizure, was brought in by  EMS to the ED for complains of slurred speech . Symptoms resolved on arrival. As per patient she had blurry vision and now its resolved, but as per ED provider EMS was called because patient had sudden on set of slurred speech and confusion and her symptoms were resolved by the time EMS arrived. Currently patient is denying any new symptoms . Pt denies new weakness, numbness, HA, fever, chest pain, shortness of breath, or any other complaints. No head injury. No smoking,  or drug use. CT head was done and negative for any acute infarct or hemorrhage. EKG showed Atrial Fibrillation. ECHO showed EF 55%, moderate atrial enlargement, mild MR. EEG showed Right parietal area of cortical hyperexcitability, right hemispheric area of structural or functional abnormality. No seizure seen. Neuro Dr. Majano consulted and asked to increase Keppra to 2000mg BID and increase Lipitor to 80mg OD. Pt is clinically stable for discharge.    of note, history stated patient had hx of CVA/TIA. This was proven false and has been subsequently removed from the patient's history.

## 2020-09-29 NOTE — DISCHARGE NOTE PROVIDER - NSDCFUADDINST_GEN_ALL_CORE_FT
The DASH diet recommendation includes: 2000 calories a day of: Whole grains (6 to 8 servings a day), Vegetables (4 to 5 servings a day, Fruits (4 to 5 servings a day), Low-fat or fat-free milk and milk products (2 to 3 servings a day)., Lean meat, poultry, and fish (6 or fewer servings a day), Nuts, seeds, and beans (4 to 5 servings a week), Healthy fats and oils (2 to 3 servings a day), Sweets, preferably low-fat or fat-free (5 or fewer a week),Sodium (no more than 2,300 mg a day).    If you drink alcohol, limit yourself to 2 drinks or less per day for men and 1 drink or less per day for women. To reduce your blood pressure even more, replace some DASH diet carbohydrates with low-fat protein and unsaturated fats.  For weight loss, reduce your daily calories to 1,600 per day.    Ways to cut back on salt:-Limit canned, dried, packaged, and fast foods.  Don't add salt to your food at the table. Season foods with herbs instead of salt when you cook. Request no added salt when you go to a restaurant.

## 2020-09-30 LAB — LEVETIRACETAM SERPL-MCNC: 35.9 MCG/ML — SIGNIFICANT CHANGE UP (ref 12–46)

## 2020-12-09 ENCOUNTER — APPOINTMENT (OUTPATIENT)
Dept: NEUROLOGY | Facility: CLINIC | Age: 55
End: 2020-12-09
Payer: MEDICARE

## 2020-12-09 VITALS
BODY MASS INDEX: 37.17 KG/M2 | SYSTOLIC BLOOD PRESSURE: 117 MMHG | HEART RATE: 95 BPM | TEMPERATURE: 97.6 F | HEIGHT: 62 IN | WEIGHT: 202 LBS | OXYGEN SATURATION: 98 % | DIASTOLIC BLOOD PRESSURE: 84 MMHG

## 2020-12-09 PROCEDURE — 99215 OFFICE O/P EST HI 40 MIN: CPT

## 2020-12-09 PROCEDURE — 99072 ADDL SUPL MATRL&STAF TM PHE: CPT

## 2020-12-14 NOTE — HISTORY OF PRESENT ILLNESS
[FreeTextEntry1] : FROM 1/25/19:\par This is a 53-year-old right-handed woman with a previous right hemispheric ischemic stroke and residual left-sided hemiparesis, who presented to [facility] ED on January 12, 2019 after experiencing inability to speak and worsened left-sided hemiparesis, followed by a generalized convulsion. EMS was called and the patient was administered lorazepam 2mg IV x 1, which caused the seizure to resolve. The patient was started on levetiracetam 500mg PO BID after receiving a 1000mg IV load, and has since had no similar events, and no change to her baseline left-sided hemiparesis. She had no tongue/cheek bite, head trauma, or urinary/bowel incontinence. She denies any recent history of fever, headache, alcohol use, added stress, or poor sleep hygiene. PCP: Dr. Jamil Seay.\par \par FROM 8/16/19:\par The patient, now 64, states that since I saw her on 1/25/19, she has had one episode of confusion associated with abdominal pain, although there was no witnessed seizure activity or new stroke-like symptoms. She presented to [facility] ED on 3/24/19 - per ED notes, she had generalized weakness with abdominal pain, but was found to have a normal CT abdomen and resolution of abdominal pain, prompting discharge to home. She has been compliant with her medications, although she has not taken aspirin 81mg daily while taking Xarelto. She has occasional mild frontal headache, which resolves on its own or with over-the-counter Tylenol.\par \par FROM 11/13/19:\par Since the last clinic visit, the patient presented to the ED in early October for breakthrough seizures, and her levetiracetam dosage was increased from 500mg to 750mg PO BID. Since then, she has not had any new seizures, and has been compliant with all of her medications for seizure prophylaxis and secondary stroke prevention. She completed four sessions of physical therapy, which helped her improve her left arm and leg strength, but she was not approved by her insurance plan for more sessions.\par \par FROM 1/8/20:\par The patient was admitted to [facility] during December 2-4, 2019, for breakthrough seizure. Her levetiracetam dosage was increased from 750mg to 1000mg PO BID. Since then, she has been stable without any new seizures. She has been continuing her physical therapy course since the hospital discharge, as well.\par \par FROM 9/9/20:\par Since the last clinic visit, the patient, now 55 was admitted to [facility] during July 30 - August 2, 2020 for breakthrough seizure in the setting of acute renal injury and prolonged exposure to high outdoor temperature. Her levetiracetam dosage was increased from 1000mg to 1500mg twice daily. Since then, she has not had any new seizures nor any concerns for new stroke.\par \par FROM 12/9/20:\par Since the last clinic visit, the patient was admitted to [facility] during September 27-30, 2020 for a breakthrough seizure. Her levetiracetam dosage was increased from 1500mg to 2000mg PO BID at that time. She was advised to pursue physical therapy for deconditioning in the setting of left-sided hemiparesis associated with chronic stroke. She has not had any breakthrough seizures or other presentations to the Emergency Department since then.

## 2020-12-14 NOTE — ASSESSMENT
[FreeTextEntry1] : 55 RHF with left nasolabial fold flattening and left-sided hemiparesis secondary to right MCA territory ischemic stroke secondary to atrial fibrillation, also with recent breakthrough post-stroke seizure.

## 2020-12-14 NOTE — DATA REVIEWED
[de-identified] : (1/13/19): Right temporal sharps and electrographic seizure; Bilateral temporal intermittent rhythmic delta activity (TIRDA)\par \par (12/2/19): Right frontal sharps and focal slowing, Diffuse beta activity\par \par (09/28/2020): Right parietal sharps, Right hemispheric slowing, No subclinical seizures [de-identified] : CT Head and CT Angio Head & Neck (1/12/19): Chronic right MCA infarct, Absent right PCA, Otherwise normal intracranial and neck vasculature\par \par Echocardiogram (1/14/19): Severely dilated left atrium, Trace mitral regurgitation\par \par Labs (1/15/19): CBC Normal, INR 1.67 (high), BMP notable for low sodium (133), low potassium (3.3), and high glucose (107)\par \par Labs (1/13/19): Hemoglobin A1c 5.6%, Fasting lipid panel: Cholesterol 152, Triglycerides 43, HDL 65, LDL 78\par \par CT Head (12/1/19): Chronic right MCA (large) and right EJ (partial) infarcts without new intracranial abnormalities\par \par Labs (12/3/19): Notable for Hct 46.0% <H>, Phosphorus 2.4 <L>, Vitamin D 17.1 <L>, Normal Vit B12 & Folate\par \par Labs (8/2/20): CBC, BMP normal; Levetiracetam level (07/31/2020): 86.0 <H>\par \par Labs (9/27/20 - 9/28/20): COVID-19 PCR Negative; COVID-19 IgG Positive\par \par Labs (9/29/20): CBC & BMP normal; Levetiracetam level 35.9 <WNL>

## 2020-12-14 NOTE — PHYSICAL EXAM
[General Appearance - Alert] : alert [General Appearance - In No Acute Distress] : in no acute distress [General Appearance - Well Nourished] : well nourished [Oriented To Time, Place, And Person] : oriented to person, place, and time [Impaired Insight] : insight and judgment were intact [Person] : oriented to person [Place] : oriented to place [Time] : oriented to time [Fluency] : fluency intact [Comprehension] : comprehension intact [Cranial Nerves Optic (II)] : visual acuity intact bilaterally,  visual fields full to confrontation, pupils equal round and reactive to light [Cranial Nerves Oculomotor (III)] : extraocular motion intact [Cranial Nerves Trigeminal (V)] : facial sensation intact symmetrically [Cranial Nerves Vestibulocochlear (VIII)] : hearing was intact bilaterally [Cranial Nerves Glossopharyngeal (IX)] : tongue and palate midline [Cranial Nerves Accessory (XI - Cranial And Spinal)] : head turning and shoulder shrug symmetric [Cranial Nerves Hypoglossal (XII)] : there was no tongue deviation with protrusion [Flattening Of Nasolabial Fold On The Left] : flattening of the  nasolabial fold [Involuntary Movements] : no involuntary movements were seen [Motor Handedness Right-Handed] : the patient is right hand dominant [Hemiparesis Of Left Side] : hemiparesis was present on the left [Paresis Pronator Drift Left-Sided] : a left-sided pronator drift was present [1] : shoulder adduction 1/5 [Hand Weakness Left] : the hand  was weak [5] : ankle plantar flexion 5/5 [2] : ankle dorsiflexion 2/5 [3] : ankle plantar flexion 3/5 [1+] : Patella left 1+ [0] : Ankle jerk left 0 [Plantar Reflex Right Only] : abnormal on the right [Sclera] : the sclera and conjunctiva were normal [PERRL With Normal Accommodation] : pupils were equal in size, round, reactive to light, with normal accommodation [Extraocular Movements] : extraocular movements were intact [Outer Ear] : the ears and nose were normal in appearance [Hearing Threshold Finger Rub Not Porter] : hearing was normal [Oropharynx] : the oropharynx was normal [Neck Appearance] : the appearance of the neck was normal [Auscultation Breath Sounds / Voice Sounds] : lungs were clear to auscultation bilaterally [Arterial Pulses Carotid] : carotid pulses were normal with no bruits [Full Pulse] : the pedal pulses are present [Abdomen Soft] : soft [Abdomen Tenderness] : non-tender [No CVA Tenderness] : no ~M costovertebral angle tenderness [No Spinal Tenderness] : no spinal tenderness [Nail Clubbing] : no clubbing  or cyanosis of the fingernails [Skin Turgor] : normal skin turgor [FreeTextEntry1] : NIHSS:\par \par LOC Responsiveness: 0\par LOC Questions: 0\par LOC Commands: 0\par Gaze Preference: 0\par Visual Field: 0\par Facial Palsy: 1\par Motor Arm Right: 0\par Motor Arm Left: 2\par Motor Leg Right: 0\par Motor Arm Left: 0\par Limb Ataxia: 0\par Sensory: 0\par Language: 0\par Speech: 0\par Extinction/Inattention: 0\par \par \par NIHSS Total: 3\par \par Modified Dodson Scale: 2\par  [Concentration Intact] : normal concentrating ability [Naming Objects] : no difficulty naming common objects [Repeating Phrases] : no difficulty repeating a phrase [Flattening Of Nasolabial Fold On The Right] : no flattening of the nasolabial fold [Paresis Pronator Drift Right-Sided] : no pronator drift on the right [Hand Weakness Right] : normal hand  [Allodynia] : no ~T allodynia present [Tactile Decrease Entire Right Arm] : normal right arm [Tactile Decrease Entire Left Arm] : normal left arm [Tactile Decrease Hand] : normal left hand [Tactile Decrease Entire Leg Right] : normal right leg [Tactile Decrease Entire Leg Left] : normal left leg [Pain / Temperature Decrease Entire Arm Right] : normal right arm [Pain / Temperature Decrease Entire Arm Left] : normal left arm [Pain / Temp Decrease Hand] : normal left hand [Pain / Temp Decrease Entire Leg - Right] : normal right leg [Pain / Temp Decrease Entire Leg - Left] : normal left leg [Coordination - Dysmetria Impaired Finger-to-Nose Bilateral] : not present [Coordination - Dysmetria Impaired Heel-to-Shin Bilateral] : not present [Plantar Reflex Left Only] : normal on the left [___] : absent on the right [___] : absent on the left [FreeTextEntry4] : No dysarthria or aphasia present [FreeTextEntry8] : Wide-based stance and gait. Hemiparetic gait present. Unable to test for specialized gaits or Romberg sign due to left leg weakness. Coordination exams on left are limited due to weakness. [] : the neck was supple [Normal] : the heart rate was normal [Irregularly Irregular] : the rhythm was irregularly irregular

## 2020-12-24 ENCOUNTER — APPOINTMENT (OUTPATIENT)
Dept: ELECTROPHYSIOLOGY | Facility: CLINIC | Age: 55
End: 2020-12-24

## 2021-01-13 ENCOUNTER — APPOINTMENT (OUTPATIENT)
Dept: NEUROLOGY | Facility: CLINIC | Age: 56
End: 2021-01-13

## 2021-03-21 ENCOUNTER — EMERGENCY (EMERGENCY)
Facility: HOSPITAL | Age: 56
LOS: 1 days | Discharge: HOME | End: 2021-03-21
Attending: EMERGENCY MEDICINE
Payer: COMMERCIAL

## 2021-03-21 VITALS
DIASTOLIC BLOOD PRESSURE: 80 MMHG | SYSTOLIC BLOOD PRESSURE: 167 MMHG | TEMPERATURE: 98 F | OXYGEN SATURATION: 99 % | HEART RATE: 78 BPM | RESPIRATION RATE: 20 BRPM

## 2021-03-21 VITALS
OXYGEN SATURATION: 99 % | HEIGHT: 64 IN | DIASTOLIC BLOOD PRESSURE: 103 MMHG | SYSTOLIC BLOOD PRESSURE: 148 MMHG | TEMPERATURE: 98 F | HEART RATE: 125 BPM | RESPIRATION RATE: 22 BRPM | WEIGHT: 179.9 LBS

## 2021-03-21 DIAGNOSIS — Z98.51 TUBAL LIGATION STATUS: Chronic | ICD-10-CM

## 2021-03-21 LAB
ALBUMIN SERPL ELPH-MCNC: 3.1 G/DL — LOW (ref 3.5–5)
ALP SERPL-CCNC: 133 U/L — HIGH (ref 40–120)
ALT FLD-CCNC: 26 U/L DA — SIGNIFICANT CHANGE UP (ref 10–60)
ANION GAP SERPL CALC-SCNC: 5 MMOL/L — SIGNIFICANT CHANGE UP (ref 5–17)
APPEARANCE UR: CLEAR — SIGNIFICANT CHANGE UP
APTT BLD: 35.8 SEC — HIGH (ref 27.5–35.5)
AST SERPL-CCNC: 24 U/L — SIGNIFICANT CHANGE UP (ref 10–40)
BASOPHILS # BLD AUTO: 0.04 K/UL — SIGNIFICANT CHANGE UP (ref 0–0.2)
BASOPHILS NFR BLD AUTO: 0.5 % — SIGNIFICANT CHANGE UP (ref 0–2)
BILIRUB SERPL-MCNC: 0.2 MG/DL — SIGNIFICANT CHANGE UP (ref 0.2–1.2)
BILIRUB UR-MCNC: NEGATIVE — SIGNIFICANT CHANGE UP
BUN SERPL-MCNC: 30 MG/DL — HIGH (ref 7–18)
CALCIUM SERPL-MCNC: 8.9 MG/DL — SIGNIFICANT CHANGE UP (ref 8.4–10.5)
CHLORIDE SERPL-SCNC: 109 MMOL/L — HIGH (ref 96–108)
CK SERPL-CCNC: 123 U/L — SIGNIFICANT CHANGE UP (ref 21–215)
CO2 SERPL-SCNC: 27 MMOL/L — SIGNIFICANT CHANGE UP (ref 22–31)
COLOR SPEC: YELLOW — SIGNIFICANT CHANGE UP
CREAT SERPL-MCNC: 0.86 MG/DL — SIGNIFICANT CHANGE UP (ref 0.5–1.3)
DIFF PNL FLD: NEGATIVE — SIGNIFICANT CHANGE UP
EOSINOPHIL # BLD AUTO: 0.03 K/UL — SIGNIFICANT CHANGE UP (ref 0–0.5)
EOSINOPHIL NFR BLD AUTO: 0.3 % — SIGNIFICANT CHANGE UP (ref 0–6)
GLUCOSE SERPL-MCNC: 108 MG/DL — HIGH (ref 70–99)
GLUCOSE UR QL: NEGATIVE — SIGNIFICANT CHANGE UP
HCT VFR BLD CALC: 42.6 % — SIGNIFICANT CHANGE UP (ref 34.5–45)
HGB BLD-MCNC: 13.8 G/DL — SIGNIFICANT CHANGE UP (ref 11.5–15.5)
IMM GRANULOCYTES NFR BLD AUTO: 0.2 % — SIGNIFICANT CHANGE UP (ref 0–1.5)
INR BLD: 1.85 RATIO — HIGH (ref 0.88–1.16)
KETONES UR-MCNC: NEGATIVE — SIGNIFICANT CHANGE UP
LEUKOCYTE ESTERASE UR-ACNC: NEGATIVE — SIGNIFICANT CHANGE UP
LYMPHOCYTES # BLD AUTO: 1.17 K/UL — SIGNIFICANT CHANGE UP (ref 1–3.3)
LYMPHOCYTES # BLD AUTO: 13.3 % — SIGNIFICANT CHANGE UP (ref 13–44)
MCHC RBC-ENTMCNC: 29.4 PG — SIGNIFICANT CHANGE UP (ref 27–34)
MCHC RBC-ENTMCNC: 32.4 GM/DL — SIGNIFICANT CHANGE UP (ref 32–36)
MCV RBC AUTO: 90.8 FL — SIGNIFICANT CHANGE UP (ref 80–100)
MONOCYTES # BLD AUTO: 0.41 K/UL — SIGNIFICANT CHANGE UP (ref 0–0.9)
MONOCYTES NFR BLD AUTO: 4.7 % — SIGNIFICANT CHANGE UP (ref 2–14)
NEUTROPHILS # BLD AUTO: 7.11 K/UL — SIGNIFICANT CHANGE UP (ref 1.8–7.4)
NEUTROPHILS NFR BLD AUTO: 81 % — HIGH (ref 43–77)
NITRITE UR-MCNC: NEGATIVE — SIGNIFICANT CHANGE UP
NRBC # BLD: 0 /100 WBCS — SIGNIFICANT CHANGE UP (ref 0–0)
PH UR: 7 — SIGNIFICANT CHANGE UP (ref 5–8)
PLATELET # BLD AUTO: 177 K/UL — SIGNIFICANT CHANGE UP (ref 150–400)
POTASSIUM SERPL-MCNC: 4.6 MMOL/L — SIGNIFICANT CHANGE UP (ref 3.5–5.3)
POTASSIUM SERPL-SCNC: 4.6 MMOL/L — SIGNIFICANT CHANGE UP (ref 3.5–5.3)
PROT SERPL-MCNC: 7 G/DL — SIGNIFICANT CHANGE UP (ref 6–8.3)
PROT UR-MCNC: 15
PROTHROM AB SERPL-ACNC: 21.4 SEC — HIGH (ref 10.6–13.6)
RBC # BLD: 4.69 M/UL — SIGNIFICANT CHANGE UP (ref 3.8–5.2)
RBC # FLD: 12.6 % — SIGNIFICANT CHANGE UP (ref 10.3–14.5)
SODIUM SERPL-SCNC: 141 MMOL/L — SIGNIFICANT CHANGE UP (ref 135–145)
SP GR SPEC: 1.01 — SIGNIFICANT CHANGE UP (ref 1.01–1.02)
TROPONIN I SERPL-MCNC: <0.015 NG/ML — SIGNIFICANT CHANGE UP (ref 0–0.04)
UROBILINOGEN FLD QL: 1
WBC # BLD: 8.78 K/UL — SIGNIFICANT CHANGE UP (ref 3.8–10.5)
WBC # FLD AUTO: 8.78 K/UL — SIGNIFICANT CHANGE UP (ref 3.8–10.5)

## 2021-03-21 PROCEDURE — 70450 CT HEAD/BRAIN W/O DYE: CPT

## 2021-03-21 PROCEDURE — 85025 COMPLETE CBC W/AUTO DIFF WBC: CPT

## 2021-03-21 PROCEDURE — 84484 ASSAY OF TROPONIN QUANT: CPT

## 2021-03-21 PROCEDURE — 80053 COMPREHEN METABOLIC PANEL: CPT

## 2021-03-21 PROCEDURE — 87086 URINE CULTURE/COLONY COUNT: CPT

## 2021-03-21 PROCEDURE — 82550 ASSAY OF CK (CPK): CPT

## 2021-03-21 PROCEDURE — 80177 DRUG SCRN QUAN LEVETIRACETAM: CPT

## 2021-03-21 PROCEDURE — 85610 PROTHROMBIN TIME: CPT

## 2021-03-21 PROCEDURE — 99285 EMERGENCY DEPT VISIT HI MDM: CPT

## 2021-03-21 PROCEDURE — 81001 URINALYSIS AUTO W/SCOPE: CPT

## 2021-03-21 PROCEDURE — 85730 THROMBOPLASTIN TIME PARTIAL: CPT

## 2021-03-21 PROCEDURE — 70450 CT HEAD/BRAIN W/O DYE: CPT | Mod: 26,MA

## 2021-03-21 PROCEDURE — 36415 COLL VENOUS BLD VENIPUNCTURE: CPT

## 2021-03-21 PROCEDURE — 93005 ELECTROCARDIOGRAM TRACING: CPT

## 2021-03-21 PROCEDURE — 99284 EMERGENCY DEPT VISIT MOD MDM: CPT | Mod: 25

## 2021-03-21 PROCEDURE — 93010 ELECTROCARDIOGRAM REPORT: CPT

## 2021-03-21 RX ORDER — LEVETIRACETAM 250 MG/1
2000 TABLET, FILM COATED ORAL ONCE
Refills: 0 | Status: COMPLETED | OUTPATIENT
Start: 2021-03-21 | End: 2021-03-21

## 2021-03-21 RX ORDER — SODIUM CHLORIDE 9 MG/ML
1000 INJECTION INTRAMUSCULAR; INTRAVENOUS; SUBCUTANEOUS ONCE
Refills: 0 | Status: COMPLETED | OUTPATIENT
Start: 2021-03-21 | End: 2021-03-21

## 2021-03-21 RX ORDER — LEVETIRACETAM 250 MG/1
1 TABLET, FILM COATED ORAL
Qty: 60 | Refills: 0
Start: 2021-03-21 | End: 2021-04-19

## 2021-03-21 RX ADMIN — SODIUM CHLORIDE 1000 MILLILITER(S): 9 INJECTION INTRAMUSCULAR; INTRAVENOUS; SUBCUTANEOUS at 14:31

## 2021-03-21 RX ADMIN — LEVETIRACETAM 600 MILLIGRAM(S): 250 TABLET, FILM COATED ORAL at 16:56

## 2021-03-21 NOTE — ED PROVIDER NOTE - OBJECTIVE STATEMENT
55 year old female with PMHx of fever. stroke, subsequent seizure, on Eliquis, on Kepra 1000mg BID, presenting to the ED s/p witnessed symptoms today. Patient's daughter came into the bedroom and saw her seizing. Patient was given 10 Versed by EMS and is currently sleeping. Her last known seizure was several months ago. 55 year old female with PMHx of  stroke, subsequent seizure disorder, residual left sided weaknes, on Eliquis, on Kepra 1000mg BID, presenting to the ED s/p witnessed seizure today. Patient's daughter came into the bedroom and saw her seizing. Patient was given 10 Versed by EMS and is currently sleeping. Her last known seizure was several months ago. no recent illness, compliant with meds

## 2021-03-21 NOTE — ED PROVIDER NOTE - PATIENT PORTAL LINK FT
You can access the FollowMyHealth Patient Portal offered by [facility] by registering at the following website: [email or web address]. By joining "[employer]’s FollowMyHealth portal, you will also be able to view your health information using other applications (apps) compatible with our system.

## 2021-03-21 NOTE — ED ADULT NURSE NOTE - NSIMPLEMENTINTERV_GEN_ALL_ED
Implemented All Fall Risk Interventions:  [place] to call system. Call bell, personal items and telephone within reach. Instruct patient to call for assistance. Room bathroom lighting operational. Non-slip footwear when patient is off stretcher. Physically safe environment: no spills, clutter or unnecessary equipment. Stretcher in lowest position, wheels locked, appropriate side rails in place. Provide visual cue, wrist band, yellow gown, etc. Monitor gait and stability. Monitor for mental status changes and reorient to person, place, and time. Review medications for side effects contributing to fall risk. Reinforce activity limits and safety measures with patient and family.

## 2021-03-21 NOTE — ED ADULT NURSE NOTE - OBJECTIVE STATEMENT
Covering BRIAN Maldonado. Pt presents to the ED s/p witnessed seizure today. Patient's daughter came into the bedroom and saw her seizing. As per EMS, was given 10 Versed. Upon assessment, EKG completed, pt on cardiac monitor, O2, Pulse ox. Pt is A&Ox1-2, drowsy. Pt arouses to tactile and verbal stimuli. No acute distress noted, no shortness of breath indicated. MD Alexandra at bedside.

## 2021-03-21 NOTE — ED ADULT NURSE NOTE - ED CARDIAC RATE
Will continue with concerta 36mg daily, #30, 3rxs printed ( mom will  )  Follow up in 3 months  
tachycardic

## 2021-03-21 NOTE — ED PROVIDER NOTE - PROGRESS NOTE DETAILS
patient back to baseline mental status. I spoke with Dr Majano, patient ok to be discharged after receieving 2000mg keppra IV, and then increasing dose to 1250mg BID. I explained plan to daughter and patient (Price) - s/o to f/u CT which is negative for ICH  UA - no UTI  Will dc with neuro fu. Discussed indications for patient return to ED. Patient understood.

## 2021-03-21 NOTE — ED PROVIDER NOTE - NSFOLLOWUPINSTRUCTIONS_ED_ALL_ED_FT
Epilepsia    LO QUE NECESITA SABER:    ¿Qué es la epilepsia?La epilepsia es un trastorno cerebral que provoca convulsiones. También se conoce nioclasa trastorno convulsivo. Edgar convulsión significa que un área anormal del cerebro a veces envía ráfagas de actividad eléctrica. Edgar convulsión puede afectar mariposa o ambos lados del cerebro. Dependiendo del tipo de convulsión, puede tener movimientos que no puede controlar, perder la conciencia o mirar fijo hacia adelante. Puede sentirse confundido o cansado después de la convulsión. Edgar convulsión puede durar unos segundos o más de 5 minutos. Un defecto de nacimiento, un tumor, un derrame cerebral, demencia, edgar lesión o edgar infección podrían provocar edgar epilepsia. Podría desconocerse la causa de velasco epilepsia. Si ashley convulsiones no son controladas, podrían representar edgar amenaza para la constantin.    ¿Cómo se diagnostica la epilepsia?Velasco médico le preguntará acerca de velasco condición de jose y sobre los medicamentos que julia. Generalmente, se diagnostica epilepsia si tiene al menos 2 convulsiones en el término de 24 horas. También se puede diagnosticar si tiene 1 convulsión, deonte es propenso a tener más. Velasco riesgo es mayor si tiene antecedentes familiares de epilepsia. Edgar gammagrafía cerebral también puede mostrar signos de epilepsia, que probablemente provocaría otra convulsión. Infórmele al médico qué carlson cercanas fueron las convulsiones si tuvo más de edgar. Velasco médico le pedirá que describa detalladamente cada convulsión. Si es posible, concurra acompañado por edgar persona que haya estado presente cuando usted tuvo la convulsión. Es posible que también necesite alguno de los siguientes tratamientos:   •Un EEGregistra la actividad eléctrica de velasco cerebro. Se usa para encontrar cambios en los patrones normales de velasco actividad cerebral.      •Edgar tomografía computarizada (TC) o imágenes por resonancia magnética (IRM)podrían mostrar imágenes que pueden usarse para revisar áreas anormales. Es posible que le administren un medio de contraste que sirve para que el cerebro se observe con mayor claridad en las imágenes. Dígale al médico si usted alguna vez ha tenido edgar reacción alérgica al líquido de contraste. No entre a la harrison donde se realiza la resonancia magnética con algo de metal. El metal puede causar lesiones serias. Dígale al médico si usted tiene algo de metal dentro de velasco cuerpo o por encima.      •Edgar tomografía por emisión de positrones (TEP)se utiliza para observar la actividad en las áreas del cerebro. Le administran un material radioactivo que ayuda a los médicos a observar mejor la actividad.      •Edgar tomografía computarizada de emisión monofotónica (SPECT)utiliza material radioactivo para encontrar el lugar donde la convulsión comenzó en el cerebro. Se puede realizar esta tomografía si otras exploraciones no muestran dónde comenzó la convulsión.      ¿Cómo se trata la epilepsia?El objetivo del tratamiento es tratar de detener las convulsiones completamente. Es posible que usted necesite alguno de los siguientes:   •Los medicamentosayudarán a controlar ashley convulsiones. Usted podría necesitar medicamento a diario para evitar las convulsiones o dina edgar convulsión para detenerla. No deje de lauren velasco medicamento a menos que se lo haya indicado velasco médico.      •La cirugíapodría ayudarlo a reducir la frecuencia con que suceden las epilepsias si el medicamento no ayuda. Pida más información a velasco médico acerca de la cirugía para la epilepsia.      ¿Qué más necesito saber acerca de la epilepsia?  •La muerte súbita sin causa aparente en la epilepsia (SUDEP) es edgar complicación poco frecuente de esta enfermedad. En 1 año, 1 de cada 1,000 adultos con epilepsia tendrá esta complicación. El riesgo de sufrir SUDEP aumenta si tiene 3 o más convulsiones tónico-clónicas generalizadas en 1 año. El riesgo también aumenta si tiene convulsiones nocturnas (convulsiones dina el sueño). Después de edgar convulsión nocturna, la respiración puede ser superficial.      •Velasco médico puede recomendar un cambio en el medicamento para disminuir el número de convulsiones. En el nela de las convulsiones nocturnas, puede recomendar que edgar persona duerma cerca suyo. La persona debe tener más de 10 años y, además, estar lo bastante cerca para saber que usted está teniendo edgar convulsión.      ¿Qué puedo hacer para evitar edgar convulsión?Es posible que no pueda evitar todas las convulsiones. Lo que se detalla a continuación puede ayudarlo a mantener bajo control los factores que pueden provocar el inicio de edgar convulsión:   •[place] velasco medicamento todos los días a la misma hora.[place] también ayudará a evitar los efectos secundarios del medicamento. Programe edgar alarma para que le ayude a acordarse de lauren velasco medicamento cada día.      •Controle el estrés.El estrés puede ser un desencadenante de la epilepsia. La actividad física puede ayudar a reducir el estrés. Hable con velasco médico sobre la actividad física que es denson para usted. Edgar enfermedad puede ser edgar forma de estrés. Consuma edgar variedad de alimentos saludables y tome suficientes líquidos dina edgar enfermedad. Hable con velasco médico acerca de otras formas de controlar el estrés.      •Establezca un horario y edgar rutina para ir a dormir.La falta de sueño puede provocar edgar convulsión. Intente acostarse y levantarse a la misma hora todos los días. Mantenga velasco habitación en silencio y a oscuras. Consulte con velasco médico si usted tiene dificultad para dormir.      •Limite o no consuma bebidas alcohólicas, según indicaciones.El alcohol puede provocar edgar convulsión, especialmente si sravani edgar gran cantidad de edgar vez. Edgar bebida de alcohol equivale a 12 onzas de cerveza, 1½ onzas de licor o 5 onzas de vino. Hable con velasco médico acerca de cuál es la cantidad denson de alcohol para usted. Velasco médico puede recomendarle que no jeremias alcohol. Infórmele al médico si necesita ayuda para dejar de beber.      ¿Qué puedo hacer para controlar la epilepsia?  •Lleve un diario de las convulsiones.[place] puede ayudarlo a encontrar los factores desencadenantes y a evitarlos. Escriba las fechas de ashley convulsiones, dónde se encontraba y qué estaba haciendo. Incluya cómo se sintió antes y después. Los posibles factores desencadenantes incluyen enfermedades, falta de sueño, cambios hormonales, alcohol, drogas, luces o estrés.      •Anote cualquier aura que tenga antes de edgar convulsión.Un aura es un signo que indica que está a punto de tener edgar convulsión. Las auras se producen antes de determinados tipos de convulsiones que ocurren solo en edgar parte del cerebro. El aura puede ocurrir segundos antes de edgar convulsión o hasta edgar hora antes. Puede sentir, ursula, escuchar u oler algo. Los ejemplos incluyen el aumento de la temperatura en parte de velasco cuerpo. Puede ursula un destello de martir o escuchar algo. Puede tener ansiedad o sensación de déjà vu. Si tiene un aura, incluya el episodio en el diario de las convulsiones.      •Elabore un plan de cuidado personal.Informe a ashley familiares, amigos y compañeros de trabajo acerca de velasco epilepsia. Deles instrucciones que expliquen cómo pueden mantenerlo seguro si tiene edgar convulsión.      •Busque apoyo.A usted podrían referirlo a un psicólogo o a un trabajador social. Pregunte a velasco médico acerca de grupos de apoyo para personas con epilepsia.      •Pregunte qué precauciones de seguridad usted debería lauren.Pregúntele a velasco médico si puede conducir. Juan Daniel vez no pueda conducir hasta tanto haya dejado de tener convulsiones dina un tiempo. Tendrá que revisar la irena que rige en el lugar donde vive. Además, pregúntele a velasco médico si puede nadar y bañarse. Si tiene edgar convulsión en el agua, puede ahogarse o sufrir un daño cardíaco o pulmonar potencialmente mortal.      •Lleve edgar identificación de alerta médica.Use un brazalete o edgar watts de alerta médica o edgar tarjeta que indique que tiene epilepsia. Pregúntele a velasco médico dónde conseguir estos artículos.  Accesorios de alerta médica           ¿Cómo pueden otras personas mantenerme seguro dina edgar convulsión?Dé las siguientes instrucciones a ashley familiares, amigos y compañeros de trabajo:   •No entre en pánico.      •No me sujeten ni me pongan ningún objeto en la boca.      •Guíenme cuidadosamente al suelo o a edgar superficie suave.      •Acuéstenme de lado para que no trague mi propia saliva o vómito.  Primeros auxilios: Convulsiones (ADULTOS)           •Protéjanme de lesiones. Quiten los elementos cortantes o duros de mis proximidades o protejan mi marilia.      •Aflójenme la ropa alrededor de la marilia y el lilia.      •Tomen el tiempo que hughes mis convulsiones. Llame al 911 si mis convulsiones hughes más de 5 minutos o si tengo edgar segunda convulsión.      •Permanezcan conmigo hasta que termine la convulsión. Déjenme descansar hasta que esté completamente despierto.      •Realicen reanimación cardiopulmonar si dejo de respirar o si no pueden sentir mi pulso.      •No me dé nada para comer o lauren hasta que esté completamente despierto.      Llame al número local de emergencias (911 en los [place]), o pídale a alguien que llame si ocurre lo siguiente:  •Velasco convulsión dura más de 5 minutos.      •Tiene dificultad para respirar después de edgar convulsión.      •Tiene diabetes o está embarazada, y tiene edgar convulsión.      •Tiene edgar convulsión en el agua, nicolasa edgar piscina o bañera.      ¿Cuándo harsh llamar a mi médico?  •Tiene edgar segunda convulsión en el término de 24 horas después de la primera.      •Usted sufre edgar lesión dina edgar convulsión.      •Usted siente que no puede lidiar con velasco condición.      •Las convulsiones empiezan a ocurrir con mayor frecuencia.      •Se siente confundido dina más tiempo de lo habitual después de edgar convulsión.      •Usted está planeando quedar embarazada o está embarazada actualmente.      •Usted tiene preguntas o inquietudes acerca de velasco condición o cuidado.      ACUERDOS SOBRE VELASCO CUIDADO:    Usted tiene el derecho de ayudar a planear velasco cuidado. Aprenda todo lo que pueda sobre velasco condición y nicolasa darle tratamiento. Discuta ashley opciones de tratamiento con ashley médicos para decidir el cuidado que usted desea recibir. Usted siempre tiene el derecho de rechazar el tratamiento.

## 2021-03-22 LAB
CULTURE RESULTS: SIGNIFICANT CHANGE UP
SPECIMEN SOURCE: SIGNIFICANT CHANGE UP

## 2021-03-25 LAB — LEVETIRACETAM SERPL-MCNC: 38.8 UG/ML — SIGNIFICANT CHANGE UP (ref 10–40)

## 2021-04-02 ENCOUNTER — EMERGENCY (EMERGENCY)
Facility: HOSPITAL | Age: 56
LOS: 1 days | Discharge: HOME | End: 2021-04-02
Attending: EMERGENCY MEDICINE
Payer: COMMERCIAL

## 2021-04-02 VITALS
OXYGEN SATURATION: 96 % | RESPIRATION RATE: 18 BRPM | TEMPERATURE: 99 F | HEART RATE: 88 BPM | WEIGHT: 186.95 LBS | HEIGHT: 64 IN | SYSTOLIC BLOOD PRESSURE: 170 MMHG | DIASTOLIC BLOOD PRESSURE: 100 MMHG

## 2021-04-02 DIAGNOSIS — Z98.51 TUBAL LIGATION STATUS: Chronic | ICD-10-CM

## 2021-04-02 PROCEDURE — 99284 EMERGENCY DEPT VISIT MOD MDM: CPT

## 2021-04-03 VITALS
SYSTOLIC BLOOD PRESSURE: 147 MMHG | RESPIRATION RATE: 18 BRPM | OXYGEN SATURATION: 94 % | TEMPERATURE: 99 F | HEART RATE: 68 BPM | DIASTOLIC BLOOD PRESSURE: 87 MMHG

## 2021-04-03 LAB
ALBUMIN SERPL ELPH-MCNC: 3.4 G/DL — LOW (ref 3.5–5)
ALP SERPL-CCNC: 133 U/L — HIGH (ref 40–120)
ALT FLD-CCNC: 41 U/L DA — SIGNIFICANT CHANGE UP (ref 10–60)
ANION GAP SERPL CALC-SCNC: 7 MMOL/L — SIGNIFICANT CHANGE UP (ref 5–17)
AST SERPL-CCNC: 19 U/L — SIGNIFICANT CHANGE UP (ref 10–40)
BASOPHILS # BLD AUTO: 0.04 K/UL — SIGNIFICANT CHANGE UP (ref 0–0.2)
BASOPHILS NFR BLD AUTO: 0.5 % — SIGNIFICANT CHANGE UP (ref 0–2)
BILIRUB SERPL-MCNC: 0.3 MG/DL — SIGNIFICANT CHANGE UP (ref 0.2–1.2)
BUN SERPL-MCNC: 21 MG/DL — HIGH (ref 7–18)
CALCIUM SERPL-MCNC: 9.2 MG/DL — SIGNIFICANT CHANGE UP (ref 8.4–10.5)
CHLORIDE SERPL-SCNC: 106 MMOL/L — SIGNIFICANT CHANGE UP (ref 96–108)
CO2 SERPL-SCNC: 28 MMOL/L — SIGNIFICANT CHANGE UP (ref 22–31)
CREAT SERPL-MCNC: 0.92 MG/DL — SIGNIFICANT CHANGE UP (ref 0.5–1.3)
EOSINOPHIL # BLD AUTO: 0.07 K/UL — SIGNIFICANT CHANGE UP (ref 0–0.5)
EOSINOPHIL NFR BLD AUTO: 0.9 % — SIGNIFICANT CHANGE UP (ref 0–6)
GLUCOSE SERPL-MCNC: 102 MG/DL — HIGH (ref 70–99)
HCT VFR BLD CALC: 43.8 % — SIGNIFICANT CHANGE UP (ref 34.5–45)
HGB BLD-MCNC: 14.1 G/DL — SIGNIFICANT CHANGE UP (ref 11.5–15.5)
IMM GRANULOCYTES NFR BLD AUTO: 0.3 % — SIGNIFICANT CHANGE UP (ref 0–1.5)
LYMPHOCYTES # BLD AUTO: 1.67 K/UL — SIGNIFICANT CHANGE UP (ref 1–3.3)
LYMPHOCYTES # BLD AUTO: 21.4 % — SIGNIFICANT CHANGE UP (ref 13–44)
MAGNESIUM SERPL-MCNC: 2.3 MG/DL — SIGNIFICANT CHANGE UP (ref 1.6–2.6)
MCHC RBC-ENTMCNC: 29.4 PG — SIGNIFICANT CHANGE UP (ref 27–34)
MCHC RBC-ENTMCNC: 32.2 GM/DL — SIGNIFICANT CHANGE UP (ref 32–36)
MCV RBC AUTO: 91.3 FL — SIGNIFICANT CHANGE UP (ref 80–100)
MONOCYTES # BLD AUTO: 0.52 K/UL — SIGNIFICANT CHANGE UP (ref 0–0.9)
MONOCYTES NFR BLD AUTO: 6.6 % — SIGNIFICANT CHANGE UP (ref 2–14)
NEUTROPHILS # BLD AUTO: 5.5 K/UL — SIGNIFICANT CHANGE UP (ref 1.8–7.4)
NEUTROPHILS NFR BLD AUTO: 70.3 % — SIGNIFICANT CHANGE UP (ref 43–77)
NRBC # BLD: 0 /100 WBCS — SIGNIFICANT CHANGE UP (ref 0–0)
PLATELET # BLD AUTO: 223 K/UL — SIGNIFICANT CHANGE UP (ref 150–400)
POTASSIUM SERPL-MCNC: 4.4 MMOL/L — SIGNIFICANT CHANGE UP (ref 3.5–5.3)
POTASSIUM SERPL-SCNC: 4.4 MMOL/L — SIGNIFICANT CHANGE UP (ref 3.5–5.3)
PROT SERPL-MCNC: 7.9 G/DL — SIGNIFICANT CHANGE UP (ref 6–8.3)
RBC # BLD: 4.8 M/UL — SIGNIFICANT CHANGE UP (ref 3.8–5.2)
RBC # FLD: 12.9 % — SIGNIFICANT CHANGE UP (ref 10.3–14.5)
SODIUM SERPL-SCNC: 141 MMOL/L — SIGNIFICANT CHANGE UP (ref 135–145)
WBC # BLD: 7.82 K/UL — SIGNIFICANT CHANGE UP (ref 3.8–10.5)
WBC # FLD AUTO: 7.82 K/UL — SIGNIFICANT CHANGE UP (ref 3.8–10.5)

## 2021-04-03 PROCEDURE — 83735 ASSAY OF MAGNESIUM: CPT

## 2021-04-03 PROCEDURE — 99283 EMERGENCY DEPT VISIT LOW MDM: CPT

## 2021-04-03 PROCEDURE — 80053 COMPREHEN METABOLIC PANEL: CPT

## 2021-04-03 PROCEDURE — 85025 COMPLETE CBC W/AUTO DIFF WBC: CPT

## 2021-04-03 PROCEDURE — 36415 COLL VENOUS BLD VENIPUNCTURE: CPT

## 2021-04-03 RX ORDER — ACETAMINOPHEN 500 MG
650 TABLET ORAL ONCE
Refills: 0 | Status: COMPLETED | OUTPATIENT
Start: 2021-04-03 | End: 2021-04-03

## 2021-04-03 RX ADMIN — Medication 650 MILLIGRAM(S): at 00:45

## 2021-04-03 NOTE — ED PROVIDER NOTE - CPE EDP EYES NORM
[Home] : at home, [unfilled] , at the time of the visit. [Other Location: e.g. 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  \par Overall feeling well.  with his medication.  \par Would lie to get the AB test.  WIll go to urgent care near him.  \par I Ji ad a cough.  pos phlegm - then sore throat and lymph node swelling.  \par Dry skin and eczema, gets itchy at times.   - FInds aquaphor helps.  PRotopic heps the most.  \par \par RIght arm forearm is red / inflamed.  \par  normal...

## 2021-04-03 NOTE — ED PROVIDER NOTE - NSFOLLOWUPINSTRUCTIONS_ED_ALL_ED_FT
Log Out.      [employer]® CareNotes®     :  [facility]  	                       EPILEPSY - AfterCare(R) Instructions(ER/ED)           Epilepsia    LO QUE NECESITA SABER:    La epilepsia es un trastorno cerebral que provoca convulsiones. También se conoce nicolasa trastorno convulsivo. Edgar convulsión significa que un área anormal del cerebro a veces envía ráfagas de actividad eléctrica. Edgar convulsión puede afectar mariposa o ambos lados del cerebro. Dependiendo del tipo de convulsión, puede tener movimientos que no puede controlar, perder la conciencia o mirar fijo hacia adelante. Puede sentirse confundido o cansado después de la convulsión. Edgar convulsión puede durar unos segundos o más de 5 minutos. Un defecto de nacimiento, un tumor, un derrame cerebral, demencia, edgar lesión o edgar infección podrían provocar edgar epilepsia. Podría desconocerse la causa de kang epilepsia. Si ashley convulsiones no son controladas, podrían representar edgar amenaza para la constantin.    INSTRUCCIONES SOBRE EL MARIANN HOSPITALARIA:    Llame al número local de emergencias (911 en los [place]), o pídale a alguien que llame si ocurre lo siguiente:  •Kang convulsión dura más de 5 minutos.      •Tiene dificultad para respirar después de edgar convulsión.      •Tiene diabetes o está embarazada, y tiene edgar convulsión.      •Tiene edgar convulsión en el agua, nicolasa edgar piscina o bañera.      Llame a kang médico si:  •Tiene edgar segunda convulsión en el término de 24 horas después de la primera.      •Usted sufre edgar lesión dina edgar convulsión.      •Usted siente que no puede lidiar con kang condición.      •Las convulsiones empiezan a ocurrir con mayor frecuencia.      •Se siente confundido dina más tiempo de lo habitual después de edgar convulsión.      •Usted está planeando quedar embarazada o está embarazada actualmente.      •Usted tiene preguntas o inquietudes acerca de kang condición o cuidado.      Medicamentos:  •Los medicamentos anticonvulsivospodrían controlar o evitar otra convulsión. No suspenda el uso de rossy medicamento. Es posible que otra persona deba administrarle un medicamento de alivio rápido si usted tiene convulsiones en el hogar. Pida más información a kang médico sobre los medicamentos de alivio rápido.      •[place] ashley medicamentos nicolasa se le haya indicado.Consulte con kang médico si usted martell que kang medicamento no le está ayudando o si presenta efectos secundarios. Infórmele si es alérgico a algún medicamento. Mantenga edgar lista actualizada de los medicamentos, las vitaminas y los productos herbales que julia. Incluya los siguientes datos de los medicamentos: cantidad, frecuencia y motivo de administración. Traiga con usted la lista o los envases de las píldoras a ashley citas de seguimiento. Lleve la lista de los medicamentos con usted en nela de edgar emergencia.      Programe edgar karon con kang neurólogo nicolasa se le indique:Usted podría necesitar exámenes para revisar el nivel de medicamento contra las convulsiones en kang [place]. Kang neurólogo podría necesitar cambiar o ajustar kang medicamento. Anote ashley preguntas para que se acuerde de hacerlas dina ashley visitas.    Prevenir edgar complicación de la epilepsia:  •La muerte súbita sin causa aparente en la epilepsia (SUDEP) es edgar complicación poco frecuente de esta enfermedad. En 1 año, 1 de cada 1,000 adultos con epilepsia tendrá esta complicación. El riesgo de sufrir SUDEP aumenta si tiene 3 o más convulsiones tónico-clónicas generalizadas en 1 año. El riesgo también aumenta si tiene convulsiones nocturnas (convulsiones dina el sueño). Después de edgar convulsión nocturna, la respiración puede ser superficial.      •Kang médico puede recomendar un cambio en el medicamento para disminuir el número de convulsiones. En el nela de las convulsiones nocturnas, puede recomendar que edgar persona duerma cerca suyo. La persona debe tener más de 10 años y, además, estar lo bastante cerca para saber que usted está teniendo edgar convulsión.      Qué puede hacer para prevenir edgar convulsión:Es posible que no pueda evitar todas las convulsiones. Lo que se detalla a continuación puede ayudarlo a mantener bajo control los factores que pueden provocar el inicio de edgar convulsión:   •[place] kang medicamento todos los días a la misma hora.[place] también ayudará a evitar los efectos secundarios del medicamento. Programe edgar alarma para que le ayude a acordarse de lauren kang medicamento cada día.      •Controle el estrés.El estrés puede ser un desencadenante de la epilepsia. La actividad física puede ayudar a reducir el estrés. Hable con kang médico sobre la actividad física que es denson para usted. Edgar enfermedad puede ser degar forma de estrés. Consuma edgar variedad de alimentos saludables y tome suficientes líquidos dina edgar enfermedad. Hable con kang médico acerca de otras formas de controlar el estrés.      •Establezca un horario y edgar rutina para ir a dormir.La falta de sueño puede provocar edgar convulsión. Intente acostarse y levantarse a la misma hora todos los días. Mantenga kang habitación en silencio y a oscuras. Consulte con kang médico si usted tiene dificultad para dormir.      •Limite o no consuma bebidas alcohólicas, según indicaciones.El alcohol puede provocar edgar convulsión, especialmente si sravani edgar gran cantidad de edgar vez. Edgar bebida de alcohol equivale a 12 onzas de cerveza, 1½ onzas de licor o 5 onzas de vino. Hable con kang médico acerca de cuál es la cantidad denson de alcohol para usted. Kang médico puede recomendarle que no jeremias alcohol. Infórmele al médico si necesita ayuda para dejar de beber.      Lo que puede hacer para controlar la epilepsia:  •Lleve un diario de las convulsiones.[place] puede ayudarlo a encontrar los factores desencadenantes y a evitarlos. Escriba las fechas de ashley convulsiones, dónde se encontraba y qué estaba haciendo. Incluya cómo se sintió antes y después. Los posibles factores desencadenantes incluyen enfermedades, falta de sueño, cambios hormonales, alcohol, drogas, luces o estrés.      •[place]ote cualquier aura que tenga antes de edgar convulsión.Un aura es un signo que indica que está a punto de tener edgar convulsión. Las auras se producen antes de determinados tipos de convulsiones que ocurren solo en edgar parte del cerebro. El aura puede ocurrir segundos antes de edgar convulsión o hasta edgar hora antes. Puede sentir, ursula, escuchar u oler algo. Los ejemplos incluyen el aumento de la temperatura en parte de kang cuerpo. Puede ursula un destello de martir o escuchar algo. Puede tener ansiedad o sensación de déjà vu. Si tiene un aura, incluya el episodio en el diario de las convulsiones.      •[place] un plan de cuidado personal.Informe a ashley familiares, amigos y compañeros de trabajo acerca de kang epilepsia. Deles instrucciones que expliquen cómo pueden mantenerlo seguro si tiene edgar convulsión.      •Busque apoyo.A usted podrían referirlo a un psicólogo o a un trabajador social. Pregunte a kang médico acerca de grupos de apoyo para personas con epilepsia.      •Pregunte qué precauciones de seguridad usted debería lauren.Pregúntele a kang médico si puede conducir. Juan Daniel vez no pueda conducir hasta tanto haya dejado de tener convulsiones dina un tiempo. Tendrá que revisar la irena que rige en el lugar donde vive. Además, pregúntele a kang médico si puede nadar y bañarse. Si tiene edgar convulsión en el agua, puede ahogarse o sufrir un daño cardíaco o pulmonar potencialmente mortal.      •Lleve edgar identificación de alerta médica.Use un brazalete o edgar watts de alerta médica o edgar tarjeta que indique que tiene epilepsia. Pregúntele a kang médico dónde conseguir estos artículos.  Accesorios de alerta médica           Cómo pueden otras personas mantenerlo seguro dina edgar convulsión:Dé las siguientes instrucciones a ashley familiares, amigos y compañeros de trabajo:   •No entre en pánico.      •No me sujeten ni me pongan ningún objeto en la boca.      •Guíenme cuidadosamente al suelo o a edgar superficie suave.      •Acuéstenme de lado para que no trague mi propia saliva o vómito.  Primeros auxilios: Convulsiones (ADULTOS)           •Protéjanme de lesiones. Quiten los elementos cortantes o duros de mis proximidades o protejan mi marilia.      •Aflójenme la ropa alrededor de la marilia y el lilia.      •Tomen el tiempo que hughes mis convulsiones. Llame al 911 si mis convulsiones hughes más de 5 minutos o si tengo edgar segunda convulsión.      •Permanezcan conmigo hasta que termine la convulsión. Déjenme descansar hasta que esté completamente despierto.      •Realicen reanimación cardiopulmonar si dejo de respirar o si no pueden sentir mi pulso.      •No me dé nada para comer o lauren hasta que esté completamente despierto.         © Copyright [employer] 2021           back to top                          © Copyright [employer] 2021

## 2021-04-03 NOTE — ED PROVIDER NOTE - PROGRESS NOTE DETAILS
labs are unremarkable. HA improved with tylenol. no seizures in the ed. spoke with pt- states she was going to be started on second anti-epileptic but the pharmacy didnt have it yet- advised pt and her daughter gladys to f/u with neurologist and pharmacy to get a second medication since pt with frequent seizures lately. will dc. advised continue current medications. f/u neurology. return precautions discussed.

## 2021-04-03 NOTE — ED PROVIDER NOTE - PATIENT PORTAL LINK FT
You can access the FollowMyHealth Patient Portal offered by [facility] by registering at the following website: [email or web address]. By joining [employer]’s FollowMyHealth portal, you will also be able to view your health information using other applications (apps) compatible with our system.

## 2021-04-03 NOTE — ED PROVIDER NOTE - OBJECTIVE STATEMENT
55 year old female PMH CVA with left sided residual weakness, afib on eliquis, seizure disorder coming in with seizure. pt states she took her keppra as directed today but has a seizure. states last seizure was couple weeks ago. only complains of mild HA. denies all other complaints.

## 2021-04-07 ENCOUNTER — APPOINTMENT (OUTPATIENT)
Dept: NEUROLOGY | Facility: CLINIC | Age: 56
End: 2021-04-07
Payer: MEDICARE

## 2021-04-07 VITALS
HEART RATE: 46 BPM | TEMPERATURE: 97.2 F | BODY MASS INDEX: 32.94 KG/M2 | DIASTOLIC BLOOD PRESSURE: 80 MMHG | HEIGHT: 62 IN | OXYGEN SATURATION: 96 % | SYSTOLIC BLOOD PRESSURE: 114 MMHG | WEIGHT: 179 LBS

## 2021-04-07 PROCEDURE — 99215 OFFICE O/P EST HI 40 MIN: CPT

## 2021-04-07 NOTE — ED PROVIDER NOTE - NIH STROKE SCALE 1A. LEVEL OF CONSCIOUSNESS
negative
(2) Not alert; requires repeated stimulation to attend, or is obtunded and requires strong or painful stimulation to make movements (not stereotyped)

## 2021-04-21 NOTE — HISTORY OF PRESENT ILLNESS
[FreeTextEntry1] : FROM 1/25/19:\par This is a 53-year-old right-handed woman with a previous right hemispheric ischemic stroke and residual left-sided hemiparesis, who presented to [facility] ED on January 12, 2019 after experiencing inability to speak and worsened left-sided hemiparesis, followed by a generalized convulsion. EMS was called and the patient was administered lorazepam 2mg IV x 1, which caused the seizure to resolve. The patient was started on levetiracetam 500mg PO BID after receiving a 1000mg IV load, and has since had no similar events, and no change to her baseline left-sided hemiparesis. She had no tongue/cheek bite, head trauma, or urinary/bowel incontinence. She denies any recent history of fever, headache, alcohol use, added stress, or poor sleep hygiene. PCP: Dr. Jamil Seay.\par \par FROM 8/16/19:\par The patient, now 64, states that since I saw her on 1/25/19, she has had one episode of confusion associated with abdominal pain, although there was no witnessed seizure activity or new stroke-like symptoms. She presented to [facility] ED on 3/24/19 - per ED notes, she had generalized weakness with abdominal pain, but was found to have a normal CT abdomen and resolution of abdominal pain, prompting discharge to home. She has been compliant with her medications, although she has not taken aspirin 81mg daily while taking Xarelto. She has occasional mild frontal headache, which resolves on its own or with over-the-counter Tylenol.\par \par FROM 11/13/19:\par Since the last clinic visit, the patient presented to the ED in early October for breakthrough seizures, and her levetiracetam dosage was increased from 500mg to 750mg PO BID. Since then, she has not had any new seizures, and has been compliant with all of her medications for seizure prophylaxis and secondary stroke prevention. She completed four sessions of physical therapy, which helped her improve her left arm and leg strength, but she was not approved by her insurance plan for more sessions.\par \par FROM 1/8/20:\par The patient was admitted to [facility] during December 2-4, 2019, for breakthrough seizure. Her levetiracetam dosage was increased from 750mg to 1000mg PO BID. Since then, she has been stable without any new seizures. She has been continuing her physical therapy course since the hospital discharge, as well.\par \par FROM 9/9/20:\par Since the last clinic visit, the patient, now 55 was admitted to [facility] during July 30 - August 2, 2020 for breakthrough seizure in the setting of acute renal injury and prolonged exposure to high outdoor temperature. Her levetiracetam dosage was increased from 1000mg to 1500mg twice daily. Since then, she has not had any new seizures nor any concerns for new stroke.\par \par FROM 12/9/20:\par Since the last clinic visit, the patient was admitted to [facility] during September 27-30, 2020 for a breakthrough seizure. Her levetiracetam dosage was increased from 1500mg to 2000mg PO BID at that time. She was advised to pursue physical therapy for deconditioning in the setting of left-sided hemiparesis associated with chronic stroke. She has not had any breakthrough seizures or other presentations to the Emergency Department since then.\par \par FROM 4/7/21:\par The patient reports having an episode of confusion followed by hallucinations last week, and presented to [facility] ED. Per hospital records, the patient was determined to have possibly had a seizure, and no medication changes were made. She also underwent cardioversion at 3/23/21 at [facility], Dr. Leona Devlin and has been started on amiodarone 200mg once daily.

## 2021-04-21 NOTE — PHYSICAL EXAM
[General Appearance - Alert] : alert [General Appearance - In No Acute Distress] : in no acute distress [General Appearance - Well Nourished] : well nourished [Oriented To Time, Place, And Person] : oriented to person, place, and time [Impaired Insight] : insight and judgment were intact [Person] : oriented to person [Place] : oriented to place [Time] : oriented to time [Concentration Intact] : normal concentrating ability [Naming Objects] : no difficulty naming common objects [Repeating Phrases] : no difficulty repeating a phrase [Fluency] : fluency intact [Comprehension] : comprehension intact [Cranial Nerves Optic (II)] : visual acuity intact bilaterally,  visual fields full to confrontation, pupils equal round and reactive to light [Cranial Nerves Oculomotor (III)] : extraocular motion intact [Cranial Nerves Trigeminal (V)] : facial sensation intact symmetrically [Cranial Nerves Vestibulocochlear (VIII)] : hearing was intact bilaterally [Cranial Nerves Glossopharyngeal (IX)] : tongue and palate midline [Cranial Nerves Accessory (XI - Cranial And Spinal)] : head turning and shoulder shrug symmetric [Cranial Nerves Hypoglossal (XII)] : there was no tongue deviation with protrusion [Flattening Of Nasolabial Fold On The Left] : flattening of the  nasolabial fold [Involuntary Movements] : no involuntary movements were seen [Motor Handedness Right-Handed] : the patient is right hand dominant [Hemiparesis Of Left Side] : hemiparesis was present on the left [Paresis Pronator Drift Left-Sided] : a left-sided pronator drift was present [1] : shoulder adduction 1/5 [Hand Weakness Left] : the hand  was weak [5] : ankle plantar flexion 5/5 [2] : ankle dorsiflexion 2/5 [3] : ankle plantar flexion 3/5 [1+] : Patella left 1+ [0] : Ankle jerk left 0 [Plantar Reflex Right Only] : abnormal on the right [Sclera] : the sclera and conjunctiva were normal [PERRL With Normal Accommodation] : pupils were equal in size, round, reactive to light, with normal accommodation [Extraocular Movements] : extraocular movements were intact [Outer Ear] : the ears and nose were normal in appearance [Hearing Threshold Finger Rub Not [place]] : hearing was normal [Oropharynx] : the oropharynx was normal [Neck Appearance] : the appearance of the neck was normal [Auscultation Breath Sounds / Voice Sounds] : lungs were clear to auscultation bilaterally [Normal] : the heart rate was normal [Irregularly Irregular] : the rhythm was irregularly irregular [Arterial Pulses Carotid] : carotid pulses were normal with no bruits [Full Pulse] : the pedal pulses are present [Abdomen Soft] : soft [Abdomen Tenderness] : non-tender [No CVA Tenderness] : no ~M costovertebral angle tenderness [No Spinal Tenderness] : no spinal tenderness [Nail Clubbing] : no clubbing  or cyanosis of the fingernails [Skin Turgor] : normal skin turgor [] : no rash [FreeTextEntry1] : NIHSS:\par \par LOC Responsiveness: 0\par LOC Questions: 0\par LOC Commands: 0\par Gaze Preference: 0\par Visual Field: 0\par Facial Palsy: 1\par Motor Arm Right: 0\par Motor Arm Left: 2\par Motor Leg Right: 0\par Motor Arm Left: 0\par Limb Ataxia: 0\par Sensory: 0\par Language: 0\par Speech: 0\par Extinction/Inattention: 0\par \par \par NIHSS Total: 3\par \par Modified [place] Scale: 2\par  [Flattening Of Nasolabial Fold On The Right] : no flattening of the nasolabial fold [Hand Weakness Right] : normal hand  [Paresis Pronator Drift Right-Sided] : no pronator drift on the right [Allodynia] : no ~T allodynia present [Tactile Decrease Entire Right Arm] : normal right arm [Tactile Decrease Entire Left Arm] : normal left arm [Tactile Decrease Hand] : normal left hand [Tactile Decrease Entire Leg Right] : normal right leg [Tactile Decrease Entire Leg Left] : normal left leg [Pain / Temperature Decrease Entire Arm Right] : normal right arm [Pain / Temperature Decrease Entire Arm Left] : normal left arm [Pain / Temp Decrease Hand] : normal left hand [Pain / Temp Decrease Entire Leg - Right] : normal right leg [Pain / Temp Decrease Entire Leg - Left] : normal left leg [Coordination - Dysmetria Impaired Finger-to-Nose Bilateral] : not present [Coordination - Dysmetria Impaired Heel-to-Shin Bilateral] : not present [Plantar Reflex Left Only] : normal on the left [___] : absent on the right [___] : absent on the left [FreeTextEntry4] : No dysarthria or aphasia present [FreeTextEntry8] : Wide-based stance and gait. Hemiparetic gait present. Unable to test for specialized gaits or Romberg sign due to left leg weakness. Coordination exams on left are limited due to weakness.

## 2021-04-21 NOTE — DATA REVIEWED
[de-identified] : (1/13/19): Right temporal sharps and electrographic seizure; Bilateral temporal intermittent rhythmic delta activity (TIRDA)\par \par (12/2/19): Right frontal sharps and focal slowing, Diffuse beta activity\par \par (09/28/2020): Right parietal sharps, Right hemispheric slowing, No subclinical seizures [de-identified] : CT Head and CT Angio Head & Neck (1/12/19): Chronic right MCA infarct, Absent right PCA, Otherwise normal intracranial and neck vasculature\par \par Echocardiogram (1/14/19): Severely dilated left atrium, Trace mitral regurgitation\par \par Labs (1/15/19): CBC Normal, INR 1.67 (high), BMP notable for low sodium (133), low potassium (3.3), and high glucose (107)\par \par Labs (1/13/19): Hemoglobin A1c 5.6%, Fasting lipid panel: Cholesterol 152, Triglycerides 43, HDL 65, LDL 78\par \par CT Head (12/1/19): Chronic right MCA (large) and right EJ (partial) infarcts without new intracranial abnormalities\par \par Labs (12/3/19): Notable for Hct 46.0% <H>, Phosphorus 2.4 <L>, Vitamin D 17.1 <L>, Normal Vit B12 & Folate\par \par Labs (8/2/20): CBC, BMP normal; Levetiracetam level (07/31/2020): 86.0 <H>\par \par Labs (9/27/20 - 9/28/20): COVID-19 PCR Negative; COVID-19 IgG Positive\par \par Labs (9/29/20): CBC & BMP normal; Levetiracetam level 35.9 <WNL>

## 2021-05-06 NOTE — ED PROVIDER NOTE - PMH
Daily Note     Today's date: 2021  Patient name: Darryl Abdullahi  : 1969  MRN: [id number]  Referring provider: Torie Canales PT  Dx:   Encounter Diagnosis     ICD-10-CM    1  Chronic low back pain, unspecified back pain laterality, unspecified whether sciatica present  M54 5     G89 29        Start Time: 1645  Stop Time: 1735  Total time in clinic (min): 50 minutes    Subjective: Pt reports she is feeling better compared to Thursday  She reports no pain right now  She has to get up at work ever 45 mins  Objective: See treatment diary below      Assessment: Tolerated treatment well  Patient exhibited good technique with therapeutic exercises  Pt presents with good control with TAC requiring min vc/tc to maintain with LE strengthening exercises  Pt cued for log roll technique and to perform TAC with rolling side to side during bed mobility and switching sides between exercises  Plan: Continue per plan of care        Precautions: depression, anxiety  Daily Treatment Diary    Manuals  5        L piriformis STM   8 mins                                      Neuro Re-Ed          SLS           TAC 4 mins 10"x5        TAC with ball squeeze 5"x10 5"x10        TAC with bridge  3"x7        TAC with alt knee fall out 1x5 ea 1x10 ea        TAC with march  1x10 ea        TAC with SLR          TAC with s/l ABD          TAC with clamshells  5"x10 ea        Dying bug                     Ther Ex          Bike                    LTR  10"x5 ea        Supine piri stretch  20"x3 ea        Seated HS stretch   20"x2 ea        Seated fig 4 stretch  20"x2 ea         Standing hip ABD          Standing SLR          Standing march          HR/TR                              Ther Activity          Log roll  2 mins                   Squatting          LSU and over          FSU and over                     Gait Training                              Modalities
CVA (cerebral vascular accident)    Seizure

## 2021-07-28 ENCOUNTER — APPOINTMENT (OUTPATIENT)
Dept: NEUROLOGY | Facility: CLINIC | Age: 56
End: 2021-07-28
Payer: MEDICARE

## 2021-07-28 VITALS
WEIGHT: 179 LBS | TEMPERATURE: 97.7 F | BODY MASS INDEX: 32.94 KG/M2 | HEART RATE: 64 BPM | DIASTOLIC BLOOD PRESSURE: 70 MMHG | OXYGEN SATURATION: 97 % | HEIGHT: 62 IN | SYSTOLIC BLOOD PRESSURE: 102 MMHG

## 2021-07-28 PROCEDURE — 99215 OFFICE O/P EST HI 40 MIN: CPT

## 2021-07-28 RX ORDER — METOPROLOL SUCCINATE 100 MG/1
100 TABLET, EXTENDED RELEASE ORAL DAILY
Qty: 30 | Refills: 0 | Status: DISCONTINUED | COMMUNITY
Start: 2019-11-13 | End: 2021-07-28

## 2021-07-28 RX ORDER — AMIODARONE HYDROCHLORIDE 200 MG/1
200 TABLET ORAL DAILY
Qty: 30 | Refills: 0 | Status: DISCONTINUED | COMMUNITY
Start: 2021-04-07 | End: 2021-07-28

## 2021-07-28 NOTE — ASSESSMENT
[FreeTextEntry1] : 56 RHF with left nasolabial fold flattening and left-sided hemiparesis secondary to right MCA territory ischemic stroke secondary to atrial fibrillation, also with breakthrough post-stroke seizures while on levetiracetam monotherapy.

## 2021-07-28 NOTE — PHYSICAL EXAM
[General Appearance - Alert] : alert [General Appearance - In No Acute Distress] : in no acute distress [General Appearance - Well Nourished] : well nourished [Oriented To Time, Place, And Person] : oriented to person, place, and time [Impaired Insight] : insight and judgment were intact [Person] : oriented to person [Place] : oriented to place [Time] : oriented to time [Concentration Intact] : normal concentrating ability [Naming Objects] : no difficulty naming common objects [Repeating Phrases] : no difficulty repeating a phrase [Fluency] : fluency intact [Comprehension] : comprehension intact [Cranial Nerves Optic (II)] : visual acuity intact bilaterally,  visual fields full to confrontation, pupils equal round and reactive to light [Cranial Nerves Oculomotor (III)] : extraocular motion intact [Cranial Nerves Trigeminal (V)] : facial sensation intact symmetrically [Cranial Nerves Vestibulocochlear (VIII)] : hearing was intact bilaterally [Cranial Nerves Glossopharyngeal (IX)] : tongue and palate midline [Cranial Nerves Accessory (XI - Cranial And Spinal)] : head turning and shoulder shrug symmetric [Cranial Nerves Hypoglossal (XII)] : there was no tongue deviation with protrusion [Flattening Of Nasolabial Fold On The Left] : flattening of the  nasolabial fold [Involuntary Movements] : no involuntary movements were seen [Motor Handedness Right-Handed] : the patient is right hand dominant [Hemiparesis Of Left Side] : hemiparesis was present on the left [Paresis Pronator Drift Left-Sided] : a left-sided pronator drift was present [Hand Weakness Left] : the hand  was weak [5] : ankle plantar flexion 5/5 [0] : Ankle jerk right 0 [Sclera] : the sclera and conjunctiva were normal [PERRL With Normal Accommodation] : pupils were equal in size, round, reactive to light, with normal accommodation [Extraocular Movements] : extraocular movements were intact [Outer Ear] : the ears and nose were normal in appearance [Hearing Threshold Finger Rub Not [place]] : hearing was normal [Oropharynx] : the oropharynx was normal [Neck Appearance] : the appearance of the neck was normal [Auscultation Breath Sounds / Voice Sounds] : lungs were clear to auscultation bilaterally [Normal] : the heart rate was normal [Irregularly Irregular] : the rhythm was irregularly irregular [Arterial Pulses Carotid] : carotid pulses were normal with no bruits [Full Pulse] : the pedal pulses are present [Abdomen Soft] : soft [Abdomen Tenderness] : non-tender [No CVA Tenderness] : no ~M costovertebral angle tenderness [No Spinal Tenderness] : no spinal tenderness [Nail Clubbing] : no clubbing  or cyanosis of the fingernails [Skin Turgor] : normal skin turgor [] : no rash [Tactile Decrease Entire Right Arm] : normal right arm [Tactile Decrease Entire Left Arm] : normal left arm [Tactile Decrease Hand] : normal left hand [Tactile Decrease Entire Leg Right] : normal right leg [Tactile Decrease Entire Leg Left] : normal left leg [Pain / Temperature Decrease Entire Arm Right] : normal right arm [Pain / Temperature Decrease Entire Arm Left] : normal left arm [Pain / Temp Decrease Hand] : normal left hand [Pain / Temp Decrease Entire Leg - Right] : normal right leg [Pain / Temp Decrease Entire Leg - Left] : normal left leg [1] : shoulder abduction 1/5 [2] : shoulder adduction 2/5 [3] : knee flexion 3/5 [Sensation Tactile Decrease] : light touch was intact [Sensation Pain / Temperature Decrease] : pain and temperature was intact [3+] : Patella left 3+ [1+] : Ankle jerk left 1+ [Plantar Reflex Left Only] : abnormal on the left [FreeTextEntry1] : NIHSS:\par \par LOC Responsiveness: 0\par LOC Questions: 0\par LOC Commands: 0\par Gaze Preference: 0\par Visual Field: 0\par Facial Palsy: 1\par Motor Arm Right: 0\par Motor Arm Left: 2\par Motor Leg Right: 0\par Motor Arm Left: 0\par Limb Ataxia: 0\par Sensory: 0\par Language: 0\par Speech: 0\par Extinction/Inattention: 0\par \par \par NIHSS Total: 3\par \par Modified [place] Scale: 2\par  [Flattening Of Nasolabial Fold On The Right] : no flattening of the nasolabial fold [Paresis Pronator Drift Right-Sided] : no pronator drift on the right [Hand Weakness Right] : normal hand  [Allodynia] : no ~T allodynia present [Coordination - Dysmetria Impaired Finger-to-Nose Bilateral] : not present [Coordination - Dysmetria Impaired Heel-to-Shin Bilateral] : not present [Plantar Reflex Right Only] : normal on the right [___] : absent on the right [___] : absent on the left [FreeTextEntry4] : No dysarthria or aphasia present [FreeTextEntry8] : Wide-based stance and gait. Hemiparetic gait present. Unable to test for specialized gaits or Romberg sign due to left leg weakness. Coordination exams on left are limited due to weakness.

## 2021-07-28 NOTE — HISTORY OF PRESENT ILLNESS
[FreeTextEntry1] : FROM 1/25/19:\par This is a 53-year-old right-handed woman with a previous right hemispheric ischemic stroke and residual left-sided hemiparesis, who presented to [facility] ED on January 12, 2019 after experiencing inability to speak and worsened left-sided hemiparesis, followed by a generalized convulsion. EMS was called and the patient was administered lorazepam 2mg IV x 1, which caused the seizure to resolve. The patient was started on levetiracetam 500mg PO BID after receiving a 1000mg IV load, and has since had no similar events, and no change to her baseline left-sided hemiparesis. She had no tongue/cheek bite, head trauma, or urinary/bowel incontinence. She denies any recent history of fever, headache, alcohol use, added stress, or poor sleep hygiene. PCP: Dr. Jamil Seay.\par \par FROM 8/16/19:\par The patient, now 64, states that since I saw her on 1/25/19, she has had one episode of confusion associated with abdominal pain, although there was no witnessed seizure activity or new stroke-like symptoms. She presented to [facility] ED on 3/24/19 - per ED notes, she had generalized weakness with abdominal pain, but was found to have a normal CT abdomen and resolution of abdominal pain, prompting discharge to home. She has been compliant with her medications, although she has not taken aspirin 81mg daily while taking Xarelto. She has occasional mild frontal headache, which resolves on its own or with over-the-counter Tylenol.\par \par FROM 11/13/19:\par Since the last clinic visit, the patient presented to the ED in early October for breakthrough seizures, and her levetiracetam dosage was increased from 500mg to 750mg PO BID. Since then, she has not had any new seizures, and has been compliant with all of her medications for seizure prophylaxis and secondary stroke prevention. She completed four sessions of physical therapy, which helped her improve her left arm and leg strength, but she was not approved by her insurance plan for more sessions.\par \par FROM 1/8/20:\par The patient was admitted to [facility] during December 2-4, 2019, for breakthrough seizure. Her levetiracetam dosage was increased from 750mg to 1000mg PO BID. Since then, she has been stable without any new seizures. She has been continuing her physical therapy course since the hospital discharge, as well.\par \par FROM 9/9/20:\par Since the last clinic visit, the patient, now 55 was admitted to [facility] during July 30 - August 2, 2020 for breakthrough seizure in the setting of acute renal injury and prolonged exposure to high outdoor temperature. Her levetiracetam dosage was increased from 1000mg to 1500mg twice daily. Since then, she has not had any new seizures nor any concerns for new stroke.\par \par FROM 12/9/20:\par Since the last clinic visit, the patient was admitted to [facility] during September 27-30, 2020 for a breakthrough seizure. Her levetiracetam dosage was increased from 1500mg to 2000mg PO BID at that time. She was advised to pursue physical therapy for deconditioning in the setting of left-sided hemiparesis associated with chronic stroke. She has not had any breakthrough seizures or other presentations to the Emergency Department since then.\par \par FROM 4/7/21:\par The patient reports having an episode of confusion followed by hallucinations last week, and presented to [facility] ED. Per hospital records, the patient was determined to have possibly had a seizure, and no medication changes were made. She also underwent cardioversion at 3/23/21 at [facility], Dr. Leona Devlin and has been started on amiodarone 200mg once daily.]\par \par FROM 7/28/21:\par I am assisted in interval history collection by medical student, Tad Elliott. During this clinic visit, the patient, now 56, speaks primarily in [place]yan, with translation provided by the patient's daughter. The patient had a seizure on 7/4/21, but did not require medical attention, and also had an episode of confusion on 7/20/21. She was told by her cardiologist that her recent episode of confusion was likely related to her irregular cardiac rhythm. She has been compliant with levetiracetam 2000mg PO BID, but she stopped taking Depakote ER 500mg PO BID earlier this month when her supply ran out. She notes that Depakote sometimes made her feel tired during the day.

## 2021-07-28 NOTE — DATA REVIEWED
[de-identified] : (1/13/19): Right temporal sharps and electrographic seizure; Bilateral temporal intermittent rhythmic delta activity (TIRDA)\par \par (12/2/19): Right frontal sharps and focal slowing, Diffuse beta activity\par \par (09/28/2020): Right parietal sharps, Right hemispheric slowing, No subclinical seizures [de-identified] : CT Head and CT Angio Head & Neck (1/12/19): Chronic right MCA infarct, Absent right PCA, Otherwise normal intracranial and neck vasculature\par \par Echocardiogram (1/14/19): Severely dilated left atrium, Trace mitral regurgitation\par \par Labs (1/15/19): CBC Normal, INR 1.67 (high), BMP notable for low sodium (133), low potassium (3.3), and high glucose (107)\par \par Labs (1/13/19): Hemoglobin A1c 5.6%, Fasting lipid panel: Cholesterol 152, Triglycerides 43, HDL 65, LDL 78\par \par CT Head (12/1/19): Chronic right MCA (large) and right EJ (partial) infarcts without new intracranial abnormalities\par \par Labs (12/3/19): Notable for Hct 46.0% <H>, Phosphorus 2.4 <L>, Vitamin D 17.1 <L>, Normal Vit B12 & Folate\par \par Labs (8/2/20): CBC, BMP normal; Levetiracetam level (07/31/2020): 86.0 <H>\par \par Labs (9/27/20 - 9/28/20): COVID-19 PCR Negative; COVID-19 IgG Positive\par \par Labs (9/29/20): CBC & BMP normal; Levetiracetam level 35.9 <WNL>

## 2021-07-30 NOTE — ED ADULT NURSE NOTE - NSSISCREENINGQ1_ED_A_ED
"TRAUMA TERTIARY SURVEY     Mental status adequate for full examination?: Yes    Spine cleared (radiologically and/or clinically): Yes    PHYSICAL EXAMINATION:  Vitals: /82   Pulse 80   Temp 37.1 °C (98.7 °F) (Temporal)   Resp 20   Ht 1.753 m (5' 9\")   Wt 67.6 kg (149 lb 0.5 oz)   SpO2 93%   BMI 22.01 kg/m²   Constitutional:     General Appearance: appears stated age, is in no apparent distress.  HEENT:     No significant external craniofacial trauma. The pupils are equal, round, and reactive to light bilaterally. The extraocular muscles are intact bilaterally.. The nares and oropharynx are clear. The midface and jaw are stable. No malocclusion is evident.  Neck:    No posterior midline cervical-spine tenderness, no evidence of intoxication, normal level of alertness (Shu Coma Scale 15), no focal neurologic deficit, and no painful distracting injuries.  Respiratory:   Inspection: Unlabored respirations, no intercostal retractions, paradoxical motion, or accessory muscle use.   Palpation:  The chest is tender - sternum, muscles. Left clavicle pain   Auscultation: normal, clear to auscultation.  Cardiovascular:   Auscultation: regular rate and rhythm.   Peripheral Pulses: Normal.   Abdomen:   Abdomen soft, nontender  Genitourinary:   (MALE): normal male external genitalia.  Musculoskeletal:   The pelvis is stable. abrasion to left shoulder.  Back:   The thoracolumbar spine was examined. Examination is remarkable for no significant tenderness, swelling, or deformity in the thoracolumbar region.  Skin:   The skin is warm and dry.  Neurologic:    Shu Coma Scale (GCS) 15 E4V5M6. Neurologic examination revealed tremors noted.  Psychiatric:   The patient does not appear depressed or anxious.    IMAGING:  OUTSIDE IMAGES-CT HEAD   Final Result      DX-CLAVICLE LEFT   Final Result      Comminuted intra-articular scapular fractures are mildly displaced      Comminuted distal clavicle fractures are moderately " displaced      DX-CHEST-PORTABLE (1 VIEW)   Final Result      Mild left atelectasis      Multiple left fractures and no pneumothorax is seen      OUTSIDE IMAGES-DX CHEST   Final Result      CT-CHEST,ABDOMEN,PELVIS WITH   Final Result      Left flail chest with numerous acute mildly displaced rib fractures affecting the ribs at 2 locations      Small left hemopneumothorax      Small pelvic hemorrhage of uncertain origin. Some hemorrhage is also seen adjacent to the spleen. Organ laceration is not confirmed. No free intraperitoneal air is seen to indicate bowel injury but it could not be excluded      Medial and lateral comminuted left clavicle fractures with open fracture seen laterally. Scapula fractures with intra-articular extension.      Mild urinary bladder wall thickening and prostate enlargement which could be from cystitis or chronic outlet obstruction      Mild left pulmonary contusion      Hepatic steatosis      4 cm ascending aortic ectasia, chronic finding      Findings were discussed with MARIA C MURRELL on 7/30/2021 2:30 AM.      OUTSIDE IMAGES-DX UPPER EXTREMITY, LEFT   Final Result      CT-CSPINE WITHOUT PLUS RECONS   Final Result      No CT evidence of acute cervical spine abnormality.      CT-SHOULDER W/O PLUS RECONS LEFT   Final Result      Comminuted intra-articular scapular fractures      Comminuted distal and medial clavicle fractures and there is intra-articular extension without significant step-off. Distal clavicle fracture is open      Left rib fractures, minute pneumothorax, small hemothorax      CT-CHEST,ABDOMEN,PELVIS WITH    (Results Pending)     All current laboratory studies/radiology exams reviewed: Yes    Completed Consultations:  Ortho    Pending Consultations:  NA    Newly Identified Diagnoses and Injuries:  NA    TOTAL RAP SCORE:  RAP Score Total: 0      ETOH Screening     Assessment complete date: 7/30/2021  Intervention: yes. Patient response to intervention:.   Patient  demonstrates understanding of intervention. Patient does not agree to follow-up.   has been contacted.           Awaiting repeat am labs for HH   Remains NPO at this time  Appears in early detox. CIWA initiated.      No

## 2021-08-30 NOTE — DISCHARGE NOTE PROVIDER - NSDCHC_MEDRECSTATUS_GEN_ALL_CORE
-- DO NOT REPLY / DO NOT REPLY ALL --  -- Message is from the [facility]--      Patient is requesting a medication refill - medication is on active list    Was Medication Pended? Yes.    Rx Name and Dose:  dexmethylphenidate (FOCALIN XR) 10 MG 24 hr capsule    Duration: 30 days    Pharmacy  [facility] [address] [facility] MIHIR COSTA (Emergency Contact) [phone]          Alternative phone number: n/a    Turnaround time given to caller:   \"This message will be sent to [state Provider's name]. The clinical team will fulfill your request as soon as they review your message when the office opens tomorrow.\"   Admission Reconciliation is Completed  Discharge Reconciliation is Completed

## 2021-09-21 NOTE — PROGRESS NOTE ADULT - PROBLEM SELECTOR PROBLEM 5
Prophylactic measure Introduction Text (Please End With A Colon): The following procedure was deferred: Detail Level: Detailed Procedure To Be Performed At Next Visit: Mohs surgery Instructions (Optional): Mohs with Dr Mirza

## 2021-09-27 NOTE — TRANSFER ACCEPTANCE NOTE - PROBLEM SELECTOR PROBLEM 3
Patient arrived to ED today with c/o low Hg 9.0, dizziness for the past 2 days intermittently.
CVA (cerebral vascular accident)

## 2021-10-18 NOTE — H&P ADULT - NSHPLANGLIMITEDENGLISH_GEN_A_CORE
No Minocycline Pregnancy And Lactation Text: This medication is Pregnancy Category D and not consider safe during pregnancy. It is also excreted in breast milk.

## 2021-10-25 ENCOUNTER — RX RENEWAL (OUTPATIENT)
Age: 56
End: 2021-10-25

## 2021-11-03 ENCOUNTER — APPOINTMENT (OUTPATIENT)
Dept: NEUROLOGY | Facility: CLINIC | Age: 56
End: 2021-11-03

## 2021-12-15 NOTE — PHYSICAL THERAPY INITIAL EVALUATION ADULT - TRANSFER SAFETY CONCERNS NOTED: SIT/STAND, REHAB EVAL
Ongoing SW/CM Assessment/Plan of Care Note     See SW/CM flowsheets for goals and other objective data.    Patient/Family discharge goal (s):  Goal #1: Psychosocial needs assessed  Goal #2: Extended Care Facility discharge arranged       PT Recommendation:  Recommendation for Discharge: PT WI: Sub-acute nursing home (return to  [facility])       OT Recommendation:  Recommendations for Discharge: OT WI: Sub-acute nursing home       SLP Recommendation:  Recommendations for Discharge: SLP: return to [facility]    Disposition:  Planned Discharge Destination: Rehabilitation/Skilled Care    Progress note:   SW following for discharge planning. Per OFT's, pt scheduled for video swallow study this morning and Angiomax drip was discontinued yesterday afternoon. Update provided to [facility] admissions liaison Aida. Continuing to plan for return to [facility] SNF for continued sub-acute rehab when medically ready for discharge. SW will continue to follow.         decreased weight-shifting ability/decreased step length/decreased balance during turns

## 2022-01-18 ENCOUNTER — RX RENEWAL (OUTPATIENT)
Age: 57
End: 2022-01-18

## 2022-01-21 ENCOUNTER — RX RENEWAL (OUTPATIENT)
Age: 57
End: 2022-01-21

## 2022-01-28 NOTE — ED ADULT TRIAGE NOTE - CADM TRG TX PRIOR TO ARRIVAL
oxygen Cheiloplasty (Less Than 50%) Text: A decision was made to reconstruct the defect with a  cheiloplasty.  The defect was undermined extensively.  Additional obicularis oris muscle was excised with a 15 blade scalpel.  The defect was converted into a full thickness wedge, of less than 50% of the vertical height of the lip, to facilite a better cosmetic result.  Small vessels were then tied off with 5-0 monocyrl. The obicularis oris, superficial fascia, adipose and dermis were then reapproximated.  After the deeper layers were approximated the epidermis was reapproximated with particular care given to realign the vermilion border.

## 2022-04-18 NOTE — PHYSICAL THERAPY INITIAL EVALUATION ADULT - DID THE PATIENT HAVE SURGERY?
[FreeTextEntry1] : 79F originally from [facility] with HTN, GERD, hx of skin cancer, osteoarthritis of b/l knees recommended for right TKR presents for walk in acute visit for follow up of above chronic conditions, renewal of medications. \par \par 1. HTN\par -lisinopril 5 mg daily \par -amlodipine 5 mg daily \par -renewed above. wrote for lisinorpil 5 mg KIRSTIE and d/w pt and her son -  not sure how this will affect price. Pt confident will be very cheap. \par \par 2. GERD\par -controlled with nexium; pt to f/u with GI for endoscopy. referral given today. \par \par 3. Skin Cancer\par -dermatology follow up - referral placed last visit, renewed today\par -counselled on sunscreen use discussed last visit. \par \par 4. HCM\par -DEXA - osteopenia\par -pneumonia vaccine : wants to discuss next visit - CPE in July\par -shingrix - declined last visit. \par -tdap - declined last visit. \par -COVID vaccine - pfizer completed in april\par \par CPE in June/July n/a

## 2022-04-20 NOTE — ED ADULT NURSE NOTE - ADDITIONAL COMPLAINTS
Additional Complaints Cyclophosphamide Counseling:  I discussed with the patient the risks of cyclophosphamide including but not limited to hair loss, hormonal abnormalities, decreased fertility, abdominal pain, diarrhea, nausea and vomiting, bone marrow suppression and infection. The patient understands that monitoring is required while taking this medication.

## 2022-05-11 ENCOUNTER — APPOINTMENT (OUTPATIENT)
Dept: NEUROLOGY | Facility: CLINIC | Age: 57
End: 2022-05-11
Payer: MEDICARE

## 2022-05-11 VITALS
TEMPERATURE: 97.7 F | DIASTOLIC BLOOD PRESSURE: 63 MMHG | BODY MASS INDEX: 31.65 KG/M2 | WEIGHT: 172 LBS | HEART RATE: 69 BPM | SYSTOLIC BLOOD PRESSURE: 101 MMHG | OXYGEN SATURATION: 95 % | HEIGHT: 62 IN

## 2022-05-11 PROCEDURE — 99215 OFFICE O/P EST HI 40 MIN: CPT

## 2022-05-11 RX ORDER — ASPIRIN ENTERIC COATED TABLETS 81 MG 81 MG/1
81 TABLET, DELAYED RELEASE ORAL DAILY
Refills: 0 | Status: DISCONTINUED | COMMUNITY
End: 2022-05-11

## 2022-05-11 RX ORDER — ACETAMINOPHEN/DIPHENHYDRAMINE 500MG-25MG
1000 TABLET ORAL DAILY
Qty: 30 | Refills: 0 | Status: DISCONTINUED | COMMUNITY
Start: 2020-01-08 | End: 2022-05-11

## 2022-05-11 RX ORDER — ACETAMINOPHEN/DIPHENHYDRAMINE 500MG-25MG
1000 TABLET ORAL DAILY
Qty: 30 | Refills: 0 | Status: DISCONTINUED | COMMUNITY
Start: 2021-04-07 | End: 2022-05-11

## 2022-06-07 NOTE — HISTORY OF PRESENT ILLNESS
[FreeTextEntry1] : FROM 1/25/19:\par This is a 53-year-old right-handed woman with a previous right hemispheric ischemic stroke and residual left-sided hemiparesis, who presented to [facility] ED on January 12, 2019 after experiencing inability to speak and worsened left-sided hemiparesis, followed by a generalized convulsion. EMS was called and the patient was administered lorazepam 2mg IV x 1, which caused the seizure to resolve. The patient was started on levetiracetam 500mg PO BID after receiving a 1000mg IV load, and has since had no similar events, and no change to her baseline left-sided hemiparesis. She had no tongue/cheek bite, head trauma, or urinary/bowel incontinence. She denies any recent history of fever, headache, alcohol use, added stress, or poor sleep hygiene. PCP: Dr. Jamil Seay.\par \par FROM 8/16/19:\par The patient, now 64, states that since I saw her on 1/25/19, she has had one episode of confusion associated with abdominal pain, although there was no witnessed seizure activity or new stroke-like symptoms. [facility] ED [facility]/24[facility] - per ED notes, she had generalized weakness with abdominal pain, but was found to have a normal CT abdomen and resolution of abdominal pain, prompting discharge to home. She has been compliant with her medications, although she has not taken aspirin 81mg daily while taking Xarelto. She has occasional mild frontal headache, which resolves on its own or with over-the-counter Tylenol.\par \par FROM 11/13/19:\par Since the last clinic visit, the patient presented to the ED in early October for breakthrough seizures, and her levetiracetam dosage was increased from 500mg to 750mg PO BID. Since then, she has not had any new seizures, and has been compliant with all of her medications for seizure prophylaxis and secondary stroke prevention. She completed four sessions of physical therapy, which helped her improve her left arm and leg strength, but she was not approved by her insurance plan for more sessions.\par \par FROM 1/8/20:\par The patient was admitted to [facility] during December 2-4, 2019, for breakthrough seizure. Her levetiracetam dosage was increased from 750mg to 1000mg PO BID. Since then, she has been stable without any new seizures. She has been continuing her physical therapy course since the hospital discharge, as well.\par \par FROM 9/9/20:\par Since the last clinic visit, the patient, now 55 was admitted to [facility] during July 30 - August 2, 2020 for breakthrough seizure in the setting of acute renal injury and prolonged exposure to high outdoor temperature. Her levetiracetam dosage was increased from 1000mg to 1500mg twice daily. Since then, she has not had any new seizures nor any concerns for new stroke.\par \par FROM 12/9/20:\par Since the last clinic visit, the patient was admitted to [facility] during September 27-30, 2020 for a breakthrough seizure. Her levetiracetam dosage was increased from 1500mg to 2000mg PO BID at that time. She was advised to pursue physical therapy for deconditioning in the setting of left-sided hemiparesis associated with chronic stroke. She has not had any breakthrough seizures or other presentations to the Emergency Department since then.\par \par FROM 4/7/21:\par The patient reports having an episode of confusion followed by hallucinations last week, and presented to [facility] ED. Per hospital records, the patient was determined to have possibly had a seizure, and no medication changes were made. She also underwent cardioversion at 3/23/21 at [facility], Dr. Loena Devlin and has been started on amiodarone 200mg once daily.]\par \par FROM 7/28/21:\par I am assisted in interval history collection by medical student, Tad Elliott. During this clinic visit, the patient, now 56, speaks primarily in Qatari, with translation provided by the patient's daughter. The patient had a seizure on 7/4/21, but did not require medical attention, and also had an episode of confusion on 7/20/21. She was told by her cardiologist that her recent episode of confusion was likely related to her irregular cardiac rhythm. She has been compliant with levetiracetam 2000mg PO BID, but she stopped taking Depakote ER 500mg PO BID earlier this month when her supply ran out. She notes that Depakote sometimes made her feel tired during the day.\par \par FROM 5/11/22:\par The patient experienced a cold with blurry vision and dizziness during the past weekend (5/6 - 5/8). On 5/6 specifically, she had an episode of eyelid twitching and difficulty maintaining balance for a few hours - in the past, she had these symptoms before a generalized convulsion. She also experienced hallucinations during that time, stating that the "floor is bleeding" and that she was hearing people speak in the next room who were not there. The son reveals that the patient has had intermittent hallucinations over the past few years, typically after a seizure has happened. The patient is concerned that hallucinations started only after she started taking Depakote, but her son notes that she had experienced occasional hallucinations even before she started taking Depakote. The patient's Aspirin and Atorvastatin have been stopped by her PCP because she has had improvement in her lipid levels, although she continues to take Xarelto 20mg every day.

## 2022-06-07 NOTE — DATA REVIEWED
[de-identified] : (1/13/19): Right temporal sharps and electrographic seizure; Bilateral temporal intermittent rhythmic delta activity (TIRDA)\par \par (12/2/19): Right frontal sharps and focal slowing, Diffuse beta activity\par \par (09/28/2020): Right parietal sharps, Right hemispheric slowing, No subclinical seizures [de-identified] : CT Head and CT Angio Head & Neck (1/12/19): Chronic right MCA infarct, Absent right PCA, Otherwise normal intracranial and neck vasculature\par \par Echocardiogram (1/14/19): Severely dilated left atrium, Trace mitral regurgitation\par \par Labs (1/15/19): CBC Normal, INR 1.67 (high), BMP notable for low sodium (133), low potassium (3.3), and high glucose (107)\par \par Labs (1/13/19): Hemoglobin A1c 5.6%, Fasting lipid panel: Cholesterol 152, Triglycerides 43, HDL 65, LDL 78\par \par CT Head (12/1/19): Chronic right MCA (large) and right EJ (partial) infarcts without new intracranial abnormalities\par \par Labs (12/3/19): Notable for Hct 46.0% <H>, Phosphorus 2.4 <L>, Vitamin D 17.1 <L>, Normal Vit B12 & Folate\par \par Labs (8/2/20): CBC, BMP normal; Levetiracetam level (07/31/2020): 86.0 <H>\par \par Labs (9/27/20 - 9/28/20): COVID-19 PCR Negative; COVID-19 IgG Positive\par \par Labs (9/29/20): CBC & BMP normal; Levetiracetam level 35.9 <WNL>

## 2022-06-07 NOTE — ED ADULT TRIAGE NOTE - CCCP TRG CHIEF CMPLNT
[facility]  [address] [place] [address]    Patient: Cheri Pickett  MRN: [id number]  : 1939    Reason for Visit:   Chief Complaint   Patient presents with   • Follow-up       History of Present Illness:  Cheri Pickett is a 82 year old female  Comes in with her daughter for a follow-up.  Patient is an 82-year-old who has history of moderately severe Alzheimer's disease.  She is doing fair on the Aricept and Namenda and has been stable.    She is incontinent and uses large diapers.  She is otherwise eating well.  She has had no falls.  She is pleasantly confused.  She does know her daughter's names but does not recall the grandchildren.    She had no idea who the president was    She has history of hypertension hypothyroidism and CKD stage IIIb/IV.  She sees the nephrologist regularly and her CKD has been stable.  She also has hyperlipidemia.  No history of diabetes    Patient denies any specific complaints.  She has had her vaccinations    She just had her thyroid and lipid testing done today results are pending    Review of Systems   Constitutional: Negative.    HENT: Negative.    Eyes: Negative.    Respiratory: Negative.    Cardiovascular: Negative.    Gastrointestinal: Negative.    Endocrine: Negative.    Genitourinary: Negative.    Musculoskeletal: Positive for arthralgias.   Skin: Negative.    Neurological: Negative.    Psychiatric/Behavioral: Positive for confusion.       ALLERGIES:  Patient has no known allergies.    Current Outpatient Medications   Medication Sig Dispense Refill   • memantine (NAMENDA) 10 MG tablet Take 1 tablet by mouth 2 times daily. 180 tablet 3   • amLODIPine (NORVASC) 10 MG tablet Take 1 tablet by mouth daily. 90 tablet 3   • levothyroxine 100 MCG tablet Take 1 tablet by mouth daily. 90 tablet 3   • doxazosin (CARDURA) 2 MG tablet Take 1 tablet by mouth daily. 90 tablet 3   • donepezil (ARICEPT) 10 MG tablet Take 2 tablets by mouth nightly. 180 tablet 3   •  carvedilol (COREG) 25 MG tablet Take 1 tablet by mouth 2 times daily (with meals). 180 tablet 3   • pravastatin (PRAVACHOL) 40 MG tablet Take 1 tablet by mouth daily. 90 tablet 3   • Multiple Vitamin (Quintabs) Tab Take 1 tablet by mouth.     • Multiple Vitamins-Minerals (VITAMIN - THERAPEUTIC MULTIVITAMINS W/MINERALS) Tab Take 1 tablet by mouth daily.       No current facility-administered medications for this visit.       Past Medical History:   Diagnosis Date   • kidney decreased    • Other specified hypothyroidism        Past Surgical History:   Procedure Laterality Date   • Oral surgery procedure          Family History   Problem Relation Age of Onset   • Heart disease Son    • Congestive Heart Failure Son        OB History   No obstetric history on file.       Social History     Socioeconomic History   • Marital status:      Spouse name: Not on file   • Number of children: Not on file   • Years of education: Not on file   • Highest education level: Not on file   Occupational History   • Not on file   Tobacco Use   • Smoking status: Unknown If Ever Smoked   • Smokeless tobacco: Never Used   Vaping Use   • Vaping Use: never used   Substance and Sexual Activity   • Alcohol use: No   • Drug use: No   • Sexual activity: Not Currently   Other Topics Concern   •  Service No   • Blood Transfusions No   • Caffeine Concern No   • Occupational Exposure No   • Hobby Hazards No   • Sleep Concern No   • Stress Concern No   • Weight Concern No   • Special Diet No   • Back Care No   • Exercise No   • Bike Helmet No   • Seat Belt Yes   • Self-Exams No   Social History Narrative   • Not on file     Social Determinants of Health     Financial Resource Strain: Not on file   Food Insecurity: Not on file   Transportation Needs: Not on file   Physical Activity: Not on file   Stress: Not on file   Social Connections: Not on file   Intimate Partner Violence: Not on file       /70   Pulse 89   Temp 98.5 °F (36.9  dizziness °C)   Resp 16   Ht 5' 2\" (1.575 m)   Wt 77.8 kg (171 lb 9.6 oz)   BMI 31.39 kg/m²   BSA 1.79 m²     Physical Exam  Vitals and nursing note reviewed. Exam conducted with a chaperone present.   Constitutional:       Appearance: Normal appearance. She is well-developed. She is obese.   HENT:      Head: Normocephalic.      Right Ear: External ear normal.      Left Ear: External ear normal.      Nose: Nose normal.   Eyes:      Conjunctiva/sclera: Conjunctivae normal.      Pupils: Pupils are equal, round, and reactive to light.   Cardiovascular:      Rate and Rhythm: Normal rate and regular rhythm.      Heart sounds: Normal heart sounds.   Pulmonary:      Breath sounds: Normal breath sounds.   Abdominal:      General: Bowel sounds are normal.      Palpations: Abdomen is soft.   Musculoskeletal:         General: Normal range of motion.      Cervical back: Normal range of motion and neck supple.      Comments: Osteoarthritic changes   Skin:     General: Skin is warm and dry.   Neurological:      Mental Status: She is alert and oriented to person, place, and time.      Deep Tendon Reflexes: Reflexes are normal and symmetric.   Psychiatric:         Behavior: Behavior normal.         Thought Content: Thought content normal.         Judgment: Judgment normal.      Comments: Pleasantly confused         Assessment:   ED Diagnosis   1. Physical exam, routine     2. Thyroid dysfunction  levothyroxine 100 MCG tablet   3. Healthcare maintenance     4. Stage 4 chronic kidney disease (CMS/HCC)     5. Vitamin D deficiency     6. Acquired hypothyroidism     7. Advanced directives, counseling/discussion     8. Essential hypertension     9. Pure hypercholesterolemia     10. Late onset Alzheimer's disease without behavioral disturbance (CMS/HCC)     11. Anemia in stage 4 chronic kidney disease (CMS/HCC)         Plan: Pending lab work.  Refills were given.  Patient will be encouraged to be as active physically and socially.  She will  keep a close watch on her kidney functions.  Follow-up in 6 months

## 2022-07-15 ENCOUNTER — RX RENEWAL (OUTPATIENT)
Age: 57
End: 2022-07-15

## 2022-07-22 NOTE — ED ADULT TRIAGE NOTE - AS HEIGHT TYPE
-Growth:  following curves  -Development: normal. Passed all areas on ASQ  -Vaccines: first COVID 19 vaccine today (Moderna)  -Discussed lead/hemoglobin screening today versus at 12 months  -Continue breast/formula feeding ad kaylyn and soft age-appropriate foods  -All questions answered, f/u in 3 month for 12 month WCC or sooner if needed   stated

## 2022-08-17 ENCOUNTER — APPOINTMENT (OUTPATIENT)
Dept: NEUROLOGY | Facility: CLINIC | Age: 57
End: 2022-08-17

## 2022-09-09 NOTE — PROGRESS NOTE ADULT - PROBLEM SELECTOR PLAN 2
09/09/22 1103   Right Leg Edema Point of Measurement   Leg circumference 38.8 cm   Ankle circumference 24.4 cm   Left Leg Edema Point of Measurement   Leg circumference 37 cm   Ankle circumference 24 cm   LLE Peripheral Vascular    Capillary Refill Greater than 3 seconds   Color Appropriate for race   Temperature Warm   Pedal Pulse Palpable   RLE Peripheral Vascular    Capillary Refill Less than/equal to 3 seconds   Color Appropriate for race   Temperature Warm   Pedal Pulse Palpable   Wound Foot Left;Medial #1 amp site 08/26/22   Date First Assessed/Time First Assessed: 08/26/22 1121   Present on Hospital Admission: Yes  Primary Wound Type: Blister/bullae  Location: Foot  Wound Location Orientation: Left;Medial  Wound Description: #1 amp site  Date of First Observation: 08/26/22   Dressing Status Breakthrough drainage noted   Cleansed Soap and water   Offloading for Diabetic Foot Ulcers Total contact cast   Wound Length (cm) 0.6 cm   Wound Width (cm) 0.9 cm   Wound Depth (cm) 0.3 cm   Wound Surface Area (cm^2) 0.54 cm^2   Change in Wound Size % 96.72   Wound Volume (cm^3) 0.162 cm^3   Wound Healing % 90   Wound Assessment Pink/red   Drainage Amount Moderate   Drainage Description Serosanguinous   Wound Odor None   Lisa-Wound/Incision Assessment Maceration; Hyperkeratosis (Callous); Blanchable erythema   Edges Epibole (rolled edges)   Wound Toe (Comment  which one) Right;Posterior #2 right foot 2nd toe   Date First Assessed/Time First Assessed: 08/12/22 1128   Present on Hospital Admission: Yes  Location: Toe (Comment  which one)  Wound Location Orientation: Right;Posterior  Wound Description: #2 right foot 2nd toe   Wound Image    Dressing Status Old drainage noted   Cleansed Cleansed with saline   Wound Length (cm) 0.1 cm   Wound Width (cm) 0.1 cm   Wound Depth (cm) 0.1 cm   Wound Surface Area (cm^2) 0.01 cm^2   Change in Wound Size % 98.75   Wound Volume (cm^3) 0.001 cm^3   Wound Healing % 99   Wound Assessment Epithelialization   Drainage Amount Scant   Drainage Description Serous   Wound Odor None   Lisa-Wound/Incision Assessment Blanchable erythema;Dry/flaky   Edges Attached edges   Visit Vitals  /66 (BP 1 Location: Left upper arm, BP Patient Position: Reclining)   Pulse 75   Temp 99 °F (37.2 °C)   Resp 18 cont keppra

## 2022-10-19 NOTE — ED ADULT TRIAGE NOTE - AS O2 DELIVERY
Thank you for letting us take care of you today. We hope all your questions were addressed. If a question was overlooked or something else comes to mind after you return home, please contact a member of your Care Team listed below. Your Care Team at [place] [address] is Team #5  Benito Whitfield MD (Faculty)  Trina Frost MD (Resident)  Francis Sosa MD (Resident)  Ayesha Meeks MD (Resident)  Tyler Lopez MD, (Resident)  Melani Powell., SHANNON Garcia., BRAYAN Joseph.,  FLORIDA Perez., Victoria Naqvi., [facility] office)  Nubia Beal, [address] (Clinical Practice Manager)  Jayashree [facility] (Clinical Pharmacist)       Office phone number: [phone]    If you need to get in right away due to illness, please be advised we have \"Same Day\" appointments available Monday-Friday. Please call us at [phone] option #3 to schedule your \"Same Day\" appointment.
supplemental O2

## 2022-12-16 NOTE — ED ADULT TRIAGE NOTE - LOCATION:
Follow up with Dr Federica Alvarado in 12 months   Labs soon - make sure that results are sent to us when completed   Call for any questions or concerns.
Left arm;

## 2023-01-17 ENCOUNTER — RX RENEWAL (OUTPATIENT)
Age: 58
End: 2023-01-17

## 2023-05-11 NOTE — ED ADULT NURSE NOTE - NSFALLRSKUNASSIST_ED_ALL_ED
The Delivery OB Provider certifies that vaginal examination and/or abdominal examination after the delivery was done and no foreign body was found.
no

## 2023-05-22 NOTE — CONSULT NOTE ADULT - ATTENDING COMMENTS
MICU ATTENDING. I have personally seen and examined the pt. I agree with the above history, physical and plan which I edited where appropriate. I was physically present for the key elements service provided. I total spent 35 min critical care time .  54 y/o known A/FIB on Xarelto and lopressor, s/p cardioversion 3 years ago, stroke with residual left side weakness presented with slurred speech and " strange behavior " as per sister. Stroke code in ER. CT negative for acute ICH, old stroke. ICU consulted for management of A/FIB with RVR. While in ER pt had generalized tonic clonic seizure .    A/P A/FIB with RVR   CVA   New onset seizure     Admit to tele  Cardizem drip for Afib control  Anticoagulation as per Neuro  Repeat CT head in 24 hours   MRI   EEG   Ativan for seizure control   LIPITOR  serial; CE   ECHO Card eval  REplace K  Not MICU candidate  Reconsult prn Detail Level: Simple Render Risk Assessment In Note?: no Additional Notes: Hair test pull was done today result:  (negative )\\nDiscussed PRP with Pt today x 3 treatments \\nDiscussed ILK injections with pt today\\nDiscussed @home hair cap with patient today .

## 2023-06-09 NOTE — DIETITIAN INITIAL EVALUATION ADULT. - NS FNS REASON FOR WEIGHT CHANG
nasal septal reconstruction, bilateral anterior sinus surgery, bilateral inferior turbinate reduction
eating healthier, portion control to lose wt

## 2023-06-14 ENCOUNTER — APPOINTMENT (OUTPATIENT)
Dept: NEUROLOGY | Facility: CLINIC | Age: 58
End: 2023-06-14
Payer: MEDICARE

## 2023-06-14 VITALS
TEMPERATURE: 97.3 F | OXYGEN SATURATION: 99 % | HEIGHT: 62 IN | HEART RATE: 60 BPM | DIASTOLIC BLOOD PRESSURE: 72 MMHG | SYSTOLIC BLOOD PRESSURE: 106 MMHG

## 2023-06-14 DIAGNOSIS — I48.20 CHRONIC ATRIAL FIBRILLATION, UNSP: ICD-10-CM

## 2023-06-14 DIAGNOSIS — Z87.898 PERSONAL HISTORY OF OTHER SPECIFIED CONDITIONS: ICD-10-CM

## 2023-06-14 PROCEDURE — 99215 OFFICE O/P EST HI 40 MIN: CPT

## 2023-06-14 RX ORDER — ATORVASTATIN CALCIUM 80 MG/1
80 TABLET, FILM COATED ORAL
Qty: 30 | Refills: 0 | Status: DISCONTINUED | COMMUNITY
End: 2023-06-14

## 2023-07-24 ENCOUNTER — RX RENEWAL (OUTPATIENT)
Age: 58
End: 2023-07-24

## 2023-07-24 PROBLEM — Z87.898 HISTORY OF SEIZURE: Status: RESOLVED | Noted: 2019-01-25 | Resolved: 2023-07-24

## 2023-07-24 PROBLEM — I48.20 CHRONIC ATRIAL FIBRILLATION: Status: ACTIVE | Noted: 2019-02-04

## 2023-07-24 NOTE — DATA REVIEWED
[de-identified] : (1/13/19): Right temporal sharps and electrographic seizure; Bilateral temporal intermittent rhythmic delta activity (TIRDA)\par \par (12/2/19): Right frontal sharps and focal slowing, Diffuse beta activity\par \par (09/28/2020): Right parietal sharps, Right hemispheric slowing, No subclinical seizures [de-identified] : CT Head and CT Angio Head & Neck (1/12/19): Chronic right MCA infarct, Absent right PCA, Otherwise normal intracranial and neck vasculature\par \par Echocardiogram (1/14/19): Severely dilated left atrium, Trace mitral regurgitation\par \par Labs (1/15/19): CBC Normal, INR 1.67 (high), BMP notable for low sodium (133), low potassium (3.3), and high glucose (107)\par \par Labs (1/13/19): Hemoglobin A1c 5.6%, Fasting lipid panel: Cholesterol 152, Triglycerides 43, HDL 65, LDL 78\par \par CT Head (12/1/19): Chronic right MCA (large) and right EJ (partial) infarcts without new intracranial abnormalities\par \par Labs (12/3/19): Notable for Hct 46.0% <H>, Phosphorus 2.4 <L>, Vitamin D 17.1 <L>, Normal Vit B12 & Folate\par \par Labs (8/2/20): CBC, BMP normal; Levetiracetam level (07/31/2020): 86.0 <H>\par \par Labs (9/27/20 - 9/28/20): COVID-19 PCR Negative; COVID-19 IgG Positive\par \par Labs (9/29/20): CBC & BMP normal; Levetiracetam level 35.9 <WNL>

## 2023-07-24 NOTE — ASSESSMENT
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

## 2023-07-24 NOTE — PHYSICAL EXAM
[General Appearance - Alert] : alert [General Appearance - In No Acute Distress] : in no acute distress [General Appearance - Well Nourished] : well nourished [Oriented To Time, Place, And Person] : oriented to person, place, and time [Impaired Insight] : insight and judgment were intact [Person] : oriented to person [Place] : oriented to place [Time] : oriented to time [Concentration Intact] : normal concentrating ability [Naming Objects] : no difficulty naming common objects [Repeating Phrases] : no difficulty repeating a phrase [Fluency] : fluency intact [Comprehension] : comprehension intact [Cranial Nerves Optic (II)] : visual acuity intact bilaterally,  visual fields full to confrontation, pupils equal round and reactive to light [Cranial Nerves Oculomotor (III)] : extraocular motion intact [Cranial Nerves Trigeminal (V)] : facial sensation intact symmetrically [Cranial Nerves Vestibulocochlear (VIII)] : hearing was intact bilaterally [Cranial Nerves Glossopharyngeal (IX)] : tongue and palate midline [Cranial Nerves Accessory (XI - Cranial And Spinal)] : head turning and shoulder shrug symmetric [Cranial Nerves Hypoglossal (XII)] : there was no tongue deviation with protrusion [Flattening Of Nasolabial Fold On The Left] : flattening of the  nasolabial fold [Involuntary Movements] : no involuntary movements were seen [Motor Handedness Right-Handed] : the patient is right hand dominant [Hemiparesis Of Left Side] : hemiparesis was present on the left [Paresis Pronator Drift Left-Sided] : a left-sided pronator drift was present [1] : shoulder abduction 1/5 [Hand Weakness Left] : the hand  was weak [5] : ankle plantar flexion 5/5 [2] : ankle dorsiflexion 2/5 [3] : ankle plantar flexion 3/5 [Sensation Tactile Decrease] : light touch was intact [Sensation Pain / Temperature Decrease] : pain and temperature was intact [3+] : Patella left 3+ [0] : Ankle jerk right 0 [1+] : Ankle jerk left 1+ [Plantar Reflex Left Only] : abnormal on the left [Sclera] : the sclera and conjunctiva were normal [PERRL With Normal Accommodation] : pupils were equal in size, round, reactive to light, with normal accommodation [Extraocular Movements] : extraocular movements were intact [Outer Ear] : the ears and nose were normal in appearance [Hearing Threshold Finger Rub Not [place]] : hearing was normal [Oropharynx] : the oropharynx was normal [Neck Appearance] : the appearance of the neck was normal [Auscultation Breath Sounds / Voice Sounds] : lungs were clear to auscultation bilaterally [Normal] : the heart rate was normal [Irregularly Irregular] : the rhythm was irregularly irregular [Arterial Pulses Carotid] : carotid pulses were normal with no bruits [Full Pulse] : the pedal pulses are present [Abdomen Soft] : soft [Abdomen Tenderness] : non-tender [No CVA Tenderness] : no ~M costovertebral angle tenderness [No Spinal Tenderness] : no spinal tenderness [Nail Clubbing] : no clubbing  or cyanosis of the fingernails [Skin Turgor] : normal skin turgor [] : no rash [FreeTextEntry1] : NIHSS:\par \par LOC Responsiveness: 0\par LOC Questions: 0\par LOC Commands: 0\par Gaze Preference: 0\par Visual Field: 0\par Facial Palsy: 1\par Motor Arm Right: 0\par Motor Arm Left: 2\par Motor Leg Right: 0\par Motor Arm Left: 0\par Limb Ataxia: 0\par Sensory: 0\par Language: 0\par Speech: 0\par Extinction/Inattention: 0\par \par \par NIHSS Total: 3\par \par Modified Salyer Scale: 2\par  [Flattening Of Nasolabial Fold On The Right] : no flattening of the nasolabial fold [Paresis Pronator Drift Right-Sided] : no pronator drift on the right [Hand Weakness Right] : normal hand  [Allodynia] : no ~T allodynia present [Coordination - Dysmetria Impaired Finger-to-Nose Bilateral] : not present [Coordination - Dysmetria Impaired Heel-to-Shin Bilateral] : not present [Plantar Reflex Right Only] : normal on the right [___] : absent on the right [___] : absent on the left [FreeTextEntry4] : No dysarthria or aphasia present [FreeTextEntry8] : Wide-based stance and gait. Hemiparetic gait present. Unable to test for specialized gaits or Romberg sign due to left leg weakness. Coordination exams on left are limited due to weakness.

## 2023-07-24 NOTE — HISTORY OF PRESENT ILLNESS
[FreeTextEntry1] : FROM 1/25/19:\par This is a 53-year-old right-handed woman with a previous right hemispheric ischemic stroke and residual left-sided hemiparesis, who presented to [facility] ED on January 12, 2019 after experiencing inability to speak and worsened left-sided hemiparesis, followed by a generalized convulsion. EMS was called and the patient was administered lorazepam 2mg IV x 1, which caused the seizure to resolve. The patient was started on levetiracetam 500mg PO BID after receiving a 1000mg IV load, and has since had no similar events, and no change to her baseline left-sided hemiparesis. She had no tongue/cheek bite, head trauma, or urinary/bowel incontinence. She denies any recent history of fever, headache, alcohol use, added stress, or poor sleep hygiene. PCP: Dr. Jamil Seay.\par \par FROM 8/16/19:\par The patient, now 64, states that since I saw her on 1/25/19, she has had one episode of confusion associated with abdominal pain, although there was no witnessed seizure activity or new stroke-like symptoms. She presented to [facility] ED on 3/24/19 - per ED notes, she had generalized weakness with abdominal pain, but was found to have a normal CT abdomen and resolution of abdominal pain, prompting discharge to home. She has been compliant with her medications, although she has not taken aspirin 81mg daily while taking Xarelto. She has occasional mild frontal headache, which resolves on its own or with over-the-counter Tylenol.\par \par FROM 11/13/19:\par Since the last clinic visit, the patient presented to the ED in early October for breakthrough seizures, and her levetiracetam dosage was increased from 500mg to 750mg PO BID. Since then, she has not had any new seizures, and has been compliant with all of her medications for seizure prophylaxis and secondary stroke prevention. She completed four sessions of physical therapy, which helped her improve her left arm and leg strength, but she was not approved by her insurance plan for more sessions.\par \par FROM 1/8/20:\par The patient was admitted to [facility] during December 2-4, 2019, for breakthrough seizure. Her levetiracetam dosage was increased from 750mg to 1000mg PO BID. Since then, she has been stable without any new seizures. She has been continuing her physical therapy course since the hospital discharge, as well.\par \par FROM 9/9/20:\par Since the last clinic visit, the patient, now 55 was admitted to [facility] during July 30 - August 2, 2020 for breakthrough seizure in the setting of acute renal injury and prolonged exposure to high outdoor temperature. Her levetiracetam dosage was increased from 1000mg to 1500mg twice daily. Since then, she has not had any new seizures nor any concerns for new stroke.\par \par FROM 12/9/20:\par Since the last clinic visit, the patient was admitted to [facility] during September 27-30, 2020 for a breakthrough seizure. Her levetiracetam dosage was increased from 1500mg to 2000mg PO BID at that time. She was advised to pursue physical therapy for deconditioning in the setting of left-sided hemiparesis associated with chronic stroke. She has not had any breakthrough seizures or other presentations to the Emergency Department since then.\par \par FROM 4/7/21:\par The patient reports having an episode of confusion followed by hallucinations last week, and presented to [facility] ED. Per hospital records, the patient was determined to have possibly had a seizure, and no medication changes were made. She also underwent cardioversion at 3/23/21 at [facility], Dr. Leona Devlin and has been started on amiodarone 200mg once daily.]\par \par FROM 7/28/21:\par I am assisted in interval history collection by medical student, Tad Elliott. During this clinic visit, the patient, now 56, speaks primarily in Cymraes, with translation provided by the patient's daughter. The patient had a seizure on 7/4/21, but did not require medical attention, and also had an episode of confusion on 7/20/21. She was told by her cardiologist that her recent episode of confusion was likely related to her irregular cardiac rhythm. She has been compliant with levetiracetam 2000mg PO BID, but she stopped taking Depakote ER 500mg PO BID earlier this month when her supply ran out. She notes that Depakote sometimes made her feel tired during the day.\par \par FROM 5/11/22:\par The patient experienced a cold with blurry vision and dizziness during the past weekend (5/6 - 5/8). On 5/6 specifically, she had an episode of eyelid twitching and difficulty maintaining balance for a few hours - in the past, she had these symptoms before a generalized convulsion. She also experienced hallucinations during that time, stating that the "floor is bleeding" and that she was hearing people speak in the next room who were not there. The son reveals that the patient has had intermittent hallucinations over the past few years, typically after a seizure has happened. The patient is concerned that hallucinations started only after she started taking Depakote, but her son notes that she had experienced occasional hallucinations even before she started taking Depakote. The patient's Aspirin and Atorvastatin have been stopped by her PCP because she has had improvement in her lipid levels, although she continues to take Xarelto 20mg every day.\par \par FROM 6/14/23:\par The patient, now 57, has not had any breakthrough seizures since I last saw her on 5/11/22. She thinks that her left-sided hemiparesis has improved as she attends physical therapy sessions 3 times per week. She continues to be off of Atorvastatin due to improved lipid levels, and she continues to take Xarelto 20mg PO Daily without Aspirin for managemetn of atrial fibrillation.

## 2023-08-03 NOTE — ED ADULT NURSE NOTE - CAS EDN DISCHARGE INTERVENTIONS
S/p Valve in valve TAVR in 2021 with echo today unchanged from prior TTE 12/22: EF> 75%, moderate AS and moderate MR.  - cont to monitor
IV discontinued, cath removed intact

## 2023-08-11 NOTE — PROGRESS NOTE ADULT - SUBJECTIVE AND OBJECTIVE BOX
ANESTHESIA POSTOP CHECK    54y Female POSTOP DAY 1 S/P MARGE CArdioversion / Pacemaker    Vital Signs Last 24 Hrs  T(C): 36.9 (10 Mar 2020 06:28), Max: 36.9 (09 Mar 2020 22:00)  T(F): 98.4 (10 Mar 2020 06:28), Max: 98.5 (09 Mar 2020 22:00)  HR: 98 (10 Mar 2020 06:28) (63 - 98)  BP: 103/72 (10 Mar 2020 06:28) (96/65 - 111/82)  BP(mean): --  RR: 16 (10 Mar 2020 06:28) (16 - 17)  SpO2: 98% (10 Mar 2020 06:28) (97% - 100%)          [ x] NO APPARENT ANESTHESIA COMPLICATIONS      Comments: Localized Dermabrasion Text: First, a scalpel was used to shave remove protuberant scar and sebaceous hyperplasia.  Next, sterilized sandpaper was used to physically abrade to papillary dermis. This spot dermabrasion was performed to complete skin cancer reconstruction. It also will eliminate the other sun damaged precancerous cells that are known to be part of the regional effect of a lifetime's worth of sun exposure. This localized dermabrasion is therapeutic and should not be considered cosmetic in any regard. Localized Dermabrasion With Wire Brush Text: First, a scalpel was used to shave remove protuberant scar and sebaceous hyperplasia.  Next, sterilized sandpaper was used to physically abrade to papillary dermis. This spot dermabrasion was performed to complete skin cancer reconstruction. It also will eliminate the other sun damaged precancerous cells that are known to be part of the regional effect of a lifetime's worth of sun exposure. This localized dermabrasion is therapeutic and should not be considered cosmetic in any regard.

## 2023-11-03 NOTE — ED PROVIDER NOTE - MDM ORDERS SUBMITTED SELECTION
Body Location Override (Optional - Billing Will Still Be Based On Selected Body Map Location If Applicable): right oral commissure
Labs
Size Of Lesion: 1.4cm
Detail Level: Simple

## 2023-11-29 NOTE — PATIENT PROFILE ADULT - NSPROPTRIGHTCAREGIVER_GEN_A_NUR
----- Message from All Parker MD sent at 11/29/2023  1:37 PM CST -----  Stable, but continue current regimen and dose of atorvastatin, recheck in 6 months   declines

## 2024-01-10 ENCOUNTER — RX RENEWAL (OUTPATIENT)
Age: 59
End: 2024-01-10

## 2024-01-16 ENCOUNTER — RX RENEWAL (OUTPATIENT)
Age: 59
End: 2024-01-16

## 2024-02-02 NOTE — ED PROVIDER NOTE - CADM POA URETHRAL CATHETER
Pt hypoglycemic to 50s on midnight and 0400 check. BG resolved after D10 bolus given each time.    Problem: Adult Inpatient Plan of Care  Goal: Plan of Care Review  Outcome: Ongoing, Progressing  Goal: Patient-Specific Goal (Individualized)  Outcome: Ongoing, Progressing  Goal: Absence of Hospital-Acquired Illness or Injury  Outcome: Ongoing, Progressing  Goal: Optimal Comfort and Wellbeing  Outcome: Ongoing, Progressing  Goal: Readiness for Transition of Care  Outcome: Ongoing, Progressing     Problem: Coping Ineffective  Goal: Effective Coping  Outcome: Ongoing, Progressing     Problem: Fluid and Electrolyte Imbalance (Acute Kidney Injury/Impairment)  Goal: Fluid and Electrolyte Balance  Outcome: Ongoing, Progressing     Problem: Oral Intake Inadequate (Acute Kidney Injury/Impairment)  Goal: Optimal Nutrition Intake  Outcome: Ongoing, Progressing     Problem: Renal Function Impairment (Acute Kidney Injury/Impairment)  Goal: Effective Renal Function  Outcome: Ongoing, Progressing     Problem: Skin Injury Risk Increased  Goal: Skin Health and Integrity  Outcome: Ongoing, Progressing     Problem: Fall Injury Risk  Goal: Absence of Fall and Fall-Related Injury  Outcome: Ongoing, Progressing     Problem: Impaired Wound Healing  Goal: Optimal Wound Healing  Outcome: Ongoing, Progressing     Problem: Infection  Goal: Absence of Infection Signs and Symptoms  Outcome: Ongoing, Progressing      No

## 2024-02-21 ENCOUNTER — APPOINTMENT (OUTPATIENT)
Dept: NEUROLOGY | Facility: CLINIC | Age: 59
End: 2024-02-21
Payer: MEDICARE

## 2024-02-21 VITALS
TEMPERATURE: 97.4 F | OXYGEN SATURATION: 96 % | DIASTOLIC BLOOD PRESSURE: 69 MMHG | WEIGHT: 139 LBS | HEIGHT: 62 IN | SYSTOLIC BLOOD PRESSURE: 103 MMHG | HEART RATE: 69 BPM | BODY MASS INDEX: 25.58 KG/M2

## 2024-02-21 DIAGNOSIS — E55.9 VITAMIN D DEFICIENCY, UNSPECIFIED: ICD-10-CM

## 2024-02-21 PROCEDURE — 99215 OFFICE O/P EST HI 40 MIN: CPT

## 2024-02-21 RX ORDER — EMPAGLIFLOZIN 10 MG/1
10 TABLET, FILM COATED ORAL DAILY
Qty: 30 | Refills: 0 | Status: ACTIVE | COMMUNITY

## 2024-02-21 RX ORDER — SPIRONOLACTONE 25 MG/1
25 TABLET ORAL DAILY
Refills: 0 | Status: ACTIVE | COMMUNITY

## 2024-02-21 RX ORDER — LISINOPRIL AND HYDROCHLOROTHIAZIDE TABLETS 20; 12.5 MG/1; MG/1
20-12.5 TABLET ORAL DAILY
Qty: 30 | Refills: 0 | Status: DISCONTINUED | COMMUNITY
Start: 2019-11-13 | End: 2024-02-21

## 2024-02-21 RX ORDER — SACUBITRIL AND VALSARTAN 24; 26 MG/1; MG/1
24-26 TABLET, FILM COATED ORAL TWICE DAILY
Refills: 0 | Status: ACTIVE | COMMUNITY

## 2024-02-21 RX ORDER — DRONEDARONE 400 MG/1
400 TABLET, FILM COATED ORAL TWICE DAILY
Refills: 0 | Status: ACTIVE | COMMUNITY

## 2024-02-21 RX ORDER — RIVAROXABAN 20 MG/1
20 TABLET, FILM COATED ORAL
Refills: 0 | Status: ACTIVE | COMMUNITY

## 2024-02-21 RX ORDER — METOPROLOL SUCCINATE 50 MG/1
50 TABLET, EXTENDED RELEASE ORAL DAILY
Qty: 30 | Refills: 0 | Status: DISCONTINUED | COMMUNITY
Start: 2023-06-14 | End: 2024-02-21

## 2024-03-15 NOTE — PHYSICAL THERAPY INITIAL EVALUATION ADULT - PHYSICAL ASSIST/NONPHYSICAL ASSIST: CHAIR TO BED, REHAB EVAL
59F w/Hx of RCC s/p R total nephrectomy, S/p hysterectomy, R-sided glaucoma, Fibromyalgia, Anxiety, GERD, smoker p/w R breast swelling, redness and pain, found to have axillary, subclavicular and mediastinal lymphadenopathy & pulm nodules, s/p LN biopsy. Nephrology consulted for hyponatremia.    1 person assist supervision

## 2024-05-04 NOTE — ED ADULT TRIAGE NOTE - CHIEF COMPLAINT QUOTE
Headache and dizziness since an hour per son [de-identified] : Able to rotate to the left and right without pain; unable to lean forward due to pain; no point tenderness; pain not reproducible with palpation [de-identified] : erythematous, macular, slightly reticular lesions (telangiectasias) throughout her hands and face, stable

## 2024-06-18 ENCOUNTER — NON-APPOINTMENT (OUTPATIENT)
Age: 59
End: 2024-06-18

## 2024-06-19 ENCOUNTER — APPOINTMENT (OUTPATIENT)
Dept: NEUROLOGY | Facility: CLINIC | Age: 59
End: 2024-06-19

## 2024-06-19 VITALS
HEART RATE: 69 BPM | TEMPERATURE: 97.7 F | WEIGHT: 140 LBS | OXYGEN SATURATION: 96 % | BODY MASS INDEX: 25.76 KG/M2 | SYSTOLIC BLOOD PRESSURE: 113 MMHG | HEIGHT: 62 IN | DIASTOLIC BLOOD PRESSURE: 74 MMHG

## 2024-06-19 DIAGNOSIS — G40.909 OTHER SEQUELAE OF CEREBRAL INFARCTION: ICD-10-CM

## 2024-06-19 DIAGNOSIS — I63.9 CEREBRAL INFARCTION, UNSPECIFIED: ICD-10-CM

## 2024-06-19 DIAGNOSIS — I69.398 OTHER SEQUELAE OF CEREBRAL INFARCTION: ICD-10-CM

## 2024-06-19 DIAGNOSIS — I69.354 HEMIPLEGIA AND HEMIPARESIS FOLLOWING CEREBRAL INFARCTION AFFECTING LEFT NON-DOMINANT SIDE: ICD-10-CM

## 2024-06-19 PROCEDURE — 99215 OFFICE O/P EST HI 40 MIN: CPT

## 2024-06-19 RX ORDER — FOLIC ACID 1 MG/1
1 TABLET ORAL DAILY
Qty: 30 | Refills: 2 | Status: DISCONTINUED | COMMUNITY
Start: 2024-02-21 | End: 2024-06-19

## 2024-06-19 RX ORDER — MULTIVIT-MIN/FOLIC/VIT K/LYCOP 400-300MCG
25 MCG TABLET ORAL DAILY
Qty: 30 | Refills: 0 | Status: DISCONTINUED | COMMUNITY
Start: 2024-02-21 | End: 2024-06-19

## 2024-06-19 RX ORDER — DIVALPROEX SODIUM 500 MG/1
500 TABLET, DELAYED RELEASE ORAL
Qty: 180 | Refills: 3 | Status: ACTIVE | COMMUNITY
Start: 2021-04-07 | End: 1900-01-01

## 2024-06-19 RX ORDER — LEVETIRACETAM 1000 MG/1
1000 TABLET, FILM COATED ORAL
Qty: 360 | Refills: 3 | Status: ACTIVE | COMMUNITY
Start: 2020-01-03 | End: 1900-01-01

## 2024-06-19 NOTE — HISTORY OF PRESENT ILLNESS
[FreeTextEntry1] : FROM 1/25/19: This is a 53-year-old right-handed woman with a previous right hemispheric ischemic stroke and residual left-sided hemiparesis, who presented to [facility] ED on January 12, 2019 after experiencing inability to speak and worsened left-sided hemiparesis, followed by a generalized convulsion. EMS was called and the patient was administered lorazepam 2mg IV x 1, which caused the seizure to resolve. The patient was started on levetiracetam 500mg PO BID after receiving a 1000mg IV load, and has since had no similar events, and no change to her baseline left-sided hemiparesis. She had no tongue/cheek bite, head trauma, or urinary/bowel incontinence. She denies any recent history of fever, headache, alcohol use, added stress, or poor sleep hygiene. PCP: Dr. Jamil Seay.  FROM 8/16/19: The patient, now 64, states that since I saw her on 1/25/19, she has had one episode of confusion associated with abdominal pain, although there was no witnessed seizure activity or new stroke-like symptoms. She presented to [facility] ED on 3/24/19 - per ED notes, she had generalized weakness with abdominal pain, but was found to have a normal CT abdomen and resolution of abdominal pain, prompting discharge to home. She has been compliant with her medications, although she has not taken aspirin 81mg daily while taking Xarelto. She has occasional mild frontal headache, which resolves on its own or with over-the-counter Tylenol.  FROM 11/13/19: Since the last clinic visit, the patient presented to the ED in early October for breakthrough seizures, and her levetiracetam dosage was increased from 500mg to 750mg PO BID. Since then, she has not had any new seizures, and has been compliant with all of her medications for seizure prophylaxis and secondary stroke prevention. She completed four sessions of physical therapy, which helped her improve her left arm and leg strength, but she was not approved by her insurance plan for more sessions.  FROM 1/8/20: The patient was admitted to [facility] during December 2-4, 2019, for breakthrough seizure. Her levetiracetam dosage was increased from 750mg to 1000mg PO BID. Since then, she has been stable without any new seizures. She has been continuing her physical therapy course since the hospital discharge, as well.  FROM 9/9/20: Since the last clinic visit, the patient, now 55 was admitted to [facility] during July 30 - August 2, 2020 for breakthrough seizure in the setting of acute renal injury and prolonged exposure to high outdoor temperature. Her levetiracetam dosage was increased from 1000mg to 1500mg twice daily. Since then, she has not had any new seizures nor any concerns for new stroke.  FROM 12/9/20: Since the last clinic visit, the patient was admitted to [facility] during September 27-30, 2020 for a breakthrough seizure. Her levetiracetam dosage was increased from 1500mg to 2000mg PO BID at that time. She was advised to pursue physical therapy for deconditioning in the setting of left-sided hemiparesis associated with chronic stroke. She has not had any breakthrough seizures or other presentations to the Emergency Department since then.  FROM 4/7/21: The patient reports having an episode of confusion followed by hallucinations last week, and presented to [facility] ED. Per hospital records, the patient was determined to have possibly had a seizure, and no medication changes were made. She also underwent cardioversion at 3/23/21 at [facility], Dr. Leona Devlin and has been started on amiodarone 200mg once daily.]  FROM 7/28/21: I am assisted in interval history collection by medical student, Tad Elliott. During this clinic visit, the patient, now 56, speaks primarily in Irish, with translation provided by the patient's daughter. The patient had a seizure on 7/4/21, but did not require medical attention, and also had an episode of confusion on 7/20/21. She was told by her cardiologist that her recent episode of confusion was likely related to her irregular cardiac rhythm. She has been compliant with levetiracetam 2000mg PO BID, but she stopped taking Depakote ER 500mg PO BID earlier this month when her supply ran out. She notes that Depakote sometimes made her feel tired during the day.  FROM 5/11/22: The patient experienced a cold with blurry vision and dizziness during the past weekend (5/6 - 5/8). On 5/6 specifically, she had an episode of eyelid twitching and difficulty maintaining balance for a few hours - in the past, she had these symptoms before a generalized convulsion. She also experienced hallucinations during that time, stating that the "floor is bleeding" and that she was hearing people speak in the next room who were not there. The son reveals that the patient has had intermittent hallucinations over the past few years, typically after a seizure has happened. The patient is concerned that hallucinations started only after she started taking Depakote, but her son notes that she had experienced occasional hallucinations even before she started taking Depakote. The patient's Aspirin and Atorvastatin have been stopped by her PCP because she has had improvement in her lipid levels, although she continues to take Xarelto 20mg every day.  FROM 6/14/23: The patient, now 57, has not had any breakthrough seizures since I last saw her on 5/11/22. She thinks that her left-sided hemiparesis has improved as she attends physical therapy sessions 3 times per week. She continues to be off of Atorvastatin due to improved lipid levels, and she continues to take Xarelto 20mg PO Daily without Aspirin for management of atrial fibrillation.  FROM 6/19/24: Since I last saw the patient on 2/21/24, she had an episode of feeing confused and with eyes drifting to the left for a few minutes, without convulsions. The patient and daughter are not certain if this was a type of seizure or a result from walking outside on a hot day. Beyond that, the patient has not had any seizures for over 1 year.

## 2024-06-19 NOTE — DATA REVIEWED
[de-identified] : (1/13/19): Right temporal sharps and electrographic seizure; Bilateral temporal intermittent rhythmic delta activity (TIRDA)\par  \par  (12/2/19): Right frontal sharps and focal slowing, Diffuse beta activity\par  \par  (09/28/2020): Right parietal sharps, Right hemispheric slowing, No subclinical seizures [de-identified] : CT Head and CT Angio Head & Neck (1/12/19): Chronic right MCA infarct, Absent right PCA, Otherwise normal intracranial and neck vasculature\par  \par  Echocardiogram (1/14/19): Severely dilated left atrium, Trace mitral regurgitation\par  \par  Labs (1/15/19): CBC Normal, INR 1.67 (high), BMP notable for low sodium (133), low potassium (3.3), and high glucose (107)\par  \par  Labs (1/13/19): Hemoglobin A1c 5.6%, Fasting lipid panel: Cholesterol 152, Triglycerides 43, HDL 65, LDL 78\par  \par  CT Head (12/1/19): Chronic right MCA (large) and right EJ (partial) infarcts without new intracranial abnormalities\par  \par  Labs (12/3/19): Notable for Hct 46.0% <H>, Phosphorus 2.4 <L>, Vitamin D 17.1 <L>, Normal Vit B12 & Folate\par  \par  Labs (8/2/20): CBC, BMP normal; Levetiracetam level (07/31/2020): 86.0 <H>\par  \par  Labs (9/27/20 - 9/28/20): COVID-19 PCR Negative; COVID-19 IgG Positive\par  \par  Labs (9/29/20): CBC & BMP normal; Levetiracetam level 35.9 <WNL>

## 2024-06-19 NOTE — PHYSICAL EXAM
[General Appearance - Alert] : alert [General Appearance - In No Acute Distress] : in no acute distress [General Appearance - Well Nourished] : well nourished [Oriented To Time, Place, And Person] : oriented to person, place, and time [Impaired Insight] : insight and judgment were intact [Person] : oriented to person [Place] : oriented to place [Time] : oriented to time [Concentration Intact] : normal concentrating ability [Naming Objects] : no difficulty naming common objects [Repeating Phrases] : no difficulty repeating a phrase [Fluency] : fluency intact [Comprehension] : comprehension intact [Cranial Nerves Optic (II)] : visual acuity intact bilaterally,  visual fields full to confrontation, pupils equal round and reactive to light [Cranial Nerves Oculomotor (III)] : extraocular motion intact [Cranial Nerves Trigeminal (V)] : facial sensation intact symmetrically [Cranial Nerves Vestibulocochlear (VIII)] : hearing was intact bilaterally [Cranial Nerves Glossopharyngeal (IX)] : tongue and palate midline [Cranial Nerves Accessory (XI - Cranial And Spinal)] : head turning and shoulder shrug symmetric [Cranial Nerves Hypoglossal (XII)] : there was no tongue deviation with protrusion [Flattening Of Nasolabial Fold On The Left] : flattening of the  nasolabial fold [Involuntary Movements] : no involuntary movements were seen [Motor Handedness Right-Handed] : the patient is right hand dominant [Hemiparesis Of Left Side] : hemiparesis was present on the left [Paresis Pronator Drift Left-Sided] : a left-sided pronator drift was present [1] : shoulder abduction 1/5 [Hand Weakness Left] : the hand  was weak [5] : ankle plantar flexion 5/5 [2] : ankle dorsiflexion 2/5 [3] : ankle plantar flexion 3/5 [Sensation Tactile Decrease] : light touch was intact [Sensation Pain / Temperature Decrease] : pain and temperature was intact [3+] : Patella left 3+ [0] : Ankle jerk right 0 [1+] : Ankle jerk left 1+ [Plantar Reflex Left Only] : abnormal on the left [Sclera] : the sclera and conjunctiva were normal [PERRL With Normal Accommodation] : pupils were equal in size, round, reactive to light, with normal accommodation [Extraocular Movements] : extraocular movements were intact [Outer Ear] : the ears and nose were normal in appearance [Hearing Threshold Finger Rub Not [place]] : hearing was normal [Oropharynx] : the oropharynx was normal [Neck Appearance] : the appearance of the neck was normal [Auscultation Breath Sounds / Voice Sounds] : lungs were clear to auscultation bilaterally [Normal] : the heart rate was normal [Irregularly Irregular] : the rhythm was irregularly irregular [Arterial Pulses Carotid] : carotid pulses were normal with no bruits [Full Pulse] : the pedal pulses are present [Abdomen Soft] : soft [Abdomen Tenderness] : non-tender [No CVA Tenderness] : no ~M costovertebral angle tenderness [No Spinal Tenderness] : no spinal tenderness [Nail Clubbing] : no clubbing  or cyanosis of the fingernails [Skin Turgor] : normal skin turgor [] : no rash [FreeTextEntry1] : NIHSS:\par  \par  LOC Responsiveness: 0\par  LOC Questions: 0\par  LOC Commands: 0\par  Gaze Preference: 0\par  Visual Field: 0\par  Facial Palsy: 1\par  Motor Arm Right: 0\par  Motor Arm Left: 2\par  Motor Leg Right: 0\par  Motor Arm Left: 0\par  Limb Ataxia: 0\par  Sensory: 0\par  Language: 0\par  Speech: 0\par  Extinction/Inattention: 0\par  \par  \par  NIHSS Total: 3\par  \par  Modified Angella Scale: 2\par   [Flattening Of Nasolabial Fold On The Right] : no flattening of the nasolabial fold [Paresis Pronator Drift Right-Sided] : no pronator drift on the right [Hand Weakness Right] : normal hand  [Allodynia] : no ~T allodynia present [Coordination - Dysmetria Impaired Finger-to-Nose Bilateral] : not present [Coordination - Dysmetria Impaired Heel-to-Shin Bilateral] : not present [Plantar Reflex Right Only] : normal on the right [___] : absent on the right [___] : absent on the left [FreeTextEntry4] : No dysarthria or aphasia present [FreeTextEntry8] : Wide-based stance and gait. Hemiparetic gait present. Unable to test for specialized gaits or Romberg sign due to left leg weakness. Coordination exams on left are limited due to weakness.

## 2024-09-12 NOTE — ED ADULT TRIAGE NOTE - LOCATION:
Mr. Marquez arrives for schedule BL knee injections with Durolane. Tolerated well. See procedure note for details.    Right arm;

## 2024-10-18 ENCOUNTER — APPOINTMENT (OUTPATIENT)
Dept: NEUROLOGY | Facility: CLINIC | Age: 59
End: 2024-10-18

## 2024-11-04 NOTE — PATIENT PROFILE ADULT - NSPROALCOHOLUSE2_GEN_A_NUR
Buzz from [facility] with patients insurance plan calling to discuss that patients plan does not cover ozempic and to send alternative. She states she could not advise on an alternative or what would be covered by plan.     RN stated appears Mounjaro was sent in and denied so yesterday 10/30/24 provider sent in [facility].     RN stated appears RN spoke with patient in regards to this but Buzz will also reach out to patient to further discuss.       Micki Bernal RN on 10/31/2024 at 11:31 AM          
Karson calling from [facility]    All weight loss medication unless medically inclusive (DM2) are excluded with her specific insurance plan    If you would like to appeal- Karson states that this can be faxed to their urgent line and will be check on 24-48 hours    Fax [phone]    Please include pt pain and hip replacement needs and how current weight ladi from healing and physical activity       Can submit for both wegovy and scarlett Champagne, RN    Triage Nurse  [facility]      
Patient has been notified to discuss with MTM in alternate encounter (see TE 10/28/24) and verbalized understanding    Closing encounter    VERONICA Meek RN  [facility], [facility]  
Recommend discussing coverage with MTZACH Avalos MD   
Patient to ED for nausea. Pt reports she went out drinking last night. Today, she woke up with nausea that has not resolved.
never

## 2025-05-02 NOTE — STROKE CODE NOTE - ISCHEMIC STROKE_INCLUSION CRITERIA YN
Link to all my YouTube, social media, and more:  [email or web address]    Goal is less than 30-50 grams of carbs daily  Less than 30 grams best           The only rice that best is cauliflower rice    Cauliflower rice:    Cauliflower mashed:      Cauliflower tots     Non-Pharmaceutical Interventions  If the patient is completely opposed to anticoagulants:  A. Left Atrial Appendage Occlusion (LAAO)  Devices like the WATCHMAN can seal off the left atrial appendage, the main source of clots in AFib.  Indicated for patients at high stroke risk who cannot tolerate long-term anticoagulation.  Requires short-term anticoagulation or dual antiplatelet therapy post-procedure.  B. Rhythm and Rate Control  While these do not reduce stroke risk, controlling AFib itself may reduce symptoms and improve quality of life:  Rhythm control: antiarrhythmic drugs, cardioversion, or ablation  Rate control: beta-blockers, calcium channel blockers, digoxin    ?? 3. Lifestyle Modifications (Supportive, Not Substitutes)  These support cardiovascular health but cannot replace anticoagulation for stroke prevention:  Low-carb/keto/carnivore diets to reverse insulin resistance and reduce inflammation  Manage sleep apnea (big AFib trigger!)  Limit alcohol and caffeine  Exercise regularly  Lose weight if overweight  Monitor electrolytes (especially magnesium and potassium)    ?? Important: Patient Education  Make sure the patient understands:  AFib increases stroke risk 5-fold  Stroke from AFib can be disabling or fatal  “Natural” options alone do not provide adequate protection    ?? 1-Month Keto Meal Plan (Detailed Meals + Long, Satisfying Keto Snacks)  ?? Macro Goals (general keto framework):  Carbs: <= 20-30g net carbs/day  Fat: 70-80% of calories  Protein: 20-25% of calories    ?? Week 1  Breakfasts:  Cheesy scrambled eggs cooked in butter with 2 sausage links  Keto patria pudding (patria seeds + unsweetened almond milk + stevia +  cinnamon)  2-egg omelet with spinach and goat cheese  Bulletproof coffee (coffee + butter + MCT oil)  Lunches:  B[place]ant salad (greens, avocado, blue cheese, navarrete, boiled egg, olive oil dressing)  Bunless navarrete cheeseburger with lettuce wrap  Chicken Caesar salad (skip croutons)  Tuna salad with ocasio, celery, and cucumber slices  Dinners:  Ribeye steak with garlic butter asparagus  Pan-seared salmon with cauliflower mash  Ground beef taco bowls (lettuce base, sour cream, avocado)  Roasted chicken thighs with sautéed spinach  Snack Ideas (Long, Detailed):  ?? \"Charcuterie-for-One\" Box: salami slices, pepperoni, aged cheddar cubes, olives, cucumber sticks, and a tiny dark chocolate square (90% cocoa).  ?? Avocado boats: Half an avocado filled with chicken salad or egg salad.  ?? Navarrete-wrapped mozzarella sticks (bake or air-thakkar until crispy, dip in ranch).  ?? Deviled eggs made with real ocasio, mustard, and a dusting of smoked paprika.  ?? Coconut butter bombs (coconut butter mixed with cacao nibs and frozen in silicone molds).    ?? Week 2  Breakfasts:  Full-fat Greek yogurt (unsweetened) with crushed pecans  Keto pancakes (almond flour based) topped with butter  Leftover steak and eggs  Keto smoothie (spinach, collagen powder, unsweetened almond milk, avocado)  Lunches:  Egg salad lettuce wraps  Shrimp and avocado salad  Roast beef roll-ups with cheese and pickle cook  Zucchini noodle Cristiano (heavy cream + parmesan + chicken)  Dinners:  Pork chops with sautéed cabbage  Beef meatballs with zucchini noodles  Roasted salmon with herb butter broccoli  Chicken wings tossed in buffalo sauce and ranch  Snack Ideas (Long, Detailed):  ?? Pickle roll-ups: pickle wrapped in turkey slices and cream cheese.  ?? Nut butter celery boats: celery sticks loaded with almond or macadamia nut butter, sprinkled with hemp seeds.  ?? Keto garlic breadsticks (almond flour, mozzarella dough).  ?? Fat bombs: cream cheese, cocoa powder, stevia,  and coconut oil.  ?? Mini bob avocado bowls (bob cups baked in muffin tins, filled with smashed avocado).    ?? Week 3  Breakfasts:  Bob and eggs  Keto breakfast sandwich (chaffles as the \"bread\" with sausage la and egg)  Keto porridge (hemp hearts, patria seeds, unsweetened almond milk, cinnamon)  Lunches:  Chicken avocado salad  Keto BLT salad  Tuna stuffed avocados  Grilled sausage with mustard dipping sauce  Dinners:  Shrimp scampi over spaghetti squash  Grilled lamb chops with rosemary and cauliflower mash  Keto chicken Cristiano (creamy, no pasta)  Bunless mushroom Swiss burgers  Snack Ideas (Long, Detailed):  ?? Trail mix upgrade: macadamia nuts, Brazil nuts, cacao nibs, coconut flakes (no sugary dried fruits sneaking in!).  ?? Stuffed peppers: mini bell peppers stuffed with herbed cream cheese and wrapped in prosciutto.  ?? Crispy chicken skins (buy extra skins, season, bake until crunchy--better than chips).  ?? Keto mug cake: quick almond flour chocolate cake made in the microwave.  ?? Bob brittle: candied bob (using keto sweetener) with pecans.    ?? Week 4  Breakfasts:  Breakfast sausage patties with avocado  Fried eggs and sautéed mushrooms  Keto smoothie bowl (avocado, spinach, coconut milk, collagen)  Lunches:  Turkey lettuce wraps with guacamole  Grilled chicken thighs over arugula  Eggplant lasagna (thinly sliced eggplant instead of noodles)  Italian antipasto platter (salami, olives, peppers, mozzarella)  Dinners:  Roast duck breast with rosemary green beans  Bunless lamb burgers with tzatziki sauce  Keto beef stroganoff over mashed cauliflower  Pan-fried cod with lemon butter sauce  Snack Ideas (Long, Detailed):  ?? Cucumber sandwiches: cucumber slices with a thick slab of herbed cream cheese in the middle (like a mini sandwich).  ?? Nut butter fat bombs (peanut or almond butter mixed with coconut oil, frozen).  ?? Keto nachos: pork rinds used as chips, topped with ground beef, cheese,  jalapeños, and sour cream.  ?? Keto egg muffins: mini omelets baked in muffin tins with navarrete, spinach, and cheese.  ?? Guacamole boats: halved avocados filled with chunky guacamole and navarrete crumbles.    ?? Grocery List Highlights  (so your cart won't look like you're preparing for a \"cabbage soup diet\" disaster ????)  Eggs, lots of eggs  Avocados (buy ripe and underripe)  Navarrete, sausage, ribeye, chicken thighs  Venus, cod, shrimp  Kathleen flour, coconut flour  Heavy cream, butter, cream cheese, mozzarella  Olive oil, coconut oil, MCT oil  Spinach, arugula, broccoli, cauliflower, zucchini  Pickles, olives, peppers  Nuts: pecans, macadamias, Brazil nuts  Nut butters: almond, macadamia  Raoul seeds, hemp hearts  Dark chocolate (90% cocoa)  Cacao nibs    ?? Doctor's Reminder (Dr. Anne Style):  Keto isn’t about chasing macros to the death.  It’s about healing your metabolic machinery so your body finally becomes the fat-burning [place] it was born to be -- and not the broken-down minivan it’s been treated like. ????  Eat when hungry. Rest when tired. Smile when fed.      ?? Ultimate List of Detailed Keto-Friendly Snacks    ?? 1. Charcuterie Snack Box  What’s in it: Sliced salami, aged cheddar cubes, green olives, pepperoni, and cucumber cook.  Why it works: Perfect fat-protein combo with salty, savory satisfaction. Add mustard for dipping!    ?? 2. Navarrete-Wrapped Cheese Sticks  How to make: Wrap mozzarella cheese sticks in cooked navarrete, air-thakkar or bake until crisp.  Bonus: Dip in ranch or a sugar-free marinara sauce.    ?? 3. Deviled Eggs  Classic recipe: Mix boiled yolks with ocasio, mustard, salt, and paprika. Spoon back into whites.  Upgrade: Add chopped pickles, avocado, or crispy navarrete bits.    ?? 4. Pickle Roll-Ups  How to make: Lay out sliced deli turkey or ham, spread cream cheese, place a pickle spear inside, roll it up.  Crunchy, creamy, tangy -- done!    ?? 5. Coconut Fat Bombs  Base: Coconut butter +  coconut oil + a few drops of vanilla + stevia or monk fruit.  Add-ins: Chopped macadamia nuts or cacao nibs.  Freeze in silicone molds. Portable energy!    ?? 6. Avocado Boats  How to make: Halve an avocado and scoop a little out. Fill the center with tuna salad, egg salad, or guac + bob bits.    ?? 7. Nut Butter Celery Boats  What to do: Fill celery stalks with almond, macadamia, or peanut butter (no added sugar).  Top with: Raoul seeds, hemp seeds, or crushed pecans.    ?? 8. Pork Rind Nachos  How to make: Use pork rinds as chips. Top with shredded cheese, ground beef, jalapeños, and bake until melted.  Serve with: Sour cream and guacamole.    ?? 9. Lettuce Wrap Mini-Tacos  What to do: Use liv or butter lettuce as a shell. Fill with seasoned meat, cheese, sour cream, and salsa.    ?? 10. Keto Trail Mix  Custom blend: Macadamia nuts, Brazil nuts, coconut flakes, pumpkin seeds, and cacao nibs.  Avoid: Store-bought mixes with raisins or hidden sugars.    ?? 11. 90% Dark Chocolate Squares with Nut Butter  Pro tip: Melt a square of dark chocolate and drizzle it over a spoonful of almond butter for a gourmet “fat bomb dessert.”    ?? 12. Keto Garlic Cheese Breadsticks  How to make: Mix mozzarella, almond flour, egg, and garlic powder. Form into sticks and bake until kraus.  Dip into: Marinara or creamy Caesar dressing.    ?? 13. Mini Tuna Salad Cups  How to make: Mix tuna, ocasio, celery, and spices. Serve in cucumber slices or baby bell pepper halves.    ?? 14. Crispy Chicken Skins  How to make: Save chicken skins, season, and bake or air-thakkar until crunchy.  Tastes like chips. No joke.    ?? 15. Keto Mug Cake  Recipe: [place] flour, egg, butter, cocoa powder, and sweetener. Mix in a mug and microwave for 60-90 seconds.  Optional: Top with whipped cream or nut butter.    ?? 16. Keto Egg Muffins  How to make: Whisk eggs with cheese, spinach, bob bits, pour into muffin tins and bake.  Eat hot or cold, perfect  grab-and-go snack.    ?? 17. Butter Coffee Pops  How to make: Blend coffee, butter, and a keto sweetener. Pour into ice cube trays or popsicle molds.  Frozen fat fuel. Summer-approved.    ?? 18. [place] Bites  What to do: Slice cucumbers and sandwich with herbed cream cheese or smoked salmon between two slices.    ?? 19. Cheese Crisps  How to make: Bake small piles of shredded cheese (like cheddar or parmesan) until crisp.  Store-bought option: Whisps or ParmCrisps.    ?? 20. [place] Chicken Dip with Veggies  Recipe: Shredded rotisserie chicken, cream cheese, hot sauce, ranch, and cheddar.  Serve with: Celery sticks, cucumber rounds, or pork rinds.     No

## 2025-05-19 NOTE — PHYSICAL EXAM
Yes [General Appearance - Alert] : alert [General Appearance - In No Acute Distress] : in no acute distress [General Appearance - Well Nourished] : well nourished [Oriented To Time, Place, And Person] : oriented to person, place, and time [Impaired Insight] : insight and judgment were intact [Person] : oriented to person [Place] : oriented to place [Time] : oriented to time [Concentration Intact] : normal concentrating ability [Naming Objects] : no difficulty naming common objects [Repeating Phrases] : no difficulty repeating a phrase [Fluency] : fluency intact [Comprehension] : comprehension intact [Cranial Nerves Optic (II)] : visual acuity intact bilaterally,  visual fields full to confrontation, pupils equal round and reactive to light [Cranial Nerves Oculomotor (III)] : extraocular motion intact [Cranial Nerves Trigeminal (V)] : facial sensation intact symmetrically [Cranial Nerves Vestibulocochlear (VIII)] : hearing was intact bilaterally [Cranial Nerves Glossopharyngeal (IX)] : tongue and palate midline [Cranial Nerves Accessory (XI - Cranial And Spinal)] : head turning and shoulder shrug symmetric [Cranial Nerves Hypoglossal (XII)] : there was no tongue deviation with protrusion [Flattening Of Nasolabial Fold On The Left] : flattening of the  nasolabial fold [Involuntary Movements] : no involuntary movements were seen [Motor Handedness Right-Handed] : the patient is right hand dominant [Hemiparesis Of Left Side] : hemiparesis was present on the left [Paresis Pronator Drift Left-Sided] : a left-sided pronator drift was present [1] : shoulder abduction 1/5 [Hand Weakness Left] : the hand  was weak [5] : ankle plantar flexion 5/5 [2] : ankle dorsiflexion 2/5 [3] : ankle plantar flexion 3/5 [Sensation Tactile Decrease] : light touch was intact [Sensation Pain / Temperature Decrease] : pain and temperature was intact [3+] : Patella left 3+ [0] : Ankle jerk right 0 [1+] : Ankle jerk left 1+ [Plantar Reflex Left Only] : abnormal on the left [Sclera] : the sclera and conjunctiva were normal [PERRL With Normal Accommodation] : pupils were equal in size, round, reactive to light, with normal accommodation [Extraocular Movements] : extraocular movements were intact [Outer Ear] : the ears and nose were normal in appearance [Hearing Threshold Finger Rub Not Gallia] : hearing was normal [Oropharynx] : the oropharynx was normal [Neck Appearance] : the appearance of the neck was normal [Auscultation Breath Sounds / Voice Sounds] : lungs were clear to auscultation bilaterally [Normal] : the heart rate was normal [Irregularly Irregular] : the rhythm was irregularly irregular [Arterial Pulses Carotid] : carotid pulses were normal with no bruits [Full Pulse] : the pedal pulses are present [Abdomen Soft] : soft [Abdomen Tenderness] : non-tender [No CVA Tenderness] : no ~M costovertebral angle tenderness [No Spinal Tenderness] : no spinal tenderness [Nail Clubbing] : no clubbing  or cyanosis of the fingernails [Skin Turgor] : normal skin turgor [] : no rash [FreeTextEntry1] : NIHSS:\par \par LOC Responsiveness: 0\par LOC Questions: 0\par LOC Commands: 0\par Gaze Preference: 0\par Visual Field: 0\par Facial Palsy: 1\par Motor Arm Right: 0\par Motor Arm Left: 2\par Motor Leg Right: 0\par Motor Arm Left: 0\par Limb Ataxia: 0\par Sensory: 0\par Language: 0\par Speech: 0\par Extinction/Inattention: 0\par \par \par NIHSS Total: 3\par \par Modified [place] Scale: 2\par  [Flattening Of Nasolabial Fold On The Right] : no flattening of the nasolabial fold [Hand Weakness Right] : normal hand  [Paresis Pronator Drift Right-Sided] : no pronator drift on the right [Allodynia] : no ~T allodynia present [Coordination - Dysmetria Impaired Finger-to-Nose Bilateral] : not present [Coordination - Dysmetria Impaired Heel-to-Shin Bilateral] : not present [Plantar Reflex Right Only] : normal on the right [___] : absent on the right [___] : absent on the left [FreeTextEntry4] : No dysarthria or aphasia present [FreeTextEntry8] : Wide-based stance and gait. Hemiparetic gait present. Unable to test for specialized gaits or Romberg sign due to left leg weakness. Coordination exams on left are limited due to weakness.

## 2025-06-17 NOTE — PHYSICAL THERAPY INITIAL EVALUATION ADULT - BALANCE DISTURBANCE, IDENTIFIED IMPAIRMENT CONTRIBUTE, REHAB EVAL
Subjective:   Patient ID:  Chandni Wu is a 46 y.o. female who presents for evaluation of No chief complaint on file.      HFU  05/25, H/o PE after fall and foot Fx  S/p thromboectomy  Echo showed EF nml. RV function nml  Troponin peaked 1.3  BNP nml  EKG NSR      Now no cp sob dizizness  Parents had VTE      History of Present Illness    CHIEF COMPLAINT:  - Chandni presents for follow-up after a recent hospitalization for pulmonary embolism and to discuss ongoing management of their condition.    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 Donnie Nieves, to discuss recent lab results and determine anticoagulation therapy duration  - Family history of thrombosis: both parents had clots  - Mother had a saddle pulmonary embolism associated with cancer  - Father recently had a DVT in his right leg following knee surgery  - Denies any symptoms prior to the onset of dyspnea, including bruising, swelling, pain, trouble breathing, or dizziness  - Denies history of cholecystitis or any gallbladder issues    CARDIAC HISTORY:  - Echo: normal heart function  - CTA: pulmonary embolism, RV strain  - EKG: RV function normal, no significant strain    MEDICATIONS:  - Eliquis (apixaban) 5 mg  twice daily for pulmonary embolism  - Discontinued heparin, switched to Eliquis    TEST RESULTS:  - Troponin: Slightly elevated  - A1C: 5.4  - Cholesterol: Within acceptable range  - Triglycerides: 111, mildly elevated    MEDICAL HISTORY:  - Crohn's disease  - Acid reflux  - Hypothyroidism  - Anxiety  - Hydronephrosis: severe, affecting kidneys  - Foot fracture: Due to tripping  - Calcium deficiency    SURGICAL HISTORY:  - Thrombectomy: May, for pulmonary embolism    FAMILY HISTORY:  - Father: recent blood clot in RLE post knee surgery  - Mother: hx of saddle pulmonary embolism (had cancer)    SOCIAL HISTORY:  - No smoking  - No drinking  - Works at multiple schools, gets between 12,000-15,000 steps per day        Results for orders placed during the hospital encounter of 05/21/25    Echo Saline Bubble? No    Interpretation Summary    Left Ventricle: The left ventricle is normal in size. Normal wall thickness. There is normal systolic function. Ejection fraction is approximately 60%. There is normal diastolic function.    Right Ventricle: The right ventricle is normal in size Wall thickness is normal. Systolic function is normal.    Pulmonary Artery: The estimated pulmonary artery systolic pressure is 26 mmHg.    IVC/SVC: Normal venous pressure at 3 mmHg.     No results found for this or any previous visit.       Past Medical History:   Diagnosis Date    Crohn's colitis        Past Surgical History:   Procedure Laterality Date    EXAMINATION UNDER ANESTHESIA N/A 5/21/2025    Procedure: EXAM UNDER ANESTHESIA;  Surgeon: Jackie Dodd MD;  Location: [facility];  Service: Interventional Radiology;  Laterality: N/A;  Pulmonary Embolism Thrombectomy    INTRALUMINAL GASTROINTESTINAL TRACT IMAGING VIA CAPSULE N/A 7/28/2021    Procedure: IMAGING PROCEDURE, GI TRACT, INTRALUMINAL, VIA CAPSULE;  Surgeon: Misbah Menendez RN;  Location: [facility];  Service: Endoscopy;  Laterality: N/A;       Social History[1]    Family  History   Problem Relation Name Age of Onset    Hypertension Father      Breast cancer Maternal Aunt      Hypertension Maternal Grandmother      Hypertension Maternal Grandfather         ROS    Objective:   Physical Exam  HENT:      Head: Normocephalic.   Eyes:      Pupils: Pupils are equal, round, and reactive to light.   Neck:      Thyroid: No thyromegaly.      Vascular: Normal carotid pulses. No carotid bruit or JVD.   Cardiovascular:      Rate and Rhythm: Normal rate and regular rhythm. No extrasystoles are present.     Chest Wall: PMI is not displaced.      Pulses: Normal pulses.      Heart sounds: Normal heart sounds. No murmur heard.     No gallop. No S3 sounds.   Pulmonary:      Effort: No respiratory distress.      Breath sounds: Normal breath sounds. No stridor.   Abdominal:      General: Bowel sounds are normal.      Palpations: Abdomen is soft.      Tenderness: There is no abdominal tenderness. There is no rebound.   Skin:     Findings: No rash.   Neurological:      Mental Status: She is alert and oriented to person, place, and time.   Psychiatric:         Behavior: Behavior normal.         Lab Results   Component Value Date    CHOL 224 (H) 10/13/2023     Lab Results   Component Value Date    HDL 56 10/13/2023     Lab Results   Component Value Date    LDLCALC 125.8 10/13/2023     Lab Results   Component Value Date    TRIG 211 (H) 10/13/2023     Lab Results   Component Value Date    CHOLHDL 25.0 10/13/2023       Chemistry        Component Value Date/Time     05/24/2025 1951     12/18/2024 1748    K 3.6 05/24/2025 1951    K 3.5 12/18/2024 1748     05/24/2025 1951     12/18/2024 1748    CO2 24 05/24/2025 1951    CO2 24 12/18/2024 1748    BUN 11 05/24/2025 1951    CREATININE 0.8 05/24/2025 1951     05/24/2025 1951     12/18/2024 1748        Component Value Date/Time    CALCIUM 9.5 05/24/2025 1951    CALCIUM 9.3 12/18/2024 1748    ALKPHOS 106 05/24/2025 1951    ALKPHOS  111 12/18/2024 1748    AST 19 05/24/2025 1951    AST 14 12/18/2024 1748    ALT 21 05/24/2025 1951    ALT 12 12/18/2024 1748    BILITOT 0.3 05/24/2025 1951    BILITOT 0.2 12/18/2024 1748    ESTGFRAFRICA 88 07/27/2020 1242          Lab Results   Component Value Date    HGBA1C 5.4 10/30/2024     Lab Results   Component Value Date    TSH 1.992 05/21/2025     Lab Results   Component Value Date    INR 0.9 05/21/2025    PROTIME 10.7 05/21/2025     Lab Results   Component Value Date    WBC 9.18 05/24/2025    HGB 11.8 (L) 05/24/2025    HCT 35.7 (L) 05/24/2025    MCV 87 05/24/2025     05/24/2025     BNP  @LABRCNTIP(BNP,BNPTRIAGEBLO)@  CrCl cannot be calculated (Patient's most recent lab result is older than the maximum 7 days allowed.).  No results found in the last 24 hours.  No results found in the last 24 hours.  No results found in the last 24 hours.    Assessment:      1. Acute saddle pulmonary embolism with acute cor pulmonale    2. Other hyperlipidemia    3. Other closed fracture of distal end of left fibula, sequela        Plan:     Assessment & Plan    IMPRESSION:  - Family history of blood clots and recent leg injury are potential contributing factors to PE.  - Normal heart function following PE event.    PULMONARY EMBOLISM AND HEART FAILURE:  - Switched from Eliquis to Xarelto 5 mg twice daily.    HYPERGLYCERIDEMIA:  - Implement weight control measures, follow low-fat diet to help reduce triglyceride levels.    LONG TERM USE OF ANTICOAGULANTS:  - Switched from Eliquis to Xarelto 5 mg twice daily.          Continue Eliquis rx and f/u hematology  RTC as needed    This note was generated with the assistance of ambient listening technology. Verbal consent was obtained by the patient and accompanying visitor(s) for the recording of patient appointment to facilitate this note. I attest to having reviewed and edited the generated note for accuracy, though some syntax or spelling errors may persist. Please contact  the author of this note for any clarification.              [1]   Social History  Tobacco Use    Smoking status: Never    Smokeless tobacco: Never   Substance Use Topics    Alcohol use: Not Currently    Drug use: Yes     Types: Marijuana      impaired motor control/decreased strength/abnormal muscle tone/decreased ROM/impaired coordination/impaired postural control